# Patient Record
Sex: FEMALE | Race: WHITE | Employment: OTHER | URBAN - METROPOLITAN AREA
[De-identification: names, ages, dates, MRNs, and addresses within clinical notes are randomized per-mention and may not be internally consistent; named-entity substitution may affect disease eponyms.]

---

## 2017-01-02 ENCOUNTER — HOSPITAL ENCOUNTER (EMERGENCY)
Age: 77
Discharge: HOME | End: 2017-01-03
Attending: EMERGENCY MEDICINE
Payer: MEDICARE

## 2017-01-02 ENCOUNTER — APPOINTMENT (OUTPATIENT)
Dept: GENERAL RADIOLOGY | Age: 77
End: 2017-01-02
Attending: EMERGENCY MEDICINE
Payer: MEDICARE

## 2017-01-02 ENCOUNTER — APPOINTMENT (OUTPATIENT)
Dept: CT IMAGING | Age: 77
End: 2017-01-02
Attending: EMERGENCY MEDICINE
Payer: MEDICARE

## 2017-01-02 DIAGNOSIS — R53.1 GENERAL WEAKNESS: ICD-10-CM

## 2017-01-02 DIAGNOSIS — M79.10 MYALGIA: Primary | ICD-10-CM

## 2017-01-02 DIAGNOSIS — Z87.448: ICD-10-CM

## 2017-01-02 LAB
ALBUMIN SERPL BCP-MCNC: 3 G/DL (ref 3.5–5)
ALBUMIN/GLOB SERPL: 0.6 {RATIO} (ref 1.1–2.2)
ALP SERPL-CCNC: 68 U/L (ref 45–117)
ALT SERPL-CCNC: 20 U/L (ref 12–78)
ANION GAP BLD CALC-SCNC: 14 MMOL/L (ref 5–15)
APPEARANCE UR: CLEAR
AST SERPL W P-5'-P-CCNC: 15 U/L (ref 15–37)
BACTERIA URNS QL MICRO: NEGATIVE /HPF
BASOPHILS # BLD AUTO: 0 K/UL
BASOPHILS # BLD: 0 %
BILIRUB SERPL-MCNC: 0.6 MG/DL (ref 0.2–1)
BILIRUB UR QL: NEGATIVE
BUN SERPL-MCNC: 29 MG/DL (ref 6–20)
BUN/CREAT SERPL: 13 (ref 12–20)
CALCIUM SERPL-MCNC: 10 MG/DL (ref 8.5–10.1)
CHLORIDE SERPL-SCNC: 100 MMOL/L (ref 97–108)
CK MB CFR SERPL CALC: ABNORMAL % (ref 0–2.5)
CK MB SERPL-MCNC: <1 NG/ML (ref 5–25)
CK SERPL-CCNC: 13 U/L (ref 26–192)
CO2 SERPL-SCNC: 24 MMOL/L (ref 21–32)
COLOR UR: ABNORMAL
CREAT SERPL-MCNC: 2.15 MG/DL (ref 0.55–1.02)
CRP SERPL-MCNC: 20.1 MG/DL (ref 0–0.6)
EOSINOPHIL # BLD: 0.3 K/UL
EOSINOPHIL NFR BLD: 2 %
EPITH CASTS URNS QL MICRO: ABNORMAL /LPF
ERYTHROCYTE [DISTWIDTH] IN BLOOD BY AUTOMATED COUNT: 16.5 % (ref 11.5–14.5)
ERYTHROCYTE [SEDIMENTATION RATE] IN BLOOD: 127 MM/HR (ref 0–30)
GLOBULIN SER CALC-MCNC: 4.8 G/DL (ref 2–4)
GLUCOSE SERPL-MCNC: 106 MG/DL (ref 65–100)
GLUCOSE UR STRIP.AUTO-MCNC: NEGATIVE MG/DL
HCT VFR BLD AUTO: 29.5 % (ref 35–47)
HGB BLD-MCNC: 9.7 G/DL (ref 11.5–16)
HGB UR QL STRIP: NEGATIVE
HYALINE CASTS URNS QL MICRO: ABNORMAL /LPF (ref 0–5)
KETONES UR QL STRIP.AUTO: NEGATIVE MG/DL
LACTATE SERPL-SCNC: 1 MMOL/L (ref 0.4–2)
LEUKOCYTE ESTERASE UR QL STRIP.AUTO: NEGATIVE
LIPASE SERPL-CCNC: 92 U/L (ref 73–393)
LYMPHOCYTES # BLD AUTO: 16 %
LYMPHOCYTES # BLD: 2.2 K/UL
MAGNESIUM SERPL-MCNC: 1.9 MG/DL (ref 1.6–2.4)
MCH RBC QN AUTO: 29.6 PG (ref 26–34)
MCHC RBC AUTO-ENTMCNC: 32.9 G/DL (ref 30–36.5)
MCV RBC AUTO: 89.9 FL (ref 80–99)
METAMYELOCYTES NFR BLD MANUAL: 2 %
MONOCYTES # BLD: 1.1 K/UL
MONOCYTES NFR BLD AUTO: 8 %
NEUTS BAND NFR BLD MANUAL: 5 %
NEUTS SEG # BLD: 9.9 K/UL
NEUTS SEG NFR BLD AUTO: 67 %
NITRITE UR QL STRIP.AUTO: NEGATIVE
PH UR STRIP: 6.5 [PH] (ref 5–8)
PLATELET # BLD AUTO: 296 K/UL (ref 150–400)
POTASSIUM SERPL-SCNC: 3.2 MMOL/L (ref 3.5–5.1)
PROT SERPL-MCNC: 7.8 G/DL (ref 6.4–8.2)
PROT UR STRIP-MCNC: 30 MG/DL
RBC # BLD AUTO: 3.28 M/UL (ref 3.8–5.2)
RBC #/AREA URNS HPF: ABNORMAL /HPF (ref 0–5)
RBC MORPH BLD: ABNORMAL
SODIUM SERPL-SCNC: 138 MMOL/L (ref 136–145)
SP GR UR REFRACTOMETRY: 1.01 (ref 1–1.03)
TROPONIN I SERPL-MCNC: <0.04 NG/ML
UA: UC IF INDICATED,UAUC: ABNORMAL
UROBILINOGEN UR QL STRIP.AUTO: 1 EU/DL (ref 0.2–1)
WBC # BLD AUTO: 13.8 K/UL (ref 3.6–11)
WBC MORPH BLD: ABNORMAL
WBC URNS QL MICRO: ABNORMAL /HPF (ref 0–4)

## 2017-01-02 PROCEDURE — 83605 ASSAY OF LACTIC ACID: CPT | Performed by: EMERGENCY MEDICINE

## 2017-01-02 PROCEDURE — 81001 URINALYSIS AUTO W/SCOPE: CPT | Performed by: EMERGENCY MEDICINE

## 2017-01-02 PROCEDURE — 87086 URINE CULTURE/COLONY COUNT: CPT | Performed by: EMERGENCY MEDICINE

## 2017-01-02 PROCEDURE — 87040 BLOOD CULTURE FOR BACTERIA: CPT | Performed by: EMERGENCY MEDICINE

## 2017-01-02 PROCEDURE — 82550 ASSAY OF CK (CPK): CPT | Performed by: EMERGENCY MEDICINE

## 2017-01-02 PROCEDURE — 74011250636 HC RX REV CODE- 250/636: Performed by: EMERGENCY MEDICINE

## 2017-01-02 PROCEDURE — 86140 C-REACTIVE PROTEIN: CPT | Performed by: EMERGENCY MEDICINE

## 2017-01-02 PROCEDURE — 74176 CT ABD & PELVIS W/O CONTRAST: CPT

## 2017-01-02 PROCEDURE — 84484 ASSAY OF TROPONIN QUANT: CPT | Performed by: EMERGENCY MEDICINE

## 2017-01-02 PROCEDURE — 83690 ASSAY OF LIPASE: CPT | Performed by: EMERGENCY MEDICINE

## 2017-01-02 PROCEDURE — 80053 COMPREHEN METABOLIC PANEL: CPT | Performed by: EMERGENCY MEDICINE

## 2017-01-02 PROCEDURE — 93005 ELECTROCARDIOGRAM TRACING: CPT

## 2017-01-02 PROCEDURE — 36415 COLL VENOUS BLD VENIPUNCTURE: CPT | Performed by: EMERGENCY MEDICINE

## 2017-01-02 PROCEDURE — 96360 HYDRATION IV INFUSION INIT: CPT

## 2017-01-02 PROCEDURE — 85652 RBC SED RATE AUTOMATED: CPT | Performed by: EMERGENCY MEDICINE

## 2017-01-02 PROCEDURE — 71020 XR CHEST PA LAT: CPT

## 2017-01-02 PROCEDURE — 85025 COMPLETE CBC W/AUTO DIFF WBC: CPT | Performed by: EMERGENCY MEDICINE

## 2017-01-02 PROCEDURE — 83735 ASSAY OF MAGNESIUM: CPT | Performed by: EMERGENCY MEDICINE

## 2017-01-02 PROCEDURE — 74011636320 HC RX REV CODE- 636/320: Performed by: EMERGENCY MEDICINE

## 2017-01-02 PROCEDURE — 99285 EMERGENCY DEPT VISIT HI MDM: CPT

## 2017-01-02 RX ORDER — SODIUM CHLORIDE 0.9 % (FLUSH) 0.9 %
5-10 SYRINGE (ML) INJECTION EVERY 8 HOURS
Status: DISCONTINUED | OUTPATIENT
Start: 2017-01-02 | End: 2017-01-03 | Stop reason: HOSPADM

## 2017-01-02 RX ORDER — SODIUM CHLORIDE 0.9 % (FLUSH) 0.9 %
5-10 SYRINGE (ML) INJECTION AS NEEDED
Status: DISCONTINUED | OUTPATIENT
Start: 2017-01-02 | End: 2017-01-03 | Stop reason: HOSPADM

## 2017-01-02 RX ADMIN — DIATRIZOATE MEGLUMINE AND DIATRIZOATE SODIUM 30 ML: 600; 100 SOLUTION ORAL; RECTAL at 22:08

## 2017-01-02 RX ADMIN — SODIUM CHLORIDE 1000 ML: 900 INJECTION, SOLUTION INTRAVENOUS at 21:58

## 2017-01-03 VITALS
WEIGHT: 103.84 LBS | SYSTOLIC BLOOD PRESSURE: 137 MMHG | RESPIRATION RATE: 25 BRPM | HEART RATE: 95 BPM | OXYGEN SATURATION: 93 % | BODY MASS INDEX: 20.93 KG/M2 | HEIGHT: 59 IN | DIASTOLIC BLOOD PRESSURE: 65 MMHG | TEMPERATURE: 99.4 F

## 2017-01-03 LAB
ATRIAL RATE: 111 BPM
CALCULATED P AXIS, ECG09: 22 DEGREES
CALCULATED R AXIS, ECG10: -16 DEGREES
CALCULATED T AXIS, ECG11: 23 DEGREES
DIAGNOSIS, 93000: NORMAL
P-R INTERVAL, ECG05: 156 MS
Q-T INTERVAL, ECG07: 304 MS
QRS DURATION, ECG06: 68 MS
QTC CALCULATION (BEZET), ECG08: 413 MS
VENTRICULAR RATE, ECG03: 111 BPM

## 2017-01-03 NOTE — ED NOTES
Discharge instructions reviewed with pt and copy given along with RX by ER MD Melina Sarah. Pt ambulatory from ED accompanied by spouse in no sign of distress.

## 2017-01-03 NOTE — ED NOTES
Patient presents to ED with C/O fevers, SOB, fatigue, left side abd tenderness, and bilateral leg pain that started 1 month ago. The pt stated she gets SOB when ambulating to the mailbox. The pt stated she was seen in this ED on Saturday night fore the same symptoms. The pt denies chest pain, dizziness, N/V, diarrhea, constipation, and urinary symptoms. Patient is A&Ox3, side rails up, call bell w/in reach, and aware of plan of care. The patient is in NAD.  at the bedside.

## 2017-01-03 NOTE — DISCHARGE INSTRUCTIONS
Fatigue: Care Instructions  Your Care Instructions  Fatigue is a feeling of tiredness, exhaustion, or lack of energy. You may feel fatigue because of too much or not enough activity. It can also come from stress, lack of sleep, boredom, and poor diet. Many medical problems, such as viral infections, can cause fatigue. Emotional problems, especially depression, are often the cause of fatigue. Fatigue is most often a symptom of another problem. Treatment for fatigue depends on the cause. For example, if you have fatigue because you have a certain health problem, treating this problem also treats your fatigue. If depression or anxiety is the cause, treatment may help. Follow-up care is a key part of your treatment and safety. Be sure to make and go to all appointments, and call your doctor if you are having problems. It's also a good idea to know your test results and keep a list of the medicines you take. How can you care for yourself at home? · Get regular exercise. But don't overdo it. Go back and forth between rest and exercise. · Get plenty of rest.  · Eat a healthy diet. Do not skip meals, especially breakfast.  · Reduce your use of caffeine, tobacco, and alcohol. Caffeine is most often found in coffee, tea, cola drinks, and chocolate. · Limit medicines that can cause fatigue. This includes tranquilizers and cold and allergy medicines. When should you call for help? Watch closely for changes in your health, and be sure to contact your doctor if:  · You have new symptoms such as fever or a rash. · Your fatigue gets worse. · You have been feeling down, depressed, or hopeless. Or you may have lost interest in things that you usually enjoy. · You are not getting better as expected. Where can you learn more? Go to http://roel-lonnie.info/. Enter J938 in the search box to learn more about \"Fatigue: Care Instructions. \"  Current as of: May 27, 2016  Content Version: 11.1  © 7015-7346 [employer], [employer]. Care instructions adapted under license by [employer] (which disclaims liability or warranty for this information). If you have questions about a medical condition or this instruction, always ask your healthcare professional. [address] any warranty or liability for your use of this information. Dr. Reyna Davila feels that we should not restart your prednisone just yet and would prefer to have Dr. Mora Mcgregor help decide the next steps in your treatment. Based off labs tonight and your last ED visit it is likely that you are having a re-occurrence of your Acute Interstitial Nephritis, if things continue to escalate please return to the ED and we will likely need to admit you for further work-up, but based off current labs and studies you do not require inpatient admission at this time and can be followed up as an outpatient with Dr. Mora Mcgregor to help discuss the next steps in your care.

## 2017-01-03 NOTE — ED PROVIDER NOTES
HPI Comments: Rach Traylor is a 64 y.o. female with PMhx significant for HTN and ARF who presents ambulatory to the ED with cc of increased fatigue, fever x 2 days, abdominal pain x 2 days, rhinorrhea, SAMPSON, and bilateral lower extremity pain and weakness upon ambulation. Pt also notes decreased appetite secondary to symptoms. Pt was evaluated in the ED on 2016 for similar symptoms with noted worsened creatinine. Pt was told to discontinue her lisinopril and double her dose of amlodipine at that time. She states that she has been compliant with medication changes but notes onset of fever and worsening of symptoms after leaving the ED. She denies any changes in her SOB with position. She denies taking any medications for relief of symptoms or any other relieving or exacerbating factors. Pt specifically denies any cough, urinary frequency, hematuria, dysuria, nausea, vomiting, diarrhea, congestion, headache, ear pain, sore throat, sinus pressure, or leg swelling.     Neprhologist: Dr. Savannah Avendaño  PCP: Cookie Shipman MD     There are no other complaints, changes or physical findings at this time. The history is provided by the patient and the spouse. No  was used.         Past Medical History:   Diagnosis Date    Arthritis     Gastrointestinal disorder      GERD    GERD (gastroesophageal reflux disease)     Glaucoma     High cholesterol     Hypertension     Ill-defined condition      chronic cystitis    Kidney failure 10/14/2016       Past Surgical History:   Procedure Laterality Date    Hx tonsillectomy      Hx urological       cauterization of bladder ulcers    Hx gyn           Hx hysterectomy      Hx gyn           Family History:   Problem Relation Age of Onset    Cancer Father     Cancer Brother        Social History     Social History    Marital status:      Spouse name: N/A    Number of children: N/A    Years of education: N/A     Occupational History    Not on file. Social History Main Topics    Smoking status: Former Smoker     Packs/day: 0.50     Years: 4.00     Quit date: 7/29/1997    Smokeless tobacco: Not on file    Alcohol use Yes      Comment: seldom    Drug use: Yes     Special: Prescription    Sexual activity: Not on file     Other Topics Concern    Not on file     Social History Narrative    ** Merged History Encounter **              ALLERGIES: Review of patient's allergies indicates no known allergies. Review of Systems   Constitutional: Positive for appetite change and fever. Negative for chills and fatigue. HENT: Positive for rhinorrhea. Negative for congestion, ear pain, sinus pressure and sore throat. Eyes: Negative. Respiratory: Positive for shortness of breath. Negative for cough, choking, chest tightness and wheezing. Cardiovascular: Negative. Negative for chest pain, palpitations and leg swelling. Gastrointestinal: Positive for abdominal pain. Negative for constipation, diarrhea, nausea and vomiting. Endocrine: Negative. Genitourinary: Negative. Negative for difficulty urinating, dysuria, flank pain, hematuria and urgency. Musculoskeletal: Positive for myalgias. Skin: Negative. Neurological: Positive for weakness. Negative for dizziness, speech difficulty, light-headedness, numbness and headaches. Psychiatric/Behavioral: Negative. All other systems reviewed and are negative.       Patient Vitals for the past 12 hrs:   Temp Pulse Resp BP SpO2   01/03/17 0050 - 95 25 - 93 %   01/02/17 2320 - 97 30 - 94 %   01/02/17 2300 - 97 (!) 32 132/81 97 %   01/02/17 2245 - 96 29 134/65 95 %   01/02/17 2230 - 97 30 146/69 96 %   01/02/17 2215 - (!) 102 20 132/67 97 %   01/02/17 2200 - (!) 108 21 129/69 95 %   01/02/17 2145 - (!) 103 22 126/66 93 %   01/02/17 2134 - (!) 107 22 - 95 %   01/02/17 2133 - - - 134/71 -   01/02/17 2115 - (!) 105 16 123/62 95 %   01/02/17 2100 - (!) 101 18 117/59 93 %   01/02/17 2050 - - - 114/64 95 %   01/02/17 2046 - (!) 107 20 - 94 %   01/02/17 2030 - (!) 103 23 147/65 97 %   01/02/17 2015 - (!) 104 16 153/75 100 %   01/02/17 2013 - (!) 105 25 123/83 97 %   01/02/17 1814 99.4 °F (37.4 °C) (!) 123 18 123/79 96 %            Physical Exam   Constitutional: She is oriented to person, place, and time. She appears well-developed and well-nourished. No distress. HENT:   Head: Normocephalic and atraumatic. Mouth/Throat: Oropharynx is clear and moist. No oropharyngeal exudate. Eyes: Conjunctivae and EOM are normal. Pupils are equal, round, and reactive to light. Neck: Normal range of motion. Neck supple. No JVD present. No tracheal deviation present. Cardiovascular: Regular rhythm, normal heart sounds and intact distal pulses. No murmur heard. Tachycardic     Pulmonary/Chest: Effort normal and breath sounds normal. No stridor. No respiratory distress. She has no wheezes. She has no rales. She exhibits no tenderness. Abdominal: Soft. She exhibits no distension. There is no tenderness. There is no rebound and no guarding. Musculoskeletal: Normal range of motion. She exhibits no edema or tenderness. Neurological: She is alert and oriented to person, place, and time. No cranial nerve deficit. No gross motor or sensory deficits    Skin: Skin is warm and dry. She is not diaphoretic. Psychiatric: She has a normal mood and affect. Her behavior is normal.   Nursing note and vitals reviewed.        MDM  Number of Diagnoses or Management Options  Diagnosis management comments: DDx: colitis, diverticulitis, UTI, viral illness, dehydration       Amount and/or Complexity of Data Reviewed  Clinical lab tests: ordered and reviewed  Tests in the radiology section of CPT®: ordered and reviewed  Tests in the medicine section of CPT®: ordered and reviewed  Obtain history from someone other than the patient: yes  Review and summarize past medical records: yes  Discuss the patient with other providers: yes (Nephrology)  Independent visualization of images, tracings, or specimens: yes    Patient Progress  Patient progress: stable      Procedures     ED EKG interpretation:  Rhythm: sinus tachycardia; and regular . Rate (approx.): 111; Axis: normal; P wave: normal; QRS interval: normal ; ST/T wave: normal;  This EKG was interpreted by Rico Garcia DO,ED Provider. Progress Note:  10:55 PM  Discussed elevated SED rate with the pt. Concern for possible polymyalgia or viral myositis. However, CK is not elevated to suggest viral myositis. Will restart pt on high dose steroids with close follow up with Dr. Caleb Cristobal to discuss further treatment. CONSULT NOTE:   12:02 AM   Rico Garcia DO spoke with Isaac Sharp MD,   Specialty: Nephrology  Discussed pt's hx, disposition, and available diagnostic and imaging results. Reviewed care plans. Consultant agrees with plans as outlined. Dr. Renetta Salcedo advises that he thinks pt is having a recurrence of her AIN and would not start her on steroids at this time and what pt needs is to f/u with Dr. Caleb Cristobal in office and discuss starting steroids with him. He notes Dr. Caleb Cristobal is out of town until next week and so he will send a message to Dr. Judith Gerard nurse tomorrow to get pt a clinic appointment as soon as possible. He further recommends that if pt's sxs worsen she should return to the ED and be admitted for further eval/management  Written by Nicole Miranda.  Fu, ED Scribe, as dictated by Rico Garcia DO.      LABORATORY TESTS:  Recent Results (from the past 12 hour(s))   EKG, 12 LEAD, INITIAL    Collection Time: 01/02/17  6:15 PM   Result Value Ref Range    Ventricular Rate 111 BPM    Atrial Rate 111 BPM    P-R Interval 156 ms    QRS Duration 68 ms    Q-T Interval 304 ms    QTC Calculation (Bezet) 413 ms    Calculated P Axis 22 degrees    Calculated R Axis -16 degrees    Calculated T Axis 23 degrees    Diagnosis       Sinus tachycardia  When compared with ECG of 31-DEC-2016 17:05,  No significant change was found     CBC WITH AUTOMATED DIFF    Collection Time: 01/02/17  8:50 PM   Result Value Ref Range    WBC 13.8 (H) 3.6 - 11.0 K/uL    RBC 3.28 (L) 3.80 - 5.20 M/uL    HGB 9.7 (L) 11.5 - 16.0 g/dL    HCT 29.5 (L) 35.0 - 47.0 %    MCV 89.9 80.0 - 99.0 FL    MCH 29.6 26.0 - 34.0 PG    MCHC 32.9 30.0 - 36.5 g/dL    RDW 16.5 (H) 11.5 - 14.5 %    PLATELET 117 253 - 853 K/uL    NEUTROPHILS 67 %    BANDS 5 %    LYMPHOCYTES 16 %    MONOCYTES 8 %    EOSINOPHILS 2 %    BASOPHILS 0 %    METAMYELOCYTES 2 %    ABS. NEUTROPHILS 9.9 K/UL    ABS. LYMPHOCYTES 2.2 K/UL    ABS. MONOCYTES 1.1 K/UL    ABS. EOSINOPHILS 0.3 K/UL    ABS. BASOPHILS 0.0 K/UL    RBC COMMENTS ANISOCYTOSIS  1+        WBC COMMENTS TOXIC GRANULATION     METABOLIC PANEL, COMPREHENSIVE    Collection Time: 01/02/17  8:50 PM   Result Value Ref Range    Sodium 138 136 - 145 mmol/L    Potassium 3.2 (L) 3.5 - 5.1 mmol/L    Chloride 100 97 - 108 mmol/L    CO2 24 21 - 32 mmol/L    Anion gap 14 5 - 15 mmol/L    Glucose 106 (H) 65 - 100 mg/dL    BUN 29 (H) 6 - 20 MG/DL    Creatinine 2.15 (H) 0.55 - 1.02 MG/DL    BUN/Creatinine ratio 13 12 - 20      GFR est AA 27 (L) >60 ml/min/1.73m2    GFR est non-AA 22 (L) >60 ml/min/1.73m2    Calcium 10.0 8.5 - 10.1 MG/DL    Bilirubin, total 0.6 0.2 - 1.0 MG/DL    ALT 20 12 - 78 U/L    AST 15 15 - 37 U/L    Alk.  phosphatase 68 45 - 117 U/L    Protein, total 7.8 6.4 - 8.2 g/dL    Albumin 3.0 (L) 3.5 - 5.0 g/dL    Globulin 4.8 (H) 2.0 - 4.0 g/dL    A-G Ratio 0.6 (L) 1.1 - 2.2     CK W/ CKMB & INDEX    Collection Time: 01/02/17  8:50 PM   Result Value Ref Range    CK 13 (L) 26 - 192 U/L    CK - MB <1.0 <3.6 NG/ML    CK-MB Index CANNOT BE CALCULATED 0 - 2.5     TROPONIN I    Collection Time: 01/02/17  8:50 PM   Result Value Ref Range    Troponin-I, Qt. <0.04 <0.05 ng/mL   LIPASE    Collection Time: 01/02/17  8:50 PM   Result Value Ref Range    Lipase 92 73 - 393 U/L   MAGNESIUM    Collection Time: 01/02/17  8:50 PM   Result Value Ref Range    Magnesium 1.9 1.6 - 2.4 mg/dL   URINALYSIS W/ REFLEX CULTURE    Collection Time: 01/02/17  8:50 PM   Result Value Ref Range    Color YELLOW/STRAW      Appearance CLEAR CLEAR      Specific gravity 1.014 1.003 - 1.030      pH (UA) 6.5 5.0 - 8.0      Protein 30 (A) NEG mg/dL    Glucose NEGATIVE  NEG mg/dL    Ketone NEGATIVE  NEG mg/dL    Bilirubin NEGATIVE  NEG      Blood NEGATIVE  NEG      Urobilinogen 1.0 0.2 - 1.0 EU/dL    Nitrites NEGATIVE  NEG      Leukocyte Esterase NEGATIVE  NEG      WBC 5-10 0 - 4 /hpf    RBC 0-5 0 - 5 /hpf    Epithelial cells FEW FEW /lpf    Bacteria NEGATIVE  NEG /hpf    UA:UC IF INDICATED URINE CULTURE ORDERED (A) CNI      Hyaline Cast 0-2 0 - 5 /lpf   SED RATE (ESR)    Collection Time: 01/02/17  8:50 PM   Result Value Ref Range    Sed rate, automated 127 (H) 0 - 30 mm/hr   C REACTIVE PROTEIN, QT    Collection Time: 01/02/17  8:50 PM   Result Value Ref Range    C-Reactive protein 20.10 (H) 0.00 - 0.60 mg/dL   LACTIC ACID, PLASMA    Collection Time: 01/02/17  8:52 PM   Result Value Ref Range    Lactic acid 1.0 0.4 - 2.0 MMOL/L       IMAGING RESULTS:  CT ABD PELV WO CONT   Final Result     INDICATION: ABD LLQ, will get PO contrast      EXAM: CT Abdomen and CT Pelvis without contrast.  CT dose reduction was achieved through use of a standardized protocol tailored  for this examination and automatic exposure control for dose modulation.     FINDINGS:   There are nonobstructing left intrarenal stones. There is no  hydroureteronephrosis. The kidneys are normal in size. There is no perirenal  fluid or ascites.      There are gallstones without apparent inflammation. Liver shows no apparent  significant finding without contrast. Pancreas, adrenal glands, spleen and aorta  show no significant enlargement. No focal inflammation is seen. There is no  free air or substantial adenopathy.     The bladder is midline and the distal ureters are not dilated. There is no  apparent pelvic mass. Appendix is not seen. There are a few colonic diverticula. There is mild to moderate stool.     IMPRESSION  IMPRESSION: No Acute Disease. XR CHEST PA LAT   Final Result     INDICATION: fatigue, fever     EXAM: CXR 2 Views.     COMPARISON: 12/31/2016.     FINDINGS: Frontal and lateral views of the chest show the lungs are free of  acute disease. Heart size is normal. There is no overt pulmonary edema. There is  no evident pneumothorax, adenopathy or pleural effusion.         IMPRESSION  IMPRESSION: No acute disease. No significant interval change. MEDICATIONS GIVEN:  Medications   sodium chloride (NS) flush 5-10 mL (not administered)   sodium chloride (NS) flush 5-10 mL (not administered)   sodium chloride 0.9 % bolus infusion 1,000 mL (1,000 mL IntraVENous New Bag 1/2/17 9282)   diatrizoate meglumine-d.sodium (MD-GASTROVIEW,GASTROGRAFIN) 66-10 % contrast solution 30 mL (30 mL Oral Given 1/2/17 8240)       IMPRESSION:  1. Myalgia    2. General weakness    3. History of interstitial nephritis        PLAN:  1. Current Discharge Medication List      CONTINUE these medications which have NOT CHANGED    Details   amLODIPine (NORVASC) 10 mg tablet Take 1 Tab by mouth daily. Qty: 30 Tab, Refills: 0      famotidine (PEPCID) 20 mg tablet Take 1 Tab by mouth two (2) times a day. Indications: GASTROESOPHAGEAL REFLUX  Qty: 180 Tab, Refills: 1      latanoprost (XALATAN) 0.005 % ophthalmic solution Administer 1 Drop to both eyes daily. multivitamin (ONE A DAY) tablet Take 2 Tabs by mouth daily. cholecalciferol, vitamin D3, (VITAMIN D3) 2,000 unit tab Take 1 Tab by mouth daily. Associated Diagnoses: Osteopenia      lovastatin (MEVACOR) 40 mg tablet Take 1 Tab by mouth daily. Qty: 90 Tab, Refills: 3    Associated Diagnoses: Other and unspecified hyperlipidemia      Aspirin, Buffered 81 mg tab Take 1 Tab by mouth daily.       timolol (BETIMOL) 0.25 % ophthalmic solution Administer 2 Drops to both eyes two (2) times a day. use in affected eye(s)       vitamin c-vitamin e (CRANBERRY CONCENTRATE) cap Take 1 Cap by mouth two (2) times a day. 2.   Follow-up Information     Follow up With Details Comments [id number] Socrates Borrero MD   [address] [facility]  [address] [facility] [address] [facility]      MD Dr. Jess Sotelo is who I spoke with this evening  Fercho Shaun [address] [place] [address]  [address] [phone]          Return to ED if worse     DISCHARGE NOTE  12:57 AM  Pt has been re-evaluated. She has no new complaints, changes, or physical findings. All diagnostic results have been reviewed and discussed with the pt. Care plan has been outlined and discussed, and she understands all current sx, dx, tx, and rx. All of the pt's questions have been answered and concerns addressed. All medications have been reviewed with the pt. She was instructed to and agrees to follow up with her PCP and nephrologist, or to return to the ED should her sxs worsen prior to follow up. Thuy Severino is ready for discharge. This note is prepared by Baudilio Simons, acting as Scribe for Sabine De Leon, [facility], DO : The scribe's documentation has been prepared under my direction and personally reviewed by me in its entirety. I confirm that the note above accurately reflects all work, treatment, procedures, and medical decision making performed by me. Excerpt from pt's discharge instructions:   Dr. Jess Masterson feels that we should not restart your prednisone just yet and would prefer to have Dr. Lorie Gilford help decide the next steps in your treatment.   Based off labs tonight and your last ED visit it is likely that you are having a re-occurrence of your Acute Interstitial Nephritis, if things continue to escalate please return to the ED and we will likely need to admit you for further work-up, but based off current labs and studies you do not require inpatient admission at this time and can be followed up as an outpatient with Dr. Ace Keen to help discuss the next steps in your care.

## 2017-01-04 LAB
BACTERIA SPEC CULT: NORMAL
CC UR VC: NORMAL
SERVICE CMNT-IMP: NORMAL

## 2017-01-07 LAB
BACTERIA SPEC CULT: NORMAL
SERVICE CMNT-IMP: NORMAL

## 2017-01-08 RX ORDER — LOVASTATIN 40 MG/1
40 TABLET ORAL DAILY
Qty: 90 TAB | Refills: 1 | Status: CANCELLED | OUTPATIENT
Start: 2017-01-08

## 2017-01-09 ENCOUNTER — OFFICE VISIT (OUTPATIENT)
Dept: INTERNAL MEDICINE CLINIC | Age: 77
End: 2017-01-09

## 2017-01-09 VITALS
WEIGHT: 102.2 LBS | TEMPERATURE: 97.5 F | BODY MASS INDEX: 20.6 KG/M2 | SYSTOLIC BLOOD PRESSURE: 121 MMHG | DIASTOLIC BLOOD PRESSURE: 72 MMHG | OXYGEN SATURATION: 99 % | RESPIRATION RATE: 16 BRPM | HEIGHT: 59 IN | HEART RATE: 98 BPM

## 2017-01-09 DIAGNOSIS — M79.10 MUSCLE PAIN: Primary | ICD-10-CM

## 2017-01-09 RX ORDER — FAMOTIDINE 20 MG/1
20 TABLET, FILM COATED ORAL 2 TIMES DAILY
Qty: 180 TAB | Refills: 1 | Status: SHIPPED | OUTPATIENT
Start: 2017-01-09 | End: 2017-04-07 | Stop reason: SDUPTHER

## 2017-01-09 NOTE — PROGRESS NOTES
She is a 64 y.o. female who presents for follow up on medications. In with . Seen in ED on  with muscle aches and weakness. She reports both legs are very achy with exertion. Feels generally weak with muscle soreness. She has been on mevacor for years without difficulty. Labs in ED normal except creatinine 2.15, increased from 2.05 Dec 31. Sees Dr. Светлана Mclaughlin, nephrology. Avoids NSAIDS, increase water intake. ROS:  Constitutional: negative for fevers, chills, anorexia and weight loss  Eyes:   negative for visual disturbance,  irritation  ENT:   negative for tinnitus,sore throat,nasal congestion,ear pain, sinus pain. Respiratory:  negative for cough, chest congestion, dyspnea,wheezing  CV:   negative for chest pain, palpitations, lower extremity edema  GI:   negative for nausea, vomiting, diarrhea, abdominal pain,melena  Genitourinary: negative for frequency, dysuria, hematuria  Musculoskel: negative for arthralgias, back pain, muscle weakness, joint pain, positive for muscle pain  Neurological:  negative for headaches, dizziness, focal weakness, numbness      Past Medical History   Diagnosis Date    Arthritis     Gastrointestinal disorder      GERD    GERD (gastroesophageal reflux disease)     Glaucoma     High cholesterol     Hypertension     Ill-defined condition      chronic cystitis    Kidney failure 10/14/2016       Past Surgical History   Procedure Laterality Date    Hx tonsillectomy      Hx urological       cauterization of bladder ulcers    Hx gyn           Hx hysterectomy      Hx gyn         Family History   Problem Relation Age of Onset    Cancer Father     Cancer Brother        Social History     Social History    Marital status:      Spouse name: N/A    Number of children: N/A    Years of education: N/A     Occupational History    Not on file.      Social History Main Topics    Smoking status: Former Smoker     Packs/day: 0.50 Years: 4.00     Quit date: 7/29/1997    Smokeless tobacco: Not on file    Alcohol use Yes      Comment: seldom    Drug use: Yes     Special: Prescription    Sexual activity: Not on file     Other Topics Concern    Not on file     Social History Narrative    ** Merged History Encounter **              Current Outpatient Prescriptions:     lovastatin (MEVACOR) 40 mg tablet, Take 1 Tab by mouth daily. , Disp: 90 Tab, Rfl: 1    famotidine (PEPCID) 20 mg tablet, Take 1 Tab by mouth two (2) times a day. Indications: GASTROESOPHAGEAL REFLUX, Disp: 180 Tab, Rfl: 1    amLODIPine (NORVASC) 10 mg tablet, Take 1 Tab by mouth daily. , Disp: 30 Tab, Rfl: 0    latanoprost (XALATAN) 0.005 % ophthalmic solution, Administer 1 Drop to both eyes daily. , Disp: , Rfl:     multivitamin (ONE A DAY) tablet, Take 2 Tabs by mouth daily. , Disp: , Rfl:     cholecalciferol, vitamin D3, (VITAMIN D3) 2,000 unit tab, Take 1 Tab by mouth daily. , Disp: , Rfl:     vitamin c-vitamin e (CRANBERRY CONCENTRATE) cap, Take 1 Cap by mouth two (2) times a day., Disp: , Rfl:     Aspirin, Buffered 81 mg tab, Take 1 Tab by mouth daily. , Disp: , Rfl:     timolol (BETIMOL) 0.25 % ophthalmic solution, Administer 2 Drops to both eyes two (2) times a day. use in affected eye(s) , Disp: , Rfl:        Visit Vitals    /72 (BP 1 Location: Left arm, BP Patient Position: Sitting)    Pulse 98    Temp 97.5 °F (36.4 °C) (Oral)    Resp 16    Ht 4' 11\" (1.499 m)    Wt 102 lb 3.2 oz (46.4 kg)    SpO2 99%    BMI 20.64 kg/m2       Physical Examination:   General - Well appearing female  Pulm - clear to auscultation bilaterally  Cardio - RRR, normal S1 S2  Abd - soft, nontender, no masses, no HSM  Extrem - no edema, +2 distal pulses  Neuro- alert, cautious steady gait. Assessment/Plan:    1. Muscle pain-stop lovastatin. Call if symptoms persist. Increase water intake, increase potassium and magnesium in diet.        Follow-up Disposition:  Return in about 3 months (around 4/9/2017) for Alissa Mario.  Vaishnavi Aguiar MD

## 2017-01-09 NOTE — PROGRESS NOTES
Reviewed record in preparation for visit and have obtained necessary documentation. Identified pt with two pt identifiers(name and ). Chief Complaint   Patient presents with   [facility] Follow Up     seen in ED 16 pt states still feeling lethargic c/o of no appetite, HA, bilateral leg burning pain          Coordination of Care Questionnaire:  :     1) Have you been to an emergency room, urgent care clinic since your last visit? Yes 16 ED [facility] ED    Hospitalized since your last visit? no             2) Have you seen or consulted any other health care providers outside of [address] [place] [address] since your last visit? no  (Include any pap smears or colon screenings in this section.)      Patient is accompanied by spouse I have received verbal consent from Rach Traylor to discuss any/all medical information while they are present in the room.

## 2017-01-09 NOTE — TELEPHONE ENCOUNTER
From: Jani Haywood  To: Chely Rizo MD  Sent: 1/8/2017 10:08 PM EST  Subject: Medication Renewal Request    Original authorizing provider: MD Jani Noland would like a refill of the following medications:  lovastatin (MEVACOR) 40 mg tablet Chely Rizo MD]  famotidine (PEPCID) 20 mg tablet Chely Rizo MD]    [facility] [address]    Comment:

## 2017-01-09 NOTE — PATIENT INSTRUCTIONS
STOP THE LOVASTATIN FOR THE NEXT 2 WEEKS TO SEE IF THE MUSCLE PAIN RESOLVES. IF IT DOES, CONTINUE OFF THE MEDICATION. IF IT DOES NOT, LET ME KNOW.

## 2017-01-09 NOTE — MR AVS SNAPSHOT
Visit Information Date & Time Provider Department Dept. Phone Encounter #  
 1/9/2017 11:45 AM Mery Garner, [address] [id number] Follow-up Instructions Return in about 3 months (around 4/9/2017) for Alissa MarioSegun Viktoriya Inocencio Your Appointments 2/7/2017 10:30 AM  
ROUTINE CARE with Mery Garner MD  
[facility] [address]) Appt Note: 3 month follow up meds [address] [facility] [phone]  
[address] [facility] [address] [place] [address] Upcoming Health Maintenance Date Due  
 GLAUCOMA SCREENING Q2Y 3/18/2003 OSTEOPOROSIS SCREENING (DEXA) 3/18/2003 Pneumococcal 65+ High/Highest Risk (2 of 2 - PPSV23) 1/1/2012 MEDICARE YEARLY EXAM 9/4/2016 DTaP/Tdap/Td series (2 - Td) 9/4/2025 Allergies as of 1/9/2017  Review Complete On: 1/9/2017 By: José Tong No Known Allergies Current Immunizations  Never Reviewed Name Date Influenza Vaccine (Quad) PF 10/24/2016 10:25 AM  
 Pneumococcal Vaccine (Unspecified Type) 1/1/2007 Tdap 9/4/2015  1:01 PM  
 Zoster Vaccine, Live 1/1/2013 Not reviewed this visit Vitals BP Pulse Temp Resp Height(growth percentile) Weight(growth percentile) 121/72 (BP 1 Location: Left arm, BP Patient Position: Sitting) 98 97.5 °F (36.4 °C) (Oral) 16 4' 11\" (1.499 m) 102 lb 3.2 oz (46.4 kg) SpO2 BMI OB Status Smoking Status 99% 20.64 kg/m2 Hysterectomy Former Smoker BMI and BSA Data Body Mass Index Body Surface Area  
 20.64 kg/m 2 1.39 m 2 Preferred Pharmacy Pharmacy Name Phone [facility] [address] [place] [address] [address] [facility] [address] [place] [address] [phone] Your Updated Medication List  
  
   
This list is accurate as of: 1/9/17 12:40 PM.  Always use your most recent med list. amLODIPine 10 mg tablet Commonly known as:  Wendy Jocelyn Take 1 Tab by mouth daily. aspirin, buffered 81 mg Tab Take 1 Tab by mouth daily. CRANBERRY CONCENTRATE Cap Generic drug:  vitamin c-vitamin e Take 1 Cap by mouth two (2) times a day. famotidine 20 mg tablet Commonly known as:  PEPCID Take 1 Tab by mouth two (2) times a day. Indications: GASTROESOPHAGEAL REFLUX  
  
 latanoprost 0.005 % ophthalmic solution Commonly known as:  Verta Piety Administer 1 Drop to both eyes daily. lovastatin 40 mg tablet Commonly known as:  MEVACOR Take 1 Tab by mouth daily. multivitamin tablet Commonly known as:  ONE A DAY Take 2 Tabs by mouth daily. timolol 0.25 % ophthalmic solution Commonly known as:  Se Spaniel Administer 2 Drops to both eyes two (2) times a day. use in affected eye(s) VITAMIN D3 2,000 unit Tab Generic drug:  cholecalciferol (vitamin D3) Take 1 Tab by mouth daily. Follow-up Instructions Return in about 3 months (around 4/9/2017) for Alissa Singh .  
  
  
Patient Instructions STOP THE LOVASTATIN FOR THE NEXT 2 WEEKS TO SEE IF THE MUSCLE PAIN RESOLVES. IF IT DOES, CONTINUE OFF THE MEDICATION. IF IT DOES NOT, LET ME KNOW. Introducing [facility] & HEALTH SERVICES! Dear [facility]: Thank you for requesting a [employer] account. Our records indicate that you already have an active [employer] account. You can access your account anytime at [email or web address]. "[employer] Did you know that you can access your hospital and ER discharge instructions at any time in [employer]? You can also review all of your test results from your hospital stay or ER visit. Additional Information If you have questions, please visit the Frequently Asked Questions section of the [employer] website at [email or web address]. "[employer]/. Remember, [employer] is NOT to be used for urgent needs. For medical emergencies, dial 911. Now available from your iPhone and Android! Please provide this summary of care documentation to your next provider. Your primary care clinician is listed as Aneta Calix. If you have any questions after today's visit, please call [phone].

## 2017-01-10 ENCOUNTER — APPOINTMENT (OUTPATIENT)
Dept: GENERAL RADIOLOGY | Age: 77
End: 2017-01-10
Attending: EMERGENCY MEDICINE
Payer: MEDICARE

## 2017-01-10 ENCOUNTER — HOSPITAL ENCOUNTER (EMERGENCY)
Age: 77
Discharge: HOME | End: 2017-01-10
Attending: EMERGENCY MEDICINE
Payer: MEDICARE

## 2017-01-10 VITALS
SYSTOLIC BLOOD PRESSURE: 137 MMHG | DIASTOLIC BLOOD PRESSURE: 79 MMHG | WEIGHT: 100.75 LBS | RESPIRATION RATE: 22 BRPM | OXYGEN SATURATION: 98 % | TEMPERATURE: 97.5 F | BODY MASS INDEX: 20.31 KG/M2 | HEIGHT: 59 IN | HEART RATE: 109 BPM

## 2017-01-10 DIAGNOSIS — M79.10 MUSCLE PAIN: ICD-10-CM

## 2017-01-10 DIAGNOSIS — R53.83 FATIGUE, UNSPECIFIED TYPE: Primary | ICD-10-CM

## 2017-01-10 DIAGNOSIS — E83.52 HYPERCALCEMIA: ICD-10-CM

## 2017-01-10 LAB
ALBUMIN SERPL BCP-MCNC: 2.9 G/DL (ref 3.5–5)
ALBUMIN/GLOB SERPL: 0.6 {RATIO} (ref 1.1–2.2)
ALP SERPL-CCNC: 78 U/L (ref 45–117)
ALT SERPL-CCNC: 18 U/L (ref 12–78)
ANION GAP BLD CALC-SCNC: 13 MMOL/L (ref 5–15)
APPEARANCE UR: CLEAR
AST SERPL W P-5'-P-CCNC: 12 U/L (ref 15–37)
BACTERIA URNS QL MICRO: NEGATIVE /HPF
BASOPHILS # BLD AUTO: 0.1 K/UL (ref 0–0.1)
BASOPHILS # BLD: 1 % (ref 0–1)
BILIRUB SERPL-MCNC: 0.3 MG/DL (ref 0.2–1)
BILIRUB UR QL: NEGATIVE
BNP SERPL-MCNC: 779 PG/ML (ref 0–450)
BUN SERPL-MCNC: 25 MG/DL (ref 6–20)
BUN/CREAT SERPL: 12 (ref 12–20)
CALCIUM SERPL-MCNC: 12.6 MG/DL (ref 8.5–10.1)
CHLORIDE SERPL-SCNC: 107 MMOL/L (ref 97–108)
CK SERPL-CCNC: 14 U/L (ref 26–192)
CO2 SERPL-SCNC: 21 MMOL/L (ref 21–32)
COLOR UR: ABNORMAL
CREAT SERPL-MCNC: 2.15 MG/DL (ref 0.55–1.02)
D DIMER PPP FEU-MCNC: 0.45 MG/L FEU (ref 0–0.65)
EOSINOPHIL # BLD: 0.4 K/UL (ref 0–0.4)
EOSINOPHIL NFR BLD: 4 % (ref 0–7)
EPITH CASTS URNS QL MICRO: ABNORMAL /LPF
ERYTHROCYTE [DISTWIDTH] IN BLOOD BY AUTOMATED COUNT: 16.2 % (ref 11.5–14.5)
GLOBULIN SER CALC-MCNC: 4.9 G/DL (ref 2–4)
GLUCOSE SERPL-MCNC: 105 MG/DL (ref 65–100)
GLUCOSE UR STRIP.AUTO-MCNC: NEGATIVE MG/DL
HCT VFR BLD AUTO: 26.2 % (ref 35–47)
HGB BLD-MCNC: 8.7 G/DL (ref 11.5–16)
HGB UR QL STRIP: NEGATIVE
KETONES UR QL STRIP.AUTO: NEGATIVE MG/DL
LEUKOCYTE ESTERASE UR QL STRIP.AUTO: ABNORMAL
LYMPHOCYTES # BLD AUTO: 17 % (ref 12–49)
LYMPHOCYTES # BLD: 1.9 K/UL (ref 0.8–3.5)
MAGNESIUM SERPL-MCNC: 2.3 MG/DL (ref 1.6–2.4)
MCH RBC QN AUTO: 29 PG (ref 26–34)
MCHC RBC AUTO-ENTMCNC: 33.2 G/DL (ref 30–36.5)
MCV RBC AUTO: 87.3 FL (ref 80–99)
MONOCYTES # BLD: 0.8 K/UL (ref 0–1)
MONOCYTES NFR BLD AUTO: 7 % (ref 5–13)
NEUTS SEG # BLD: 8 K/UL (ref 1.8–8)
NEUTS SEG NFR BLD AUTO: 71 % (ref 32–75)
NITRITE UR QL STRIP.AUTO: NEGATIVE
PH UR STRIP: 6.5 [PH] (ref 5–8)
PLATELET # BLD AUTO: 395 K/UL (ref 150–400)
POTASSIUM SERPL-SCNC: 3 MMOL/L (ref 3.5–5.1)
PROT SERPL-MCNC: 7.8 G/DL (ref 6.4–8.2)
PROT UR STRIP-MCNC: NEGATIVE MG/DL
RBC # BLD AUTO: 3 M/UL (ref 3.8–5.2)
RBC #/AREA URNS HPF: ABNORMAL /HPF (ref 0–5)
RBC MORPH BLD: ABNORMAL
SODIUM SERPL-SCNC: 141 MMOL/L (ref 136–145)
SP GR UR REFRACTOMETRY: 1.01 (ref 1–1.03)
TROPONIN I SERPL-MCNC: <0.04 NG/ML
UA: UC IF INDICATED,UAUC: ABNORMAL
UROBILINOGEN UR QL STRIP.AUTO: 0.2 EU/DL (ref 0.2–1)
WBC # BLD AUTO: 11.2 K/UL (ref 3.6–11)
WBC MORPH BLD: ABNORMAL
WBC URNS QL MICRO: ABNORMAL /HPF (ref 0–4)

## 2017-01-10 PROCEDURE — 84484 ASSAY OF TROPONIN QUANT: CPT | Performed by: EMERGENCY MEDICINE

## 2017-01-10 PROCEDURE — 96360 HYDRATION IV INFUSION INIT: CPT

## 2017-01-10 PROCEDURE — 85379 FIBRIN DEGRADATION QUANT: CPT | Performed by: EMERGENCY MEDICINE

## 2017-01-10 PROCEDURE — 82550 ASSAY OF CK (CPK): CPT | Performed by: EMERGENCY MEDICINE

## 2017-01-10 PROCEDURE — 74011250636 HC RX REV CODE- 250/636: Performed by: EMERGENCY MEDICINE

## 2017-01-10 PROCEDURE — 71020 XR CHEST PA LAT: CPT

## 2017-01-10 PROCEDURE — 81001 URINALYSIS AUTO W/SCOPE: CPT | Performed by: EMERGENCY MEDICINE

## 2017-01-10 PROCEDURE — 83735 ASSAY OF MAGNESIUM: CPT | Performed by: EMERGENCY MEDICINE

## 2017-01-10 PROCEDURE — 99285 EMERGENCY DEPT VISIT HI MDM: CPT

## 2017-01-10 PROCEDURE — 87086 URINE CULTURE/COLONY COUNT: CPT | Performed by: EMERGENCY MEDICINE

## 2017-01-10 PROCEDURE — 85025 COMPLETE CBC W/AUTO DIFF WBC: CPT | Performed by: EMERGENCY MEDICINE

## 2017-01-10 PROCEDURE — 83880 ASSAY OF NATRIURETIC PEPTIDE: CPT | Performed by: EMERGENCY MEDICINE

## 2017-01-10 PROCEDURE — 93005 ELECTROCARDIOGRAM TRACING: CPT

## 2017-01-10 PROCEDURE — 74011250637 HC RX REV CODE- 250/637: Performed by: EMERGENCY MEDICINE

## 2017-01-10 PROCEDURE — 36415 COLL VENOUS BLD VENIPUNCTURE: CPT | Performed by: EMERGENCY MEDICINE

## 2017-01-10 PROCEDURE — 80053 COMPREHEN METABOLIC PANEL: CPT | Performed by: EMERGENCY MEDICINE

## 2017-01-10 RX ORDER — POTASSIUM CHLORIDE 750 MG/1
20 TABLET, FILM COATED, EXTENDED RELEASE ORAL
Status: COMPLETED | OUTPATIENT
Start: 2017-01-10 | End: 2017-01-10

## 2017-01-10 RX ADMIN — SODIUM CHLORIDE 500 ML: 900 INJECTION, SOLUTION INTRAVENOUS at 13:25

## 2017-01-10 RX ADMIN — POTASSIUM CHLORIDE 20 MEQ: 750 TABLET, FILM COATED, EXTENDED RELEASE ORAL at 16:31

## 2017-01-10 NOTE — DISCHARGE INSTRUCTIONS
- Please stop taking lovastatin as previously prescribed by primary care provider. Please increase water intake. Please follow up with Dr. Tri Bryant to discuss elevated calcium. Fatigue: Care Instructions  Your Care Instructions  Fatigue is a feeling of tiredness, exhaustion, or lack of energy. You may feel fatigue because of too much or not enough activity. It can also come from stress, lack of sleep, boredom, and poor diet. Many medical problems, such as viral infections, can cause fatigue. Emotional problems, especially depression, are often the cause of fatigue. Fatigue is most often a symptom of another problem. Treatment for fatigue depends on the cause. For example, if you have fatigue because you have a certain health problem, treating this problem also treats your fatigue. If depression or anxiety is the cause, treatment may help. Follow-up care is a key part of your treatment and safety. Be sure to make and go to all appointments, and call your doctor if you are having problems. It's also a good idea to know your test results and keep a list of the medicines you take. How can you care for yourself at home? · Get regular exercise. But don't overdo it. Go back and forth between rest and exercise. · Get plenty of rest.  · Eat a healthy diet. Do not skip meals, especially breakfast.  · Reduce your use of caffeine, tobacco, and alcohol. Caffeine is most often found in coffee, tea, cola drinks, and chocolate. · Limit medicines that can cause fatigue. This includes tranquilizers and cold and allergy medicines. When should you call for help? Watch closely for changes in your health, and be sure to contact your doctor if:  · You have new symptoms such as fever or a rash. · Your fatigue gets worse. · You have been feeling down, depressed, or hopeless. Or you may have lost interest in things that you usually enjoy. · You are not getting better as expected. Where can you learn more?   Go to http://roel-lonnie.info/. Enter K136 in the search box to learn more about \"Fatigue: Care Instructions. \"  Current as of: May 27, 2016  Content Version: 11.1  © 9533-3064 [employer]. Care instructions adapted under license by [employer] (which disclaims liability or warranty for this information). If you have questions about a medical condition or this instruction, always ask your healthcare professional. [place] [address] any warranty or liability for your use of this information. Musculoskeletal Pain: Care Instructions  Your Care Instructions  Different problems with the bones, muscles, nerves, ligaments, and tendons in the body can cause pain. One or more areas of your body may ache or burn. Or they may feel tired, stiff, or sore. The medical term for this type of pain is musculoskeletal pain. It can have many different causes. Sometimes the pain is caused by an injury such as a strain or sprain. Or you might have pain from using one part of your body in the same way over and over again. This is called overuse. In some cases, the cause of the pain is another health problem such as arthritis or fibromyalgia. The doctor will examine you and ask you questions about your health to help find the cause of your pain. Blood tests or imaging tests like an X-ray may also be helpful. But sometimes doctors can't find a cause of the pain. Treatment depends on your symptoms and the cause of the pain, if known. The doctor has checked you carefully, but problems can develop later. If you notice any problems or new symptoms, get medical treatment right away. Follow-up care is a key part of your treatment and safety. Be sure to make and go to all appointments, and call your doctor if you are having problems. It's also a good idea to know your test results and keep a list of the medicines you take. How can you care for yourself at home?   · Rest until you feel better. · Do not do anything that makes the pain worse. Return to exercise gradually if you feel better and your doctor says it's okay. · Be safe with medicines. Read and follow all instructions on the label. ¨ If the doctor gave you a prescription medicine for pain, take it as prescribed. ¨ If you are not taking a prescription pain medicine, ask your doctor if you can take an over-the-counter medicine. · Put ice or a cold pack on the area for 10 to 20 minutes at a time to ease pain. Put a thin cloth between the ice and your skin. When should you call for help? Call your doctor now or seek immediate medical care if:  · You have new pain, or your pain gets worse. · You have new symptoms such as a fever, a rash, or chills. Watch closely for changes in your health, and be sure to contact your doctor if:  · You do not get better as expected. Where can you learn more? Go to http://roel-lonnie.info/. Enter Z253 in the search box to learn more about \"Musculoskeletal Pain: Care Instructions. \"  Current as of: February 19, 2016  Content Version: 11.1  © 8758-1714 [employer]. Care instructions adapted under license by [employer] (which disclaims liability or warranty for this information). If you have questions about a medical condition or this instruction, always ask your healthcare professional. [address] any warranty or liability for your use of this information.

## 2017-01-10 NOTE — ED NOTES
Patient in room resting in bed. No distress observed. Needs assessed and addressed. Side rails up x 2 and bed in low position with call bell in reach. Will continue to monitor.

## 2017-01-10 NOTE — ED NOTES
Discharge instructions given to and reviewed with patient and her  by Dr. Shanae Lopez. Patient verbalized her understanding and agreed to drink 2-3 glasses of water upon arrival home.

## 2017-01-10 NOTE — ED PROVIDER NOTES
HPI Comments: Brigid Dixon is a 64 y.o. female with PMHx significant for HTN, kidney failure, and GERD, who presents ambulatory to [facility] ED with cc of bilateral leg pain and intermittent SOB. Patient reports aching pain and weakness in her bilateral legs and arms. She states walking aggravates her leg pain and causes her thighs to \"burn. \" Patient notes SOB for several weeks and states dyspnea is exacerbated with exertion. She reports additional fatigue and states she is constantly sleeping. She reports being admitted to the hospital for 10 days for acute renal failure on 10/14/16. Patient states she was prescribed tapered Prednisone from 10/24/16 until 16, which she states has \"thrown her out of whack. \" She reports she saw her PCP yesterday, who instructed her to discontinue Lovastatin. Patient denies a history of DVT or PE. She denies any recent travel, surgery, or hormone therapy. Patient denies hematemesis, hematochezia, cough, fever, chills, abdominal pain, vomiting, diarrhea, nausea, or hemoptysis. PCP: Aletha Madison MD      There are no other complaints, changes, or physical findings at this time. Written by CAROLYN Angulo, as dictated by Elisabeth Munoz MD.       The history is provided by the patient. No  was used.         Past Medical History:   Diagnosis Date    Arthritis     Gastrointestinal disorder      GERD    GERD (gastroesophageal reflux disease)     Glaucoma     High cholesterol     Hypertension     Ill-defined condition      chronic cystitis    Kidney failure 10/14/2016       Past Surgical History:   Procedure Laterality Date    Hx tonsillectomy      Hx urological       cauterization of bladder ulcers    Hx gyn           Hx hysterectomy      Hx gyn           Family History:   Problem Relation Age of Onset    Cancer Father     Cancer Brother        Social History     Social History    Marital status:      Spouse name: N/A    Number of children: N/A    Years of education: N/A     Occupational History    Not on file. Social History Main Topics    Smoking status: Former Smoker     Packs/day: 0.50     Years: 4.00     Quit date: 7/29/1997    Smokeless tobacco: Not on file    Alcohol use Yes      Comment: seldom    Drug use: Yes     Special: Prescription    Sexual activity: Not on file     Other Topics Concern    Not on file     Social History Narrative    ** Merged History Encounter **              ALLERGIES: Review of patient's allergies indicates no known allergies. Review of Systems   Constitutional: Positive for fatigue. Negative for chills and fever. HENT: Negative for congestion, rhinorrhea and sore throat. Eyes: Negative for pain, discharge and visual disturbance. Respiratory: Positive for shortness of breath. Negative for cough, chest tightness and wheezing. Negative for hemoptysis   Cardiovascular: Negative for chest pain, palpitations and leg swelling. Gastrointestinal: Negative for abdominal pain, blood in stool, constipation, diarrhea, nausea and vomiting. Negative for hematemesis   Genitourinary: Negative for dysuria, frequency and hematuria. Musculoskeletal: Positive for myalgias (BL legs and arms). Negative for arthralgias and back pain. Skin: Negative for rash. Neurological: Negative for dizziness, weakness, light-headedness and headaches. Psychiatric/Behavioral: Negative. All other systems reviewed and are negative.     Patient Vitals for the past 12 hrs:   Temp Pulse Resp BP SpO2   01/10/17 1630 - (!) 109 22 137/79 98 %   01/10/17 1530 - (!) 105 18 140/64 98 %   01/10/17 1400 - 97 21 130/61 98 %   01/10/17 1300 - 92 22 128/64 96 %   01/10/17 1230 - 99 23 131/67 95 %   01/10/17 1208 - 95 10 - 99 %   01/10/17 1206 - (!) 101 14 136/70 99 %   01/10/17 1057 97.5 °F (36.4 °C) (!) 117 18 110/71 98 %       Physical Exam   Constitutional: She is oriented to person, place, and time. She appears well-developed and well-nourished. No distress. HENT:   Head: Normocephalic and atraumatic. Eyes: EOM are normal. Right eye exhibits no discharge. Left eye exhibits no discharge. No scleral icterus. Neck: Normal range of motion. Neck supple. No tracheal deviation present. Cardiovascular: Normal rate, regular rhythm, normal heart sounds and intact distal pulses. Exam reveals no gallop and no friction rub. No murmur heard. Pulses:       Femoral pulses are 2+ on the right side, and 2+ on the left side. Dorsalis pedis pulses are 2+ on the right side, and 2+ on the left side. Pulmonary/Chest: Effort normal and breath sounds normal. No respiratory distress. She has no wheezes. She has no rales. Abdominal: Soft. She exhibits no distension. There is no tenderness. Genitourinary:   Genitourinary Comments: Rectal Exam: Guaiac negative. Musculoskeletal: Normal range of motion. She exhibits no edema. No calf tenderness   Lymphadenopathy:     She has no cervical adenopathy. Neurological: She is alert and oriented to person, place, and time. No focal neuro deficits   Skin: Skin is warm and dry. No rash noted. No erythema or warmth to BL calves. Psychiatric: She has a normal mood and affect. Nursing note and vitals reviewed. MDM  Number of Diagnoses or Management Options  Fatigue, unspecified type:   Hypercalcemia:   Muscle pain:   Diagnosis management comments:     Patient presents to ED with diffuse myalgias and dyspnea. She saw PCP yesterday for myalgias and was instructed to d/c lovastatin. 1. Myalgias, fatigue - Differential includes medication reaction, rhabdomyolysis, electrolyte abnormality, dehydration, ARF  2.  Dyspnea - Differential includes heart failure, pulmonary edema, pleural effusion, pneumonia, lung mass, ACS, PE  - CBC, CMP, Mg, UA  - Stephanie  - D-dimer, low suspicion for PE  - CXR  - IVF         Amount and/or Complexity of Data Reviewed  Clinical lab tests: ordered and reviewed  Tests in the radiology section of CPT®: ordered and reviewed  Tests in the medicine section of CPT®: ordered and reviewed  Review and summarize past medical records: yes  Discuss the patient with other providers: yes (PCP)  Independent visualization of images, tracings, or specimens: yes    Patient Progress  Patient progress: stable    ED Course       Procedures     EKG interpretation: (Preliminary) 1102  Rhythm: normal sinus rhythm; and regular . Rate (approx.): 98; Axis: normal; AK interval: normal; QRS interval: normal ; ST/T wave: non-specific T wave abnormality; Other findings: unchanged from previous EKG. Written by Joy Urbano, ED Scribe, as dictated by Regi Kemp MD.    Procedure Note - Rectal Exam:   2:10 PM  Performed by: Regi Kemp MD  Rectal exam performed. No stool was collected. Stool was Hemoccult tested, and found to be heme Negative. The procedure took 1-15 minutes, and pt tolerated well. Written by Joy Urbano, CAROLYN Scribe, as dictated by Regi Kemp MD.    CONSULT NOTE:   3:00 PM  Regi Kemp MD spoke with Dr. Pito Taylor,  Specialty: Hospitalist  Discussed pt's hx, disposition, and available diagnostic and imaging results. Reviewed care plans. Consultant agrees with plans as outlined. Patient does not require hospitalization for hypercalcemia. Written by Regi Kemp MD.    CONSULT NOTE:   4:23 PM  Regi Kemp MD spoke with Dr. Andrea Espinoza,   Specialty: Primary Care  Discussed pt's hx, disposition, and available diagnostic and imaging results. Reviewed care plans. Consultant agrees with plans as outlined. Encourage patient to hydrate and follow-up closely with Dr. Aristides Membreno. Written by Joy Urbano ED Scribe, as dictated by Regi Kemp MD.    PROGRESS NOTE:  4:25 PM  Calcium 12.6 - likely contributing to symptoms. Discussed with hospitalist - patient does not require admission for hypercalcemia.   Hgb 8.7 - no s/s to suggest GI bleed, guaiac negative. Potassium 3 - supplemented with 20 meq due to CKD, which is stable. Discussed results with patient and encouraged hydration and close PCP follow up for reevaluation of calcium. Discussed results, prescriptions and follow up plan with patient. Provided customary return to ED instructions. Patient expressed understanding. Whitney Hall MD      LABORATORY TESTS:  Recent Results (from the past 12 hour(s))   EKG, 12 LEAD, INITIAL    Collection Time: 01/10/17 11:02 AM   Result Value Ref Range    Ventricular Rate 98 BPM    Atrial Rate 98 BPM    P-R Interval 178 ms    QRS Duration 72 ms    Q-T Interval 340 ms    QTC Calculation (Bezet) 434 ms    Calculated P Axis 9 degrees    Calculated R Axis -11 degrees    Calculated T Axis 4 degrees    Diagnosis       Normal sinus rhythm  Nonspecific ST and T wave abnormality  When compared with ECG of 02-JAN-2017 18:15,  Nonspecific T wave abnormality, worse in Inferior leads     CBC WITH AUTOMATED DIFF    Collection Time: 01/10/17  1:15 PM   Result Value Ref Range    WBC 11.2 (H) 3.6 - 11.0 K/uL    RBC 3.00 (L) 3.80 - 5.20 M/uL    HGB 8.7 (L) 11.5 - 16.0 g/dL    HCT 26.2 (L) 35.0 - 47.0 %    MCV 87.3 80.0 - 99.0 FL    MCH 29.0 26.0 - 34.0 PG    MCHC 33.2 30.0 - 36.5 g/dL    RDW 16.2 (H) 11.5 - 14.5 %    PLATELET 615 366 - 016 K/uL    NEUTROPHILS 71 32 - 75 %    LYMPHOCYTES 17 12 - 49 %    MONOCYTES 7 5 - 13 %    EOSINOPHILS 4 0 - 7 %    BASOPHILS 1 0 - 1 %    ABS. NEUTROPHILS 8.0 1.8 - 8.0 K/UL    ABS. LYMPHOCYTES 1.9 0.8 - 3.5 K/UL    ABS. MONOCYTES 0.8 0.0 - 1.0 K/UL    ABS. EOSINOPHILS 0.4 0.0 - 0.4 K/UL    ABS.  BASOPHILS 0.1 0.0 - 0.1 K/UL    RBC COMMENTS ANISOCYTOSIS  1+        WBC COMMENTS TOXIC GRANULATION     METABOLIC PANEL, COMPREHENSIVE    Collection Time: 01/10/17  1:15 PM   Result Value Ref Range    Sodium 141 136 - 145 mmol/L    Potassium 3.0 (L) 3.5 - 5.1 mmol/L    Chloride 107 97 - 108 mmol/L    CO2 21 21 - 32 mmol/L    Anion gap 13 5 - 15 mmol/L    Glucose 105 (H) 65 - 100 mg/dL    BUN 25 (H) 6 - 20 MG/DL    Creatinine 2.15 (H) 0.55 - 1.02 MG/DL    BUN/Creatinine ratio 12 12 - 20      GFR est AA 27 (L) >60 ml/min/1.73m2    GFR est non-AA 22 (L) >60 ml/min/1.73m2    Calcium 12.6 (H) 8.5 - 10.1 MG/DL    Bilirubin, total 0.3 0.2 - 1.0 MG/DL    ALT 18 12 - 78 U/L    AST 12 (L) 15 - 37 U/L    Alk.  phosphatase 78 45 - 117 U/L    Protein, total 7.8 6.4 - 8.2 g/dL    Albumin 2.9 (L) 3.5 - 5.0 g/dL    Globulin 4.9 (H) 2.0 - 4.0 g/dL    A-G Ratio 0.6 (L) 1.1 - 2.2     TROPONIN I    Collection Time: 01/10/17  1:15 PM   Result Value Ref Range    Troponin-I, Qt. <0.04 <0.05 ng/mL   CK W/ REFLX CKMB    Collection Time: 01/10/17  1:15 PM   Result Value Ref Range    CK 14 (L) 26 - 192 U/L   MAGNESIUM    Collection Time: 01/10/17  1:15 PM   Result Value Ref Range    Magnesium 2.3 1.6 - 2.4 mg/dL   PRO-BNP    Collection Time: 01/10/17  1:15 PM   Result Value Ref Range    NT pro- (H) 0 - 450 PG/ML   URINALYSIS W/ REFLEX CULTURE    Collection Time: 01/10/17  1:25 PM   Result Value Ref Range    Color YELLOW/STRAW      Appearance CLEAR CLEAR      Specific gravity 1.010 1.003 - 1.030      pH (UA) 6.5 5.0 - 8.0      Protein NEGATIVE  NEG mg/dL    Glucose NEGATIVE  NEG mg/dL    Ketone NEGATIVE  NEG mg/dL    Bilirubin NEGATIVE  NEG      Blood NEGATIVE  NEG      Urobilinogen 0.2 0.2 - 1.0 EU/dL    Nitrites NEGATIVE  NEG      Leukocyte Esterase TRACE (A) NEG      WBC 5-10 0 - 4 /hpf    RBC 0-5 0 - 5 /hpf    Epithelial cells FEW FEW /lpf    Bacteria NEGATIVE  NEG /hpf    UA:UC IF INDICATED URINE CULTURE ORDERED (A) CNI     D DIMER    Collection Time: 01/10/17  2:12 PM   Result Value Ref Range    D-dimer 0.45 0.00 - 0.65 mg/L FEU       IMAGING RESULTS:  XR CHEST PA LAT   Final Result   Indication: dyspnea      Exam: PA and lateral views of the chest.     Direct comparison is made to prior CXR dated January 2, 2017.     Findings: Cardiomediastinal silhouette is within normal limits. Lungs are clear  bilaterally. Pleural spaces are normal. Osseous structures are intact.     IMPRESSION  IMPRESSION: No acute cardiopulmonary disease. MEDICATIONS GIVEN:  Medications   sodium chloride 0.9 % bolus infusion 500 mL (0 mL IntraVENous IV Completed 1/10/17 1425)   potassium chloride SR (KLOR-CON 10) tablet 20 mEq (20 mEq Oral Given 1/10/17 1631)       IMPRESSION:  1. Fatigue, unspecified type    2. Muscle pain    3. Hypercalcemia        PLAN:  1. Discharge Medication List as of 1/10/2017  4:24 PM      CONTINUE these medications which have NOT CHANGED    Details   famotidine (PEPCID) 20 mg tablet Take 1 Tab by mouth two (2) times a day. Indications: GASTROESOPHAGEAL REFLUX, Normal, Disp-180 Tab, R-1      lovastatin (MEVACOR) 40 mg tablet Take 1 Tab by mouth daily. , Normal, Disp-90 Tab, R-1      amLODIPine (NORVASC) 10 mg tablet Take 1 Tab by mouth daily. , Print, Disp-30 Tab, R-0      latanoprost (XALATAN) 0.005 % ophthalmic solution Administer 1 Drop to both eyes daily. , Historical Med      multivitamin (ONE A DAY) tablet Take 2 Tabs by mouth daily. , Historical Med      cholecalciferol, vitamin D3, (VITAMIN D3) 2,000 unit tab Take 1 Tab by mouth daily. , Historical Med      vitamin c-vitamin e (CRANBERRY CONCENTRATE) cap Take 1 Cap by mouth two (2) times a day., Historical Med      Aspirin, Buffered 81 mg tab Take 1 Tab by mouth daily. , Historical Med      timolol (BETIMOL) 0.25 % ophthalmic solution Administer 2 Drops to both eyes two (2) times a day. use in affected eye(s) , Historical Med           2.    Follow-up Information     Follow up With Details Comments Contact Info    Mery Garner MD  Please follow up with Dr. Juan Awan as soon as possible [address] [place] [address]  [phone]      [facility] [place] EM[facility] DEPT  As needed, If symptoms worsen Apolinar Oscar  [address] [facility]  [phone]        Return to ED if worse     Discharge Note:  4:26 PM  The patient has been re-evaluated and is ready for discharge. Reviewed available results with patient. Counseled patient on diagnosis and care plan. Patient has expressed understanding, and all questions have been answered. Patient agrees with plan and agrees to follow up as recommended, or to return to the ED if their symptoms worsen. Discharge instructions have been provided and explained to the patient, along with reasons to return to the ED. Written by Hermilo Ragland, ED Scribe, as dictated by Lottie Beasley MD.    This note is prepared by Hermilo Ragland, acting as Scribe for Lottie Beasley MD,. Lottie Beasley MD,: The scribe's documentation has been prepared under my direction and personally reviewed by me in its entirety. I confirm that the note above accurately reflects all work, treatment, procedures, and medical decision making performed by me.

## 2017-01-10 NOTE — ED NOTES
Assumed care of patient who is resting on stretcher in position of comfor with call bell in reach. Patient on cardiac monitor showing  bpm. Patient has multiple complaints including BLE burning when she walks, fatigue, memory loss since starting prednisone several months ago, elevated electrolytes, SOB. And increased sleeping. \"I just overall don't feel good. \" Patient in NAD.  at bedside. She takes prednisone for kidney failure for which she sees Dr. Daniele Rosa. Will continue to monitor.

## 2017-01-10 NOTE — ED NOTES
Dr. Rios Dinning in to update patient and  on test results. D-Dimer needs to be recollected due to short sample. Re-ordered.

## 2017-01-10 NOTE — ED NOTES
Patient in room resting in bed. No distress observed. Needs assessed and addressed. Potassium Chloride to be given PO. Side rails up x 2 and bed in low position with call bell in reach. Will continue to monitor.

## 2017-01-10 NOTE — ED NOTES
states pt has been c/o of \"feeling bad\" for some time. He also states pt was admitted recently for a high Creatinine level. Pt has continued to stay feeling bad since then.  expresses some concern about Prednisone usage and her mental status as well, to include irritability.  states pt has a hx of very high anxiety and is concerned that this may be causing her symptoms.

## 2017-01-11 ENCOUNTER — TELEPHONE (OUTPATIENT)
Dept: INTERNAL MEDICINE CLINIC | Age: 77
End: 2017-01-11

## 2017-01-11 LAB
ATRIAL RATE: 98 BPM
CALCULATED P AXIS, ECG09: 9 DEGREES
CALCULATED R AXIS, ECG10: -11 DEGREES
CALCULATED T AXIS, ECG11: 4 DEGREES
DIAGNOSIS, 93000: NORMAL
P-R INTERVAL, ECG05: 178 MS
Q-T INTERVAL, ECG07: 340 MS
QRS DURATION, ECG06: 72 MS
QTC CALCULATION (BEZET), ECG08: 434 MS
VENTRICULAR RATE, ECG03: 98 BPM

## 2017-01-11 NOTE — TELEPHONE ENCOUNTER
Patient states she needs a call back to discuss her increase in dosage on her Amlodipine. Patient states she thinks she is having side effects from medication. Patient states she has a History of Kidney problems. Please call to discuss.  Thank you

## 2017-01-12 ENCOUNTER — TELEPHONE (OUTPATIENT)
Dept: INTERNAL MEDICINE CLINIC | Age: 77
End: 2017-01-12

## 2017-01-12 DIAGNOSIS — E87.6 HYPOKALEMIA: ICD-10-CM

## 2017-01-12 DIAGNOSIS — E83.52 HYPERCALCEMIA: Primary | ICD-10-CM

## 2017-01-12 LAB
BACTERIA SPEC CULT: NORMAL
CC UR VC: NORMAL
SERVICE CMNT-IMP: NORMAL

## 2017-01-12 NOTE — TELEPHONE ENCOUNTER
1/12/17 @ 1:36pm Returning patient's call. Unable to speak with patient but left message for patient to call back.

## 2017-01-12 NOTE — TELEPHONE ENCOUNTER
Patient states she feels her symptoms on loss of memory & muscle pain is related to her amlodipine after reading up on the medication. Patient reports d/c Lovastatin. She did not increasing mag/K+ in diet \"because the levels were high in the ER\". Of note, mag was 2.3 & Potassium low at 3.0. Patient states she is drinking 5-6 glasses of water (8oz) which she states \"is not enough\". Advised will forward to Dr. Miriam York for recommendations.

## 2017-01-13 NOTE — TELEPHONE ENCOUNTER
Advised patient of recommendations per Dr. Cirilo East. I explained her symptoms are unlikely to be related to amlodipine & to continue med. Since Calcium was high in ED, we need repeat labs, if not tomorrow, then Monday.  Increase dietary potassium. Pt states she is not taking any Calcium supplements. I have reviewed the provider's instructions with the patient, answering all questions to her satisfaction.

## 2017-01-16 ENCOUNTER — LAB ONLY (OUTPATIENT)
Dept: INTERNAL MEDICINE CLINIC | Age: 77
End: 2017-01-16

## 2017-01-16 ENCOUNTER — HOSPITAL ENCOUNTER (OUTPATIENT)
Dept: LAB | Age: 77
Discharge: HOME | End: 2017-01-16
Payer: MEDICARE

## 2017-01-16 DIAGNOSIS — E83.52 HYPERCALCEMIA: ICD-10-CM

## 2017-01-16 DIAGNOSIS — E87.6 HYPOKALEMIA: ICD-10-CM

## 2017-01-16 PROCEDURE — 36415 COLL VENOUS BLD VENIPUNCTURE: CPT

## 2017-01-16 PROCEDURE — 80048 BASIC METABOLIC PNL TOTAL CA: CPT

## 2017-01-16 PROCEDURE — 83970 ASSAY OF PARATHORMONE: CPT

## 2017-01-17 ENCOUNTER — OFFICE VISIT (OUTPATIENT)
Dept: INTERNAL MEDICINE CLINIC | Age: 77
End: 2017-01-17

## 2017-01-17 ENCOUNTER — APPOINTMENT (OUTPATIENT)
Dept: CT IMAGING | Age: 77
DRG: 641 | End: 2017-01-17
Attending: EMERGENCY MEDICINE
Payer: MEDICARE

## 2017-01-17 ENCOUNTER — HOSPITAL ENCOUNTER (INPATIENT)
Age: 77
LOS: 2 days | Discharge: HOME | DRG: 641 | End: 2017-01-19
Attending: EMERGENCY MEDICINE | Admitting: INTERNAL MEDICINE
Payer: MEDICARE

## 2017-01-17 VITALS
DIASTOLIC BLOOD PRESSURE: 76 MMHG | OXYGEN SATURATION: 98 % | TEMPERATURE: 97.7 F | HEIGHT: 59 IN | SYSTOLIC BLOOD PRESSURE: 137 MMHG | RESPIRATION RATE: 19 BRPM | BODY MASS INDEX: 19.88 KG/M2 | WEIGHT: 98.6 LBS | HEART RATE: 99 BPM

## 2017-01-17 DIAGNOSIS — R41.82 ALTERED MENTAL STATUS, UNSPECIFIED ALTERED MENTAL STATUS TYPE: Primary | ICD-10-CM

## 2017-01-17 DIAGNOSIS — E83.52 HYPERCALCEMIA: Primary | ICD-10-CM

## 2017-01-17 DIAGNOSIS — E83.52 HYPERCALCEMIA: ICD-10-CM

## 2017-01-17 LAB
ALBUMIN SERPL BCP-MCNC: 3.8 G/DL (ref 3.5–5)
ALBUMIN/GLOB SERPL: 0.9 {RATIO} (ref 1.1–2.2)
ALP SERPL-CCNC: 64 U/L (ref 45–117)
ALT SERPL-CCNC: 15 U/L (ref 12–78)
ANION GAP BLD CALC-SCNC: 14 MMOL/L (ref 5–15)
APPEARANCE UR: CLEAR
AST SERPL W P-5'-P-CCNC: 15 U/L (ref 15–37)
BACTERIA URNS QL MICRO: NEGATIVE /HPF
BASOPHILS # BLD AUTO: 0.1 K/UL (ref 0–0.1)
BASOPHILS # BLD: 1 % (ref 0–1)
BILIRUB SERPL-MCNC: 0.3 MG/DL (ref 0.2–1)
BILIRUB UR QL: NEGATIVE
BUN SERPL-MCNC: 25 MG/DL (ref 6–20)
BUN SERPL-MCNC: 25 MG/DL (ref 8–27)
BUN/CREAT SERPL: 12 (ref 11–26)
BUN/CREAT SERPL: 12 (ref 12–20)
CALCIUM SERPL-MCNC: 11.7 MG/DL (ref 8.5–10.1)
CALCIUM SERPL-MCNC: 12.5 MG/DL (ref 8.7–10.3)
CHLORIDE SERPL-SCNC: 105 MMOL/L (ref 96–106)
CHLORIDE SERPL-SCNC: 108 MMOL/L (ref 97–108)
CO2 SERPL-SCNC: 18 MMOL/L (ref 18–29)
CO2 SERPL-SCNC: 18 MMOL/L (ref 21–32)
COLOR UR: ABNORMAL
CREAT SERPL-MCNC: 2.13 MG/DL (ref 0.55–1.02)
CREAT SERPL-MCNC: 2.13 MG/DL (ref 0.57–1)
EOSINOPHIL # BLD: 0.2 K/UL (ref 0–0.4)
EOSINOPHIL NFR BLD: 2 % (ref 0–7)
EPITH CASTS URNS QL MICRO: ABNORMAL /LPF
ERYTHROCYTE [DISTWIDTH] IN BLOOD BY AUTOMATED COUNT: 17.2 % (ref 11.5–14.5)
GLOBULIN SER CALC-MCNC: 4.3 G/DL (ref 2–4)
GLUCOSE SERPL-MCNC: 112 MG/DL (ref 65–99)
GLUCOSE SERPL-MCNC: 131 MG/DL (ref 65–100)
GLUCOSE UR STRIP.AUTO-MCNC: NEGATIVE MG/DL
HCT VFR BLD AUTO: 29.6 % (ref 35–47)
HGB BLD-MCNC: 9.6 G/DL (ref 11.5–16)
HGB UR QL STRIP: NEGATIVE
INTACT PTH, 004000: ABNORMAL
KETONES UR QL STRIP.AUTO: NEGATIVE MG/DL
LEUKOCYTE ESTERASE UR QL STRIP.AUTO: NEGATIVE
LYMPHOCYTES # BLD AUTO: 25 % (ref 12–49)
LYMPHOCYTES # BLD: 3.9 K/UL (ref 0.8–3.5)
MCH RBC QN AUTO: 28.6 PG (ref 26–34)
MCHC RBC AUTO-ENTMCNC: 32.4 G/DL (ref 30–36.5)
MCV RBC AUTO: 88.1 FL (ref 80–99)
MONOCYTES # BLD: 0.9 K/UL (ref 0–1)
MONOCYTES NFR BLD AUTO: 6 % (ref 5–13)
NEUTS SEG # BLD: 10.3 K/UL (ref 1.8–8)
NEUTS SEG NFR BLD AUTO: 66 % (ref 32–75)
NITRITE UR QL STRIP.AUTO: NEGATIVE
PH UR STRIP: 6 [PH] (ref 5–8)
PLATELET # BLD AUTO: 488 K/UL (ref 150–400)
POTASSIUM SERPL-SCNC: 3.5 MMOL/L (ref 3.5–5.1)
POTASSIUM SERPL-SCNC: 3.7 MMOL/L (ref 3.5–5.2)
PROT SERPL-MCNC: 8.1 G/DL (ref 6.4–8.2)
PROT UR STRIP-MCNC: 30 MG/DL
PTH-INTACT SERPL-MCNC: 12 PG/ML (ref 15–65)
RBC # BLD AUTO: 3.36 M/UL (ref 3.8–5.2)
RBC #/AREA URNS HPF: ABNORMAL /HPF (ref 0–5)
SODIUM SERPL-SCNC: 140 MMOL/L (ref 136–145)
SODIUM SERPL-SCNC: 141 MMOL/L (ref 134–144)
SP GR UR REFRACTOMETRY: 1.02 (ref 1–1.03)
UA: UC IF INDICATED,UAUC: ABNORMAL
UROBILINOGEN UR QL STRIP.AUTO: 0.2 EU/DL (ref 0.2–1)
WBC # BLD AUTO: 15.4 K/UL (ref 3.6–11)
WBC URNS QL MICRO: ABNORMAL /HPF (ref 0–4)

## 2017-01-17 PROCEDURE — 74011250636 HC RX REV CODE- 250/636: Performed by: INTERNAL MEDICINE

## 2017-01-17 PROCEDURE — 87086 URINE CULTURE/COLONY COUNT: CPT | Performed by: EMERGENCY MEDICINE

## 2017-01-17 PROCEDURE — 36415 COLL VENOUS BLD VENIPUNCTURE: CPT | Performed by: EMERGENCY MEDICINE

## 2017-01-17 PROCEDURE — 74011250636 HC RX REV CODE- 250/636: Performed by: EMERGENCY MEDICINE

## 2017-01-17 PROCEDURE — 80053 COMPREHEN METABOLIC PANEL: CPT | Performed by: EMERGENCY MEDICINE

## 2017-01-17 PROCEDURE — 65660000000 HC RM CCU STEPDOWN

## 2017-01-17 PROCEDURE — 85025 COMPLETE CBC W/AUTO DIFF WBC: CPT | Performed by: EMERGENCY MEDICINE

## 2017-01-17 PROCEDURE — 99284 EMERGENCY DEPT VISIT MOD MDM: CPT

## 2017-01-17 PROCEDURE — 81001 URINALYSIS AUTO W/SCOPE: CPT | Performed by: EMERGENCY MEDICINE

## 2017-01-17 PROCEDURE — 96360 HYDRATION IV INFUSION INIT: CPT

## 2017-01-17 PROCEDURE — 70450 CT HEAD/BRAIN W/O DYE: CPT

## 2017-01-17 RX ORDER — MORPHINE SULFATE 10 MG/ML
2 INJECTION, SOLUTION INTRAMUSCULAR; INTRAVENOUS
Status: DISCONTINUED | OUTPATIENT
Start: 2017-01-17 | End: 2017-01-19 | Stop reason: HOSPADM

## 2017-01-17 RX ORDER — SODIUM CHLORIDE 0.9 % (FLUSH) 0.9 %
5-10 SYRINGE (ML) INJECTION AS NEEDED
Status: DISCONTINUED | OUTPATIENT
Start: 2017-01-17 | End: 2017-01-19 | Stop reason: HOSPADM

## 2017-01-17 RX ORDER — ACETAMINOPHEN 325 MG/1
650 TABLET ORAL
Status: DISCONTINUED | OUTPATIENT
Start: 2017-01-17 | End: 2017-01-19 | Stop reason: HOSPADM

## 2017-01-17 RX ORDER — FAMOTIDINE 20 MG/1
20 TABLET, FILM COATED ORAL 2 TIMES DAILY
Status: DISCONTINUED | OUTPATIENT
Start: 2017-01-17 | End: 2017-01-17 | Stop reason: DRUGHIGH

## 2017-01-17 RX ORDER — SODIUM CHLORIDE 9 MG/ML
100 INJECTION, SOLUTION INTRAVENOUS CONTINUOUS
Status: DISCONTINUED | OUTPATIENT
Start: 2017-01-17 | End: 2017-01-18

## 2017-01-17 RX ORDER — SODIUM CHLORIDE 0.9 % (FLUSH) 0.9 %
5-10 SYRINGE (ML) INJECTION EVERY 8 HOURS
Status: DISCONTINUED | OUTPATIENT
Start: 2017-01-17 | End: 2017-01-19 | Stop reason: HOSPADM

## 2017-01-17 RX ORDER — LATANOPROST 50 UG/ML
1 SOLUTION/ DROPS OPHTHALMIC DAILY
Status: DISCONTINUED | OUTPATIENT
Start: 2017-01-18 | End: 2017-01-19 | Stop reason: HOSPADM

## 2017-01-17 RX ORDER — ENOXAPARIN SODIUM 100 MG/ML
40 INJECTION SUBCUTANEOUS EVERY 24 HOURS
Status: DISCONTINUED | OUTPATIENT
Start: 2017-01-17 | End: 2017-01-17 | Stop reason: DRUGHIGH

## 2017-01-17 RX ORDER — DOCUSATE SODIUM 100 MG/1
100 CAPSULE, LIQUID FILLED ORAL
Status: DISCONTINUED | OUTPATIENT
Start: 2017-01-17 | End: 2017-01-19 | Stop reason: HOSPADM

## 2017-01-17 RX ORDER — FAMOTIDINE 20 MG/1
20 TABLET, FILM COATED ORAL DAILY
Status: DISCONTINUED | OUTPATIENT
Start: 2017-01-18 | End: 2017-01-19 | Stop reason: HOSPADM

## 2017-01-17 RX ORDER — HYDROCODONE BITARTRATE AND ACETAMINOPHEN 5; 325 MG/1; MG/1
1 TABLET ORAL
Status: DISCONTINUED | OUTPATIENT
Start: 2017-01-17 | End: 2017-01-19 | Stop reason: HOSPADM

## 2017-01-17 RX ORDER — ONDANSETRON 2 MG/ML
4 INJECTION INTRAMUSCULAR; INTRAVENOUS
Status: DISCONTINUED | OUTPATIENT
Start: 2017-01-17 | End: 2017-01-19 | Stop reason: HOSPADM

## 2017-01-17 RX ADMIN — SODIUM CHLORIDE 1000 ML: 900 INJECTION, SOLUTION INTRAVENOUS at 13:44

## 2017-01-17 RX ADMIN — SODIUM CHLORIDE 100 ML/HR: 900 INJECTION, SOLUTION INTRAVENOUS at 20:50

## 2017-01-17 RX ADMIN — Medication 10 ML: at 20:49

## 2017-01-17 RX ADMIN — ENOXAPARIN SODIUM 20 MG: 60 INJECTION SUBCUTANEOUS at 20:49

## 2017-01-17 NOTE — H&P
Hospitalist Admission Note    NAME: Sandra Olson   :  1940   MRN:  [id number]     Date/Time:  2017 3:59 PM    Patient PCP: El Sprague MD  ________________________________________________________________________    My assessment of this patient's clinical condition and my plan of care is as follows. Assessment / Plan:  Hypercalcemia with associated weakness and confusion - 12.5 this morning, 11.7 after fluids, no significant EKG changes, PTH is low so not primary parathyroid, consider hypercalcemia of malignancy, patient not taking any supplemental calcium or vit d  -will start IV fluid resuscitation  -send work up for hypercalcemia including SPEP, UPEP, vitamin d levels, PTHrP  -recheck Ca tomorrow AM, if elevated consider bisphosphonate therapy  -consider endocrinology consult    CKD - patient with history of ARF in October of last year, Cr has been improving, plan for f/u with Dr. Dasia Meek next month  -will consult nephrology to follow up with patient, also given new electrolyte abnormalities    Glaucoma  -continue eye drops    Hypertension  -patient recently stopped amlodipine, not on antihypertensive meds    Code Status: full  Surrogate Decision Maker:     DVT Prophylaxis: lovenox  GI Prophylaxis: not indicated    Baseline: independent        Subjective:   CHIEF COMPLAINT: confusion, weakness    HISTORY OF PRESENT ILLNESS:     Bibi Cordova is a 64 y.o.  female with history of recent renal failure due to medication, hypertension, hyperlipidemia who presents with worsening weakness and confusion over the last 3 weeks. Patient noticed gradual fatigue and lower extremity weakness, stating it has become more difficult to walk. She also notes worsening forgetfulness and confusion. Her weakness in generalized and has some dyspnea on exertion and lightheadedness. For last few weeks she has very poor appetite and has lost 10 pounds, stating things \"taste terrible. \" She was in to see her PCP and found to have hypercalcemia, hypokalemia. We were asked to admit for work up and evaluation of the above problems. Past Medical History   Diagnosis Date    Arthritis     Gastrointestinal disorder      GERD    GERD (gastroesophageal reflux disease)     Glaucoma     High cholesterol     Hypertension     Ill-defined condition      chronic cystitis    Kidney failure 10/14/2016        Past Surgical History   Procedure Laterality Date    Hx tonsillectomy      Hx urological       cauterization of bladder ulcers    Hx gyn           Hx hysterectomy      Hx gyn         Social History   Substance Use Topics    Smoking status: Former Smoker     Packs/day: 0.50     Years: 4.00     Quit date: 1997    Smokeless tobacco: Not on file    Alcohol use Yes      Comment: seldom        Family History   Problem Relation Age of Onset    Cancer Father     Cancer Brother      No Known Allergies     Prior to Admission medications    Medication Sig Start Date End Date Taking? Authorizing Provider   famotidine (PEPCID) 20 mg tablet Take 1 Tab by mouth two (2) times a day. Indications: GASTROESOPHAGEAL REFLUX 17   Nallely Jonas MD   lovastatin (MEVACOR) 40 mg tablet Take 1 Tab by mouth daily. 17   Nallely Jonas MD   amLODIPine (NORVASC) 10 mg tablet Take 1 Tab by mouth daily. 16   Siddhartha Main, DO   latanoprost (XALATAN) 0.005 % ophthalmic solution Administer 1 Drop to both eyes daily. 2/10/16   Historical Provider   multivitamin (ONE A DAY) tablet Take 2 Tabs by mouth daily. Historical Provider   cholecalciferol, vitamin D3, (VITAMIN D3) 2,000 unit tab Take 1 Tab by mouth daily. Historical Provider   vitamin c-vitamin e (CRANBERRY CONCENTRATE) cap Take 1 Cap by mouth two (2) times a day. Historical Provider   Aspirin, Buffered 81 mg tab Take 1 Tab by mouth daily.     Historical Provider   timolol (BETIMOL) 0.25 % ophthalmic solution Administer 2 Drops to both eyes two (2) times a day. use in affected eye(s)     Phys Other, MD       REVIEW OF SYSTEMS:       Total of 12 systems reviewed as follows:         General: no fever, no chills, no sweats, + generalized weakness, + weight loss, no weight gain, + loss of appetite   Eyes: no blurred vision, no eye pain, no loss of vision, no double vision  ENT: no rhinorrhea, no pharyngitis   Respiratory: no cough, no sputum production, no SOB, + SAMPSON, no wheezing, no pleuritic pain   Cardiology: no chest pain, no palpitations, no orthopnea, no PND, no edema, no syncope   Gastrointestinal: no abdominal pain, no N/V, + diarrhea, no dysphagia, no constipation, no bleeding   Genitourinary: no frequency, no urgency, no dysuria, no hematuria, no incontinence   Muskuloskeletal : + arthralgia, no myalgia, no back pain  Hematology: no easy bruising, no nose or gum bleeding, no lymphadenopathy   Dermatological: no rash, no ulceration, no pruritis, no color change / jaundice  Endocrine: no hot flashes, no polydipsia   Neurological: no headache, + dizziness, + confusion, no focal weakness, no paresthesia, no speech difficulties, + memory loss, + gait difficulty  Psychological: no anxiety, no depression, no agitation      Objective:   VITALS:    Patient Vitals for the past 12 hrs:   Temp Pulse Resp BP SpO2   01/17/17 1630 - (!) 101 18 156/72 98 %   01/17/17 1545 - (!) 101 20 144/73 99 %   01/17/17 1200 - (!) 123 - 105/67 99 %   01/17/17 1117 97.7 °F (36.5 °C) (!) 124 18 103/68 100 %         PHYSICAL EXAM:    General:    Alert, cooperative, no distress, appears stated age. HEENT: Atraumatic, anicteric sclerae, pink conjunctivae     No oral ulcers, mucosa dry, throat clear, dentition fair  Neck:  Supple, symmetrical  Lungs:   Clear to auscultation bilaterally, no wheezing, rhonchi, or rales. Chest wall:  No tenderness, no accessory muscle use.   Heart:   Regular rhythm, no murmur, no edema  Abdomen:   Soft, non-tender, not distended, bowel sounds normal  Extremities: No cyanosis, no clubbing      Capillary refill normal,  radial pulse 2+  Skin:     Not pale, not jaundiced, no rashes   Psych:  Good insight, not depressed, not anxious or agitated. Neurologic: EOMs intact, face symmetric, no aphasia or slurred speech, symmetrical strength, sensation grossly intact, alert and oriented x 4.     _______________________________________________________________________  Care Plan discussed with:    Comments   Patient x    Family      RN     Care Manager                    Consultant:      _______________________________________________________________________  Expected  Disposition:   Home with Family x   HH/PT/OT/RN x   SNF/LTC    JO ANN    ________________________________________________________________________  TOTAL TIME:  61 Minutes    Critical Care Provided     Minutes non procedure based      Comments     Reviewed previous records   >50% of visit spent in counseling and coordination of care  Discussion with patient and/or family and questions answered       ________________________________________________________________________  Kedar Osuna MD    Procedures: see electronic medical records for all procedures/Xrays and details which were not copied into this note but were reviewed prior to creation of Plan. LAB DATA REVIEWED:    Recent Results (from the past 24 hour(s))   CBC WITH AUTOMATED DIFF    Collection Time: 01/17/17 11:35 AM   Result Value Ref Range    WBC 15.4 (H) 3.6 - 11.0 K/uL    RBC 3.36 (L) 3.80 - 5.20 M/uL    HGB 9.6 (L) 11.5 - 16.0 g/dL    HCT 29.6 (L) 35.0 - 47.0 %    MCV 88.1 80.0 - 99.0 FL    MCH 28.6 26.0 - 34.0 PG    MCHC 32.4 30.0 - 36.5 g/dL    RDW 17.2 (H) 11.5 - 14.5 %    PLATELET 097 (H) 826 - 400 K/uL    NEUTROPHILS 66 32 - 75 %    LYMPHOCYTES 25 12 - 49 %    MONOCYTES 6 5 - 13 %    EOSINOPHILS 2 0 - 7 %    BASOPHILS 1 0 - 1 %    ABS. NEUTROPHILS 10.3 (H) 1.8 - 8.0 K/UL    ABS.  LYMPHOCYTES 3.9 (H) 0.8 - 3.5 K/UL    ABS. MONOCYTES 0.9 0.0 - 1.0 K/UL    ABS. EOSINOPHILS 0.2 0.0 - 0.4 K/UL    ABS. BASOPHILS 0.1 0.0 - 0.1 K/UL   METABOLIC PANEL, COMPREHENSIVE    Collection Time: 01/17/17 11:35 AM   Result Value Ref Range    Sodium 140 136 - 145 mmol/L    Potassium 3.5 3.5 - 5.1 mmol/L    Chloride 108 97 - 108 mmol/L    CO2 18 (L) 21 - 32 mmol/L    Anion gap 14 5 - 15 mmol/L    Glucose 131 (H) 65 - 100 mg/dL    BUN 25 (H) 6 - 20 MG/DL    Creatinine 2.13 (H) 0.55 - 1.02 MG/DL    BUN/Creatinine ratio 12 12 - 20      GFR est AA 27 (L) >60 ml/min/1.73m2    GFR est non-AA 22 (L) >60 ml/min/1.73m2    Calcium 11.7 (H) 8.5 - 10.1 MG/DL    Bilirubin, total 0.3 0.2 - 1.0 MG/DL    ALT 15 12 - 78 U/L    AST 15 15 - 37 U/L    Alk.  phosphatase 64 45 - 117 U/L    Protein, total 8.1 6.4 - 8.2 g/dL    Albumin 3.8 3.5 - 5.0 g/dL    Globulin 4.3 (H) 2.0 - 4.0 g/dL    A-G Ratio 0.9 (L) 1.1 - 2.2     URINALYSIS W/ REFLEX CULTURE    Collection Time: 01/17/17  1:52 PM   Result Value Ref Range    Color YELLOW/STRAW      Appearance CLEAR CLEAR      Specific gravity 1.021 1.003 - 1.030      pH (UA) 6.0 5.0 - 8.0      Protein 30 (A) NEG mg/dL    Glucose NEGATIVE  NEG mg/dL    Ketone NEGATIVE  NEG mg/dL    Bilirubin NEGATIVE  NEG      Blood NEGATIVE  NEG      Urobilinogen 0.2 0.2 - 1.0 EU/dL    Nitrites NEGATIVE  NEG      Leukocyte Esterase NEGATIVE  NEG      WBC 5-10 0 - 4 /hpf    RBC 0-5 0 - 5 /hpf    Epithelial cells FEW FEW /lpf    Bacteria NEGATIVE  NEG /hpf    UA:UC IF INDICATED URINE CULTURE ORDERED (A) CNI

## 2017-01-17 NOTE — PROGRESS NOTES
Reviewed record in preparation for visit and have obtained necessary documentation. Identified pt with two pt identifiers(name and ). Chief Complaint   Patient presents with   [facility] Follow Up     seen in ED on 1/10/16 c/o of bilateral leg pain x 3 weeks            Coordination of Care Questionnaire:  :     1) Have you been to an emergency room, urgent care clinic since your last visit? yes ED 1/10/16  Hospitalized since your last visit? no             2) Have you seen or consulted any other health care providers outside of [address] [place] [address] since your last visit? no  (Include any pap smears or colon screenings in this section.)      Patient is accompanied by  I have received verbal consent from Rach Traylor to discuss any/all medical information while they are present in the room.

## 2017-01-17 NOTE — ED PROVIDER NOTES
HPI Comments: Nani Patel is a 64 y.o. female with a PMHx of HTN, Hypercholesterolemia, Arthritis, Glaucoma, Kidney failure, GERD, and Chronic Cystitis who presents ambulatory to the ED c/o generalized weakness with increased confusion. Pt notes her confusion is along the lines of her forgetting how to do things. Pt's  states the pt has been having increased difficulty with using the phone and the computer, has not been getting around well, and has been having increased fatigue and has been sleeping a lot. Pt reports she has been having a decreased appetite stating \"all food tastes horrible to me\". With her decrease in food and drink intake, pt notes she has been having decreased urine as well as very few BM's. Pt adds she has lost 10-12 pounds in the last 3 weeks. Pt states she is also presenting with unspecified vision changes as well as light-headedness that began this morning. Pt reports going to her PCP today for her sxs who did blood work and found that the pt has elevated calcium levels with low potassium and protein levels who then sent her to [facility] ED for further evaluation. Pt notes having a hx of kidney failure due to an allergic drug reaction in April with Dr. Idalia Monahan. Pt specifically denies HA, nausea, vomiting, CP, or SOB. PCP: Sharmaine Marie MD    There are no other complaints, changes or physical findings at this time. The history is provided by the patient and the spouse. The history is limited by the condition of the patient.         Past Medical History:   Diagnosis Date    Arthritis     Gastrointestinal disorder      GERD    GERD (gastroesophageal reflux disease)     Glaucoma     High cholesterol     Hypertension     Ill-defined condition      chronic cystitis    Kidney failure 10/14/2016       Past Surgical History:   Procedure Laterality Date    Hx tonsillectomy      Hx urological       cauterization of bladder ulcers    Hx gyn           Hx hysterectomy  Hx gyn           Family History:   Problem Relation Age of Onset    Cancer Father     Cancer Brother        Social History     Social History    Marital status:      Spouse name: N/A    Number of children: N/A    Years of education: N/A     Occupational History    Not on file. Social History Main Topics    Smoking status: Former Smoker     Packs/day: 0.50     Years: 4.00     Quit date: 7/29/1997    Smokeless tobacco: Not on file    Alcohol use Yes      Comment: seldom    Drug use: Yes     Special: Prescription    Sexual activity: Not on file     Other Topics Concern    Not on file     Social History Narrative    ** Merged History Encounter **              ALLERGIES: Review of patient's allergies indicates no known allergies. Review of Systems   Constitutional: Positive for appetite change (decreased), fatigue and unexpected weight change (decreased). Negative for fever. HENT: Negative. Eyes: Positive for visual disturbance. Respiratory: Negative for shortness of breath and wheezing. Cardiovascular: Negative for chest pain and leg swelling. Gastrointestinal: Negative for blood in stool, constipation, diarrhea, nausea and vomiting. Endocrine: Negative. Genitourinary: Positive for decreased urine volume. Negative for difficulty urinating and dysuria. Musculoskeletal: Negative. Skin: Negative for rash. Allergic/Immunologic: Negative. Neurological: Positive for weakness (generalized). Negative for numbness. Hematological: Negative. Psychiatric/Behavioral: Positive for confusion. All other systems reviewed and are negative. Vitals:    01/17/17 1117 01/17/17 1200   BP: 103/68 105/67   Pulse: (!) 124 (!) 123   Resp: 18    Temp: 97.7 °F (36.5 °C)    SpO2: 100% 99%   Weight: 45 kg (99 lb 3.3 oz)    Height: 4' 11\" (1.499 m)             Physical Exam   Constitutional: She is oriented to person, place, and time. She appears well-developed and well-nourished. HENT:   Head: Normocephalic and atraumatic. Mouth/Throat: Mucous membranes are normal.   Eyes: EOM are normal. Pupils are equal, round, and reactive to light. Neck: Normal range of motion. No JVD present. No tracheal deviation present. Cardiovascular: Regular rhythm, normal heart sounds and intact distal pulses. Tachycardia present. Exam reveals no gallop and no friction rub. No murmur heard. Pulmonary/Chest: Effort normal and breath sounds normal. No stridor. She has no wheezes. She has no rales. Abdominal: Soft. Bowel sounds are normal. She exhibits no distension and no mass. There is no tenderness. There is no guarding. Musculoskeletal: Normal range of motion. She exhibits no edema or tenderness. Neurological: She is alert and oriented to person, place, and time. No focal deficits, moving all extremities without difficulty, sensations intact, 5/5 muscle strength in BL upper and lower extremities, CN II-XII intact   Skin: Skin is warm and dry. No rash noted. Psychiatric: She has a normal mood and affect. Her behavior is normal. Judgment and thought content normal.        MDM  Number of Diagnoses or Management Options  Altered mental status, unspecified altered mental status type:   Hypercalcemia:   Diagnosis management comments: Pt sent from PCP's office for persistent hypercalcemia and worsening confusion and decreased appetite at home. DDx includes uti, malignancy, dehydration, electrolyte abnormality, aby, CVA. Pt was seen here on 1/10 and hypoalbuminemia at that time. Pt had a CXR which was normal. Will recheck labs, ua, and get head CT, then reassess.         Amount and/or Complexity of Data Reviewed  Clinical lab tests: reviewed and ordered  Tests in the radiology section of CPT®: reviewed and ordered  Obtain history from someone other than the patient: yes ()  Review and summarize past medical records: yes  Discuss the patient with other providers: yes (Hospitalist)    Patient Progress  Patient progress: stable    ED Course       Procedures    CONSULT NOTE:   3:04 PM   Johanna Lama DO  spoke with Dr. Schuyler Philip,   Specialty: Hospitalist  Discussed pt's hx, disposition, and available diagnostic and imaging results. Reviewed care plans. Consultant will evaluate pt for admission. Written by Marylene Given , ED Scribe, as dictated by Johanna Lama DO .    LABORATORY TESTS:  Recent Results (from the past 12 hour(s))   CBC WITH AUTOMATED DIFF    Collection Time: 01/17/17 11:35 AM   Result Value Ref Range    WBC 15.4 (H) 3.6 - 11.0 K/uL    RBC 3.36 (L) 3.80 - 5.20 M/uL    HGB 9.6 (L) 11.5 - 16.0 g/dL    HCT 29.6 (L) 35.0 - 47.0 %    MCV 88.1 80.0 - 99.0 FL    MCH 28.6 26.0 - 34.0 PG    MCHC 32.4 30.0 - 36.5 g/dL    RDW 17.2 (H) 11.5 - 14.5 %    PLATELET 648 (H) 252 - 400 K/uL    NEUTROPHILS 66 32 - 75 %    LYMPHOCYTES 25 12 - 49 %    MONOCYTES 6 5 - 13 %    EOSINOPHILS 2 0 - 7 %    BASOPHILS 1 0 - 1 %    ABS. NEUTROPHILS 10.3 (H) 1.8 - 8.0 K/UL    ABS. LYMPHOCYTES 3.9 (H) 0.8 - 3.5 K/UL    ABS. MONOCYTES 0.9 0.0 - 1.0 K/UL    ABS. EOSINOPHILS 0.2 0.0 - 0.4 K/UL    ABS. BASOPHILS 0.1 0.0 - 0.1 K/UL   METABOLIC PANEL, COMPREHENSIVE    Collection Time: 01/17/17 11:35 AM   Result Value Ref Range    Sodium 140 136 - 145 mmol/L    Potassium 3.5 3.5 - 5.1 mmol/L    Chloride 108 97 - 108 mmol/L    CO2 18 (L) 21 - 32 mmol/L    Anion gap 14 5 - 15 mmol/L    Glucose 131 (H) 65 - 100 mg/dL    BUN 25 (H) 6 - 20 MG/DL    Creatinine 2.13 (H) 0.55 - 1.02 MG/DL    BUN/Creatinine ratio 12 12 - 20      GFR est AA 27 (L) >60 ml/min/1.73m2    GFR est non-AA 22 (L) >60 ml/min/1.73m2    Calcium 11.7 (H) 8.5 - 10.1 MG/DL    Bilirubin, total 0.3 0.2 - 1.0 MG/DL    ALT 15 12 - 78 U/L    AST 15 15 - 37 U/L    Alk.  phosphatase 64 45 - 117 U/L    Protein, total 8.1 6.4 - 8.2 g/dL    Albumin 3.8 3.5 - 5.0 g/dL    Globulin 4.3 (H) 2.0 - 4.0 g/dL    A-G Ratio 0.9 (L) 1.1 - 2.2     URINALYSIS W/ REFLEX CULTURE Collection Time: 01/17/17  1:52 PM   Result Value Ref Range    Color YELLOW/STRAW      Appearance CLEAR CLEAR      Specific gravity 1.021 1.003 - 1.030      pH (UA) 6.0 5.0 - 8.0      Protein 30 (A) NEG mg/dL    Glucose NEGATIVE  NEG mg/dL    Ketone NEGATIVE  NEG mg/dL    Bilirubin NEGATIVE  NEG      Blood NEGATIVE  NEG      Urobilinogen 0.2 0.2 - 1.0 EU/dL    Nitrites NEGATIVE  NEG      Leukocyte Esterase NEGATIVE  NEG      WBC 5-10 0 - 4 /hpf    RBC 0-5 0 - 5 /hpf    Epithelial cells FEW FEW /lpf    Bacteria NEGATIVE  NEG /hpf    UA:UC IF INDICATED URINE CULTURE ORDERED (A) CNI         IMAGING RESULTS:  CT HEAD WO CONT   Final Result   HEAD CT WITHOUT CONTRAST: 1/17/2017 2:43 PM     INDICATION: Altered mental status. Hypercalcemia, confusion, weakness.     COMPARISON: None.     PROCEDURE: Axial images of the head were obtained without contrast. Coronal and  sagittal reformats were performed. CT dose reduction was achieved through use of  a standardized protocol tailored for this examination and automatic exposure  control for dose modulation.     FINDINGS: The ventricles and sulci are appropriate in size and configuration for  age. No loss of gray-white differentiation to suggest late acute or early  subacute infarction. No mass effect or intracranial hemorrhage.     IMPRESSION  IMPRESSION: No acute intracranial abnormality. MEDICATIONS GIVEN:  Medications   sodium chloride 0.9 % bolus infusion 1,000 mL (1,000 mL IntraVENous New Bag 1/17/17 1344)       IMPRESSION:  1. Altered mental status, unspecified altered mental status type    2. Hypercalcemia        PLAN:  1. Admit    3:04 PM  Patient is being admitted to the hospital by Dr. Tariq Castillo. The results of their tests and reasons for their admission have been discussed with them and/or available family. They convey agreement and understanding for the need to be admitted and for their admission diagnosis.   Consultation has been made with the inpatient physician specialist for hospitalization. Written by CAROLYN Mixonibe, as dictated by Sasha Chinchilla DO . This note is prepared by Gonzalo Stubbs, acting as Scribe for Sasha Chinchilla DO . Sasha Chinchilla DO : The scribe's documentation has been prepared under my direction and personally reviewed by me in its entirety. I confirm that the note above accurately reflects all work, treatment, procedures, and medical decision making performed by me.

## 2017-01-17 NOTE — IP AVS SNAPSHOT
[address] [facility] 
[phone] Patient: Cleo Ward MRN: [id number] TBV:6/80/[id number] You are allergic to the following No active allergies Recent Documentation Height Weight Breastfeeding? BMI OB Status Smoking Status 1.499 m 45 kg No 20.02 kg/m2 Hysterectomy Former Smoker Unresulted Labs Order Current Status FREE LIGHT CHAINS, KAPPA/LAMBDA, QT In process PTH-RELATED PEPTIDE In process CULTURE, STOOL Preliminary result FREE LIGHT CHAINS, KAPPA/LAMBDA, UR, QT(BENCE-MAYS) Preliminary result Emergency Contacts Name Discharge Info Relation Home Work Mobile Buck Guajardo DISCHARGE CAREGIVER [3] Spouse [3] [phone] About your hospitalization You were admitted on:  January 17, 2017 You last received care in the:  [facility] 2 CARDIOPULMONARY CARE You were discharged on:  January 19, 2017 Unit phone number:  [phone] Why you were hospitalized Your primary diagnosis was:  Not on File Your diagnoses also included:  Hypercalcemia Providers Seen During Your Hospitalizations Provider Role Specialty Primary office phone Arlyn Corbin DO Attending Provider Emergency Medicine [phone] Loco Hinton MD Attending Provider Internal Medicine [phone] Your Primary Care Physician (PCP) Primary Care Physician Office Phone Office Fax Deanna Davila [phone] Follow-up Information Follow up With Details Comments Contact Info Jamir Recio MD   [address] [place] [address],[address] [facility] 
[phone] Current Discharge Medication List  
  
CONTINUE these medications which have NOT CHANGED Dose & Instructions Dispensing Information Comments Morning Noon Evening Bedtime  
 aspirin, buffered 81 mg Tab Your next dose is: Today Dose:  1 Tab Take 1 Tab by mouth daily. Refills:  0  
     
   
   
  
   
  
 famotidine 20 mg tablet Commonly known as:  PEPCID Your next dose is: Today Dose:  20 mg Take 1 Tab by mouth two (2) times a day. Indications: GASTROESOPHAGEAL REFLUX Quantity:  180 Tab Refills:  1  
     
   
   
  
   
  
 latanoprost 0.005 % ophthalmic solution Commonly known as:  Carlos Alberto Cower Your next dose is:  Tomorrow Dose:  1 Drop Administer 1 Drop to both eyes daily. Refills:  0  
     
  
   
   
  
   
  
 timolol 0.25 % ophthalmic solution Commonly known as:  Leveda Fresh Your next dose is:  Tomorrow Dose:  2 Drop Administer 2 Drops to both eyes two (2) times a day. use in affected eye(s) Refills:  0 Discharge Instructions Patient Discharge Instructions Rigoberto Caicedo / [id number] : 1940 Admitted 2017 Discharged: 2017 11:46 AM  
 
ACUTE DIAGNOSES: 
Hypercalcemia CHRONIC MEDICAL DIAGNOSES: 
Problem List as of 2017  Date Reviewed: 2017 Codes Class Noted - Resolved Hypercalcemia ICD-10-CM: K05.18 
ICD-9-CM: 275.42  2017 - Present Interstitial nephritis ICD-10-CM: N12 
ICD-9-CM: 583.89  2016 - Present ARF (acute renal failure) (HCC) ICD-10-CM: N17.9 ICD-9-CM: 584.9  10/15/2016 - Present Chronic interstitial cystitis ICD-10-CM: N30.10 ICD-9-CM: 595.1 Chronic 10/15/2016 - Present Overview Signed 10/15/2016  6:41 AM by Alberta Osorio MD  
  Followed by Dr. Vikki Sena Diarrhea ICD-10-CM: R19.7 ICD-9-CM: 787.91 Present on Admission 10/15/2016 - Present Acute cystitis without hematuria ICD-10-CM: N30.00 ICD-9-CM: 595.0 Present on Admission 10/15/2016 - Present Family history of colon cancer ICD-10-CM: Z80.0 ICD-9-CM: V16.0  3/17/2016 - Present Anemia ICD-10-CM: D64.9 ICD-9-CM: 285.9  3/17/2016 - Present Essential hypertension ICD-10-CM: I10 
ICD-9-CM: 401.9  3/17/2016 - Present Hyperlipidemia ICD-10-CM: E78.5 ICD-9-CM: 272.4  9/4/2015 - Present Glaucoma ICD-10-CM: H40.9 ICD-9-CM: 365.9  9/4/2015 - Present Gastroesophageal reflux disease without esophagitis ICD-10-CM: K21.9 ICD-9-CM: 530.81  9/4/2015 - Present Osteopenia ICD-10-CM: M85.80 ICD-9-CM: 733.90  9/4/2015 - Present DISCHARGE MEDICATIONS:  
· It is important that you take the medication exactly as they are prescribed. · Keep your medication in the bottles provided by the pharmacist and keep a list of the medication names, dosages, and times to be taken in your wallet. · Do not take other medications without consulting your doctor. DIET:  Renal Diet ACTIVITY: Activity as tolerated ADDITIONAL INFORMATION: If you experience any of the following symptoms then please call your primary care physician or return to the emergency room if you cannot get hold of your doctor: Fever, chills, nausea, vomiting, diarrhea, change in mentation, falling, bleeding, shortness of breath. FOLLOW UP CARE: 
Dr. El Sprague MD.  Please call and set up an appointment to see them in 1 weeks. With Dr Fernando Gonzalez,nephrology as outpt. Discharge Orders None OfficialVirtualDJ Announcement We are excited to announce that we are making your provider's discharge notes available to you in OfficialVirtualDJ. You will see these notes when they are completed and signed by the physician that discharged you from your recent hospital stay. If you have any questions or concerns about any information you see in OfficialVirtualDJ, please call the Health Information Department where you were seen or reach out to your Primary Care Provider for more information about your plan of care. Introducing \[facility] [place] [facility]\"" & HEALTH SERVICES! Dear Kathia Soni: Thank you for requesting a OfficialVirtualDJ account.   Our records indicate that you already have an active TRUECar account. You can access your account anytime at [email or web address] Did you know that you can access your hospital and ER discharge instructions at any time in TRUECar? You can also review all of your test results from your hospital stay or ER visit. Additional Information If you have questions, please visit the Frequently Asked Questions section of the TRUECar website at [email or web address] Remember, TRUECar is NOT to be used for urgent needs. For medical emergencies, dial 911. Now available from your iPhone and Android! General Information Please provide this summary of care documentation to your next provider. Patient Signature:  ____________________________________________________________ Date:  ____________________________________________________________  
  
Georgette Goldberg Provider Signature:  ____________________________________________________________ Date:  ____________________________________________________________

## 2017-01-17 NOTE — MR AVS SNAPSHOT
Visit Information Date & Time Provider Department Dept. Phone Encounter #  
 1/17/2017  9:15 AM Gary Castellanos, [address] [id number] Follow-up Instructions Return for  to ED. Your Appointments 4/7/2017 10:00 AM  
ROUTINE CARE with Gary Castellanos MD  
[facility] [address]) Appt Note: 3 month follow up meds; .  
 [address] [facility] [address] [id number]  
[address] [facility] [address] [place] [address] Upcoming Health Maintenance Date Due  
 GLAUCOMA SCREENING Q2Y 3/18/2003 OSTEOPOROSIS SCREENING (DEXA) 3/18/2003 Pneumococcal 65+ High/Highest Risk (2 of 2 - PPSV23) 1/1/2012 MEDICARE YEARLY EXAM 9/4/2016 DTaP/Tdap/Td series (2 - Td) 9/4/2025 Allergies as of 1/17/2017  Review Complete On: 1/17/2017 By: Luciana Price No Known Allergies Current Immunizations  Never Reviewed Name Date Influenza Vaccine (Quad) PF 10/24/2016 10:25 AM  
 Pneumococcal Vaccine (Unspecified Type) 1/1/2007 Tdap 9/4/2015  1:01 PM  
 Zoster Vaccine, Live 1/1/2013 Not reviewed this visit You Were Diagnosed With   
  
 Codes Comments Hypercalcemia    -  Primary ICD-10-CM: D22.89 
ICD-9-CM: 275.42 Vitals BP Pulse Temp Resp Height(growth percentile) Weight(growth percentile)  
 137/76 (BP 1 Location: Left arm, BP Patient Position: Supine) 99 97.7 °F (36.5 °C) (Oral) 19 4' 11\" (1.499 m) 98 lb 9.6 oz (44.7 kg) SpO2 BMI OB Status Smoking Status 98% 19.91 kg/m2 Hysterectomy Former Smoker Vitals History BMI and BSA Data Body Mass Index Body Surface Area  
 19.91 kg/m 2 1.36 m 2 Preferred Pharmacy Pharmacy Name Phone [facility]  [address] [phone] Your Updated Medication List  
  
   
This list is accurate as of: 1/17/17 10:37 AM.  Always use your most recent med list. amLODIPine 10 mg tablet Commonly known as:  Wagnerdai Nathan Take 1 Tab by mouth daily. aspirin, buffered 81 mg Tab Take 1 Tab by mouth daily. CRANBERRY CONCENTRATE Cap Generic drug:  vitamin c-vitamin e Take 1 Cap by mouth two (2) times a day. famotidine 20 mg tablet Commonly known as:  PEPCID Take 1 Tab by mouth two (2) times a day. Indications: GASTROESOPHAGEAL REFLUX  
  
 latanoprost 0.005 % ophthalmic solution Commonly known as:  Kenith Standing Administer 1 Drop to both eyes daily. lovastatin 40 mg tablet Commonly known as:  MEVACOR Take 1 Tab by mouth daily. multivitamin tablet Commonly known as:  ONE A DAY Take 2 Tabs by mouth daily. timolol 0.25 % ophthalmic solution Commonly known as:  Felicia Flores Administer 2 Drops to both eyes two (2) times a day. use in affected eye(s) VITAMIN D3 2,000 unit Tab Generic drug:  cholecalciferol (vitamin D3) Take 1 Tab by mouth daily. We Performed the Following AMB POC EKG ROUTINE W/ 12 LEADS, INTER & REP [11602 CPT(R)] Follow-up Instructions Return for  to ED. Introducing [facility] [place] & HEALTH SERVICES! Dear Finn Rank: Thank you for requesting a Hordspot account. Our records indicate that you already have an active Hordspot account. You can access your account anytime at [email or web address] Did you know that you can access your hospital and ER discharge instructions at any time in Hordspot? You can also review all of your test results from your hospital stay or ER visit. Additional Information If you have questions, please visit the Frequently Asked Questions section of the Hordspot website at [email or web address] Remember, Hordspot is NOT to be used for urgent needs. For medical emergencies, dial 911. Now available from your iPhone and Android! Please provide this summary of care documentation to your next provider. Your primary care clinician is listed as Celestine Malone. If you have any questions after today's visit, please call [phone].

## 2017-01-17 NOTE — PROGRESS NOTES
She is a 64 y.o. female who presents with concerns of poor appetite, generalized weakness and feeling more forgetful. In with . Patient reports that she cannot remember anything. Per , she is not herself. Usually she is more energetic. She states that since she had the kidney failure, she has had a poor appetite. Not eating well, not drinking much. Weight loss noted. No nausea, no vomiting. Some diarrhea, water or soft dark yellow. No BRBPR or melena. Feeling generally weak. She has leg pain. Dizziness on arising. No falls recently. No abdominal pain. No urinary symptoms. Noted visual changes. Found to have elevated calcium in the hospital of 12.6 with albumin 2.9, corrected 13.48.  K 3.0. Was given IVF and potassium and discharged from ED. Had repeat labs yesterday calcium 12.5, PTH low. Baseline EKG with QTc 410, was 434 in ED on 1/10. Creatinine has been stable since December. To see Dr. Flex Beverly in February for follow up on renal failure in October. ROS:  Constitutional: negative for fevers, chills, positive for anorexia, weakness, weight loss  ENT:   negative for tinnitus,sore throat,nasal congestion,ear pain, sinus pain.    Respiratory:  negative for cough, chest congestion, dyspnea,wheezing  CV:   negative for chest pain, palpitations, lower extremity edema  GI:   negative for nausea, vomiting, diarrhea, abdominal pain,melena, positive for diarrhea  Genitourinary: negative for frequency, dysuria, hematuria  Musculoskel: negative for myalgias, arthralgias, back pain, muscle weakness, joint pain, positive for leg pain  Neurological:  negative for headaches, dizziness, focal weakness, numbness,positive for confusion      Past Medical History   Diagnosis Date    Arthritis     Gastrointestinal disorder      GERD    GERD (gastroesophageal reflux disease)     Glaucoma     High cholesterol     Hypertension     Ill-defined condition      chronic cystitis    Kidney failure 10/14/2016       Past Surgical History   Procedure Laterality Date    Hx tonsillectomy      Hx urological       cauterization of bladder ulcers    Hx gyn           Hx hysterectomy      Hx gyn         Family History   Problem Relation Age of Onset    Cancer Father     Cancer Brother        Social History     Social History    Marital status:      Spouse name: N/A    Number of children: N/A    Years of education: N/A     Occupational History    Not on file. Social History Main Topics    Smoking status: Former Smoker     Packs/day: 0.50     Years: 4.00     Quit date: 1997    Smokeless tobacco: Not on file    Alcohol use Yes      Comment: seldom    Drug use: Yes     Special: Prescription    Sexual activity: Not on file     Other Topics Concern    Not on file     Social History Narrative    ** Merged History Encounter **            No current facility-administered medications for this visit. Current Outpatient Prescriptions:     famotidine (PEPCID) 20 mg tablet, Take 1 Tab by mouth two (2) times a day. Indications: GASTROESOPHAGEAL REFLUX, Disp: 180 Tab, Rfl: 1    latanoprost (XALATAN) 0.005 % ophthalmic solution, Administer 1 Drop to both eyes daily. , Disp: , Rfl:     Aspirin, Buffered 81 mg tab, Take 1 Tab by mouth daily. , Disp: , Rfl:     timolol (BETIMOL) 0.25 % ophthalmic solution, Administer 2 Drops to both eyes two (2) times a day.  use in affected eye(s) , Disp: , Rfl:     Facility-Administered Medications Ordered in Other Visits:     0.9% sodium chloride infusion, 100 mL/hr, IntraVENous, CONTINUOUS, Miguel Rojo MD       Visit Vitals    /76 (BP 1 Location: Left arm, BP Patient Position: Supine)    Pulse 99    Temp 97.7 °F (36.5 °C) (Oral)    Resp 19    Ht 4' 11\" (1.499 m)    Wt 98 lb 9.6 oz (44.7 kg)    SpO2 98%    BMI 19.91 kg/m2       Physical Examination:   General - mildly ill appearing female  HEENT - PERRL, TM no erythema/opacification, OP no erythema or exudate, MMM  Neck - supple, no bruits, no TMG, no LAD  Pulm - clear to auscultation bilaterally  Cardio - RRR, normal S1 S2, no murmur  Abd - soft, nontender, no masses, no HSM  Extrem - no edema, +2 distal pulses  Neuro- alert and able to answer questions, normal gait, +2 DTR     Assessment/Plan:    1. Hypercalcemia-her corrected calcium remains high. She is off calcium and vitamin D supplements. The renal function is impaired, but stable since December. Due to her inability symptoms of weakness and anorexia, she is referred to Ed for hydration and nephrology consult regarding etiology of her hypercalcemia. EKG with mildly elevated QTc.      - AMB POC EKG ROUTINE W/ 12 LEADS, INTER & REP    Follow up pending ED evaluation.      Cookie Shipman MD

## 2017-01-17 NOTE — IP AVS SNAPSHOT
Current Discharge Medication List  
  
Take these medications at their scheduled times Dose & Instructions Dispensing Information Comments Morning Noon Evening Bedtime  
 aspirin, buffered 81 mg Tab Your next dose is: Today Dose:  1 Tab Take 1 Tab by mouth daily. Refills:  0  
     
   
   
  
   
  
 famotidine 20 mg tablet Commonly known as:  PEPCID Your next dose is: Today Dose:  20 mg Take 1 Tab by mouth two (2) times a day. Indications: GASTROESOPHAGEAL REFLUX Quantity:  180 Tab Refills:  1  
     
   
   
  
   
  
 latanoprost 0.005 % ophthalmic solution Commonly known as:  Franco Barnett Your next dose is:  Tomorrow Dose:  1 Drop Administer 1 Drop to both eyes daily. Refills:  0  
     
  
   
   
  
   
  
 timolol 0.25 % ophthalmic solution Commonly known as:  Naima Callum Your next dose is:  Tomorrow Dose:  2 Drop Administer 2 Drops to both eyes two (2) times a day. use in affected eye(s) Refills:  0

## 2017-01-18 LAB
25(OH)D3 SERPL-MCNC: 65.7 NG/ML (ref 30–100)
ALBUMIN SERPL BCP-MCNC: 3.2 G/DL (ref 3.5–5)
ANION GAP BLD CALC-SCNC: 10 MMOL/L (ref 5–15)
BUN SERPL-MCNC: 20 MG/DL (ref 6–20)
BUN/CREAT SERPL: 12 (ref 12–20)
C DIFF TOX GENS STL QL NAA+PROBE: NEGATIVE
CA-I BLD-SCNC: 1.41 MMOL/L (ref 1.13–1.32)
CALCIUM SERPL-MCNC: 9.7 MG/DL (ref 8.5–10.1)
CHLORIDE SERPL-SCNC: 115 MMOL/L (ref 97–108)
CO2 SERPL-SCNC: 16 MMOL/L (ref 21–32)
CREAT SERPL-MCNC: 1.65 MG/DL (ref 0.55–1.02)
CREAT UR-MCNC: 16.5 MG/DL
ERYTHROCYTE [DISTWIDTH] IN BLOOD BY AUTOMATED COUNT: 17.2 % (ref 11.5–14.5)
GLUCOSE SERPL-MCNC: 103 MG/DL (ref 65–100)
HCT VFR BLD AUTO: 25 % (ref 35–47)
HGB BLD-MCNC: 8.2 G/DL (ref 11.5–16)
MCH RBC QN AUTO: 28.7 PG (ref 26–34)
MCHC RBC AUTO-ENTMCNC: 32.8 G/DL (ref 30–36.5)
MCV RBC AUTO: 87.4 FL (ref 80–99)
PHOSPHATE SERPL-MCNC: 1.6 MG/DL (ref 2.6–4.7)
PHOSPHATE SERPL-MCNC: 2.4 MG/DL (ref 2.6–4.7)
PLATELET # BLD AUTO: 346 K/UL (ref 150–400)
POTASSIUM SERPL-SCNC: 3 MMOL/L (ref 3.5–5.1)
PROT UR-MCNC: 9 MG/DL (ref 0–11.9)
PROT/CREAT UR-RTO: 0.5
RBC # BLD AUTO: 2.86 M/UL (ref 3.8–5.2)
SODIUM SERPL-SCNC: 141 MMOL/L (ref 136–145)
TSH SERPL DL<=0.05 MIU/L-ACNC: 1.34 UIU/ML (ref 0.36–3.74)
TSH SERPL DL<=0.05 MIU/L-ACNC: 1.94 UIU/ML (ref 0.36–3.74)
WBC # BLD AUTO: 11.6 K/UL (ref 3.6–11)
WBC #/AREA STL HPF: NORMAL /HPF (ref 0–4)

## 2017-01-18 PROCEDURE — 84100 ASSAY OF PHOSPHORUS: CPT | Performed by: INTERNAL MEDICINE

## 2017-01-18 PROCEDURE — 74011000250 HC RX REV CODE- 250: Performed by: INTERNAL MEDICINE

## 2017-01-18 PROCEDURE — 74011250637 HC RX REV CODE- 250/637: Performed by: FAMILY MEDICINE

## 2017-01-18 PROCEDURE — 82330 ASSAY OF CALCIUM: CPT | Performed by: INTERNAL MEDICINE

## 2017-01-18 PROCEDURE — 84443 ASSAY THYROID STIM HORMONE: CPT | Performed by: FAMILY MEDICINE

## 2017-01-18 PROCEDURE — 83883 ASSAY NEPHELOMETRY NOT SPEC: CPT | Performed by: INTERNAL MEDICINE

## 2017-01-18 PROCEDURE — 82652 VIT D 1 25-DIHYDROXY: CPT | Performed by: INTERNAL MEDICINE

## 2017-01-18 PROCEDURE — 36415 COLL VENOUS BLD VENIPUNCTURE: CPT | Performed by: INTERNAL MEDICINE

## 2017-01-18 PROCEDURE — 85027 COMPLETE CBC AUTOMATED: CPT | Performed by: INTERNAL MEDICINE

## 2017-01-18 PROCEDURE — 80069 RENAL FUNCTION PANEL: CPT | Performed by: INTERNAL MEDICINE

## 2017-01-18 PROCEDURE — 74011250637 HC RX REV CODE- 250/637: Performed by: INTERNAL MEDICINE

## 2017-01-18 PROCEDURE — 84156 ASSAY OF PROTEIN URINE: CPT | Performed by: INTERNAL MEDICINE

## 2017-01-18 PROCEDURE — 87493 C DIFF AMPLIFIED PROBE: CPT | Performed by: INTERNAL MEDICINE

## 2017-01-18 PROCEDURE — 89055 LEUKOCYTE ASSESSMENT FECAL: CPT | Performed by: FAMILY MEDICINE

## 2017-01-18 PROCEDURE — 82306 VITAMIN D 25 HYDROXY: CPT | Performed by: INTERNAL MEDICINE

## 2017-01-18 PROCEDURE — 86335 IMMUNFIX E-PHORSIS/URINE/CSF: CPT | Performed by: INTERNAL MEDICINE

## 2017-01-18 PROCEDURE — 82397 CHEMILUMINESCENT ASSAY: CPT | Performed by: INTERNAL MEDICINE

## 2017-01-18 PROCEDURE — 74011250636 HC RX REV CODE- 250/636: Performed by: INTERNAL MEDICINE

## 2017-01-18 PROCEDURE — 65660000000 HC RM CCU STEPDOWN

## 2017-01-18 PROCEDURE — 84155 ASSAY OF PROTEIN SERUM: CPT | Performed by: INTERNAL MEDICINE

## 2017-01-18 PROCEDURE — 74011000250 HC RX REV CODE- 250: Performed by: FAMILY MEDICINE

## 2017-01-18 PROCEDURE — 87045 FECES CULTURE AEROBIC BACT: CPT | Performed by: FAMILY MEDICINE

## 2017-01-18 PROCEDURE — 87077 CULTURE AEROBIC IDENTIFY: CPT | Performed by: FAMILY MEDICINE

## 2017-01-18 RX ORDER — CLONIDINE HYDROCHLORIDE 0.1 MG/1
0.2 TABLET ORAL
Status: DISCONTINUED | OUTPATIENT
Start: 2017-01-18 | End: 2017-01-19 | Stop reason: HOSPADM

## 2017-01-18 RX ORDER — POTASSIUM CHLORIDE 20 MEQ/1
40 TABLET, EXTENDED RELEASE ORAL
Status: COMPLETED | OUTPATIENT
Start: 2017-01-18 | End: 2017-01-18

## 2017-01-18 RX ORDER — POTASSIUM CHLORIDE 1.5 G/1.77G
40 POWDER, FOR SOLUTION ORAL
Status: COMPLETED | OUTPATIENT
Start: 2017-01-18 | End: 2017-01-18

## 2017-01-18 RX ORDER — HYDRALAZINE HYDROCHLORIDE 20 MG/ML
10 INJECTION INTRAMUSCULAR; INTRAVENOUS
Status: DISCONTINUED | OUTPATIENT
Start: 2017-01-18 | End: 2017-01-19 | Stop reason: HOSPADM

## 2017-01-18 RX ORDER — POTASSIUM CHLORIDE 1.5 G/1.77G
40 POWDER, FOR SOLUTION ORAL
Status: DISPENSED | OUTPATIENT
Start: 2017-01-18 | End: 2017-01-19

## 2017-01-18 RX ORDER — TIMOLOL MALEATE 2.5 MG/ML
1 SOLUTION/ DROPS OPHTHALMIC 2 TIMES DAILY
Status: DISCONTINUED | OUTPATIENT
Start: 2017-01-18 | End: 2017-01-19 | Stop reason: HOSPADM

## 2017-01-18 RX ADMIN — FAMOTIDINE 20 MG: 20 TABLET ORAL at 09:42

## 2017-01-18 RX ADMIN — CLONIDINE HYDROCHLORIDE 0.2 MG: 0.1 TABLET ORAL at 22:14

## 2017-01-18 RX ADMIN — TIMOLOL MALEATE 1 DROP: 2.5 SOLUTION OPHTHALMIC at 11:05

## 2017-01-18 RX ADMIN — LATANOPROST 1 DROP: 50 SOLUTION OPHTHALMIC at 11:05

## 2017-01-18 RX ADMIN — POTASSIUM CHLORIDE 40 MEQ: 20 TABLET, EXTENDED RELEASE ORAL at 16:34

## 2017-01-18 RX ADMIN — Medication 10 ML: at 16:34

## 2017-01-18 RX ADMIN — ACETAMINOPHEN 650 MG: 325 TABLET, FILM COATED ORAL at 22:14

## 2017-01-18 RX ADMIN — POTASSIUM CHLORIDE 40 MEQ: 1.5 POWDER, FOR SOLUTION ORAL at 09:45

## 2017-01-18 RX ADMIN — TIMOLOL MALEATE 1 DROP: 2.5 SOLUTION OPHTHALMIC at 17:31

## 2017-01-18 RX ADMIN — SODIUM BICARBONATE: 84 INJECTION, SOLUTION INTRAVENOUS at 12:57

## 2017-01-18 RX ADMIN — Medication 5 ML: at 06:00

## 2017-01-18 RX ADMIN — ENOXAPARIN SODIUM 60 MG: 60 INJECTION SUBCUTANEOUS at 20:38

## 2017-01-18 RX ADMIN — Medication 10 ML: at 20:40

## 2017-01-18 NOTE — PROGRESS NOTES
0730: [facility]    0800: Pt.'s rectum is very excoriated from diarrhea. Will order Desitin now.

## 2017-01-18 NOTE — CDMP QUERY
Please clarify if this patient is being treated/managed for:    =>What is the clinical significance of:    2. Low K+ level in setting of recent hypokalemia in PCP offc? Hypokalemia POA?    =>Other Explanation of clinical findings  =>Unable to Determine (no explanation of clinical findings)    The medical record reflects the following clinical findings, treatment, and risk factors:    Risk Factors: PMH: Chr interstitial cystitis, CKD, recent ARF, hypokalemia, hypercalcemia per PCP offc. Recent ARF 2/2 meds, poor appetite, lost 10# over few wks, diarrhea. Clinical Indicators: k-3.0, co2-16, Recent EKG shows mildly elev QTc, bun.cr-25/2.13, gfr-22, pth-16 (low),     Treatment: send SPEP, UPEP, Vit D,  caesar yissel, if up consider bisphophonate tx, endo cs, renal cs, lvnx    Please clarify and document your clinical opinion in the progress notes and discharge summary. Thank you!   Kaylan Flood, [facility]. [address]

## 2017-01-18 NOTE — PROGRESS NOTES
Hospitalist Progress Note    NAME: Dk Richmond   :  1940   MRN:  [id number]     Interim Hospital Summary: 64 y.o. female whom presented on 2017 with      Assessment / Plan:  1. Recurrent Hypercalcemia(POA): now improved with hydration. On IVF. D/w Dr Mora Mcgregor. Previous testing done incl SPEP which was negative, possibly related to vitamin D.  PTH is low so not primary parathyroid -will start IV fluid resuscitation  2. Acute on CKD(POA): could be dehydration,poor po intake, hypercalcemia. Now cr improved. Nephrology consulted. 3.  Glaucoma: continue eye drops   4. Hypokalemia: replete  5. Hypertension: not on antihypertensive meds. Taken off norvasc recently. Add PRN hydralazine.   6. Diarrhea: no recent abx. Send stool for CX. Code Status: full  Surrogate Decision Maker:      DVT Prophylaxis: lovenox  GI Prophylaxis: not indicated     Baseline: independent            Subjective:     Chief Complaint / Reason for Physician Visit  No c/o abd pain/N/V. Having some loose stools since yday  Discussed with RN events overnight. Review of Systems:  Symptom Y/N Comments  Symptom Y/N Comments   Fever/Chills n   Chest Pain n    Poor Appetite y   Edema n    Cough n   Abdominal Pain n    Sputum n   Joint Pain n    SOB/SAMPSON n   Pruritis/Rash     Nausea/vomit n   Tolerating PT/OT     Diarrhea y   Tolerating Diet     Constipation n   Other         Objective:     VITALS:   Last 24hrs VS reviewed since prior progress note.  Most recent are:  Patient Vitals for the past 24 hrs:   Temp Pulse Resp BP SpO2   17 0742 98.4 °F (36.9 °C) 96 18 141/88 97 %   17 0501 95.8 °F (35.4 °C) 60 18 151/66 96 %   17 0021 98.7 °F (37.1 °C) 96 18 105/48 98 %   17 1948 98.7 °F (37.1 °C) (!) 102 16 175/87 100 %   17 1630 - (!) 101 18 156/72 98 %   17 1545 - (!) 101 20 144/73 99 %     No intake or output data in the 24 hours ending 17 1351     PHYSICAL EXAM:  General: WD, WN. Alert, cooperative, no acute distress    EENT:  EOMI. Anicteric sclerae. MMM  Resp:  CTA bilaterally, no wheezing or rales. No accessory muscle use  CV:  Regular  rhythm,  No edema  GI:  Soft, Non distended, Non tender.  +Bowel sounds  Neurologic:  Alert and oriented X 3, normal speech,   Psych:   Good insight. Not anxious nor agitated  Skin:  no rashes or ulcers. No jaundice    Reviewed most current lab test results and cultures  YES  Reviewed most current radiology test results   YES  Review and summation of old records today    NO  Reviewed patient's current orders and MAR    YES  PMH/ reviewed - no change compared to H&P  ________________________________________________________________________  Care Plan discussed with:    Comments   Patient x    Family      RN x    Care Manager                    Consultant:  baltazar Nunes Cost   ________________________________________________________________________  Total NON critical care TIME: 25  Minutes    Total CRITICAL CARE TIME Spent:   Minutes non procedure based      Comments   >50% of visit spent in counseling and coordination of care     ________________________________________________________________________  Monika Soni MD     Procedures: see electronic medical records for all procedures/Xrays and details which were not copied into this note but were reviewed prior to creation of Plan. LABS:  I reviewed today's most current labs and imaging studies.   Pertinent labs include:  Recent Labs      01/18/17   0300  01/17/17   1135   WBC  11.6*  15.4*   HGB  8.2*  9.6*   HCT  25.0*  29.6*   PLT  346  488*     Recent Labs      01/18/17   0300  01/17/17   1135  01/16/17   0919   NA  141  140  141   K  3.0*  3.5  3.7   CL  115*  108  105   CO2  16*  18*  18   GLU  103*  131*  112*   BUN  20  25*  25   CREA  1.65*  2.13*  2.13*   CA  9.7  11.7*  12.5*   PHOS  2.4*   --    --    ALB  3.2*  3.8   --    TBILI   --   0.3   -- SGOT   --   15   --    ALT   --   15   --

## 2017-01-18 NOTE — PROGRESS NOTES
1925 TRANSFER - IN REPORT:    Verbal report received from Eusebio Courser, [facility] My[facility] (name) on Rambo Gray being received from ED (unit) for routine progression of care. Report consisted of patients Situation, Background, Assessment and Recommendations(SBAR). Information from the following report(s) SBAR, Kardex, Intake/Output, MAR, Accordion and Recent Results was reviewed with the receiving nurse. Opportunity for questions and clarification was provided. Assessment completed upon patients arrival to unit and care assumed. 1935 Report given to night shift RNJammie

## 2017-01-18 NOTE — PROGRESS NOTES
Initial Nutrition Assessment:    INTERVENTIONS/RECOMMENDATIONS:   · Consider regular diet, K+ and Phos low  · Ensure BID  · Check B12 and Zinc    ASSESSMENT:   Ms. Jany Mae, 65 yo female present with recurrent hypercalcemia, CKD, HTN and diarrhea. Pt reports of ~7% wt loss in the past two weeks due to decrease PO intake caused by altered taste and smell aversion. Pt agreed to try Ensure. B12 and zinc deficiencies can cause altered taste. Will follow PO intake. Diet Order: Renal  % Eaten:  No data found. Pertinent Medications: [x]Reviewed []Other (D5 w Na bicarb, colace PRN, pepcid, KCl)   Pertinent Labs: [x]Reviewed []Other (K+ 3.0, Phos 1.6)  Food Allergies: [x]None []Other   Last BM: 1/17   [x]Active     []Hyperactive  []Hypoactive       [] Absent BS  Skin:    [x] Intact   [] Incision  [] Breakdown  [] Other:    Anthropometrics:   Height: 4' 11\" (149.9 cm) Weight: 45 kg (99 lb 1.6 oz)   IBW (%IBW):   ( ) UBW (%UBW):   (  %)   Last Weight Metrics:  Weight Loss Metrics 1/17/2017 1/17/2017 1/10/2017 1/9/2017 1/2/2017 12/31/2016 11/7/2016   Today's Wt 99 lb 1.6 oz 98 lb 9.6 oz 100 lb 12 oz 102 lb 3.2 oz 103 lb 13.4 oz 105 lb 2.6 oz 106 lb   BMI 20.02 kg/m2 19.91 kg/m2 20.35 kg/m2 20.64 kg/m2 20.97 kg/m2 21.24 kg/m2 21.41 kg/m2       BMI: Body mass index is 20.02 kg/(m^2). This BMI is indicative of:   []Underweight    [x]Normal    []Overweight    [] Obesity   [] Extreme Obesity (BMI>40)     Estimated Nutrition Needs (Based on):   1100 Kcals/day (MSJ: 850 x 1.3) , 45 g (1 g/kg) Protein  Carbohydrate:  At Least 130 g/day  Fluids: 1100 mL/day (1ml/kcal)    NUTRITION DIAGNOSES:   Problem:  Inadequate oral food/beverage intake      Etiology: related to altered taste and smell aversions     Signs/Symptoms: as evidenced by 6% weight loss in 2 weeks      NUTRITION INTERVENTIONS:  Meals/Snacks: General/healthful diet   Supplements: Commercial supplement              GOAL:   consume >50% of meals and ONS in 2-4 days    LEARNING NEEDS (Diet, Food/Nutrient-Drug Interaction):    [x] None Identified   [] Identified and Education Provided/Documented   [] Identified and Pt declined/was not appropriate     Cultureal, Episcopalian, OR Ethnic Dietary Needs:    [x] None Identified   [] Identified and Addressed     [x] Interdisciplinary Care Plan Reviewed/Documented    [x] Discharge Planning:   TBD    MONITORING /EVALUATION:      Food/Nutrient Intake Outcomes:  Total energy intake, Liquid meal replacement  Physical Signs/Symptoms Outcomes: Weight/weight change, Electrolyte and renal profile, GI    NUTRITION RISK:    [x] High              [] Moderate           []  Low  []  Minimal/Uncompromised    PT SEEN FOR:    []  MD Consult: []Calorie Count      []Diabetic Diet Education        []Diet Education     []Electrolyte Management     []General Nutrition Management and Supplements     []Management of Tube Feeding     []TPN Recommendations    [x]  RN Referral:  [x]MST score >=2     []Enteral/Parenteral Nutrition PTA     []Pregnant: Gestational DM or Multigestation     []Pressure Ulcer/Wound Care needs        []  Low BMI  []  DTR Referral       Blanca Yusuf RDN  Pager [phone]  Weekend Pager [phone]

## 2017-01-18 NOTE — PROGRESS NOTES
Cardiopulmonary Care Interdisciplinary rounds were held today to discuss patient plan of care and outcomes. The following members were present: PT, NP/Physician, Pharmacy, Nursing, Nutritionist and Case Management.       Plan of Care: Continue current treatment plan  D/c 1/20-1/21

## 2017-01-18 NOTE — ROUTINE PROCESS
TRANSFER - OUT REPORT:    Verbal report given to Erick Menendez RN(name) on Quinn Paiz  being transferred to [facility](unit) for routine progression of care       Report consisted of patients Situation, Background, Assessment and   Recommendations(SBAR). Information from the following report(s) ED Summary and MAR was reviewed with the receiving nurse. Lines:   Peripheral IV 17 Right Antecubital (Active)   Site Assessment Clean, dry, & intact 2017  2:01 PM   Phlebitis Assessment 0 2017  2:01 PM   Infiltration Assessment 0 2017  2:01 PM   Dressing Status Clean, dry, & intact 2017  2:01 PM   Hub Color/Line Status Pink 2017  2:01 PM        Opportunity for questions and clarification was provided.       Patient transported with:   [employer]

## 2017-01-18 NOTE — CONSULTS
Nephrology Consult Note     Shree Karmen     www. [facility]                    Phone - [phone]   Patient: Dontrell Parker  YOB: 1940     Date- 1/18/2017   MRN: [id number]  Stefanie Estrada MD   Age: 64 y.o. Sex: female      ADMIT DATE:1/17/2017    CONSULTATION DATE:1/18/2017                REASON FOR CONSULTATION: I have been asked to see this patient by Sumanth Cleveland for  Hypercalcemia and renal failure  ASSESSMENT:   · Hypercalcemia ? tums + vit d supplements , Low PTH suggest that she has non parathyroid etiology for her hypercalcemia  · Acute renal failure due to hypercalcemia + dehydration from diarrhea  She was started on lisinopril during last office visit but she is not taking lisinopril    · Active Problems:  ·   Hypercalcemia (1/17/2017)  ·   · hypokalemia  · Metabolic acidosis  · Diarrhea  · ckd 3 baseline cr. 1.4  · hypertension  PLAN:   · Add bicarb to ivf  · kcl 80 meq po  · Check urine pr/cr  · Avoid acei or arb  · Check tsh, vit a level,   · Follow pth rp, vit d level  · She had normal spep during last admission. · Follow bmp and calcium  · Check mg and phos level  · Use clonidine prn for high bp    [] High complexity decision making was performed  [] Patient is at high-risk of decompensation with multiple organ involvement    Subjective:   HPI: Dontrell Parker is a 64 y.o.  female. She is admitted with hypercalcemia and confusion  Her ca was 12.5 and cr. 2.13 on 1-16-17  She is getting nacl since admission. Her cr. Improved to 1.65 and calcium 9.7  She is having diarrhea for last 2 weeks. She was taking vit d and tums at home-   She had labs done as out for my office. Her cr.was 2.25 and calcium 12.4 on 1-9-17. At that time her tums was stopped  She was seen in [facility] for arf with cr. 9.8 and bun 107 on 10-14-16  She had renal bx done which showed AIN  She AIN was treated with prednisone. Her cr.  Improved to 1.4 in [facility] and dec.  She is off prednisone now. She denies taking lasix or nsaids  She was started on lisinopril for proteinuria and htn during last office visit. She is not taking any lisinopril at home        Past Medical Hx:   Past Medical History   Diagnosis Date    AIN (acute interstitial nephritis)     Arthritis     CKD (chronic kidney disease) stage 3, GFR 30-59 ml/min     Gastrointestinal disorder      GERD    GERD (gastroesophageal reflux disease)     Glaucoma     High cholesterol     Hypertension     Hypertension     Ill-defined condition      chronic cystitis    Kidney failure 10/14/2016        Past Surgical Hx:     Past Surgical History   Procedure Laterality Date    Hx tonsillectomy      Hx urological       cauterization of bladder ulcers    Hx gyn           Hx hysterectomy      Hx gyn         Medications:  Prior to Admission medications    Medication Sig Start Date End Date Taking? Authorizing Provider   famotidine (PEPCID) 20 mg tablet Take 1 Tab by mouth two (2) times a day. Indications: GASTROESOPHAGEAL REFLUX 17  Yes Edmund Melgoza MD   latanoprost (XALATAN) 0.005 % ophthalmic solution Administer 1 Drop to both eyes daily. 2/10/16  Yes Historical Provider   Aspirin, Buffered 81 mg tab Take 1 Tab by mouth daily. Yes Historical Provider   timolol (BETIMOL) 0.25 % ophthalmic solution Administer 2 Drops to both eyes two (2) times a day. use in affected eye(s)    Yes Rehan Phillips MD       No Known Allergies    Social Hx:  reports that she quit smoking about 19 years ago. She has a 2.00 pack-year smoking history. She does not have any smokeless tobacco history on file. She reports that she drinks alcohol. She reports that she uses illicit drugs, including Prescription. Family History   Problem Relation Age of Onset    Cancer Father     Cancer Brother        Review of Systems:  A 11 point review of system was performed today.  Pertinent positives and negatives are mentioned in the HPI. The reminder of the ROS is negative and noncontributory. No Known Allergies   Objective:    Vitals:    Vitals:    01/18/17 0021 01/18/17 0501 01/18/17 0742 01/18/17 1403   BP: 105/48 151/66 141/88 148/85   Pulse: 96 60 96 91   Resp: 18 18 18 18   Temp: 98.7 °F (37.1 °C) 95.8 °F (35.4 °C) 98.4 °F (36.9 °C) 98.2 °F (36.8 °C)   SpO2: 98% 96% 97% 99%   Weight:       Height:         I&O's:       Physical Exam:  General:Alert, No distress,   Eyes:No scleral icterus, No conjunctival pallor  Neck:Supple,no mass palpable,no thyromegaly  Lungs:Clears to auscultation Bilaterally, normal respiratory effort  CVS:RRR, S1 S2 normal,  No rub,  Abdomen:Soft, Non tender, No hepatosplenomegaly  Extremities: no LE edema  Skin:No rash or lesions, Warm and DRY   Psych: appropriate affect    :  No osborn  Musculoskeletal : no joint pain, no joint tenderness  NEURO: Normal Speech, Non focal    CODE STATUS:  full  Care Plan discussed with:  patient     Chart reviewed.   ECG[de-identified] Rev:no  Xray/CT/US/MRI REV:yes  Lab Data Personally Reviewed: (see below)  Recent Labs      01/18/17   0300  01/17/17   1135  01/16/17   0919   NA  141  140  141   K  3.0*  3.5  3.7   CL  115*  108  105   CO2  16*  18*  18   GLU  103*  131*  112*   BUN  20  25*  25   CREA  1.65*  2.13*  2.13*   CA  9.7  11.7*  12.5*   PHOS  2.4*   --    --      Recent Labs      01/18/17   0300  01/17/17   1135   WBC  11.6*  15.4*   HGB  8.2*  9.6*   HCT  25.0*  29.6*   PLT  346  488*     Recent Labs      01/18/17   0300  01/17/17   1135  01/16/17   0919   NA  141  140  141   K  3.0*  3.5  3.7   CL  115*  108  105   CO2  16*  18*  18   BUN  20  25*  25   CREA  1.65*  2.13*  2.13*   GLU  103*  131*  112*   CA  9.7  11.7*  12.5*   PHOS  2.4*   --    --      Lab Results   Component Value Date/Time    Color YELLOW/STRAW 01/17/2017 01:52 PM    Appearance CLEAR 01/17/2017 01:52 PM    Specific gravity 1.021 01/17/2017 01:52 PM    pH (UA) 6.0 01/17/2017 01:52 PM    Protein 30 01/17/2017 01:52 PM    Glucose NEGATIVE  01/17/2017 01:52 PM    Ketone NEGATIVE  01/17/2017 01:52 PM    Bilirubin NEGATIVE  01/17/2017 01:52 PM    Urobilinogen 0.2 01/17/2017 01:52 PM    Nitrites NEGATIVE  01/17/2017 01:52 PM    Leukocyte Esterase NEGATIVE  01/17/2017 01:52 PM    Epithelial cells FEW 01/17/2017 01:52 PM    Bacteria NEGATIVE  01/17/2017 01:52 PM    WBC 5-10 01/17/2017 01:52 PM    RBC 0-5 01/17/2017 01:52 PM     Lab Results   Component Value Date/Time    Culture result: NO SIGNIFICANT GROWTH 01/10/2017 01:25 PM    Culture result: NO GROWTH 5 DAYS 01/02/2017 08:50 PM    Culture result: MIXED UROGENITAL GARCIA ISOLATED 01/02/2017 08:50 PM     Prior to Admission Medications   Prescriptions Last Dose Informant Patient Reported? Taking? Aspirin, Buffered 81 mg tab 1/16/2017 at Unknown time  Yes Yes   Sig: Take 1 Tab by mouth daily. famotidine (PEPCID) 20 mg tablet 1/16/2017 at Unknown time  No Yes   Sig: Take 1 Tab by mouth two (2) times a day. Indications: GASTROESOPHAGEAL REFLUX   latanoprost (XALATAN) 0.005 % ophthalmic solution 1/16/2017 at Unknown time  Yes Yes   Sig: Administer 1 Drop to both eyes daily. timolol (BETIMOL) 0.25 % ophthalmic solution 1/16/2017 at Unknown time  Yes Yes   Sig: Administer 2 Drops to both eyes two (2) times a day. use in affected eye(s)       Facility-Administered Medications: None       Medications list Personally Reviewed   [x]      Yes     []               No    Thank you for allowing us to participate in the care this patient. We will follow patient with you. Signed By: MD [facility]  [facility]  [address], [address]  [place], [address]  Phone - [phone]         Fax - [phone] [facility] Office  [address] [place] [address]  Phone - [phone]        Fax - [phone]     www. [facility] [employer]

## 2017-01-18 NOTE — CDMP QUERY
Please clarify if this patient is being treated/managed for:    =>What is the clinical significance of:    3. Low CO2 levels in setting of poor po intake, wt loss? Metabolic acidosis POA?    =>Other Explanation of clinical findings  =>Unable to Determine (no explanation of clinical findings)    The medical record reflects the following clinical findings, treatment, and risk factors:    Risk Factors: PMH: Chr interstitial cystitis, CKD, recent ARF, hypokalemia, hypercalcemia per PCP offc. Recent ARF 2/2 meds, poor appetite, lost 10# over few wks, diarrhea. Clinical Indicators: k-3.0, co2-16, Recent EKG shows mildly elev QTc, bun.cr-25/2.13, gfr-22, pth-16 (low),     Treatment: send SPEP, UPEP, Vit D,  caesar yissel, if up consider bisphophonate tx, endo cs, renal cs, lvnx    Please clarify and document your clinical opinion in the progress notes and discharge summary. Thank you!   Robin Dale, [facility]. [address]

## 2017-01-18 NOTE — CDMP QUERY
Please clarify if this patient is being treated/managed for:    =>What is the clinical significance of:    1. low PTH in setting of hypercalcemia? Hypoparathyroidism POA?    =>Other Explanation of clinical findings  =>Unable to Determine (no explanation of clinical findings)    The medical record reflects the following clinical findings, treatment, and risk factors:    Risk Factors: PMH: Chr interstitial cystitis, CKD, recent ARF, hypokalemia, hypercalcemia per PCP offc. Recent ARF 2/2 meds, poor appetite, lost 10# over few wks, diarrhea. Clinical Indicators: k-3.0, co2-16, Recent EKG shows mildly elev QTc, bun.cr-25/2.13, gfr-22, pth-16 (low),     Treatment: send SPEP, UPEP, Vit D,  caesar yissel, if up consider bisphophonate tx, endo cs, renal cs, lvnx    Please clarify and document your clinical opinion in the progress notes and discharge summary. Thank you!   Rafa Mendenhall [facility] . [address]

## 2017-01-18 NOTE — CDMP QUERY
Please clarify if this patient is being treated/managed for:    =>What is the clinical significance of:    4. Elevated bun/creat in setting of h/o CKD, ARF above pt's baseline? Acute renal failure on CKD4 POA? Acute/Chronic Tubulo-interstitial nephritis POA?    =>Other Explanation of clinical findings  =>Unable to Determine (no explanation of clinical findings)    The medical record reflects the following clinical findings, treatment, and risk factors:    Risk Factors: PMH: Chr interstitial cystitis, CKD, recent ARF, hypokalemia, hypercalcemia per PCP offc. Recent ARF 2/2 meds, poor appetite, lost 10# over few wks, diarrhea. Clinical Indicators: k-3.0, co2-16, Recent EKG shows mildly elev QTc, bun.cr-25/2.13, gfr-22, pth-16 (low),     Treatment: send SPEP, UPEP, Vit D,  caesar yissel, if up consider bisphophonate tx, endo cs, renal cs, lvnx    Please clarify and document your clinical opinion in the progress notes and discharge summary. Thank you!   Chloé Christian, [facility]. [address]

## 2017-01-19 VITALS
SYSTOLIC BLOOD PRESSURE: 131 MMHG | BODY MASS INDEX: 19.98 KG/M2 | TEMPERATURE: 98.4 F | DIASTOLIC BLOOD PRESSURE: 60 MMHG | WEIGHT: 99.1 LBS | HEART RATE: 101 BPM | RESPIRATION RATE: 19 BRPM | HEIGHT: 59 IN | OXYGEN SATURATION: 97 %

## 2017-01-19 LAB
1,25(OH)2D3 SERPL-MCNC: 65.8 PG/ML (ref 19.9–79.3)
ALBUMIN MFR UR ELPH: 100 %
ALBUMIN SERPL BCP-MCNC: 2.8 G/DL (ref 3.5–5)
ALBUMIN SERPL ELPH-MCNC: 3.2 G/DL (ref 2.9–4.4)
ALBUMIN/GLOB SERPL: 1 {RATIO} (ref 0.7–1.7)
ALPHA1 GLOB MFR UR ELPH: 0 %
ALPHA1 GLOB SERPL ELPH-MCNC: 0.3 G/DL (ref 0–0.4)
ALPHA2 GLOB 24H MFR UR ELPH: 0 %
ALPHA2 GLOB SERPL ELPH-MCNC: 0.9 G/DL (ref 0.4–1)
ANION GAP BLD CALC-SCNC: 11 MMOL/L (ref 5–15)
B-GLOBULIN 24H MFR UR ELPH: 0 %
B-GLOBULIN SERPL ELPH-MCNC: 1.1 G/DL (ref 0.7–1.3)
BACTERIA SPEC CULT: NORMAL
BUN SERPL-MCNC: 15 MG/DL (ref 6–20)
BUN/CREAT SERPL: 10 (ref 12–20)
CALCIUM SERPL-MCNC: 8.9 MG/DL (ref 8.5–10.1)
CC UR VC: NORMAL
CHLORIDE SERPL-SCNC: 110 MMOL/L (ref 97–108)
CO2 SERPL-SCNC: 22 MMOL/L (ref 21–32)
CREAT SERPL-MCNC: 1.51 MG/DL (ref 0.55–1.02)
GAMMA GLOB 24H MFR UR ELPH: 0 %
GAMMA GLOB SERPL ELPH-MCNC: 0.8 G/DL (ref 0.4–1.8)
GLOBULIN SER CALC-MCNC: 3.1 G/DL (ref 2.2–3.9)
GLUCOSE SERPL-MCNC: 115 MG/DL (ref 65–100)
INTERPRETATION UR IFE-IMP: NORMAL
M PROTEIN MFR UR ELPH: NORMAL %
M PROTEIN SERPL ELPH-MCNC: NORMAL G/DL
MAGNESIUM SERPL-MCNC: 1.7 MG/DL (ref 1.6–2.4)
PHOSPHATE SERPL-MCNC: 1.2 MG/DL (ref 2.6–4.7)
PLEASE NOTE:, 133800: NORMAL
POTASSIUM SERPL-SCNC: 3.5 MMOL/L (ref 3.5–5.1)
PROT SERPL-MCNC: 6.3 G/DL (ref 6–8.5)
PROT UR-MCNC: 10 MG/DL
SERVICE CMNT-IMP: NORMAL
SODIUM SERPL-SCNC: 143 MMOL/L (ref 136–145)

## 2017-01-19 PROCEDURE — 80069 RENAL FUNCTION PANEL: CPT | Performed by: INTERNAL MEDICINE

## 2017-01-19 PROCEDURE — G8979 MOBILITY GOAL STATUS: HCPCS

## 2017-01-19 PROCEDURE — 74011250637 HC RX REV CODE- 250/637: Performed by: INTERNAL MEDICINE

## 2017-01-19 PROCEDURE — 97161 PT EVAL LOW COMPLEX 20 MIN: CPT

## 2017-01-19 PROCEDURE — 83883 ASSAY NEPHELOMETRY NOT SPEC: CPT | Performed by: INTERNAL MEDICINE

## 2017-01-19 PROCEDURE — 83735 ASSAY OF MAGNESIUM: CPT | Performed by: INTERNAL MEDICINE

## 2017-01-19 PROCEDURE — 74011250636 HC RX REV CODE- 250/636: Performed by: INTERNAL MEDICINE

## 2017-01-19 PROCEDURE — 74011000250 HC RX REV CODE- 250: Performed by: INTERNAL MEDICINE

## 2017-01-19 PROCEDURE — 36415 COLL VENOUS BLD VENIPUNCTURE: CPT | Performed by: INTERNAL MEDICINE

## 2017-01-19 PROCEDURE — G8980 MOBILITY D/C STATUS: HCPCS

## 2017-01-19 PROCEDURE — G8978 MOBILITY CURRENT STATUS: HCPCS

## 2017-01-19 PROCEDURE — 74011250637 HC RX REV CODE- 250/637: Performed by: FAMILY MEDICINE

## 2017-01-19 RX ORDER — SODIUM,POTASSIUM PHOSPHATES 280-250MG
2 POWDER IN PACKET (EA) ORAL 4 TIMES DAILY
Status: COMPLETED | OUTPATIENT
Start: 2017-01-19 | End: 2017-01-19

## 2017-01-19 RX ADMIN — POTASSIUM PHOSPHATE, MONOBASIC AND POTASSIUM PHOSPHATE, DIBASIC: 224; 236 INJECTION, SOLUTION INTRAVENOUS at 10:31

## 2017-01-19 RX ADMIN — TIMOLOL MALEATE 1 DROP: 2.5 SOLUTION OPHTHALMIC at 10:30

## 2017-01-19 RX ADMIN — SODIUM BICARBONATE: 84 INJECTION, SOLUTION INTRAVENOUS at 01:38

## 2017-01-19 RX ADMIN — TIMOLOL MALEATE 1 DROP: 2.5 SOLUTION OPHTHALMIC at 17:14

## 2017-01-19 RX ADMIN — POTASSIUM & SODIUM PHOSPHATES POWDER PACK 280-160-250 MG 2 PACKET: 280-160-250 PACK at 15:37

## 2017-01-19 RX ADMIN — LATANOPROST 1 DROP: 50 SOLUTION OPHTHALMIC at 10:30

## 2017-01-19 RX ADMIN — Medication 5 ML: at 06:00

## 2017-01-19 RX ADMIN — Medication 10 ML: at 15:39

## 2017-01-19 RX ADMIN — Medication: at 10:30

## 2017-01-19 RX ADMIN — FAMOTIDINE 20 MG: 20 TABLET ORAL at 10:30

## 2017-01-19 RX ADMIN — POTASSIUM & SODIUM PHOSPHATES POWDER PACK 280-160-250 MG 2 PACKET: 280-160-250 PACK at 10:41

## 2017-01-19 NOTE — PROGRESS NOTES
Patient discharged. Removed telemetry and PIV. No personal belongings/home meds left in ,safe or at bedside. Patient eating at this time. No prescriptions given. I have reviewed discharge instructions with the patient and spouse. The patient and spouse verbalized understanding. Patient to call primary care and nephro to schedule f/u appt. Primary care nurse aware.

## 2017-01-19 NOTE — PROGRESS NOTES
Nephrology Progress Note     Shree Peraza     www. [facility]                    Phone - [phone]   Patient: Alicia Seo  YOB: 1940     Date- 1/19/2017     CC: Follow up for arf and hypercalcemia          Subjective: Interval History:   -   Phos low  Calcium better  Cr. Back to baseline    ROS-  No more diarrhea  No c/o sob, fever. No c/o nausea or vomiting  No c/o chest pain     Assessment:   · Hypercalcemia ? tums + vit d supplements , Low PTH suggest that she has non parathyroid etiology for her hypercalcemia  · Acute renal failure due to hypercalcemia + dehydration from diarrhea -- She was started on lisinopril during last office visit but she is not taking lisinopril  · Hypophosphatemia  · hypokalemia  · Metabolic acidosis  · Diarrhea  · ckd 3 baseline cr. 1.4  · hypertension   Plan:   - po and iv phos replacement  - okay to stop ivf if taking po well  - we will follow as out pt for hypercalcemia work up.  - avoid tums and vit d supplements  Okay to d/c home today   Care Plan discussed with: patient , DR. Graham  Objective:   Vitals:    01/18/17 2028 01/18/17 2208 01/19/17 0534 01/19/17 0831   BP: 140/80 (!) 165/92 124/65 115/56   Pulse: 89 97 83 91   Resp: 18 18 18 18   Temp: 98.2 °F (36.8 °C) 98.2 °F (36.8 °C) 97.9 °F (36.6 °C) 97.8 °F (36.6 °C)   SpO2: 99% 100% 96% 97%   Weight:       Height:           Intake/Output Summary (Last 24 hours) at 01/19/17 1027  Last data filed at 01/18/17 2146   Gross per 24 hour   Intake                0 ml   Output              250 ml   Net             -250 ml     Physical Exam:   GEN: NAD  NECK- Supple, no thyromegaly  RESP: Clear b/l, no wheezing  CVS: RRR,S1,S2   NEURO: non focal, normal speech  SKIN: No Rash  ABDO: soft , non tender, No hepatosplenomegaly  EXT: No Edema       [] High complexity decision making was performed  [] Patient is at high-risk of decompensation with multiple organ involvement  - Chart reviewed. - Pertinent Notes Reviewed. - Medications list  Reviewed. Data Review :  Recent Labs      01/19/17   0136  01/18/17   1245  01/18/17   0300  01/17/17   1135   NA  143   --   141  140   K  3.5   --   3.0*  3.5   CL  110*   --   115*  108   CO2  22   --   16*  18*   GLU  115*   --   103*  131*   BUN  15   --   20  25*   CREA  1.51*   --   1.65*  2.13*   CA  8.9   --   9.7  11.7*   MG  1.7   --    --    --    PHOS  1.2*  1.6*  2.4*   --    ALB  2.8*   --   3.2*  3.8   SGOT   --    --    --   15   ALT   --    --    --   15     Recent Labs      01/18/17   0300  01/17/17   1135   WBC  11.6*  15.4*   HGB  8.2*  9.6*   HCT  25.0*  29.6*   PLT  346  488*     Lab Results   Component Value Date/Time    Culture result: CHECKING FOR POSSIBLE PATHOGEN 01/18/2017 10:30 AM    Culture result: NO SIGNIFICANT GROWTH 01/17/2017 01:52 PM    Culture result: NO SIGNIFICANT GROWTH 01/10/2017 01:25 PM    Culture result: NO GROWTH 5 DAYS 01/02/2017 08:50 PM    Culture result: MIXED UROGENITAL GARCIA ISOLATED 01/02/2017 08:50 PM     No results found for: MD [facility]  [facility] [address], [address]  [place], [address] [place]  Phone - [phone]         Fax - [phone] [facility] Office  [address] [place] [address]  Phone - [phone]        Fax - [phone]     www. [facility]

## 2017-01-19 NOTE — PROGRESS NOTES
Problem: Mobility Impaired (Adult and Pediatric)  Goal: *Acute Goals and Plan of Care (Insert Text)  PHYSICAL THERAPY EVALUATION/DISCHARGE  Patient: Alfredo Smith (42 y.o. female)  Date: 2017  Primary Diagnosis: Hypercalcemia        Precautions:   Fall  ASSESSMENT :  Pt is cleared for PT by RN. Pt rec'd up ad jessica in room, just finishing in the bathroom. Based on the objective data described below, the patient presents with independence/mod I with functional mobility and good balance, scoring 27/28 on Tinetti. Pt is able to  items off the floor and stand on one foot. From a mobility perspective, pt is safe to ambulate ad jessica in hallway and to return home with her . No f/u services needed. Will complete PT order. Skilled physical therapy is not indicated at this time. PLAN :  Discharge Recommendations: None  Further Equipment Recommendations for Discharge: None       SUBJECTIVE:   Patient stated I feel so much better. My muscles don't hurt anymore.       OBJECTIVE DATA SUMMARY:   HISTORY:    Past Medical History   Diagnosis Date    AIN (acute interstitial nephritis)      Arthritis      CKD (chronic kidney disease) stage 3, GFR 30-59 ml/min      Gastrointestinal disorder         GERD    GERD (gastroesophageal reflux disease)      Glaucoma      High cholesterol      Hypertension      Hypertension      Ill-defined condition         chronic cystitis    Kidney failure 10/14/2016     Past Surgical History   Procedure Laterality Date    Hx tonsillectomy        Hx urological           cauterization of bladder ulcers    Hx gyn               Hx hysterectomy        Hx gyn         Prior Level of Function/Home Situation: Pt lives with her  in a 2 story home with 3 JABARI. They stay on the 1st floor. Pt was completely independent with mobility prior to admission.    Personal factors and/or comorbidities impacting plan of care:      Home Situation  Home Environment: Private residence  # Steps to Enter: 3  Rails to Enter: Yes  Hand Rails : Bilateral  One/Two Story Residence: Two story, live on 1st floor  # of Interior Steps: 10  Height of Each Step (in): 0.5 inches  Interior Rails: Both  Lift Chair Available: No  Living Alone: No  Support Systems: Spouse/Significant Other/Partner  Patient Expects to be Discharged to[de-identified] Private residence  Current DME Used/Available at Home: None     EXAMINATION/PRESENTATION/DECISION MAKING:   Strength:    Strength:  Within functional limits                     Range Of Motion:  AROM: Within functional limits           PROM: Within functional limits           Coordination:  Coordination: Within functional limits     Functional Mobility:  Bed Mobility:              Transfers:  Sit to Stand: Independent  Stand to Sit: Independent                       Balance:   Sitting: Intact  Standing: Intact  Ambulation/Gait Training:  Distance (ft): 300 Feet (ft)     Ambulation - Level of Assistance: Independent                          Stairs:  Number of Stairs Trained: 3  Stairs - Level of Assistance: Modified independent                                  Functional Measure:  Tinetti test:      Sitting Balance: 1  Arises: 2  Attempts to Rise: 2  Immediate Standing Balance: 2  Standing Balance: 2  Nudged: 1  Eyes Closed: 1  Turn 360 Degrees - Continuous/Discontinuous: 1  Turn 360 Degrees - Steady/Unsteady: 1  Sitting Down: 2  Balance Score: 15  Indication of Gait: 1  R Step Length/Height: 1  L Step Length/Height: 1  R Foot Clearance: 1  L Foot Clearance: 1  Step Symmetry: 1  Step Continuity: 1  Path: 2  Trunk: 2  Walking Time: 1  Gait Score: 12  Total Score: 27         Tinetti Test and G-code impairment scale:  Percentage of Impairment CH     0%    CI     1-19% CJ     20-39% CK     40-59% CL     60-79% CM     80-99% CN      100%   Tinetti  Score 0-28 28 23-27 17-22 12-16 6-11 1-5 0          Tinetti Tool Score Risk of Falls  <19 = High Fall Risk  19-24 = Moderate Fall Risk  25-28 = Low Fall Risk  Tinetti ME. Performance-Oriented Assessment of Mobility Problems in Elderly Patients. [facility] 66; [id number]. (Scoring Description: PT Bulletin Feb. 10, 1993)     Older adults: Jaren Gudino et al, 2009; n = 1000 [facility] elderly evaluated with ABC, YULIYA, ADL, and IADL)  · Mean YULIYA score for males aged 69-68 years = 26.21(3.40)  · Mean YULIYA score for females age 69-68 years = 25.16(4.30)  · Mean YULIYA score for males over 80 years = 23.29(6.02)  · Mean YULIYA score for females over 80 years = 17.20(8.32)         G codes: In compliance with CMSs Claims Based Outcome Reporting, the following G-code set was chosen for this patient based on their primary functional limitation being treated: The outcome measure chosen to determine the severity of the functional limitation was the Tinetti with a score of 27/28 which was correlated with the impairment scale. · Mobility - Walking and Moving Around:               - CURRENT STATUS:    CI - 1%-19% impaired, limited or restricted               - GOAL STATUS:           CI - 1%-19% impaired, limited or restricted               - D/C STATUS:                       CI - 1%-19% impaired, limited or restricted         Physical Therapy Evaluation Charge Determination   History Examination Presentation Decision-Making   HIGH Complexity :3+ comorbidities / personal factors will impact the outcome/ POC  MEDIUM Complexity : 3 Standardized tests and measures addressing body structure, function, activity limitation and / or participation in recreation  LOW Complexity : Stable, uncomplicated  Other outcome measures Tinetti  LOW       Based on the above components, the patient evaluation is determined to be of the following complexity level: LOW      Pain:  Pain Scale 1: Numeric (0 - 10)  Pain Intensity 1: 0     Activity Tolerance:   Good for activities today. Please refer to the flowsheet for vital signs taken during this treatment.   After treatment:   [X]   Patient left in no apparent distress sitting up in chair  [ ]   Patient left in no apparent distress in bed  [X]   Call bell left within reach  [X]   Nursing notified  [X]   Caregiver present  [ ]   Bed alarm activated      COMMUNICATION/EDUCATION:   Communication/Collaboration:  [X]   Fall prevention education was provided and the patient/caregiver indicated understanding. [X]   Patient/family have participated as able and agree with findings and recommendations. [ ]   Patient is unable to participate in plan of care at this time.   Findings and recommendations were discussed with: Registered Nurse     Thank you for this referral.  Frances Perry, PT   Time Calculation: 21 mins

## 2017-01-19 NOTE — DISCHARGE SUMMARY
Hospitalist Discharge Summary     Patient ID:  Nani Patel  [id number]  89 y.o.  1940    PCP on record: Sharmaine Marie MD    Admit date: 1/17/2017  Discharge date and time: 1/19/2017      DISCHARGE DIAGNOSIS:   1. Recurrent Hypercalcemia(POA): now improved with hydration. 2. Acute on CKD(POA): could be dehydration,poor po intake, hypercalcemia   3. Glaucoma   4. Hypokalemia: not POA. 5. Hypertension   6. Diarrhea              CONSULTATIONS:  IP CONSULT TO NEPHROLOGY    Excerpted HPI from H&P of Dr Ashley Sa:   Amada Moritz is a 64 y.o.  female with history of recent renal failure due to medication, hypertension, hyperlipidemia who presents with worsening weakness and confusion over the last 3 weeks. Patient noticed gradual fatigue and lower extremity weakness, stating it has become more difficult to walk. She also notes worsening forgetfulness and confusion. Her weakness in generalized and has some dyspnea on exertion and lightheadedness. For last few weeks she has very poor appetite and has lost 10 pounds, stating things \"taste terrible. \" She was in to see her PCP and found to have hypercalcemia, hypokalemia.       ______________________________________________________________________  DISCHARGE SUMMARY/HOSPITAL COURSE:  for full details see H&P, daily progress notes, labs, consult notes. 1. Recurrent Hypercalcemia(POA): now improved with hydration. On IVF. D/w Dr Idalia Monahan. Previous testing done incl SPEP which was negative, possibly related to vitamin D. PTH is low so not primary parathyroid -improved with IVF. Stop Vitamin D and tums  2. Acute on CKD(POA): could be dehydration,poor po intake, hypercalcemia. Now cr improved. Nephrology consulted. Follow up with Dr Betty Manley as oupt  3. Glaucoma: continue eye drops   4. Hypokalemia: not POA. repleted  5. Hypertension: not on antihypertensive meds. Taken off norvasc recently. Add PRN hydralazine.   6. Diarrhea: no recent abx. stool for CX negative. 7. PT/OT-no recs     Dispo: will be discharged today to f/u with Dr Savannah Avendaño as outpt.     Code Status: full  Surrogate Decision Maker:       DVT Prophylaxis: lovenox  GI Prophylaxis: not indicated      Baseline: independent          _______________________________________________________________________  Patient seen and examined by me on discharge day. Pertinent Findings:  Gen:    Not in distress  Chest: Clear lungs  CVS:   Regular rhythm. No edema  Abd:  Soft, not distended, not tender  Neuro:  Alert,oriented x3  _______________________________________________________________________  DISCHARGE MEDICATIONS:   Current Discharge Medication List      CONTINUE these medications which have NOT CHANGED    Details   famotidine (PEPCID) 20 mg tablet Take 1 Tab by mouth two (2) times a day. Indications: GASTROESOPHAGEAL REFLUX  Qty: 180 Tab, Refills: 1      latanoprost (XALATAN) 0.005 % ophthalmic solution Administer 1 Drop to both eyes daily. Aspirin, Buffered 81 mg tab Take 1 Tab by mouth daily. timolol (BETIMOL) 0.25 % ophthalmic solution Administer 2 Drops to both eyes two (2) times a day. use in affected eye(s)              My Recommended Diet, Activity, Wound Care, and follow-up labs are listed in the patient's Discharge Insturctions which I have personally completed and reviewed.     _______________________________________________________________________  DISPOSITION:    Home with Family: x   Home with HH/PT/OT/RN:    SNF/LTC:    JO ANN:    OTHER:        Condition at Discharge:  Stable  _______________________________________________________________________  Follow up with:   PCP : Cooike Shipman MD  Follow-up Information     None              Total time in minutes spent coordinating this discharge (includes going over instructions, follow-up, prescriptions, and preparing report for sign off to her PCP) :35 minutes    Signed:  Ely Nguyen MD

## 2017-01-19 NOTE — DISCHARGE INSTRUCTIONS
Patient Discharge Instructions    [place] [employer] / [id number] : 1940    Admitted 2017 Discharged: 2017 11:46 AM     ACUTE DIAGNOSES:  Hypercalcemia    CHRONIC MEDICAL DIAGNOSES:  Problem List as of 2017  Date Reviewed: 2017          Codes Class Noted - Resolved    Hypercalcemia ICD-10-CM: E83.52  ICD-9-CM: 275.42  2017 - Present        Interstitial nephritis ICD-10-CM: N12  ICD-9-CM: 583.89  2016 - Present        [facility] (acute renal failure) ([facility] ICD-10-CM: N17.9  ICD-9-CM: 584.9  10/15/2016 - Present        Chronic interstitial cystitis ICD-10-CM: N30.10  ICD-9-CM: 595.1 Chronic 10/15/2016 - Present    Overview Signed 10/15/2016  6:41 AM by Ary Pan MD     Followed by Dr. Geovanna Suresh               Diarrhea ICD-10-CM: R19.7  ICD-9-CM: 787.91 Present on Admission 10/15/2016 - Present        Acute cystitis without hematuria ICD-10-CM: N30.00  ICD-9-CM: 595.0 Present on Admission 10/15/2016 - Present        Family history of colon cancer ICD-10-CM: Z80.0  ICD-9-CM: V16.0  3/17/2016 - Present        Anemia ICD-10-CM: D64.9  ICD-9-CM: 285.9  3/17/2016 - Present        Essential hypertension ICD-10-CM: I10  ICD-9-CM: 401.9  3/17/2016 - Present        Hyperlipidemia ICD-10-CM: E78.5  ICD-9-CM: 272.4  2015 - Present        Glaucoma ICD-10-CM: H40.9  ICD-9-CM: 365.9  2015 - Present        Gastroesophageal reflux disease without esophagitis ICD-10-CM: K21.9  ICD-9-CM: 530.81  2015 - Present        Osteopenia ICD-10-CM: M85.80  ICD-9-CM: 733.90  2015 - Present              DISCHARGE MEDICATIONS:   · It is important that you take the medication exactly as they are prescribed. · Keep your medication in the bottles provided by the pharmacist and keep a list of the medication names, dosages, and times to be taken in your wallet. · Do not take other medications without consulting your doctor.        DIET:  Renal Diet    ACTIVITY: Activity as tolerated    ADDITIONAL INFORMATION: If you experience any of the following symptoms then please call your primary care physician or return to the emergency room if you cannot get hold of your doctor: Fever, chills, nausea, vomiting, diarrhea, change in mentation, falling, bleeding, shortness of breath. FOLLOW UP CARE:  Dr. Dandre Killian MD.  Please call and set up an appointment to see them in 1 weeks. With Dr Ainsley Gonzalez,nephrology as outpt.

## 2017-01-20 ENCOUNTER — NURSE NAVIGATOR (OUTPATIENT)
Dept: INTERNAL MEDICINE CLINIC | Age: 77
End: 2017-01-20

## 2017-01-20 ENCOUNTER — PATIENT OUTREACH (OUTPATIENT)
Dept: INTERNAL MEDICINE CLINIC | Age: 77
End: 2017-01-20

## 2017-01-20 LAB
BACTERIA SPEC CULT: NORMAL
C JEJUNI+C COLI AG STL QL: NEGATIVE
COLLECT DURATION TIME UR: ABNORMAL HR
E COLI SXT1+2 STL IA: NEGATIVE
KAPPA LC FREE SER-MCNC: 45.41 MG/L (ref 3.3–19.4)
KAPPA LC FREE/LAMBDA FREE SER: 1.73 {RATIO} (ref 0.26–1.65)
KAPPA LC UR-MCNC: 42.7 MG/L (ref 1.35–24.19)
KAPPA LC/LAMBDA UR: 7.64 {RATIO} (ref 2.04–10.37)
LAMBDA LC FREE SERPL-MCNC: 26.31 MG/L (ref 5.71–26.3)
LAMBDA LC UR-MCNC: 5.59 MG/L (ref 0.24–6.66)
SERVICE CMNT-IMP: NORMAL
SPECIMEN VOL ?TM UR: ABNORMAL ML

## 2017-01-20 NOTE — PROGRESS NOTES
[facility]NT[facility] Post Hospitalization       This note will not be viewable in MyChart. Referral from [facility]. Patient admitted to [facility] on 1/17/17 and discharged on 1/19/17 with diagnosis of Recurrent Hypercalcemia, Acute on CKD. Significant Lab/Diagnostic Findings:  Lab Results  Component Value Date/Time   WBC 11.6 01/18/2017 03:00 AM   HGB 8.2 01/18/2017 03:00 AM   HCT 25.0 01/18/2017 03:00 AM   PLATELET 798 78/26/7958 03:00 AM   MCV 87.4 01/18/2017 03:00 AM       Lab Results  Component Value Date/Time   GFR est AA 40 01/19/2017 01:36 AM   GFR est non-AA 33 01/19/2017 01:36 AM   Creatinine (POC) 1.0 08/14/2009 08:21 AM   Creatinine 1.51 01/19/2017 01:36 AM   BUN 15 01/19/2017 01:36 AM   Sodium 143 01/19/2017 01:36 AM   Potassium 3.5 01/19/2017 01:36 AM   Chloride 110 01/19/2017 01:36 AM   CO2 22 01/19/2017 01:36 AM      Lab Results  Component Value Date/Time   TSH 1.34 01/18/2017 12:45 PM      Lab Results   Component Value Date/Time    Sodium 143 01/19/2017 01:36 AM    Potassium 3.5 01/19/2017 01:36 AM    Chloride 110 01/19/2017 01:36 AM    CO2 22 01/19/2017 01:36 AM    Anion gap 11 01/19/2017 01:36 AM    Glucose 115 01/19/2017 01:36 AM    BUN 15 01/19/2017 01:36 AM    Creatinine 1.51 01/19/2017 01:36 AM    BUN/Creatinine ratio 10 01/19/2017 01:36 AM    GFR est AA 40 01/19/2017 01:36 AM    GFR est non-AA 33 01/19/2017 01:36 AM    Calcium 8.9 01/19/2017 01:36 AM    Bilirubin, total 0.3 01/17/2017 11:35 AM    ALT 15 01/17/2017 11:35 AM    AST 15 01/17/2017 11:35 AM    Alk.  phosphatase 64 01/17/2017 11:35 AM    Protein, total 6.3 01/18/2017 03:00 AM    Albumin 2.8 01/19/2017 01:36 AM    Globulin 4.3 01/17/2017 11:35 AM    A-G Ratio 0.9 01/17/2017 11:35 AM          Component      Latest Ref Rng & Units 1/19/2017 1/18/2017 1/18/2017 1/18/2017           1:36 AM  5:29 PM  5:29 PM  1:00 PM   Protein, total      6.0 - 8.5 g/dL       Albumin      2.9 - 4.4 g/dL       Alpha-1-globulin      0.0 - 0.4 g/dL Alpha-2-globulin      0.4 - 1.0 g/dL       Beta globulin      0.7 - 1.3 g/dL       Gamma globulin      0.4 - 1.8 g/dL       M-spike      Not Observed g/dL       Globulin, total      2.2 - 3.9 g/dL       A/G ratio      0.7 - 1.7         Protein, urine random      0.0 - 11.9 mg/dL   9 10.0   Albumin, urine      %    100.0   Alpha-1-Globulin, urine      %    0.0   Alpha-2-Globulin, urine      %    0.0   Beta Globulin, urine      %    0.0   Gamma Globulin, urine      %    0.0   M-Boris, %      Not Observed %    Not Observed   Please note          Comment   Total volume      mL  RANDOM     Period of collection      hr  RANDOM     Free Kappa Lt Chains, urine      1.35 - 24.19 mg/L  42.70 (H)     Free Lambda Lt Chains, urine      0.24 - 6.66 mg/L  5.59     Kappa/Lambda Ratio, urine      2.04 - 10.37    7.64     Creatinine, urine      mg/dL   16.50    Protein/Creat.  urine Ratio         0.5    BRIELLE Interpretation urine             Calcitriol (Vit D 1, 25 di-OH)      19.9 - 79.3 pg/mL       Vitamin D 25-Hydroxy      30 - 100 ng/mL       Calcium, ionized      1.13 - 1.32 mmol/L       Phosphorus      2.6 - 4.7 MG/DL       Magnesium      1.6 - 2.4 mg/dL 1.7        Component      Latest Ref Rng & Units 1/18/2017 1/18/2017 1/18/2017 1/18/2017           1:00 PM 12:45 PM  3:00 AM  3:00 AM   Protein, total      6.0 - 8.5 g/dL   6.3    Albumin      2.9 - 4.4 g/dL   3.2    Alpha-1-globulin      0.0 - 0.4 g/dL   0.3    Alpha-2-globulin      0.4 - 1.0 g/dL   0.9    Beta globulin      0.7 - 1.3 g/dL   1.1    Gamma globulin      0.4 - 1.8 g/dL   0.8    M-spike      Not Observed g/dL   Not Observed    Globulin, total      2.2 - 3.9 g/dL   3.1    A/G ratio      0.7 - 1.7     1.0    Protein, urine random      0.0 - 11.9 mg/dL       Albumin, urine      %       Alpha-1-Globulin, urine      %       Alpha-2-Globulin, urine      %       Beta Globulin, urine      %       Gamma Globulin, urine      %       M-Boris, %      Not Observed % Please note             Total volume      mL       Period of collection      hr       Free Kappa Lt Chains, urine      1.35 - 24.19 mg/L       Free Lambda Lt Chains, urine      0.24 - 6.66 mg/L       Kappa/Lambda Ratio, urine      2.04 - 10.37         Creatinine, urine      mg/dL       Protein/Creat. urine Ratio             BRIELLE Interpretation urine       Comment      Calcitriol (Vit D 1, 25 di-OH)      19.9 - 79.3 pg/mL    65.8   Vitamin D 25-Hydroxy      30 - 100 ng/mL       Calcium, ionized      1.13 - 1.32 mmol/L       Phosphorus      2.6 - 4.7 MG/DL  1.6 (L)     Magnesium      1.6 - 2.4 mg/dL         Component      Latest Ref Rng & Units 1/18/2017 1/18/2017           3:00 AM  3:00 AM   Protein, total      6.0 - 8.5 g/dL     Albumin      2.9 - 4.4 g/dL     Alpha-1-globulin      0.0 - 0.4 g/dL     Alpha-2-globulin      0.4 - 1.0 g/dL     Beta globulin      0.7 - 1.3 g/dL     Gamma globulin      0.4 - 1.8 g/dL     M-spike      Not Observed g/dL     Globulin, total      2.2 - 3.9 g/dL     A/G ratio      0.7 - 1.7       Protein, urine random      0.0 - 11.9 mg/dL     Albumin, urine      %     Alpha-1-Globulin, urine      %     Alpha-2-Globulin, urine      %     Beta Globulin, urine      %     Gamma Globulin, urine      %     M-Boris, %      Not Observed %     Please note           Total volume      mL     Period of collection      hr     Free Kappa Lt Chains, urine      1.35 - 24.19 mg/L     Free Lambda Lt Chains, urine      0.24 - 6.66 mg/L     Kappa/Lambda Ratio, urine      2.04 - 10.37       Creatinine, urine      mg/dL     Protein/Creat.  urine Ratio           BRIELLE Interpretation urine           Calcitriol (Vit D 1, 25 di-OH)      19.9 - 79.3 pg/mL     Vitamin D 25-Hydroxy      30 - 100 ng/mL 65.7    Calcium, ionized      1.13 - 1.32 mmol/L  1.41 (HH)   Phosphorus      2.6 - 4.7 MG/DL     Magnesium      1.6 - 2.4 mg/dL         Urine Culture:  Component      Latest Ref Rng & Units 1/17/2017           1:52 PM Special Requests:       NO SPECIAL REQUESTS   [place] Count       <10,000 . . .   Culture result:       NO SIGNIFICANT GROWTH         Stool:  Component      Latest Ref Rng & Units 1/18/2017          10:30 AM   Special Requests:       NO SPECIAL REQUESTS   Campylobacter antigen       NEGATIVE   Shiga toxin-producing E. coli Ag       NEGATIVE   Culture result:       NO ROUTINE ENTERIC PATHOGENS ISOLATED INCLUDING SALMONELLA, SHIGELLA, YERSINIA, VIBRIO OR SHIGA TOXIN PRODUCING E. COLI   White blood cells, stool      0 - 4 /HPF    C. difficile (DNA)      NEG        Component      Latest Ref Rng & Units 1/18/2017 1/18/2017          10:30 AM 10:30 AM   Special Requests:           Campylobacter antigen           Shiga toxin-producing E. coli Ag           Culture result:           White blood cells, stool      0 - 4 /HPF  0 TO 4   C. difficile (DNA)      NEG   NEGATIVE            RRAT score:   Low Risk            9       Total Score        3 Relationship with PCP    2 Patient Living Status    4 More than 1 Admission in calendar year        Criteria that do not apply:    Patient Length of Stay > 5    Patient Insurance is Medicare, Medicaid or Self Pay    Charlson Comorbidity Score                  Advance Medical Directive on file in EMR? No.  Patient was not evaluated by care management during hospitalization. Per notes, Discharge Disposition from [address] [place] [address] Hw: Home. Recommended Post-Hospital Discharge follow-up (see below as listed on Hospital Discharge AVS/Instructions). Follow-up Information     Follow up With Details Comments Contact Info     Carina Almodovar MD     [address] [place] [address],[address]  [address] [place] [address] [address]ky                                Most recent HIPAA form in the patient's chart (dated 11/7/16) was reviewed; authorized individual(s) listed on HIPAA form include Bonifacio Bean (), Rosalio Rodriguez (Son).           NN follow-up phone call  NN contacted the patient today to complete NN post-hospital discharge assessment. Two patient identifiers verified. NN introduced self to the patient, including NN role and purpose of NN follow-up phone call; patient verbalizes understanding. NN inquired about the patient's current condition and how the patient is feeling; patient states, \"I really feel one-hundred percent better than the day that I was admitted. \"     Patient denies fever, chills, abdominal pain, diarrhea since hospital discharge. Patient denies increased weakness, fatigue since hospital discharge; states, \"It's so much better, I don't count it. \"  Patient reports that her appetite has improved since hospital discharge; denies nausea, vomiting. See details of Functional Assessment below as reported by the Patient. Living Situation/Support System: Lives with her  in a two-level home, however reports that they primarily reside on the first level (master bedroom on first level). Patient reports a good support system that consists of friends, family/neighbors. ADLs: Independent with ADLs. Mobility: Independent with Ambulation. Transportation: Self; reports that she is able to drive at baseline and prior to hospital admission, however her  will be providing her with transportation to post-hospital follow-up appointments. Patient denies any concerns regarding transportation. Medication Management: Independently manages her medications. Financial Status: Denies any financial concerns regarding her medications. Barriers to care? no       Allergies Reviewed with patient and Medication Reconciliation completed and updated. Patient verbalizes understanding of self management of medications and reports compliance with prescribed medication regimen. Med Rec during this call  Current Outpatient Prescriptions   Medication Sig    famotidine (PEPCID) 20 mg tablet Take 1 Tab by mouth two (2) times a day.  Indications: GASTROESOPHAGEAL REFLUX    latanoprost (XALATAN) 0.005 % ophthalmic solution Administer 1 Drop to both eyes daily.  Aspirin, Buffered 81 mg tab Take 1 Tab by mouth daily.  timolol (BETIMOL) 0.25 % ophthalmic solution Administer 2 Drops to both eyes two (2) times a day. use in affected eye(s)      No current facility-administered medications for this visit. There are no discontinued medications. New medications at discharge include: N/A  Medication(s) changed at hospital discharge include: N/A  Medication(s) discontinued at hospital discharge include: N/A  Patient able to fill/pickup new medications without difficulty: N/A  Any Questions/Concerns regarding new Medication(s) and/or Medication Change(s): Denies any questions/concerns. Patient verbalizes understanding of discharge instructions that were provided to her upon discharge from [address] [place] [address]. Red Flags reviewed with patient and patient understands when to contact Dr. Dixon Proper office versus use of EMS. Patient has been scheduled for a CECILY appointment with Dr. Tsering Sanchez; patient verbalizes understanding of appointment information. Opportunity for patient to ask NN questions was provided. NN contact information provided to patient and patient advised to contact NN as needed. Red Flags:  Fever, chills, dizziness, confusion, chest pain, shortness of breath, abdominal pain/discomfort, nausea, vomiting, diarrhea, increased weakness/fatigue, loss of appetite, new/worsened symptoms.       PLAN      -Patient to attend Transitions Of Care Appointment with Dr. Tsering Sanchez on 1/23/17.  -Patient to attend follow-up appointment(s) with Surgeon(if applicable) and/or Speciality Provider(s): Dr. Ayush Montano (Nephrology) - to be scheduled by the patient.  -Patient to contact this NN and/or PCP office with any questions/concerns.  -Notified of availability of PCP on-call physician after-hours and on the weekends for non-emergent/non-life threatening medical questions/concerns; patient verbalizes understanding. Patient's currently scheduled future appointments are listed below. Future Appointments      Provider Alissa Kuhn   1/23/2017 10:30 AM Jamir Recio MD [facility]   4/7/2017 10:00 AM Jamir Recio MD [address]                 Patient expressed no questions, concerns or needs for this NN at this time. Patient verbalized understanding of all information discussed. NN contact information provided and patient advised to contact NN as needed. NN will route this encounter to Dr. Atlas Saint for notification/review.

## 2017-01-20 NOTE — PROGRESS NOTES
NN attempted to reach the patient today in order to advise that DANA PERRY appointment with PCP can be cancelled, per Dr. Iona Marinelli, and she is advised to follow-up with Dr. Clayton Raza as scheduled and PCP in April. NN was unable to reach the patient today. LVM requesting a return phone call; NN direct contact information provided.

## 2017-01-20 NOTE — PROGRESS NOTES
Message  Received:  Today       MD Elle Gil, RN       Cc: Keyur Luna                     Does not need an appointment with me on 1/23.  She is following with the nephrologist for the problem and I just saw here.  Is scheduled for April with me, which is fine.  Please cancel and advise patient to see Dr. Lorie Gilford as scheduled.              Previous Messages

## 2017-01-20 NOTE — PROGRESS NOTES
This note will not be viewable in [address] [facility] Post Hospitalization         - Patient attended DANA PERRY appointment with Dr. Marybel Mendez on 11/7/16 and was advised to follow-up in 3 months. To the best of this NN's knowledge, this patient had no additional ED visits or Hospital Admissions during 30 day CECILY period following admission to [facility] 10/14/16-10/24/16. CECILY period has ended. Post-Hospitalization Episode Resolved.

## 2017-01-23 ENCOUNTER — OFFICE VISIT (OUTPATIENT)
Dept: INTERNAL MEDICINE CLINIC | Age: 77
End: 2017-01-23

## 2017-01-23 VITALS
TEMPERATURE: 97.7 F | WEIGHT: 102.6 LBS | BODY MASS INDEX: 20.68 KG/M2 | HEART RATE: 91 BPM | OXYGEN SATURATION: 98 % | DIASTOLIC BLOOD PRESSURE: 109 MMHG | RESPIRATION RATE: 16 BRPM | SYSTOLIC BLOOD PRESSURE: 181 MMHG | HEIGHT: 59 IN

## 2017-01-23 DIAGNOSIS — E83.52 HYPERCALCEMIA: ICD-10-CM

## 2017-01-23 DIAGNOSIS — I10 ESSENTIAL HYPERTENSION: Primary | ICD-10-CM

## 2017-01-23 LAB — PTH RELATED PROT SERPL-SCNC: <1.1 PMOL/L

## 2017-01-23 RX ORDER — DILTIAZEM HYDROCHLORIDE 180 MG/1
180 CAPSULE, COATED, EXTENDED RELEASE ORAL DAILY
Qty: 30 CAP | Refills: 5 | Status: SHIPPED | OUTPATIENT
Start: 2017-01-23 | End: 2017-04-07 | Stop reason: SDUPTHER

## 2017-01-23 NOTE — PROGRESS NOTES
She is a 64 y.o. female who presents for follow up after hospitalization for hypercalcemia from 1/17-1/19. Received IVF. Eating and drinking well now. No more confusion. No more muscle pain. Change in taste of food has stopped. BM are soft, not loose. Normal urination. Had low potassium, she has increased dietary potassium intake. At discharge, Creatinine 1.5. Had ARF in October. Was seen by nephrology in hospital.    Was on amlodipine due to side effect of memory changes and changes in odor of food. Stopped that prior to hospitalization. BP was fine, since being hospitalized BP trending higher. At home, 160-194/73  Avoiding ACE inhibitors, ARB and diuretics. No NSAIDS. Takes tylenol prn. Will get some leg pain. Followup with nephrology on 2/16. ROS:  Constitutional: negative for fevers, chills, anorexia and weight loss  Eyes:   negative for visual disturbance,  irritation  ENT:   negative for tinnitus,sore throat,nasal congestion,ear pain, sinus pain.    Respiratory:  negative for cough, chest congestion, dyspnea,wheezing  CV:   negative for chest pain, palpitations, lower extremity edema  GI:   negative for nausea, vomiting, diarrhea, abdominal pain,melena  Genitourinary: negative for frequency, dysuria, hematuria  Musculoskel: negative for myalgias, arthralgias, back pain, muscle weakness, joint pain  Neurological:  negative for headaches, dizziness, focal weakness, numbness      Past Medical History   Diagnosis Date    AIN (acute interstitial nephritis)     Arthritis     CKD (chronic kidney disease) stage 3, GFR 30-59 ml/min     Gastrointestinal disorder      GERD    GERD (gastroesophageal reflux disease)     Glaucoma     High cholesterol     Hypertension     Hypertension     Ill-defined condition      chronic cystitis    Kidney failure 10/14/2016       Past Surgical History   Procedure Laterality Date    Hx tonsillectomy      Hx urological       cauterization of bladder ulcers    Hx gyn           Hx hysterectomy      Hx gyn         Family History   Problem Relation Age of Onset    Cancer Father     Cancer Brother        Social History     Social History    Marital status:      Spouse name: N/A    Number of children: N/A    Years of education: N/A     Occupational History    Not on file. Social History Main Topics    Smoking status: Former Smoker     Packs/day: 0.50     Years: 4.00     Quit date: 1997    Smokeless tobacco: Not on file    Alcohol use Yes      Comment: seldom    Drug use: Yes     Special: Prescription    Sexual activity: Not on file     Other Topics Concern    Not on file     Social History Narrative    ** Merged History Encounter **              Current Outpatient Prescriptions:     dilTIAZem CD (CARDIZEM CD) 180 mg ER capsule, Take 1 Cap by mouth daily. , Disp: 30 Cap, Rfl: 5    famotidine (PEPCID) 20 mg tablet, Take 1 Tab by mouth two (2) times a day. Indications: GASTROESOPHAGEAL REFLUX, Disp: 180 Tab, Rfl: 1    latanoprost (XALATAN) 0.005 % ophthalmic solution, Administer 1 Drop to both eyes daily. , Disp: , Rfl:     Aspirin, Buffered 81 mg tab, Take 1 Tab by mouth daily. , Disp: , Rfl:     timolol (BETIMOL) 0.25 % ophthalmic solution, Administer 2 Drops to both eyes two (2) times a day.  use in affected eye(s) , Disp: , Rfl:        Visit Vitals    BP (!) 181/109 (BP 1 Location: Left arm, BP Patient Position: Sitting)    Pulse 91    Temp 97.7 °F (36.5 °C) (Oral)    Resp 16    Ht 4' 11\" (1.499 m)    Wt 102 lb 9.6 oz (46.5 kg)    SpO2 98%    BMI 20.72 kg/m2       Physical Examination:   General - Well appearing female  HEENT - PERRL, TM no erythema/opacification, normal nasal turbinates, OP no erythema or exudate, MMM  Neck - supple, no bruits, no TMG, no LAD  Pulm - clear to auscultation bilaterally  Cardio - RRR, normal S1 S2, no murmur  Abd - soft, nontender, no masses, no HSM  Extrem - no edema, +2 distal pulses     Assessment/Plan:    1. Essential hypertension-off amlodipine due to side effects. Add diltiazem for HTN. Recommend following a lower sodium diet and regular cardiovascular exercise. Blood pressure goal is less than 130/85 on average. Advised compliance with blood pressure medication and regular follow up.    - dilTIAZem CD (CARDIZEM CD) 180 mg ER capsule; Take 1 Cap by mouth daily. Dispense: 30 Cap; Refill: 5    2. Hypercalcemia- improved after hydration. Has renal follow up. Follow-up Disposition:  Return for as scheduled. followup on medications.  Mirna Benoit MD

## 2017-01-23 NOTE — PROGRESS NOTES
Reviewed record in preparation for visit and have obtained necessary documentation. Identified pt with two pt identifiers(name and ). Chief Complaint   Patient presents with   [facility] Follow Up     admitted to Hospital on 17 for hypercalcemia pt states \" I feel so much better\"         Coordination of Care Questionnaire:  :     1) Have you been to an emergency room, urgent care clinic since your last visit? yes 17 ED [facility] ED   Hospitalized since your last visit?  yes         ED [facility] 17    2) Have you seen or consulted any other health care providers outside of [facility] since your last visit? no  (Include any pap smears or colon screenings in this section.)

## 2017-01-23 NOTE — MR AVS SNAPSHOT
Visit Information Date & Time Provider Department Dept. Phone Encounter #  
 1/23/2017 10:30 AM Heide Montes De Oca, [address] [id number] Follow-up Instructions Return for as scheduled. followup on medications. .  
  
Your Appointments 4/7/2017 10:00 AM  
ROUTINE CARE with MD [facility]) Appt Note: 3 month follow up meds; .  
 [facility] [address] [phone]  
[address] [facility] [address] [place] [address] Upcoming Health Maintenance Date Due  
 GLAUCOMA SCREENING Q2Y 3/18/2003 OSTEOPOROSIS SCREENING (DEXA) 3/18/2003 Pneumococcal 65+ High/Highest Risk (2 of 2 - PPSV23) 1/1/2012 MEDICARE YEARLY EXAM 9/4/2016 DTaP/Tdap/Td series (2 - Td) 9/4/2025 Allergies as of 1/23/2017  Review Complete On: 1/23/2017 By: Heide Montes De Oca MD  
  
 Severity Noted Reaction Type Reactions Amlodipine  01/23/2017   Side Effect Other (comments) Memory loss, change in odor of food. Lisinopril  01/23/2017   Intolerance Other (comments) Renal dysfunction Current Immunizations  Never Reviewed Name Date Influenza Vaccine (Quad) PF 10/24/2016 10:25 AM  
 Pneumococcal Vaccine (Unspecified Type) 1/1/2007 Tdap 9/4/2015  1:01 PM  
 Zoster Vaccine, Live 1/1/2013 Not reviewed this visit You Were Diagnosed With   
  
 Codes Comments Essential hypertension    -  Primary ICD-10-CM: I10 
ICD-9-CM: 401.9 Vitals BP Pulse Temp Resp Height(growth percentile) Weight(growth percentile) (!) 181/109 (BP 1 Location: Left arm, BP Patient Position: Sitting) 91 97.7 °F (36.5 °C) (Oral) 16 4' 11\" (1.499 m) 102 lb 9.6 oz (46.5 kg) SpO2 BMI OB Status Smoking Status 98% 20.72 kg/m2 Hysterectomy Former Smoker Vitals History BMI and BSA Data Body Mass Index Body Surface Area  20.72 kg/m 2 1.39 m 2  
  
 Preferred Pharmacy Pharmacy Name Phone [id number] [phone] Your Updated Medication List  
  
   
This list is accurate as of: 1/23/17 11:18 AM.  Always use your most recent med list.  
  
  
  
  
 aspirin, buffered 81 mg Tab Take 1 Tab by mouth daily. dilTIAZem  mg ER capsule Commonly known as:  CARDIZEM CD Take 1 Cap by mouth daily. famotidine 20 mg tablet Commonly known as:  PEPCID Take 1 Tab by mouth two (2) times a day. Indications: GASTROESOPHAGEAL REFLUX  
  
 latanoprost 0.005 % ophthalmic solution Commonly known as:  Ying Rinaldi Administer 1 Drop to both eyes daily. timolol 0.25 % ophthalmic solution Commonly known as:  Luisana Lopez Administer 2 Drops to both eyes two (2) times a day. use in affected eye(s) Prescriptions Sent to Pharmacy Refills  
 dilTIAZem CD (CARDIZEM CD) 180 mg ER capsule 5 Sig: Take 1 Cap by mouth daily. Class: Normal  
 Pharmacy: [id number] [facility] #: [phone] Route: Oral  
  
Follow-up Instructions Return for as scheduled. followup on medications. .  
  
  
Patient Instructions Low Sodium Diet (2,000 Milligram): Care Instructions Your Care Instructions Too much sodium causes your body to hold on to extra water. This can raise your blood pressure and force your heart and kidneys to work harder. In very serious cases, this could cause you to be put in the hospital. It might even be life-threatening. By limiting sodium, you will feel better and lower your risk of serious problems. The most common source of sodium is salt. People get most of the salt in their diet from canned, prepared, and packaged foods. Fast food and restaurant meals also are very high in sodium. Your doctor will probably limit your sodium to less than 2,000 milligrams (mg) a day.  This limit counts all the sodium in prepared and packaged foods and any salt you add to your food. Follow-up care is a key part of your treatment and safety. Be sure to make and go to all appointments, and call your doctor if you are having problems. It's also a good idea to know your test results and keep a list of the medicines you take. How can you care for yourself at home? Read food labels · Read labels on cans and food packages. The labels tell you how much sodium is in each serving. Make sure that you look at the serving size. If you eat more than the serving size, you have eaten more sodium. · Food labels also tell you the Percent Daily Value for sodium. Choose products with low Percent Daily Values for sodium. · Be aware that sodium can come in forms other than salt, including monosodium glutamate (MSG), sodium citrate, and sodium bicarbonate (baking soda). MSG is often added to Asian food. When you eat out, you can sometimes ask for food without MSG or added salt. Buy low-sodium foods · Buy foods that are labeled \"unsalted\" (no salt added), \"sodium-free\" (less than 5 mg of sodium per serving), or \"low-sodium\" (less than 140 mg of sodium per serving). Foods labeled \"reduced-sodium\" and \"light sodium\" may still have too much sodium. Be sure to read the label to see how much sodium you are getting. · Buy fresh vegetables, or frozen vegetables without added sauces. Buy low-sodium versions of canned vegetables, soups, and other canned goods. Prepare low-sodium meals · Cut back on the amount of salt you use in cooking. This will help you adjust to the taste. Do not add salt after cooking. One teaspoon of salt has about 2,300 mg of sodium. · Take the salt shaker off the table. · Flavor your food with garlic, lemon juice, onion, vinegar, herbs, and spices. Do not use soy sauce, lite soy sauce, steak sauce, onion salt, garlic salt, celery salt, mustard, or ketchup on your food. · Use low-sodium salad dressings, sauces, and ketchup. Or make your own salad dressings and sauces without adding salt. · Use less salt (or none) when recipes call for it. You can often use half the salt a recipe calls for without losing flavor. Other foods such as rice, pasta, and grains do not need added salt. · Rinse canned vegetables, and cook them in fresh water. This removes somebut not allof the salt. · Avoid water that is naturally high in sodium or that has been treated with water softeners, which add sodium. Call your local water company to find out the sodium content of your water supply. If you buy bottled water, read the label and choose a sodium-free brand. Avoid high-sodium foods · Avoid eating: ¨ Smoked, cured, salted, and canned meat, fish, and poultry. ¨ Ham, gil, hot dogs, and luncheon meats. ¨ Regular, hard, and processed cheese and regular peanut butter. ¨ Crackers with salted tops, and other salted snack foods such as pretzels, chips, and salted popcorn. ¨ Frozen prepared meals, unless labeled low-sodium. ¨ Canned and dried soups, broths, and bouillon, unless labeled sodium-free or low-sodium. ¨ Canned vegetables, unless labeled sodium-free or low-sodium. ¨ [place] fries, pizza, tacos, and other fast foods. ¨ Pickles, olives, ketchup, and other condiments, especially soy sauce, unless labeled sodium-free or low-sodium. Where can you learn more? Go to http://roel-lonnie.info/. Enter D952 in the search box to learn more about \"Low Sodium Diet (2,000 Milligram): Care Instructions. \" Current as of: July 26, 2016 Content Version: 11.1 © 4363-5399 [employer]. Care instructions adapted under license by [employer] (which disclaims liability or warranty for this information).  If you have questions about a medical condition or this instruction, always ask your healthcare professional. [employer] disclaims any warranty or liability for your use of this information. Blood pressure goal is always under 150 upper number Introducing [facility]! Dear Kristin Luther: Thank you for requesting a [employer] account. Our records indicate that you already have an active [employer] account. You can access your account anytime at [email or web address] Did you know that you can access your hospital and ER discharge instructions at any time in [employer]? You can also review all of your test results from your hospital stay or ER visit. Additional Information If you have questions, please visit the Frequently Asked Questions section of the [employer] website at [email or web address] Remember, [employer] is NOT to be used for urgent needs. For medical emergencies, dial 911. Now available from your iPhone and Android! Please provide this summary of care documentation to your next provider. Your primary care clinician is listed as Donis Fsih. If you have any questions after today's visit, please call [phone].

## 2017-01-23 NOTE — PATIENT INSTRUCTIONS

## 2017-02-06 ENCOUNTER — TELEPHONE (OUTPATIENT)
Dept: INTERNAL MEDICINE CLINIC | Age: 77
End: 2017-02-06

## 2017-02-06 NOTE — TELEPHONE ENCOUNTER
Pt states she needs a letter stating that she can return to work and work 2 days a week.  (No details given)

## 2017-02-06 NOTE — LETTER
2/7/2017 2:34 PM 
 
Ms. Zayra Gomez [address] [facility], [address] [address] To Whom It May Concern: Zayra Gomez is currently under the care of [facility]. 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 If there are questions or concerns please have the patient contact our office.  
 
 
 
Sincerely, 
 
 
Krish Ayala MD

## 2017-02-07 ENCOUNTER — TELEPHONE (OUTPATIENT)
Dept: INTERNAL MEDICINE CLINIC | Age: 77
End: 2017-02-07

## 2017-02-07 NOTE — TELEPHONE ENCOUNTER
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  Patient stated she was in the hospital due to her kidneys shutting down. 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 Patient given an opportunity to ask questions, repeated information, and verbalized understanding.

## 2017-02-23 ENCOUNTER — TELEPHONE (OUTPATIENT)
Dept: INTERNAL MEDICINE CLINIC | Age: 77
End: 2017-02-23

## 2017-04-07 ENCOUNTER — OFFICE VISIT (OUTPATIENT)
Dept: INTERNAL MEDICINE CLINIC | Age: 77
End: 2017-04-07

## 2017-04-07 ENCOUNTER — HOSPITAL ENCOUNTER (OUTPATIENT)
Dept: LAB | Age: 77
Discharge: HOME | End: 2017-04-07
Payer: MEDICARE

## 2017-04-07 VITALS
TEMPERATURE: 97.9 F | RESPIRATION RATE: 18 BRPM | HEIGHT: 59 IN | WEIGHT: 98.6 LBS | HEART RATE: 78 BPM | DIASTOLIC BLOOD PRESSURE: 77 MMHG | BODY MASS INDEX: 19.88 KG/M2 | SYSTOLIC BLOOD PRESSURE: 178 MMHG | OXYGEN SATURATION: 98 %

## 2017-04-07 DIAGNOSIS — M54.5 LOW BACK PAIN, UNSPECIFIED BACK PAIN LATERALITY, UNSPECIFIED CHRONICITY, WITH SCIATICA PRESENCE UNSPECIFIED: ICD-10-CM

## 2017-04-07 DIAGNOSIS — D64.9 ANEMIA, UNSPECIFIED TYPE: ICD-10-CM

## 2017-04-07 DIAGNOSIS — N28.9 RENAL INSUFFICIENCY: ICD-10-CM

## 2017-04-07 DIAGNOSIS — K21.9 GASTROESOPHAGEAL REFLUX DISEASE WITHOUT ESOPHAGITIS: ICD-10-CM

## 2017-04-07 DIAGNOSIS — E78.00 PURE HYPERCHOLESTEROLEMIA: ICD-10-CM

## 2017-04-07 DIAGNOSIS — I10 ESSENTIAL HYPERTENSION: Primary | ICD-10-CM

## 2017-04-07 PROCEDURE — 80053 COMPREHEN METABOLIC PANEL: CPT

## 2017-04-07 PROCEDURE — 80061 LIPID PANEL: CPT

## 2017-04-07 PROCEDURE — 36415 COLL VENOUS BLD VENIPUNCTURE: CPT

## 2017-04-07 PROCEDURE — 85027 COMPLETE CBC AUTOMATED: CPT

## 2017-04-07 RX ORDER — TIMOLOL MALEATE 5 MG/ML
1 SOLUTION/ DROPS OPHTHALMIC DAILY
COMMUNITY
Start: 2017-03-24 | End: 2022-04-14

## 2017-04-07 RX ORDER — DILTIAZEM HYDROCHLORIDE 180 MG/1
180 CAPSULE, COATED, EXTENDED RELEASE ORAL DAILY
Qty: 90 CAP | Refills: 3 | Status: SHIPPED | COMMUNITY
Start: 2017-04-07 | End: 2017-11-13 | Stop reason: ALTCHOICE

## 2017-04-07 RX ORDER — FAMOTIDINE 20 MG/1
20 TABLET, FILM COATED ORAL 2 TIMES DAILY
Qty: 180 TAB | Refills: 3 | Status: SHIPPED | COMMUNITY
Start: 2017-04-07 | End: 2018-04-10 | Stop reason: SDUPTHER

## 2017-04-07 NOTE — PATIENT INSTRUCTIONS

## 2017-04-07 NOTE — PROGRESS NOTES
Reviewed record in preparation for visit and have obtained necessary documentation. Identified pt with two pt identifiers(name and ).     Chief Complaint   Patient presents with    Hypertension     3 month f/u     Mole     pt states has a mole on Rt arm denies itching or pain     LOW BACK PAIN     c/o of lower back pain radiating to left hip x 1 week            Coordination of Care Questionnaire:  :     1) Have you been to an emergency room, urgent care clinic since your last visit? no   Hospitalized since your last visit? no             2) Have you seen or consulted any other health care providers outside of [address] [place] [address] since your last visit? no  (Include any pap smears or colon screenings in this section.)

## 2017-04-07 NOTE — PROGRESS NOTES
Iker Buck is a 76 y.o. female who presents for follow up. Under stress, cat  this week. BP elevated today. 134-160/68-76. Taking cardizem. Is active volunteering at hospital. Active in the yard. No sx with exertion. Trying to restrict salt. Sees Dr. Diego Weston, nephrology, last in February. Renal insufficiency. Taking only tylenol for pain, no NSAIDS. Drinking water. Lower back pain this week. No neuro sx in legs. Taking tylenol and used heat. Past Medical History:   Diagnosis Date    AIN (acute interstitial nephritis)     Arthritis     CKD (chronic kidney disease) stage 3, GFR 30-59 ml/min     Gastrointestinal disorder     GERD    GERD (gastroesophageal reflux disease)     Glaucoma     High cholesterol     Hypertension     Hypertension     Ill-defined condition     chronic cystitis    Kidney failure 10/14/2016       Family History   Problem Relation Age of Onset    Cancer Father     Cancer Brother        Social History     Social History    Marital status:      Spouse name: N/A    Number of children: N/A    Years of education: N/A     Occupational History    Not on file. Social History Main Topics    Smoking status: Former Smoker     Packs/day: 0.50     Years: 4.00     Quit date: 1997    Smokeless tobacco: Not on file    Alcohol use Yes      Comment: seldom    Drug use: Yes     Special: Prescription    Sexual activity: Not on file     Other Topics Concern    Not on file     Social History Narrative    ** Merged History Encounter **            Current Outpatient Prescriptions on File Prior to Visit   Medication Sig Dispense Refill    latanoprost (XALATAN) 0.005 % ophthalmic solution Administer 1 Drop to both eyes daily.  Aspirin, Buffered 81 mg tab Take 1 Tab by mouth daily.  timolol (BETIMOL) 0.25 % ophthalmic solution Administer 2 Drops to both eyes two (2) times a day.  use in affected eye(s)        No current facility-administered medications on file prior to visit. Review of Systems  Pertinent items are noted in HPI. Objective:     Visit Vitals    /77 (BP 1 Location: Left arm, BP Patient Position: Sitting)    Pulse 78    Temp 97.9 °F (36.6 °C) (Oral)    Resp 18    Ht 4' 11\" (1.499 m)    Wt 98 lb 9.6 oz (44.7 kg)    SpO2 98%    BMI 19.91 kg/m2     Gen: well appearing female  HEENT:   PERRL,normal conjunctiva. External ear and canals normal, TMs no opacification or erythema,  OP no erythema, no exudates, MMM  Neck:  Supple. Thyroid normal size, nontender, without nodules. No masses or LAD  Resp:  No wheezing, no rhonchi, no rales. CV:  RRR, normal S1S2, no murmur. GI: soft, nontender, without masses. No hepatosplenomegaly. Extrem:  +2 pulses, no edema, warm distally      Assessment/Plan:     1. Essential hypertension- Recommend following a lower sodium diet and regular cardiovascular exercise. Blood pressure goal is less than 130/85 on average. Advised compliance with blood pressure medication and regular follow up.    - dilTIAZem CD (CARDIZEM CD) 180 mg ER capsule; Take 1 Cap by mouth daily. Dispense: 90 Cap; Refill: 3  - METABOLIC PANEL, COMPREHENSIVE    2. Renal insufficiency-avoid NSAIDS. Increase water intake. 3. Anemia, unspecified type    - CBC W/O DIFF    4. Gastroesophageal reflux disease without esophagitis    - famotidine (PEPCID) 20 mg tablet; Take 1 Tab by mouth two (2) times a day. Indications: gastroesophageal reflux disease  Dispense: 180 Tab; Refill: 3    5. Pure hypercholesterolemia    - LIPID PANEL    6. Low back pain, unspecified back pain laterality, unspecified chronicity, with sciatica presence unspecified- Given lower back exercises. Recommend heat alternating with ice. Recommend tylenol as tolerated. Follow-up Disposition:  Return for follow up pending labs and at 6 months.   Kaelyn Stockton MD

## 2017-04-07 NOTE — MR AVS SNAPSHOT
Visit Information Date & Time Provider Department Dept. Phone Encounter #  
 4/7/2017 10:00 AM Halina Puentes, [address] [place] [address],[address] [phone] [id number] Follow-up Instructions Return for follow up pending labs and at 6 months. Sonia Harrison Upcoming Health Maintenance Date Due  
 GLAUCOMA SCREENING Q2Y 3/18/2003 OSTEOPOROSIS SCREENING (DEXA) 3/18/2003 Pneumococcal 65+ High/Highest Risk (2 of 2 - PPSV23) 1/1/2012 MEDICARE YEARLY EXAM 9/4/2016 DTaP/Tdap/Td series (2 - Td) 9/4/2025 Allergies as of 4/7/2017  Review Complete On: 4/7/2017 By: Shashank Rivera Severity Noted Reaction Type Reactions Amlodipine  01/23/2017   Side Effect Other (comments) Memory loss, change in odor of food. Lisinopril  01/23/2017   Intolerance Other (comments) Renal dysfunction Current Immunizations  Never Reviewed Name Date Influenza Vaccine (Quad) PF 10/24/2016 10:25 AM  
 Pneumococcal Vaccine (Unspecified Type) 1/1/2007 Tdap 9/4/2015  1:01 PM  
 Zoster Vaccine, Live 1/1/2013 Not reviewed this visit You Were Diagnosed With   
  
 Codes Comments Essential hypertension    -  Primary ICD-10-CM: I10 
ICD-9-CM: 401.9 Renal insufficiency     ICD-10-CM: N28.9 ICD-9-CM: 593.9 Anemia, unspecified type     ICD-10-CM: D64.9 ICD-9-CM: 285.9 Gastroesophageal reflux disease without esophagitis     ICD-10-CM: K21.9 ICD-9-CM: 530.81 Pure hypercholesterolemia     ICD-10-CM: E78.00 ICD-9-CM: 272.0 Low back pain, unspecified back pain laterality, unspecified chronicity, with sciatica presence unspecified     ICD-10-CM: M54.5 ICD-9-CM: 724.2 Vitals BP Pulse Temp Resp Height(growth percentile) Weight(growth percentile) 178/77 (BP 1 Location: Left arm, BP Patient Position: Sitting) 78 97.9 °F (36.6 °C) (Oral) 18 4' 11\" (1.499 m) 98 lb 9.6 oz (44.7 kg) SpO2 BMI OB Status Smoking Status 98% 19.91 kg/m2 Hysterectomy Former Smoker BMI and BSA Data Body Mass Index Body Surface Area  
 19.91 kg/m 2 1.36 m 2 Preferred Pharmacy Pharmacy Name Phone Kelly Baig [address] [place] [address] 3637 [facility] [address] [phone] Your Updated Medication List  
  
   
This list is accurate as of: 4/7/17 10:48 AM.  Always use your most recent med list.  
  
  
  
  
 aspirin, buffered 81 mg Tab Take 1 Tab by mouth daily. BENADRYL PO Take  by mouth. dilTIAZem  mg ER capsule Commonly known as:  CARDIZEM CD Take 1 Cap by mouth daily. famotidine 20 mg tablet Commonly known as:  PEPCID Take 1 Tab by mouth two (2) times a day. Indications: gastroesophageal reflux disease  
  
 latanoprost 0.005 % ophthalmic solution Commonly known as:  Jadine Bjornstad Administer 1 Drop to both eyes daily. STOOL SOFTENER PO Take  by mouth. Indications: PRN  
  
 timolol 0.25 % ophthalmic solution Commonly known as:  Allie Force Administer 2 Drops to both eyes two (2) times a day. use in affected eye(s)  
  
 timolol 0.5 % ophthalmic solution Commonly known as:  TIMOPTIC  
  
 TYLENOL PO Take  by mouth. Prescriptions Sent to Mail Order Refills  
 dilTIAZem CD (CARDIZEM CD) 180 mg ER capsule 3 Sig: Take 1 Cap by mouth daily. Class: Mail Order Pharmacy: [address] [place] [address] Ph #: [phone] Route: Oral  
 famotidine (PEPCID) 20 mg tablet 3 Sig: Take 1 Tab by mouth two (2) times a day. Indications: gastroesophageal reflux disease Class: Mail Order Pharmacy: [address] [place] [address] Ph #: [phone] Route: Oral  
  
We Performed the Following CBC W/O DIFF [68986 CPT(R)] LIPID PANEL [30135 CPT(R)] METABOLIC PANEL, COMPREHENSIVE [00622 CPT(R)] Follow-up Instructions Return for follow up pending labs and at 6 months. .  
  
  
Patient Instructions Low Back Pain: Exercises Your Care Instructions Here are some examples of typical rehabilitation exercises for your condition. Start each exercise slowly. Ease off the exercise if you start to have pain. Your doctor or physical therapist will tell you when you can start these exercises and which ones will work best for you. How to do the exercises Press-up 1. Lie on your stomach, supporting your body with your forearms. 2. Press your elbows down into the floor to raise your upper back. As you do this, relax your stomach muscles and allow your back to arch without using your back muscles. As your press up, do not let your hips or pelvis come off the floor. 3. Hold for 15 to 30 seconds, then relax. 4. Repeat 2 to 4 times. Alternate arm and leg (bird dog) exercise Note: Do this exercise slowly. Try to keep your body straight at all times, and do not let one hip drop lower than the other. 1. Start on the floor, on your hands and knees. 2. Tighten your belly muscles. 3. Raise one leg off the floor, and hold it straight out behind you. Be careful not to let your hip drop down, because that will twist your trunk. 4. Hold for about 6 seconds, then lower your leg and switch to the other leg. 5. Repeat 8 to 12 times on each leg. 6. Over time, work up to holding for 10 to 30 seconds each time. 7. If you feel stable and secure with your leg raised, try raising the opposite arm straight out in front of you at the same time. Knee-to-chest exercise 1. Lie on your back with your knees bent and your feet flat on the floor. 2. Bring one knee to your chest, keeping the other foot flat on the floor (or keeping the other leg straight, whichever feels better on your lower back). 3. Keep your lower back pressed to the floor. Hold for at least 15 to 30 seconds. 4. Relax, and lower the knee to the starting position. 5. Repeat with the other leg. Repeat 2 to 4 times with each leg. 6. To get more stretch, put your other leg flat on the floor while pulling your knee to your chest. 
Curl-ups 1. Lie on the floor on your back with your knees bent at a 90-degree angle. Your feet should be flat on the floor, about 12 inches from your buttocks. 2. Cross your arms over your chest. If this bothers your neck, try putting your hands behind your neck (not your head), with your elbows spread apart. 3. Slowly tighten your belly muscles and raise your shoulder blades off the floor. 4. Keep your head in line with your body, and do not press your chin to your chest. 
5. Hold this position for 1 or 2 seconds, then slowly lower yourself back down to the floor. 6. Repeat 8 to 12 times. Pelvic tilt exercise 1. Lie on your back with your knees bent. 2. \"Brace\" your stomach. This means to tighten your muscles by pulling in and imagining your belly button moving toward your spine. You should feel like your back is pressing to the floor and your hips and pelvis are rocking back. 3. Hold for about 6 seconds while you breathe smoothly. 4. Repeat 8 to 12 times. Heel dig bridging 1. Lie on your back with both knees bent and your ankles bent so that only your heels are digging into the floor. Your knees should be bent about 90 degrees. 2. Then push your heels into the floor, squeeze your buttocks, and lift your hips off the floor until your shoulders, hips, and knees are all in a straight line. 3. Hold for about 6 seconds as you continue to breathe normally, and then slowly lower your hips back down to the floor and rest for up to 10 seconds. 4. Do 8 to 12 repetitions. Hamstring stretch in doorway 1. Lie on your back in a doorway, with one leg through the open door. 2. Slide your leg up the wall to straighten your knee. You should feel a gentle stretch down the back of your leg. 3. Hold the stretch for at least 15 to 30 seconds. Do not arch your back, point your toes, or bend either knee. Keep one heel touching the floor and the other heel touching the wall. 4. Repeat with your other leg. 5. Do 2 to 4 times for each leg. Hip flexor stretch 1. Kneel on the floor with one knee bent and one leg behind you. Place your forward knee over your foot. Keep your other knee touching the floor. 2. Slowly push your hips forward until you feel a stretch in the upper thigh of your rear leg. 3. Hold the stretch for at least 15 to 30 seconds. Repeat with your other leg. 4. Do 2 to 4 times on each side. Wall sit 1. Stand with your back 10 to 12 inches away from a wall. 2. Lean into the wall until your back is flat against it. 3. Slowly slide down until your knees are slightly bent, pressing your lower back into the wall. 4. Hold for about 6 seconds, then slide back up the wall. 5. Repeat 8 to 12 times. Follow-up care is a key part of your treatment and safety. Be sure to make and go to all appointments, and call your doctor if you are having problems. It's also a good idea to know your test results and keep a list of the medicines you take. Where can you learn more? Go to http://roel-lonnie.info/. Enter E679 in the search box to learn more about \"Low Back Pain: Exercises. \" Current as of: May 23, 2016 Content Version: 11.2 © 6723-2779 [employer]. Care instructions adapted under license by [employer] (which disclaims liability or warranty for this information). If you have questions about a medical condition or this instruction, always ask your healthcare professional. [place] [address] any warranty or liability for your use of this information. Introducing [facility] & HEALTH SERVICES! Dear Aleyda Calixto: Thank you for requesting a [employer] account.   Our records indicate that you have previously registered for a [employer] account but its currently inactive. Please call our [employer] support line at [phone]. Additional Information If you have questions, please visit the Frequently Asked Questions section of the [employer] website at [email or web address] Remember, [employer] is NOT to be used for urgent needs. For medical emergencies, dial 911. Now available from your iPhone and Android! Please provide this summary of care documentation to your next provider. Your primary care clinician is listed as Fabiana Quintero. If you have any questions after today's visit, please call [phone].

## 2017-04-08 LAB
ALBUMIN SERPL-MCNC: 4.3 G/DL (ref 3.5–4.8)
ALBUMIN/GLOB SERPL: 1.7 {RATIO} (ref 1.2–2.2)
ALP SERPL-CCNC: 74 IU/L (ref 39–117)
ALT SERPL-CCNC: 6 IU/L (ref 0–32)
AST SERPL-CCNC: 9 IU/L (ref 0–40)
BILIRUB SERPL-MCNC: <0.2 MG/DL (ref 0–1.2)
BUN SERPL-MCNC: 22 MG/DL (ref 8–27)
BUN/CREAT SERPL: 20 (ref 12–28)
CALCIUM SERPL-MCNC: 9.7 MG/DL (ref 8.7–10.3)
CHLORIDE SERPL-SCNC: 106 MMOL/L (ref 96–106)
CHOLEST SERPL-MCNC: 181 MG/DL (ref 100–199)
CO2 SERPL-SCNC: 18 MMOL/L (ref 18–29)
CREAT SERPL-MCNC: 1.08 MG/DL (ref 0.57–1)
ERYTHROCYTE [DISTWIDTH] IN BLOOD BY AUTOMATED COUNT: 13.8 % (ref 12.3–15.4)
GLOBULIN SER CALC-MCNC: 2.6 G/DL (ref 1.5–4.5)
GLUCOSE SERPL-MCNC: 100 MG/DL (ref 65–99)
HCT VFR BLD AUTO: 35.6 % (ref 34–46.6)
HDLC SERPL-MCNC: 45 MG/DL
HGB BLD-MCNC: 11.5 G/DL (ref 11.1–15.9)
LDLC SERPL CALC-MCNC: 111 MG/DL (ref 0–99)
MCH RBC QN AUTO: 28.4 PG (ref 26.6–33)
MCHC RBC AUTO-ENTMCNC: 32.3 G/DL (ref 31.5–35.7)
MCV RBC AUTO: 88 FL (ref 79–97)
PLATELET # BLD AUTO: 339 X10E3/UL (ref 150–379)
POTASSIUM SERPL-SCNC: 4.2 MMOL/L (ref 3.5–5.2)
PROT SERPL-MCNC: 6.9 G/DL (ref 6–8.5)
RBC # BLD AUTO: 4.05 X10E6/UL (ref 3.77–5.28)
SODIUM SERPL-SCNC: 143 MMOL/L (ref 134–144)
TRIGL SERPL-MCNC: 124 MG/DL (ref 0–149)
VLDLC SERPL CALC-MCNC: 25 MG/DL (ref 5–40)
WBC # BLD AUTO: 8 X10E3/UL (ref 3.4–10.8)

## 2017-04-11 NOTE — PROGRESS NOTES
Notify patient kidney test is improving, not anemic anymore. Cholesterol controlled. Follow up in 6 months.

## 2017-04-12 NOTE — PROGRESS NOTES
Spoke with patient , let patient know that patients  lab results were  kidney test is improving, not anemic anymore. Cholesterol controlled. Follow up in 6 months. . Asked patient if any questions or concerns at the time. Pt stated no.

## 2017-06-01 ENCOUNTER — TELEPHONE (OUTPATIENT)
Dept: INTERNAL MEDICINE CLINIC | Age: 77
End: 2017-06-01

## 2017-06-01 NOTE — TELEPHONE ENCOUNTER
----- Message from Silas Grossman sent at 5/31/2017  4:41 PM EDT -----  Regarding: Dr. Elliott Hall  Pt would like to know if she can take two tylenol 650 mg pills each in the morning and night for pain in legs and back. Best contact number is [phone] or [phone]. Copy/pasted from [place].

## 2017-06-05 NOTE — TELEPHONE ENCOUNTER
Patient states she's was out of town when you had returned her call & patient is calling back to discuss medication. Please call.  Thank you

## 2017-06-06 ENCOUNTER — HOSPITAL ENCOUNTER (OUTPATIENT)
Dept: MAMMOGRAPHY | Age: 77
Discharge: HOME | End: 2017-06-06
Attending: FAMILY MEDICINE
Payer: MEDICARE

## 2017-06-06 DIAGNOSIS — Z12.31 VISIT FOR SCREENING MAMMOGRAM: ICD-10-CM

## 2017-06-06 PROCEDURE — 77067 SCR MAMMO BI INCL CAD: CPT

## 2017-06-06 NOTE — TELEPHONE ENCOUNTER
Spoke with patient and advised ok to do 2 Tylenol in AM and 2 in PM for short term. Not something she can take every day for extended length of time since it can cause stomach ulcers. Patient verbalized understanding and states she's doing better right now.

## 2017-06-09 ENCOUNTER — HOSPITAL ENCOUNTER (OUTPATIENT)
Dept: LAB | Age: 77
Discharge: HOME | End: 2017-06-09
Payer: MEDICARE

## 2017-06-09 ENCOUNTER — OFFICE VISIT (OUTPATIENT)
Dept: INTERNAL MEDICINE CLINIC | Age: 77
End: 2017-06-09

## 2017-06-09 VITALS
HEART RATE: 70 BPM | WEIGHT: 97.8 LBS | SYSTOLIC BLOOD PRESSURE: 154 MMHG | DIASTOLIC BLOOD PRESSURE: 82 MMHG | RESPIRATION RATE: 16 BRPM | BODY MASS INDEX: 19.72 KG/M2 | OXYGEN SATURATION: 99 % | TEMPERATURE: 98 F | HEIGHT: 59 IN

## 2017-06-09 DIAGNOSIS — N95.2 VAGINAL ATROPHY: ICD-10-CM

## 2017-06-09 DIAGNOSIS — I10 ESSENTIAL HYPERTENSION: ICD-10-CM

## 2017-06-09 DIAGNOSIS — R30.0 DYSURIA: Primary | ICD-10-CM

## 2017-06-09 DIAGNOSIS — R82.90 ABNORMAL URINALYSIS: ICD-10-CM

## 2017-06-09 DIAGNOSIS — R50.9 FEVER, UNSPECIFIED FEVER CAUSE: ICD-10-CM

## 2017-06-09 LAB
BILIRUB UR QL STRIP: NEGATIVE
GLUCOSE UR-MCNC: NEGATIVE MG/DL
KETONES P FAST UR STRIP-MCNC: NEGATIVE MG/DL
PH UR STRIP: 6 [PH] (ref 4.6–8)
PROT UR QL STRIP: NEGATIVE MG/DL
SP GR UR STRIP: 1.02 (ref 1–1.03)
UA UROBILINOGEN AMB POC: NORMAL (ref 0.2–1)
URINALYSIS CLARITY POC: CLEAR
URINALYSIS COLOR POC: YELLOW
URINE BLOOD POC: NORMAL
URINE LEUKOCYTES POC: NEGATIVE
URINE NITRITES POC: NEGATIVE

## 2017-06-09 PROCEDURE — 85025 COMPLETE CBC W/AUTO DIFF WBC: CPT

## 2017-06-09 PROCEDURE — 36415 COLL VENOUS BLD VENIPUNCTURE: CPT

## 2017-06-09 PROCEDURE — 80053 COMPREHEN METABOLIC PANEL: CPT

## 2017-06-09 NOTE — PROGRESS NOTES
Subjective:      Patient : This patient is a 76 y.o. female that presents today with possible UTI. She reports dysuria. Denies urgency, frequency, blood in urine. Getting up twice at night to urinate. No nausea. Some ongoing lower abdominal pain intermittently, problems with constipation, chronic. Had chills yesterday, felt hot, temp 101. 3. No URI. The patient has history of UTI's. Saw Dr. Kali Branch, urology. Given estrace for vaginal dryness, not using it. Objective:     Visit Vitals    /82 (BP 1 Location: Right arm, BP Patient Position: Sitting)    Pulse 70    Temp 98 °F (36.7 °C) (Oral)    Resp 16    Ht 4' 11\" (1.499 m)    Wt 97 lb 12.8 oz (44.4 kg)    LMP  (LMP Unknown)    SpO2 99%    BMI 19.75 kg/m2       General: well appearing female  Pulmonary:  Clear to auscultation  CV: RRR, normal S1S2  Abdomen: soft, suprapubic tenderness, no rebound, no CVAT, no masses      Past Medical History:   Diagnosis Date    AIN (acute interstitial nephritis)     Arthritis     CKD (chronic kidney disease) stage 3, GFR 30-59 ml/min     Gastrointestinal disorder     GERD    GERD (gastroesophageal reflux disease)     Glaucoma     High cholesterol     Hypertension     Hypertension     Ill-defined condition     chronic cystitis    Kidney failure 10/14/2016     Current Outpatient Prescriptions   Medication Sig Dispense Refill    dilTIAZem CD (CARDIZEM CD) 180 mg ER capsule Take 1 Cap by mouth daily. 90 Cap 3    timolol (TIMOPTIC) 0.5 % ophthalmic solution       DOCUSATE CALCIUM (STOOL SOFTENER PO) Take  by mouth. Indications: PRN      DIPHENHYDRAMINE HCL (BENADRYL PO) Take  by mouth.  ACETAMINOPHEN (TYLENOL PO) Take  by mouth.  famotidine (PEPCID) 20 mg tablet Take 1 Tab by mouth two (2) times a day. Indications: gastroesophageal reflux disease 180 Tab 3    latanoprost (XALATAN) 0.005 % ophthalmic solution Administer 1 Drop to both eyes daily.       Aspirin, Buffered 81 mg tab Take 1 Tab by mouth daily.  timolol (BETIMOL) 0.25 % ophthalmic solution Administer 2 Drops to both eyes two (2) times a day. use in affected eye(s)        Allergies   Allergen Reactions    Amlodipine Other (comments)     Memory loss, change in odor of food.  Lisinopril Other (comments)     Renal dysfunction           Assessment/Plan:        1. Abnormal urinalysis    - AMB POC URINALYSIS DIP STICK AUTO W/O MICRO    2. Fever, unspecified fever cause    - CBC WITH AUTOMATED DIFF  - METABOLIC PANEL, COMPREHENSIVE    3. Dysuria-increase water intake, treat vaginal atrophy. Avoid bladder irritants. 4. Vaginal atrophy-resume use of estrace. 5. Essential hypertension- Recommend following a lower sodium diet and regular cardiovascular exercise. Blood pressure goal is less than 130/85 on average. Advised compliance with blood pressure medication and regular follow up. - CBC WITH AUTOMATED DIFF  - METABOLIC PANEL, COMPREHENSIVE    Follow-up Disposition:  Return if symptoms worsen or fail to improve.     Joshua Reyna MD

## 2017-06-09 NOTE — PATIENT INSTRUCTIONS
INCREASE WATER INTAKE. TAKE TYLENOL FOR PAIN OR FEVER. 1-2 TWICE A DAY AS NEEDED. RESUME THE ESTRACE VAGINAL CREAM 2-3 DAYS A WEEK.

## 2017-06-09 NOTE — PROGRESS NOTES
Tito Cantu is a 76 y.o. female    Chief Complaint   Patient presents with    Urinary Pain     Burning with urination, back pain X 1 month     1. Have you been to the ER, urgent care clinic since your last visit? Hospitalized since your last visit? No    2. Have you seen or consulted any other health care providers outside of the [address] [place] [address] since your last visit? Include any pap smears or colon screening.  No

## 2017-06-09 NOTE — MR AVS SNAPSHOT
Visit Information Date & Time Provider Department Dept. Phone Encounter #  
 6/9/2017 11:30 AM Lawyer Faith, [address] [place] [address],[address] [phone] [id number] Follow-up Instructions Return if symptoms worsen or fail to improve. Your Appointments 10/6/2017 10:00 AM  
ROUTINE CARE with Lawyer Marcell MD  
[facility] [address] [facility]) Appt Note: 6 month follow up  
 [address] [facility] [address] [phone]  
[address] [facility] [address] [place] [address] Upcoming Health Maintenance Date Due  
 GLAUCOMA SCREENING Q2Y 3/18/2003 OSTEOPOROSIS SCREENING (DEXA) 3/18/2003 Pneumococcal 65+ High/Highest Risk (2 of 2 - PPSV23) 1/1/2012 MEDICARE YEARLY EXAM 9/4/2016 INFLUENZA AGE 9 TO ADULT 8/1/2017 DTaP/Tdap/Td series (2 - Td) 9/4/2025 Allergies as of 6/9/2017  Review Complete On: 6/9/2017 By: Lawyer Marcell MD  
  
 Severity Noted Reaction Type Reactions Amlodipine  01/23/2017   Side Effect Other (comments) Memory loss, change in odor of food. Lisinopril  01/23/2017   Intolerance Other (comments) Renal dysfunction Current Immunizations  Never Reviewed Name Date Influenza Vaccine (Quad) PF 10/24/2016 10:25 AM  
 Pneumococcal Vaccine (Unspecified Type) 1/1/2007 Tdap 9/4/2015  1:01 PM  
 Zoster Vaccine, Live 1/1/2013 Not reviewed this visit You Were Diagnosed With   
  
 Codes Comments Dysuria    -  Primary ICD-10-CM: R30.0 ICD-9-CM: 786. 1 Abnormal urinalysis     ICD-10-CM: R82.90 ICD-9-CM: 791.9 Fever, unspecified fever cause     ICD-10-CM: R50.9 ICD-9-CM: 780.60 Vaginal atrophy     ICD-10-CM: N95.2 ICD-9-CM: 627.3 Essential hypertension     ICD-10-CM: I10 
ICD-9-CM: 401.9 Vitals BP Pulse Temp Resp Height(growth percentile) Weight(growth percentile)  154/82 (BP 1 Location: Right arm, BP Patient Position: Sitting) 70 98 °F (36.7 °C) (Oral) 16 4' 11\" (1.499 m) 97 lb 12.8 oz (44.4 kg) LMP SpO2 BMI OB Status Smoking Status (LMP Unknown) 99% 19.75 kg/m2 Hysterectomy Former Smoker Vitals History BMI and BSA Data Body Mass Index Body Surface Area 19.75 kg/m 2 1.36 m 2 Preferred Pharmacy Pharmacy Name Phone [facility] [address] [phone] Your Updated Medication List  
  
   
This list is accurate as of: 6/9/17 12:10 PM.  Always use your most recent med list.  
  
  
  
  
 aspirin, buffered 81 mg Tab Take 1 Tab by mouth daily. BENADRYL PO Take  by mouth. dilTIAZem  mg ER capsule Commonly known as:  CARDIZEM CD Take 1 Cap by mouth daily. famotidine 20 mg tablet Commonly known as:  PEPCID Take 1 Tab by mouth two (2) times a day. Indications: gastroesophageal reflux disease  
  
 latanoprost 0.005 % ophthalmic solution Commonly known as:  Albesa Lesch Administer 1 Drop to both eyes daily. STOOL SOFTENER PO Take  by mouth. Indications: PRN  
  
 timolol 0.25 % ophthalmic solution Commonly known as:  Cynthea Moons Administer 2 Drops to both eyes two (2) times a day. use in affected eye(s)  
  
 timolol 0.5 % ophthalmic solution Commonly known as:  TIMOPTIC  
  
 TYLENOL PO Take  by mouth. We Performed the Following AMB POC URINALYSIS DIP STICK AUTO W/O MICRO [81490 CPT(R)] CBC WITH AUTOMATED DIFF [39995 CPT(R)] METABOLIC PANEL, COMPREHENSIVE [08867 CPT(R)] Follow-up Instructions Return if symptoms worsen or fail to improve. Patient Instructions INCREASE WATER INTAKE. TAKE TYLENOL FOR PAIN OR FEVER. 1-2 TWICE A DAY AS NEEDED. RESUME THE ESTRACE VAGINAL CREAM 2-3 DAYS A WEEK. Introducing [facility] & HEALTH SERVICES! Dear Charbel Many: Thank you for requesting a [employer] account.   Our records indicate that you already have an active General Blood account. You can access your account anytime at [email or web address] Did you know that you can access your hospital and ER discharge instructions at any time in General Blood? You can also review all of your test results from your hospital stay or ER visit. Additional Information If you have questions, please visit the Frequently Asked Questions section of the General Blood website at [email or web address] Remember, General Blood is NOT to be used for urgent needs. For medical emergencies, dial 911. Now available from your iPhone and Android! Please provide this summary of care documentation to your next provider. Your primary care clinician is listed as Syeda Gaming. If you have any questions after today's visit, please call [phone].

## 2017-06-10 LAB
ALBUMIN SERPL-MCNC: 4.5 G/DL (ref 3.5–4.8)
ALBUMIN/GLOB SERPL: 1.5 {RATIO} (ref 1.2–2.2)
ALP SERPL-CCNC: 88 IU/L (ref 39–117)
ALT SERPL-CCNC: 7 IU/L (ref 0–32)
AST SERPL-CCNC: 12 IU/L (ref 0–40)
BASOPHILS # BLD AUTO: 0 X10E3/UL (ref 0–0.2)
BASOPHILS NFR BLD AUTO: 0 %
BILIRUB SERPL-MCNC: 0.3 MG/DL (ref 0–1.2)
BUN SERPL-MCNC: 22 MG/DL (ref 8–27)
BUN/CREAT SERPL: 19 (ref 12–28)
CALCIUM SERPL-MCNC: 10 MG/DL (ref 8.7–10.3)
CHLORIDE SERPL-SCNC: 107 MMOL/L (ref 96–106)
CO2 SERPL-SCNC: 17 MMOL/L (ref 18–29)
CREAT SERPL-MCNC: 1.14 MG/DL (ref 0.57–1)
EOSINOPHIL # BLD AUTO: 0.3 X10E3/UL (ref 0–0.4)
EOSINOPHIL NFR BLD AUTO: 3 %
ERYTHROCYTE [DISTWIDTH] IN BLOOD BY AUTOMATED COUNT: 15.3 % (ref 12.3–15.4)
GLOBULIN SER CALC-MCNC: 3.1 G/DL (ref 1.5–4.5)
GLUCOSE SERPL-MCNC: 94 MG/DL (ref 65–99)
HCT VFR BLD AUTO: 33.3 % (ref 34–46.6)
HGB BLD-MCNC: 10.5 G/DL (ref 11.1–15.9)
IMM GRANULOCYTES # BLD: 0 X10E3/UL (ref 0–0.1)
IMM GRANULOCYTES NFR BLD: 0 %
LYMPHOCYTES # BLD AUTO: 2.4 X10E3/UL (ref 0.7–3.1)
LYMPHOCYTES NFR BLD AUTO: 26 %
MCH RBC QN AUTO: 26.9 PG (ref 26.6–33)
MCHC RBC AUTO-ENTMCNC: 31.5 G/DL (ref 31.5–35.7)
MCV RBC AUTO: 85 FL (ref 79–97)
MONOCYTES # BLD AUTO: 0.9 X10E3/UL (ref 0.1–0.9)
MONOCYTES NFR BLD AUTO: 10 %
NEUTROPHILS # BLD AUTO: 5.5 X10E3/UL (ref 1.4–7)
NEUTROPHILS NFR BLD AUTO: 61 %
PLATELET # BLD AUTO: 401 X10E3/UL (ref 150–379)
POTASSIUM SERPL-SCNC: 4.7 MMOL/L (ref 3.5–5.2)
PROT SERPL-MCNC: 7.6 G/DL (ref 6–8.5)
RBC # BLD AUTO: 3.9 X10E6/UL (ref 3.77–5.28)
SODIUM SERPL-SCNC: 144 MMOL/L (ref 134–144)
WBC # BLD AUTO: 9.2 X10E3/UL (ref 3.4–10.8)

## 2017-06-12 ENCOUNTER — TELEPHONE (OUTPATIENT)
Dept: INTERNAL MEDICINE CLINIC | Age: 77
End: 2017-06-12

## 2017-06-12 NOTE — TELEPHONE ENCOUNTER
Notify patient labs showed mild kidney impairment and mild anemia. Stable compared to prior labs. Has she had any more fevers? Nothing in labs to explain fever.

## 2017-06-12 NOTE — TELEPHONE ENCOUNTER
----- Message from Hedy Garza sent at 6/12/2017  3:25 PM EDT -----  Regarding: Dr. Jacqueline Amador   Pt is returning a call from the practice. Best contact number [phone].        Copied/pasted from [facility]

## 2017-06-13 NOTE — TELEPHONE ENCOUNTER
----- Message from Wan Fernando sent at 6/13/2017  9:40 AM EDT -----  Regarding: Dr. Jaswant Tracy  Pt is returning a phone call to nurse regard to blood work results in lab. Best contact number to reach pt is [phone].       Message copy/pasted from [facility]

## 2017-06-13 NOTE — TELEPHONE ENCOUNTER
Spoke with patient and notified of labs per Dr. Lucien Wall. She states her temp has been around . Xander Ro is the only day she has not had a fever per patient.

## 2017-06-14 NOTE — TELEPHONE ENCOUNTER
Spoke with patient and advised per Dr. Nay Jara to increase her water intake and call with any symptoms. She verbalized understanding. Also, patient states her neck is still bothering her. She now has a \"knot\" on the back of her neck about middle way between base of skull and shoulders. States only painful when turning her head. Advised possibly muscular or cervical spine out of alignment. Apply ice and may take nsaids for pain and swelling. Patient verbalized understanding and will return call if no improvement or worsening.

## 2017-06-18 NOTE — PROGRESS NOTES
Notify patient kidney function is stable. Mild anemia, stable. Labs sent to Dr. Daysi Hernandez. Follow up in 6 months.

## 2017-06-19 NOTE — PROGRESS NOTES
Call to pt, ID verified x2. Result reviewed. Pt verbalized understanding with no further questions or concerns.

## 2017-07-06 ENCOUNTER — OFFICE VISIT (OUTPATIENT)
Dept: INTERNAL MEDICINE CLINIC | Age: 77
End: 2017-07-06

## 2017-07-06 ENCOUNTER — HOSPITAL ENCOUNTER (OUTPATIENT)
Dept: GENERAL RADIOLOGY | Age: 77
Discharge: HOME | End: 2017-07-06
Payer: MEDICARE

## 2017-07-06 VITALS
BODY MASS INDEX: 19.31 KG/M2 | SYSTOLIC BLOOD PRESSURE: 159 MMHG | HEART RATE: 83 BPM | RESPIRATION RATE: 18 BRPM | WEIGHT: 95.8 LBS | TEMPERATURE: 97.7 F | OXYGEN SATURATION: 99 % | DIASTOLIC BLOOD PRESSURE: 73 MMHG | HEIGHT: 59 IN

## 2017-07-06 DIAGNOSIS — G89.29 CHRONIC BILATERAL LOW BACK PAIN WITHOUT SCIATICA: ICD-10-CM

## 2017-07-06 DIAGNOSIS — M54.2 NECK PAIN: Primary | ICD-10-CM

## 2017-07-06 DIAGNOSIS — M54.50 CHRONIC BILATERAL LOW BACK PAIN WITHOUT SCIATICA: ICD-10-CM

## 2017-07-06 DIAGNOSIS — M54.2 NECK PAIN: ICD-10-CM

## 2017-07-06 PROCEDURE — 72110 X-RAY EXAM L-2 SPINE 4/>VWS: CPT

## 2017-07-06 PROCEDURE — 72050 X-RAY EXAM NECK SPINE 4/5VWS: CPT

## 2017-07-06 RX ORDER — CARISOPRODOL 350 MG/1
350 TABLET ORAL
Qty: 30 TAB | Refills: 2 | Status: SHIPPED | OUTPATIENT
Start: 2017-07-06 | End: 2017-07-25

## 2017-07-06 NOTE — PATIENT INSTRUCTIONS
Neck: Exercises  Your Care Instructions  Here are some examples of typical rehabilitation exercises for your condition. Start each exercise slowly. Ease off the exercise if you start to have pain. Your doctor or physical therapist will tell you when you can start these exercises and which ones will work best for you. How to do the exercises  Note: Stretching should make you feel a gentle stretch, but no pain. Stop any strengthening exercise that makes pain worse. Neck stretch    1. This stretch works best if you keep your shoulder down as you lean away from it. To help you remember to do this, start by relaxing your shoulders and lightly holding on to your thighs or your chair. 2. Tilt your head toward your shoulder and hold for 15 to 30 seconds. Let the weight of your head stretch your muscles. 3. If you would like a little added stretch, use your hand to gently and steadily pull your head toward your shoulder. For example, keeping your right shoulder down, lean your head to the left. 4. Repeat 2 to 4 times toward each shoulder. Diagonal neck stretch    1. Turn your head slightly toward the direction you will be stretching, and tilt your head diagonally toward your chest and hold for 15 to 30 seconds. 2. If you would like a little added stretch, use your hand to gently and steadily pull your head forward on the diagonal.  3. Repeat 2 to 4 times toward each side. Dorsal glide stretch    1. Sit or stand tall and look straight ahead. 2. Slowly tuck your chin as you glide your head backward over your body  3. Hold for a count of 6, and then relax for up to 10 seconds. 4. Repeat 8 to 12 times. Note: The dorsal glide stretches the back of the neck. If you feel pain, do not glide so far back. Some people find this exercise easier to do while lying on their backs with an ice pack on the neck. Chest and shoulder stretch    1.  Sit or stand tall and glide your head backward as in the dorsal glide stretch. 2. Raise both arms so that your hands are next to your ears. 3. Take a deep breath, and as you breathe out, lower your elbows down and behind your back. You will feel your shoulder blades slide down and together, and at the same time you will feel a stretch across your chest and the front of your shoulders. 4. Hold for about 6 seconds, and then relax for up to 10 seconds. 5. Repeat 8 to 12 times. Strengthening: Hands on head    1. Move your head backward, forward, and side to side against gentle pressure from your hands, holding each position for about 6 seconds. 2. Repeat 8 to 12 times. Follow-up care is a key part of your treatment and safety. Be sure to make and go to all appointments, and call your doctor if you are having problems. It's also a good idea to know your test results and keep a list of the medicines you take. Where can you learn more? Go to http://roel-lonnie.info/. Enter P975 in the search box to learn more about \"Neck: Exercises. \"  Current as of: March 21, 2017  Content Version: 11.3  © 6314-6439 24h00. Care instructions adapted under license by [employer] (which disclaims liability or warranty for this information). If you have questions about a medical condition or this instruction, always ask your healthcare professional. [address] any warranty or liability for your use of this information.   Take tylenol Twice a day ( up to 4 pills daily)  Trial of muscle relaxant - try first at bedtime - do not drive or operate machinery when taking muscle relaxant

## 2017-07-06 NOTE — MR AVS SNAPSHOT
Visit Information Date & Time Provider Department Dept. Phone Encounter #  
 7/6/2017 10:45 AM Nayeli Rodriguez, [address] [phone] [id number] Follow-up Instructions Return if symptoms worsen or fail to improve. Your Appointments 10/6/2017 10:00 AM  
ROUTINE CARE with Mansi Moon MD  
[facility] [address] [facility]) Appt Note: 6 month follow up  
 [address] [id number]  
[address] [facility] [address] Upcoming Health Maintenance Date Due  
 GLAUCOMA SCREENING Q2Y 3/18/2003 OSTEOPOROSIS SCREENING (DEXA) 3/18/2003 Pneumococcal 65+ High/Highest Risk (2 of 2 - PPSV23) 1/1/2012 MEDICARE YEARLY EXAM 9/4/2016 INFLUENZA AGE 9 TO ADULT 8/1/2017 DTaP/Tdap/Td series (2 - Td) 9/4/2025 Allergies as of 7/6/2017  Review Complete On: 7/6/2017 By: Mac Sharpe Severity Noted Reaction Type Reactions Amlodipine  01/23/2017   Side Effect Other (comments) Memory loss, change in odor of food. Ibuprofen  07/06/2017    Unknown (comments) Lisinopril  01/23/2017   Intolerance Other (comments) Renal dysfunction Current Immunizations  Never Reviewed Name Date Influenza Vaccine (Quad) PF 10/24/2016 10:25 AM  
 Pneumococcal Vaccine (Unspecified Type) 1/1/2007 Tdap 9/4/2015  1:01 PM  
 Zoster Vaccine, Live 1/1/2013 Not reviewed this visit You Were Diagnosed With   
  
 Codes Comments Neck pain    -  Primary ICD-10-CM: M54.2 ICD-9-CM: 723.1 Chronic bilateral low back pain without sciatica     ICD-10-CM: M54.5, G89.29 ICD-9-CM: 724.2, 338.29 Vitals BP Pulse Temp Resp Height(growth percentile) Weight(growth percentile) 159/73 (BP 1 Location: Right arm, BP Patient Position: Sitting) 83 97.7 °F (36.5 °C) (Oral) 18 4' 11\" (1.499 m) 95 lb 12.8 oz (43.5 kg) LMP SpO2 BMI OB Status Smoking Status (LMP Unknown) 99% 19.35 kg/m2 Hysterectomy Former Smoker BMI and BSA Data Body Mass Index Body Surface Area  
 19.35 kg/m 2 1.35 m 2 Preferred Pharmacy Pharmacy Name Phone [facility] #[address] [phone] Your Updated Medication List  
  
   
This list is accurate as of: 7/6/17 11:29 AM.  Always use your most recent med list.  
  
  
  
  
 aspirin, buffered 81 mg Tab Take 1 Tab by mouth daily. BENADRYL PO Take  by mouth.  
  
 carisoprodol 350 mg tablet Commonly known as:  SOMA Take 1 Tab by mouth two (2) times daily as needed for Muscle Spasm(s). Max Daily Amount: 700 mg.  
  
 dilTIAZem  mg ER capsule Commonly known as:  CARDIZEM CD Take 1 Cap by mouth daily. famotidine 20 mg tablet Commonly known as:  PEPCID Take 1 Tab by mouth two (2) times a day. Indications: gastroesophageal reflux disease  
  
 latanoprost 0.005 % ophthalmic solution Commonly known as:  Murphy Labrum Administer 1 Drop to both eyes daily. STOOL SOFTENER PO Take  by mouth. Indications: PRN  
  
 timolol 0.25 % ophthalmic solution Commonly known as:  Nanetta Else Administer 2 Drops to both eyes two (2) times a day. use in affected eye(s)  
  
 timolol 0.5 % ophthalmic solution Commonly known as:  TIMOPTIC  
  
 TYLENOL PO Take  by mouth. Prescriptions Printed Refills  
 carisoprodol (SOMA) 350 mg tablet 2 Sig: Take 1 Tab by mouth two (2) times daily as needed for Muscle Spasm(s). Max Daily Amount: 700 mg. Class: Print Route: Oral  
  
We Performed the Following REFERRAL TO PHYSICAL THERAPY [IGW88 Custom] Follow-up Instructions Return if symptoms worsen or fail to improve. To-Do List   
 07/06/2017 Imaging:  XR SPINE CERV 4 OR 5 V   
  
 07/06/2017 Imaging:  XR SPINE LUMB MIN 4 V Referral Information Referral ID Referred By Referred To  
  
 [id number] Artem Salgado 1, [address] [facility] [address], [address] Phone: [phone] Fax: [phone] Visits Status Start Date End Date 1 New Request 7/6/17 7/6/18 If your referral has a status of pending review or denied, additional information will be sent to support the outcome of this decision. Patient Instructions Neck: Exercises Your Care Instructions Here are some examples of typical rehabilitation exercises for your condition. Start each exercise slowly. Ease off the exercise if you start to have pain. Your doctor or physical therapist will tell you when you can start these exercises and which ones will work best for you. How to do the exercises Note: Stretching should make you feel a gentle stretch, but no pain. Stop any strengthening exercise that makes pain worse. Neck stretch 1. This stretch works best if you keep your shoulder down as you lean away from it. To help you remember to do this, start by relaxing your shoulders and lightly holding on to your thighs or your chair. 2. Tilt your head toward your shoulder and hold for 15 to 30 seconds. Let the weight of your head stretch your muscles. 3. If you would like a little added stretch, use your hand to gently and steadily pull your head toward your shoulder. For example, keeping your right shoulder down, lean your head to the left. 4. Repeat 2 to 4 times toward each shoulder. Diagonal neck stretch 1. Turn your head slightly toward the direction you will be stretching, and tilt your head diagonally toward your chest and hold for 15 to 30 seconds. 2. If you would like a little added stretch, use your hand to gently and steadily pull your head forward on the diagonal. 
3. Repeat 2 to 4 times toward each side. Dorsal glide stretch 1. Sit or stand tall and look straight ahead. 2. Slowly tuck your chin as you glide your head backward over your body 3. Hold for a count of 6, and then relax for up to 10 seconds. 4. Repeat 8 to 12 times. Note: The dorsal glide stretches the back of the neck. If you feel pain, do not glide so far back. Some people find this exercise easier to do while lying on their backs with an ice pack on the neck. Chest and shoulder stretch 1. Sit or stand tall and glide your head backward as in the dorsal glide stretch. 2. Raise both arms so that your hands are next to your ears. 3. Take a deep breath, and as you breathe out, lower your elbows down and behind your back. You will feel your shoulder blades slide down and together, and at the same time you will feel a stretch across your chest and the front of your shoulders. 4. Hold for about 6 seconds, and then relax for up to 10 seconds. 5. Repeat 8 to 12 times. Strengthening: Hands on head 1. Move your head backward, forward, and side to side against gentle pressure from your hands, holding each position for about 6 seconds. 2. Repeat 8 to 12 times. Follow-up care is a key part of your treatment and safety. Be sure to make and go to all appointments, and call your doctor if you are having problems. It's also a good idea to know your test results and keep a list of the medicines you take. Where can you learn more? Go to http://roel-lonnie.info/. Enter P975 in the search box to learn more about \"Neck: Exercises. \" Current as of: March 21, 2017 Content Version: 11.3 © 5697-3983 [employer]. Care instructions adapted under license by [employer] (which disclaims liability or warranty for this information). If you have questions about a medical condition or this instruction, always ask your healthcare professional. [address] any warranty or liability for your use of this information. Take tylenol Twice a day ( up to 4 pills daily) Trial of muscle relaxant - try first at bedtime - do not drive or operate machinery when taking muscle relaxant Introducing [facility]! Dear Walter Alexander: Thank you for requesting a [employer] account. Our records indicate that you already have an active [employer] account. You can access your account anytime at [email or web address] Did you know that you can access your hospital and ER discharge instructions at any time in [employer]? You can also review all of your test results from your hospital stay or ER visit. Additional Information If you have questions, please visit the Frequently Asked Questions section of the [employer] website at [email or web address] Remember, [employer] is NOT to be used for urgent needs. For medical emergencies, dial 911. Now available from your iPhone and Android! Please provide this summary of care documentation to your next provider. Your primary care clinician is listed as Metlakatla Barrier. If you have any questions after today's visit, please call [phone].

## 2017-07-06 NOTE — PROGRESS NOTES
Chief Complaint   Patient presents with    Neck Pain     couple months     Back Pain     couple months     1. Have you been to the ER, urgent care clinic since your last visit? Hospitalized since your last visit? No    2. Have you seen or consulted any other health care providers outside of the [facility] since your last visit? Include any pap smears or colon screening.  No

## 2017-07-06 NOTE — PROGRESS NOTES
Please notify patient she has severe multilevel degenerative disc disease ( arthritis) in her neck and back which is likely reason for pain.  Patient should schedule PT therapy as discussed during visit

## 2017-07-06 NOTE — PROGRESS NOTES
CC: Neck Pain (couple months ) and Back Pain (couple months)      HPI:    She is a pleasant 76 y.o. female who presents for evaluation of neck and back pain. Has a history of chronic low back pain. Has noted increased pain in neck points to trapezius bilaterally that extends to below the ears. Pain is sharp and at times debilitating feels like spasms. Has history of chronic back pain which needs to be well controlled with NSAIDS which patient cannot take   due to renal impairment   Taking tylenol and it takes a while to get releif but helps   Taking tylenol extra strength twice a day  Back ROS: denies weakness in legs, no bowel or bladder incontinence, no lack of sensation, no weight loss and no fever. No hx of cancer. Denies arm weakness also     ROS:  10 systems reviewed and negative other than HPI    Past Medical History:   Diagnosis Date    AIN (acute interstitial nephritis)     Arthritis     CKD (chronic kidney disease) stage 3, GFR 30-59 ml/min     Gastrointestinal disorder     GERD    GERD (gastroesophageal reflux disease)     Glaucoma     High cholesterol     Hypertension     Hypertension     Ill-defined condition     chronic cystitis    Kidney failure 10/14/2016       Current Outpatient Prescriptions on File Prior to Visit   Medication Sig Dispense Refill    dilTIAZem CD (CARDIZEM CD) 180 mg ER capsule Take 1 Cap by mouth daily. 90 Cap 3    timolol (TIMOPTIC) 0.5 % ophthalmic solution       DOCUSATE CALCIUM (STOOL SOFTENER PO) Take  by mouth. Indications: PRN      DIPHENHYDRAMINE HCL (BENADRYL PO) Take  by mouth.  ACETAMINOPHEN (TYLENOL PO) Take  by mouth.  famotidine (PEPCID) 20 mg tablet Take 1 Tab by mouth two (2) times a day. Indications: gastroesophageal reflux disease 180 Tab 3    latanoprost (XALATAN) 0.005 % ophthalmic solution Administer 1 Drop to both eyes daily.  Aspirin, Buffered 81 mg tab Take 1 Tab by mouth daily.       timolol (BETIMOL) 0.25 % ophthalmic solution Administer 2 Drops to both eyes two (2) times a day. use in affected eye(s)        No current facility-administered medications on file prior to visit. Past Surgical History:   Procedure Laterality Date    HX GYN          HX GYN      HX HYSTERECTOMY      HX TONSILLECTOMY      HX UROLOGICAL      cauterization of bladder ulcers       Family History   Problem Relation Age of Onset    Cancer Father     Cancer Brother      Reviewed and no changes     Social History     Social History    Marital status:      Spouse name: N/A    Number of children: N/A    Years of education: N/A     Occupational History    Not on file.      Social History Main Topics    Smoking status: Former Smoker     Packs/day: 0.50     Years: 4.00     Quit date: 1997    Smokeless tobacco: Former User    Alcohol use Yes      Comment: seldom    Drug use: Yes     Special: Prescription    Sexual activity: Not on file     Other Topics Concern    Not on file     Social History Narrative    ** Merged History Encounter **                 Visit Vitals    /73 (BP 1 Location: Right arm, BP Patient Position: Sitting)    Pulse 83    Temp 97.7 °F (36.5 °C) (Oral)    Resp 18    Ht 4' 11\" (1.499 m)    Wt 95 lb 12.8 oz (43.5 kg)    LMP  (LMP Unknown)    SpO2 99%    BMI 19.35 kg/m2       Physical Examination:   General - Well appearing female  HEENT - PERRL, TM no erythema/opacification, normal nasal turbinates, oropharynx no erythema or exudate, MMM  Neck - supple, no bruits, no TMG, no LAD  Pulm - clear to auscultation bilaterally  Cardio - RRR, normal S1 S2, no murmur gallops or rubs  Abd - soft, nontender, no masses, no HSM  Extrem - no edema, +2 distal pulses  Psych - normal affect, appropriate mood  MSK: neck has FROM, tenderness to palpation on trapezius muscles bilaterally    Back exam- paraspinous muscle pain on palpation, no vertebral pain, able to move on exam table without difficulty, straight leg raise is negative. Back neuro:  5/5 strength in legs, normal sensation,  normal reflexes in knee and achilles, normal gait. Lab Results   Component Value Date/Time    WBC 9.2 06/09/2017 12:12 PM    HGB 10.5 06/09/2017 12:12 PM    HCT 33.3 06/09/2017 12:12 PM    PLATELET 844 84/28/8510 12:12 PM    MCV 85 06/09/2017 12:12 PM     Lab Results   Component Value Date/Time    Sodium 144 06/09/2017 12:12 PM    Potassium 4.7 06/09/2017 12:12 PM    Chloride 107 06/09/2017 12:12 PM    CO2 17 06/09/2017 12:12 PM    Anion gap 11 01/19/2017 01:36 AM    Glucose 94 06/09/2017 12:12 PM    BUN 22 06/09/2017 12:12 PM    Creatinine 1.14 06/09/2017 12:12 PM    BUN/Creatinine ratio 19 06/09/2017 12:12 PM    GFR est AA 53 06/09/2017 12:12 PM    GFR est non-AA 46 06/09/2017 12:12 PM    Calcium 10.0 06/09/2017 12:12 PM     Lab Results   Component Value Date/Time    Cholesterol, total 181 04/07/2017 10:52 AM    HDL Cholesterol 45 04/07/2017 10:52 AM    LDL, calculated 111 04/07/2017 10:52 AM    VLDL, calculated 25 04/07/2017 10:52 AM    Triglyceride 124 04/07/2017 10:52 AM    CHOL/HDL Ratio 4.8 06/04/2009 10:00 AM     Lab Results   Component Value Date/Time    TSH 1.34 01/18/2017 12:45 PM     No results found for: PSA, PSA2, PSAR1, Lex Ditch, PSAR3, [id number], [id number], PSALT  No results found for: HBA1C, HGBE8, ZTV5CSYV, RIV1MXAH, PAZ5LYDF  Lab Results   Component Value Date/Time    Vitamin D 25-Hydroxy 65.7 01/18/2017 03:00 AM       Lab Results   Component Value Date/Time    ALT (SGPT) 7 06/09/2017 12:12 PM    AST (SGOT) 12 06/09/2017 12:12 PM    Alk. phosphatase 88 06/09/2017 12:12 PM    Bilirubin, total 0.3 06/09/2017 12:12 PM           Assessment/Plan:    1. Neck pain  -Neck strain/neck spasms  - REFERRAL TO PHYSICAL THERAPY  - carisoprodol (SOMA) 350 mg tablet; Take 1 Tab by mouth two (2) times daily as needed for Muscle Spasm(s). Max Daily Amount: 700 mg. Dispense: 30 Tab;  Refill: 2  - XR SPINE CERV 4 OR 5 V; Future    2. Chronic bilateral low back pain without sciatica  -Advised to take Tylenol twice a day/to not exceed 2 g daily  - REFERRAL TO PHYSICAL THERAPY Clarine Carmen  - carisoprodol (SOMA) 350 mg tablet; Take 1 Tab by mouth two (2) times daily as needed for Muscle Spasm(s). Max Daily Amount: 700 mg. Dispense: 30 Tab; Refill: 2  - XR SPINE LUMB MIN 4 V; Future  -Advised to not drive or operate machinery while taking muscle relaxant      Follow-up Disposition:  Return if symptoms worsen or fail to improve.     Lily Deras MD

## 2017-07-07 ENCOUNTER — PATIENT OUTREACH (OUTPATIENT)
Dept: INTERNAL MEDICINE CLINIC | Age: 77
End: 2017-07-07

## 2017-07-07 NOTE — PROGRESS NOTES
NNTOCIP Post Hospitalization       - Attended DANA PERRY appointment with Brina Sutherland MD on 1/23/17. To the best of this NN's knowledge, this patient had no additional ED visits or Hospital Admissions during 30 day CECILY period following admission to [facility] 1/17/17-1/19/17. CECILY period has ended. Transitions of Care Episode Resolved. Goals Addressed             Most Recent       Post Hospitalization     COMPLETED: Identification of barriers to adherence to a plan of care such as inability to afford medications, lack of insurance, lack of transportation, etc.   On track (7/7/2017)             No barriers to care reported or identified at this time. 7/7/17  CECILY Episode resolved.  COMPLETED: Prevent complications post hospitalization. On track (7/7/2017)             To the best of this NN's knowledge, this patient had no additional ED visits or Hospital Admissions during 30 day CECILY period following admission to [facility] 1/17/17-1/19/17. This note will not be viewable in [address]

## 2017-07-07 NOTE — PROGRESS NOTES
Spoke with patient , let patient know that patients  X-ray  results were she has severe multilevel degenerative disc disease ( arthritis) in her neck and back which is likely reason for pain. Patient should schedule PT therapy as discussed during visit  . Asked patient if any questions or concerns at the time. Pt stated no.

## 2017-07-17 ENCOUNTER — CLINICAL SUPPORT (OUTPATIENT)
Dept: INTERNAL MEDICINE CLINIC | Age: 77
End: 2017-07-17

## 2017-07-17 DIAGNOSIS — R10.9 FLANK PAIN: Primary | ICD-10-CM

## 2017-07-17 LAB
BILIRUB UR QL STRIP: NEGATIVE
GLUCOSE UR-MCNC: NEGATIVE MG/DL
KETONES P FAST UR STRIP-MCNC: NEGATIVE MG/DL
PH UR STRIP: 6 [PH] (ref 4.6–8)
PROT UR QL STRIP: ABNORMAL MG/DL
SP GR UR STRIP: 1.02 (ref 1–1.03)
UA UROBILINOGEN AMB POC: ABNORMAL (ref 0.2–1)
URINALYSIS CLARITY POC: CLEAR
URINALYSIS COLOR POC: YELLOW
URINE BLOOD POC: ABNORMAL
URINE LEUKOCYTES POC: NEGATIVE
URINE NITRITES POC: NEGATIVE

## 2017-07-25 ENCOUNTER — HOSPITAL ENCOUNTER (EMERGENCY)
Age: 77
Discharge: HOME | End: 2017-07-25
Attending: EMERGENCY MEDICINE
Payer: MEDICARE

## 2017-07-25 VITALS
HEART RATE: 80 BPM | TEMPERATURE: 97.9 F | DIASTOLIC BLOOD PRESSURE: 52 MMHG | OXYGEN SATURATION: 99 % | WEIGHT: 97.44 LBS | SYSTOLIC BLOOD PRESSURE: 143 MMHG | RESPIRATION RATE: 16 BRPM | BODY MASS INDEX: 19.64 KG/M2 | HEIGHT: 59 IN

## 2017-07-25 DIAGNOSIS — M10.9 ACUTE GOUT OF RIGHT WRIST, UNSPECIFIED CAUSE: Primary | ICD-10-CM

## 2017-07-25 DIAGNOSIS — M25.531 RIGHT WRIST PAIN: ICD-10-CM

## 2017-07-25 LAB
ANION GAP BLD CALC-SCNC: 7 MMOL/L (ref 5–15)
BASOPHILS # BLD AUTO: 0 K/UL (ref 0–0.1)
BASOPHILS # BLD: 0 % (ref 0–1)
BUN SERPL-MCNC: 20 MG/DL (ref 6–20)
BUN/CREAT SERPL: 18 (ref 12–20)
CALCIUM SERPL-MCNC: 8.7 MG/DL (ref 8.5–10.1)
CHLORIDE SERPL-SCNC: 111 MMOL/L (ref 97–108)
CO2 SERPL-SCNC: 22 MMOL/L (ref 21–32)
CREAT SERPL-MCNC: 1.1 MG/DL (ref 0.55–1.02)
CRP SERPL-MCNC: 2.97 MG/DL (ref 0–0.6)
EOSINOPHIL # BLD: 0.2 K/UL (ref 0–0.4)
EOSINOPHIL NFR BLD: 2 % (ref 0–7)
ERYTHROCYTE [DISTWIDTH] IN BLOOD BY AUTOMATED COUNT: 15.6 % (ref 11.5–14.5)
ERYTHROCYTE [SEDIMENTATION RATE] IN BLOOD: 71 MM/HR (ref 0–30)
GLUCOSE SERPL-MCNC: 106 MG/DL (ref 65–100)
HCT VFR BLD AUTO: 29.9 % (ref 35–47)
HGB BLD-MCNC: 9.5 G/DL (ref 11.5–16)
LYMPHOCYTES # BLD AUTO: 25 % (ref 12–49)
LYMPHOCYTES # BLD: 2.1 K/UL (ref 0.8–3.5)
MCH RBC QN AUTO: 27.5 PG (ref 26–34)
MCHC RBC AUTO-ENTMCNC: 31.8 G/DL (ref 30–36.5)
MCV RBC AUTO: 86.4 FL (ref 80–99)
MONOCYTES # BLD: 0.7 K/UL (ref 0–1)
MONOCYTES NFR BLD AUTO: 8 % (ref 5–13)
NEUTS SEG # BLD: 5.3 K/UL (ref 1.8–8)
NEUTS SEG NFR BLD AUTO: 65 % (ref 32–75)
PLATELET # BLD AUTO: 338 K/UL (ref 150–400)
POTASSIUM SERPL-SCNC: 3.5 MMOL/L (ref 3.5–5.1)
RBC # BLD AUTO: 3.46 M/UL (ref 3.8–5.2)
SODIUM SERPL-SCNC: 140 MMOL/L (ref 136–145)
WBC # BLD AUTO: 8.3 K/UL (ref 3.6–11)

## 2017-07-25 PROCEDURE — 85025 COMPLETE CBC W/AUTO DIFF WBC: CPT | Performed by: EMERGENCY MEDICINE

## 2017-07-25 PROCEDURE — 96375 TX/PRO/DX INJ NEW DRUG ADDON: CPT

## 2017-07-25 PROCEDURE — 75810000054 HC ARTHOCENTISIS JOINT

## 2017-07-25 PROCEDURE — 99284 EMERGENCY DEPT VISIT MOD MDM: CPT

## 2017-07-25 PROCEDURE — 86140 C-REACTIVE PROTEIN: CPT | Performed by: EMERGENCY MEDICINE

## 2017-07-25 PROCEDURE — 96374 THER/PROPH/DIAG INJ IV PUSH: CPT

## 2017-07-25 PROCEDURE — 85652 RBC SED RATE AUTOMATED: CPT | Performed by: EMERGENCY MEDICINE

## 2017-07-25 PROCEDURE — 74011250636 HC RX REV CODE- 250/636: Performed by: EMERGENCY MEDICINE

## 2017-07-25 PROCEDURE — 80048 BASIC METABOLIC PNL TOTAL CA: CPT | Performed by: EMERGENCY MEDICINE

## 2017-07-25 PROCEDURE — 36415 COLL VENOUS BLD VENIPUNCTURE: CPT | Performed by: EMERGENCY MEDICINE

## 2017-07-25 RX ORDER — ONDANSETRON 2 MG/ML
4 INJECTION INTRAMUSCULAR; INTRAVENOUS
Status: COMPLETED | OUTPATIENT
Start: 2017-07-25 | End: 2017-07-25

## 2017-07-25 RX ORDER — LIDOCAINE HYDROCHLORIDE 10 MG/ML
10 INJECTION, SOLUTION EPIDURAL; INFILTRATION; INTRACAUDAL; PERINEURAL ONCE
Status: DISCONTINUED | OUTPATIENT
Start: 2017-07-25 | End: 2017-07-25 | Stop reason: HOSPADM

## 2017-07-25 RX ORDER — CEPHALEXIN 500 MG/1
500 CAPSULE ORAL 4 TIMES DAILY
Qty: 28 CAP | Refills: 0 | Status: SHIPPED | OUTPATIENT
Start: 2017-07-25 | End: 2017-07-25

## 2017-07-25 RX ORDER — METHYLPREDNISOLONE 4 MG/1
TABLET ORAL
Qty: 1 DOSE PACK | Refills: 0 | Status: SHIPPED | OUTPATIENT
Start: 2017-07-25 | End: 2017-07-26 | Stop reason: SDUPTHER

## 2017-07-25 RX ORDER — DOXYCYCLINE HYCLATE 100 MG
100 TABLET ORAL 2 TIMES DAILY
Qty: 20 TAB | Refills: 0 | Status: SHIPPED | OUTPATIENT
Start: 2017-07-25 | End: 2017-08-04

## 2017-07-25 RX ORDER — MORPHINE SULFATE 2 MG/ML
2 INJECTION, SOLUTION INTRAMUSCULAR; INTRAVENOUS ONCE
Status: COMPLETED | OUTPATIENT
Start: 2017-07-25 | End: 2017-07-25

## 2017-07-25 RX ADMIN — MORPHINE SULFATE 2 MG: 2 INJECTION, SOLUTION INTRAMUSCULAR; INTRAVENOUS at 19:10

## 2017-07-25 RX ADMIN — ONDANSETRON 4 MG: 2 INJECTION INTRAMUSCULAR; INTRAVENOUS at 19:09

## 2017-07-25 NOTE — ED NOTES
Assumed care of pt from Uvaldo Holcomb RN. Pt resting comfortably with side rails up and call bell in reach. Pt aware of plan to have arthrocentesis performed.

## 2017-07-25 NOTE — ED PROVIDER NOTES
HPI Comments: Nasreen Qureshi, 76 y.o. female, with PMHx of HTN, HLD, osteoarthritis, GERD, CKD presents ambulatory to ED [facility] ED with cc of right wrist pain that radiates from her thumb to her wrist since yesterday. Pt states that she was seen at [facility] and that her xrays were unremarkable, however notes that they were concerned for a septic joint infection. Pt states that she initially believed the wrist pain was due to her osteoarthritis, however she reports some redness and increased pain upon flexion of the wrist since this morning, which is new for her. She reports taking Lisinopril and Omeprazole, as well as Tylenol for her pain. She denies fever, numbness, tingling, hx of gout, or recent surgeries. She denies elbow pain. Consent obtained for arthrocentesis. PCP: Pamela Rust MD    Social history significant for: - Tobacco, + EtOH, + Illicit Drug Use    There are no other complaints, changes, or physical findings at this time. The history is provided by the patient. No  was used. Past Medical History:   Diagnosis Date    AIN (acute interstitial nephritis)     Arthritis     CKD (chronic kidney disease) stage 3, GFR 30-59 ml/min     Gastrointestinal disorder     GERD    GERD (gastroesophageal reflux disease)     Glaucoma     High cholesterol     Hypertension     Hypertension     Ill-defined condition     chronic cystitis    Kidney failure 10/14/2016       Past Surgical History:   Procedure Laterality Date    HX GYN          HX GYN      HX HYSTERECTOMY      HX TONSILLECTOMY      HX UROLOGICAL      cauterization of bladder ulcers         Family History:   Problem Relation Age of Onset    Cancer Father     Cancer Brother        Social History     Social History    Marital status:      Spouse name: N/A    Number of children: N/A    Years of education: N/A     Occupational History    Not on file.      Social History Main Topics  Smoking status: Former Smoker     Packs/day: 0.50     Years: 4.00     Quit date: 7/29/1997    Smokeless tobacco: Former User    Alcohol use Yes      Comment: seldom    Drug use: Yes     Special: Prescription    Sexual activity: Not on file     Other Topics Concern    Not on file     Social History Narrative    ** Merged History Encounter **              ALLERGIES: Aleve [naproxen sodium]; Amlodipine; Ibuprofen; and Lisinopril    Review of Systems   Constitutional: Negative for chills, fatigue and fever. HENT: Negative for congestion and sore throat. Eyes: Negative for visual disturbance. Respiratory: Negative for cough and shortness of breath. Cardiovascular: Negative for chest pain and leg swelling. Gastrointestinal: Negative for abdominal distention, abdominal pain, blood in stool, diarrhea and nausea. Endocrine: Negative for polyuria. Genitourinary: Negative for difficulty urinating, dysuria, flank pain, vaginal bleeding and vaginal discharge. Musculoskeletal: Positive for arthralgias (right wrist). Negative for myalgias and neck pain. Skin: Positive for color change (redness to right wrist). Negative for rash. Allergic/Immunologic: Negative for immunocompromised state. Neurological: Negative for weakness, numbness and headaches. Psychiatric/Behavioral: Negative for dysphoric mood. Patient Vitals for the past 12 hrs:   Temp Pulse Resp BP SpO2   07/25/17 1909 - - - 145/62 99 %   07/25/17 1857 - - - - 100 %   07/25/17 1815 - - - 151/59 99 %   07/25/17 1724 97.9 °F (36.6 °C) 80 16 - 98 %       Physical Exam   Constitutional: She is oriented to person, place, and time. She appears well-developed and well-nourished. HENT:   Head: Normocephalic and atraumatic. Moist mucous membranes   Eyes: Conjunctivae are normal. Pupils are equal, round, and reactive to light. Right eye exhibits no discharge. Left eye exhibits no discharge. Neck: Normal range of motion. Neck supple.  No tracheal deviation present. Cardiovascular: Normal rate, regular rhythm, normal heart sounds and intact distal pulses. No murmur heard. Pulmonary/Chest: Effort normal and breath sounds normal. No respiratory distress. She has no wheezes. She has no rales. Abdominal: Soft. Bowel sounds are normal. There is no tenderness. There is no rebound and no guarding. Musculoskeletal: Normal range of motion. She exhibits no edema, tenderness or deformity. Neurological: She is alert and oriented to person, place, and time. Skin: Skin is warm and dry. No rash noted. No erythema.   + Swelling and erythema to medial aspect of joint (right wrist)   Psychiatric: She has a normal mood and affect. Her behavior is normal.   Nursing note and vitals reviewed. MDM  Number of Diagnoses or Management Options  Acute gout of right wrist, unspecified cause:   Left wrist pain:   Diagnosis management comments: DDx: gout, pseudo-gout, septic joint. Arthrocentesis attempted, but could not obtained fluid. Discussed with ortho, recommend ortho follow up. Likely gout or pseudogout. Patient alerted to results, she understands diagnostic problem of unable to obtain fluids. Amount and/or Complexity of Data Reviewed  Clinical lab tests: ordered and reviewed  Review and summarize past medical records: yes  Discuss the patient with other providers: yes (Orthopedics)  Independent visualization of images, tracings, or specimens: yes    Patient Progress  Patient progress: stable    ED Course       Procedures    Procedure Note - Arthrocentesis:   8:00 PM  Performed by Laury Oden DO  Immediately prior to the procedure, the patient was reevaluated and found suitable for the planned procedure and any planned medications. Immediately prior to the procedure a time out was called to verify the correct patient, procedure, equipment, staff, and marking as appropriate.   Indication for procedure: Diagnostic  The site prepped with Teresa. Sterile field established. Anesthesia was obtained with 2 mLs of Lidocaine 2% without epinephrine. Radiocarpal joint was entered, using a 23 gauge needle, and 0 cc's of fluid was withdrawn. Estimated blood loss: < 1 mL  The procedure took 1-15 minutes, and pt tolerated well. Written by Rebecca Woody ED Scribe, as dictated by Laury Oden DO. Consult Note:  8:01 PM  Laury Oden DO spoke with Dr. Jodi Sena,  Specialty: Orthopedics  Discussed pt's hx, disposition, and available diagnostic and imaging results. Reviewed care plans. Consultant agrees with plans as outlined. He agrees to discharge pt with Abx and have her follow up with him tomorrow in office. LABORATORY TESTS:  Recent Results (from the past 12 hour(s))   CBC WITH AUTOMATED DIFF    Collection Time: 07/25/17  6:06 PM   Result Value Ref Range    WBC 8.3 3.6 - 11.0 K/uL    RBC 3.46 (L) 3.80 - 5.20 M/uL    HGB 9.5 (L) 11.5 - 16.0 g/dL    HCT 29.9 (L) 35.0 - 47.0 %    MCV 86.4 80.0 - 99.0 FL    MCH 27.5 26.0 - 34.0 PG    MCHC 31.8 30.0 - 36.5 g/dL    RDW 15.6 (H) 11.5 - 14.5 %    PLATELET 266 125 - 738 K/uL    NEUTROPHILS 65 32 - 75 %    LYMPHOCYTES 25 12 - 49 %    MONOCYTES 8 5 - 13 %    EOSINOPHILS 2 0 - 7 %    BASOPHILS 0 0 - 1 %    ABS. NEUTROPHILS 5.3 1.8 - 8.0 K/UL    ABS. LYMPHOCYTES 2.1 0.8 - 3.5 K/UL    ABS. MONOCYTES 0.7 0.0 - 1.0 K/UL    ABS. EOSINOPHILS 0.2 0.0 - 0.4 K/UL    ABS.  BASOPHILS 0.0 0.0 - 0.1 K/UL   SED RATE (ESR)    Collection Time: 07/25/17  6:06 PM   Result Value Ref Range    Sed rate, automated 71 (H) 0 - 30 mm/hr   METABOLIC PANEL, BASIC    Collection Time: 07/25/17  6:06 PM   Result Value Ref Range    Sodium 140 136 - 145 mmol/L    Potassium 3.5 3.5 - 5.1 mmol/L    Chloride 111 (H) 97 - 108 mmol/L    CO2 22 21 - 32 mmol/L    Anion gap 7 5 - 15 mmol/L    Glucose 106 (H) 65 - 100 mg/dL    BUN 20 6 - 20 MG/DL    Creatinine 1.10 (H) 0.55 - 1.02 MG/DL    BUN/Creatinine ratio 18 12 - 20      GFR est AA 58 (L) >60 ml/min/1.73m2    GFR est non-AA 48 (L) >60 ml/min/1.73m2    Calcium 8.7 8.5 - 10.1 MG/DL       MEDICATIONS GIVEN:  Medications   lidocaine (PF) (XYLOCAINE) 10 mg/mL (1 %) injection 10 mL (not administered)   morphine injection 2 mg (2 mg IntraVENous Given 7/25/17 1910)   ondansetron (ZOFRAN) injection 4 mg (4 mg IntraVENous Given 7/25/17 1909)       IMPRESSION:  1. Left wrist pain    2. Acute gout of right wrist, unspecified cause        PLAN:  1. Current Discharge Medication List      START taking these medications    Details   methylPREDNISolone (MEDROL, NICOLA,) 4 mg tablet As directed  Qty: 1 Dose Pack, Refills: 0      cephALEXin (KEFLEX) 500 mg capsule Take 1 Cap by mouth four (4) times daily for 7 days. Qty: 28 Cap, Refills: 0           2. Follow-up Information     Follow up With Details Comments [address] [place] [address] In 1 day  Debbie You 115 200  [address] 21           Return to ED if worse     Discharge Note:  8:09 PM  The pt is ready for discharge. The pt's signs, symptoms, diagnosis, and discharge instructions have been discussed and pt has conveyed their understanding. The pt is to follow up as recommended or return to ER should their symptoms worsen. Plan has been discussed and pt is in agreement. This note is prepared by Alexa Buchanan, acting as a Scribe for [employer], DO. [employer], DO: The scribe's documentation has been prepared under my direction and personally reviewed by me in its entirety. I confirm that the notes above accurately reflects all work, treatment, procedures, and medical decision making performed by me.

## 2017-07-26 ENCOUNTER — TELEPHONE (OUTPATIENT)
Dept: INTERNAL MEDICINE CLINIC | Age: 77
End: 2017-07-26

## 2017-07-26 ENCOUNTER — OFFICE VISIT (OUTPATIENT)
Dept: INTERNAL MEDICINE CLINIC | Age: 77
End: 2017-07-26

## 2017-07-26 VITALS
OXYGEN SATURATION: 98 % | TEMPERATURE: 97.7 F | DIASTOLIC BLOOD PRESSURE: 62 MMHG | HEIGHT: 59 IN | WEIGHT: 97.8 LBS | BODY MASS INDEX: 19.72 KG/M2 | SYSTOLIC BLOOD PRESSURE: 131 MMHG | HEART RATE: 65 BPM

## 2017-07-26 DIAGNOSIS — M65.9 SYNOVITIS OF WRIST: Primary | ICD-10-CM

## 2017-07-26 RX ORDER — TRIAMCINOLONE ACETONIDE 40 MG/ML
60 INJECTION, SUSPENSION INTRA-ARTICULAR; INTRAMUSCULAR ONCE
Qty: 1 ML | Refills: 0
Start: 2017-07-26 | End: 2017-07-26

## 2017-07-26 RX ORDER — METHYLPREDNISOLONE 4 MG/1
4 TABLET ORAL
Qty: 1 DOSE PACK | Refills: 0 | Status: SHIPPED | OUTPATIENT
Start: 2017-07-26 | End: 2017-08-14 | Stop reason: ALTCHOICE

## 2017-07-26 NOTE — MR AVS SNAPSHOT
Visit Information Date & Time Provider Department Dept. Phone Encounter #  
 7/26/2017  3:45 PM Duyen Thompson, [address] [place] [address],[address] [phone] [id number] Follow-up Instructions Return if symptoms worsen or fail to improve. Your Appointments 10/6/2017 10:00 AM  
ROUTINE CARE with Duyen Thompson MD  
[facility]) Appt Note: 6 month follow up  
 [facility] [address] [id number]  
[address] [facility] [address] [place] [address] Upcoming Health Maintenance Date Due  
 GLAUCOMA SCREENING Q2Y 3/18/2003 OSTEOPOROSIS SCREENING (DEXA) 3/18/2003 Pneumococcal 65+ High/Highest Risk (2 of 2 - PPSV23) 1/1/2012 MEDICARE YEARLY EXAM 9/4/2016 INFLUENZA AGE 9 TO ADULT 8/1/2017 DTaP/Tdap/Td series (2 - Td) 9/4/2025 Allergies as of 7/26/2017  Review Complete On: 7/26/2017 By: Duyen Thompson MD  
  
 Severity Noted Reaction Type Reactions Aleve [Naproxen Sodium]  07/25/2017    Other (comments) Renal issues Amlodipine  01/23/2017   Side Effect Other (comments) Memory loss, change in odor of food. Ibuprofen  07/06/2017    Unknown (comments) Lisinopril  01/23/2017   Intolerance Other (comments) Renal dysfunction Omeprazole  07/26/2017    Other (comments) Cause Kidney failure Current Immunizations  Never Reviewed Name Date Influenza Vaccine (Quad) PF 10/24/2016 10:25 AM  
 Pneumococcal Vaccine (Unspecified Type) 1/1/2007 Tdap 9/4/2015  1:01 PM  
 Zoster Vaccine, Live 1/1/2013 Not reviewed this visit You Were Diagnosed With   
  
 Codes Comments Synovitis of wrist    -  Primary ICD-10-CM: M65.9 ICD-9-CM: 727.05 Vitals  BP Pulse Temp Height(growth percentile) Weight(growth percentile) LMP  
 131/62 (BP 1 Location: Left arm, BP Patient Position: Sitting) 65 97.7 °F (36.5 °C) (Oral) 4' 11\" (1.499 m) 97 lb 12.8 oz (44.4 kg) (LMP Unknown) SpO2 BMI OB Status Smoking Status 98% 19.75 kg/m2 Hysterectomy Former Smoker BMI and BSA Data Body Mass Index Body Surface Area 19.75 kg/m 2 1.36 m 2 Preferred Pharmacy Pharmacy Name Phone [facility] #[address] [phone] Your Updated Medication List  
  
   
This list is accurate as of: 7/26/17  4:35 PM.  Always use your most recent med list.  
  
  
  
  
 aspirin, buffered 81 mg Tab Take 1 Tab by mouth daily. dilTIAZem  mg ER capsule Commonly known as:  CARDIZEM CD Take 1 Cap by mouth daily. doxycycline 100 mg tablet Commonly known as:  VIBRA-TABS Take 1 Tab by mouth two (2) times a day for 10 days. famotidine 20 mg tablet Commonly known as:  PEPCID Take 1 Tab by mouth two (2) times a day. Indications: gastroesophageal reflux disease  
  
 latanoprost 0.005 % ophthalmic solution Commonly known as:  Hemet Romero Administer 1 Drop to both eyes daily. methylPREDNISolone 4 mg tablet Commonly known as:  MEDROL (NICOLA) Take 1 Tab by mouth Specific Days and Specific Times. As directed STOOL SOFTENER PO Take  by mouth. Indications: PRN  
  
 timolol 0.5 % ophthalmic solution Commonly known as:  TIMOPTIC  
  
 triamcinolone acetonide 40 mg/mL injection Commonly known as:  KENALOG  
1.5 mL by IntraMUSCular route once for 1 dose. TYLENOL PO Take  by mouth. Prescriptions Sent to Pharmacy Refills  
 methylPREDNISolone (MEDROL, NICOLA,) 4 mg tablet 0 Sig: Take 1 Tab by mouth Specific Days and Specific Times. As directed Class: Normal  
 Pharmacy: [facility] #[id number] Ph #: [phone] Route: Oral  
  
We Performed the Following TRIAMCINOLONE ACETONIDE INJ [ [facility] [place] Follow-up Instructions Return if symptoms worsen or fail to improve. Patient Instructions ELEVATE HAND, USE ICE PACKS, USE TYLENOL 1-2 TWICE A DAY AS NEEDED. Introducing [facility]! Dear Kentrell Solis: Thank you for requesting a [employer] account. Our records indicate that you already have an active [employer] account. You can access your account anytime at [email or web address] Did you know that you can access your hospital and ER discharge instructions at any time in [employer]? You can also review all of your test results from your hospital stay or ER visit. Additional Information If you have questions, please visit the Frequently Asked Questions section of the [employer] website at [email or web address] Remember, [employer] is NOT to be used for urgent needs. For medical emergencies, dial 911. Now available from your iPhone and Android! Please provide this summary of care documentation to your next provider. Your primary care clinician is listed as John Underwood. If you have any questions after today's visit, please call [phone].

## 2017-07-26 NOTE — PROGRESS NOTES
Sis Valentin is a 76 y.o. female who presents with right wrist swelling, redness and pain. Onset 7/22. Taking tylenol, without minimal relief. 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 Seen in ED yesterday. Was given keflex for possible cellulitis, not taken. Given medrol, not taken yet. Mother and oldest sister with RA. Past Medical History:   Diagnosis Date    AIN (acute interstitial nephritis)     Arthritis     CKD (chronic kidney disease) stage 3, GFR 30-59 ml/min     Gastrointestinal disorder     GERD    GERD (gastroesophageal reflux disease)     Glaucoma     High cholesterol     Hypertension     Hypertension     Ill-defined condition     chronic cystitis    Kidney failure 10/14/2016       Family History   Problem Relation Age of Onset    Cancer Father     Cancer Brother        Social History     Social History    Marital status:      Spouse name: N/A    Number of children: N/A    Years of education: N/A     Occupational History    Not on file. Social History Main Topics    Smoking status: Former Smoker     Packs/day: 0.50     Years: 4.00     Quit date: 7/29/1997    Smokeless tobacco: Former User    Alcohol use Yes      Comment: seldom    Drug use: Yes     Special: Prescription, OTC    Sexual activity: No     Other Topics Concern    Not on file     Social History Narrative    ** Merged History Encounter **            Current Outpatient Prescriptions on File Prior to Visit   Medication Sig Dispense Refill    dilTIAZem CD (CARDIZEM CD) 180 mg ER capsule Take 1 Cap by mouth daily. 90 Cap 3    timolol (TIMOPTIC) 0.5 % ophthalmic solution       DOCUSATE CALCIUM (STOOL SOFTENER PO) Take  by mouth. Indications: PRN      ACETAMINOPHEN (TYLENOL PO) Take  by mouth.  famotidine (PEPCID) 20 mg tablet Take 1 Tab by mouth two (2) times a day.  Indications: gastroesophageal reflux disease 180 Tab 3    latanoprost (XALATAN) 0.005 % ophthalmic solution Administer 1 Drop to both eyes daily.  Aspirin, Buffered 81 mg tab Take 1 Tab by mouth daily.  doxycycline (VIBRA-TABS) 100 mg tablet Take 1 Tab by mouth two (2) times a day for 10 days. 20 Tab 0     No current facility-administered medications on file prior to visit. Review of Systems  Pertinent items are noted in HPI. Objective:     Visit Vitals    /62 (BP 1 Location: Left arm, BP Patient Position: Sitting)    Pulse 65    Temp 97.7 °F (36.5 °C) (Oral)    Ht 4' 11\" (1.499 m)    Wt 97 lb 12.8 oz (44.4 kg)    LMP  (LMP Unknown)    SpO2 98%    BMI 19.75 kg/m2     Gen: well appearing female  HEENT:   PERRL,normal conjunctiva. External ear and canals normal, TMs no opacification or erythema,  OP no erythema, no exudates, MMM  Neck:  Supple. Thyroid normal size, nontender, without nodules. No masses or LAD  Resp:  No wheezing, no rhonchi, no rales. CV:  RRR, normal S1S2, no murmur. GI: soft, nontender, without masses. No hepatosplenomegaly. Extrem:  +2 pulses, no edema, warm distally, swelling of right wrist,  OA deformities of DIP and PIP joints of hands. Assessment/Plan:       ICD-10-CM ICD-9-CM    1. Synovitis of wrist M65.9 727.05 TRIAMCINOLONE ACETONIDE INJ      triamcinolone acetonide (KENALOG) 40 mg/mL injection      methylPREDNISolone (MEDROL, NICOLA,) 4 mg tablet       Follow-up Disposition:  Return if symptoms worsen or fail to improve.     Jake Bailey MD

## 2017-07-26 NOTE — DISCHARGE INSTRUCTIONS
Joint Pain: Care Instructions  Your Care Instructions  Many people have small aches and pains from overuse or injury to muscles and joints. Joint injuries often happen during sports or recreation, work tasks, or projects around the home. An overuse injury can happen when you put too much stress on a joint or when you do an activity that stresses the joint over and over, such as using the computer or rowing a boat. You can take action at home to help your muscles and joints get better. You should feel better in 1 to 2 weeks, but it can take 3 months or more to heal completely. Follow-up care is a key part of your treatment and safety. Be sure to make and go to all appointments, and call your doctor if you are having problems. It's also a good idea to know your test results and keep a list of the medicines you take. How can you care for yourself at home? · Do not put weight on the injured joint for at least a day or two. · For the first day or two after an injury, do not take hot showers or baths, and do not use hot packs. The heat could make swelling worse. · Put ice or a cold pack on the sore joint for 10 to 20 minutes at a time. Try to do this every 1 to 2 hours for the next 3 days (when you are awake) or until the swelling goes down. Put a thin cloth between the ice and your skin. · Wrap the injury in an elastic bandage. Do not wrap it too tightly because this can cause more swelling. · Prop up the sore joint on a pillow when you ice it or anytime you sit or lie down during the next 3 days. Try to keep it above the level of your heart. This will help reduce swelling. · Take an over-the-counter pain medicine, such as acetaminophen (Tylenol), ibuprofen (Advil, Motrin), or naproxen (Aleve). Read and follow all instructions on the label. · After 1 or 2 days of rest, begin moving the joint gently.  While the joint is still healing, you can begin to exercise using activities that do not strain or hurt the painful joint. When should you call for help? Call your doctor now or seek immediate medical care if:  · You have signs of infection, such as:  ¨ Increased pain, swelling, warmth, and redness. ¨ Red streaks leading from the joint. ¨ A fever. Watch closely for changes in your health, and be sure to contact your doctor if:  · Your movement or symptoms are not getting better after 1 to 2 weeks of home treatment. Where can you learn more? Go to http://roel-lonnie.info/. Enter P205 in the search box to learn more about \"Joint Pain: Care Instructions. \"  Current as of: March 21, 2017  Content Version: 11.3  © 4277-4388 [employer]. Care instructions adapted under license by [employer] (which disclaims liability or warranty for this information). If you have questions about a medical condition or this instruction, always ask your healthcare professional. [address] any warranty or liability for your use of this information.

## 2017-07-26 NOTE — PROGRESS NOTES
Spoke with pt regarding results. Pt has an appointment with PCP today at 3:45pm. She states that her PCP is in the [employer] system so he/she will be able to log into the portal to see the result. Pt attempted to schedule appointment with orthopedist, but they told her to see her PCP first. Pt feels improved. Pt expresses understanding. Provided customary ED return precautions.

## 2017-07-26 NOTE — PROGRESS NOTES
Patient is here today for follow up and acute care of right wrist . Patient was   Seen in ER on 7/25/17. Patient complaining of right fingers hurt on the 7/21/17  which lead to her right wrist hurting , red in color and hot to the touch on 7/25/17.

## 2017-07-26 NOTE — ED NOTES
Discharge instructions given to patient by Sj Jimenez DO. Patient verbalized understanding of discharge instructions. Pt discharged without difficulty. Pt discharged in stable condition via ambulation, accompanied by .

## 2017-07-26 NOTE — PROGRESS NOTES
Attempted to call pt and left HIPPA compliant  requesting a return call. Per her chart review, pt was diagnosed with gout. She has attempted to reach out to her orthopedist and PCP since discharge. Will inform her of elevated inflammatory marker.    Marcel Cuevas PA-C

## 2017-07-26 NOTE — TELEPHONE ENCOUNTER
Patient states she needs a call back in reference to getting an appt today for an ER 7/25/17 Follow Up that patient reports she was diagnosed with Gout & advised to see an orthopedic doctor. Patient states she called [facility] & they advised to see PCP first. Please call to discuss.  Thank you

## 2017-08-14 ENCOUNTER — OFFICE VISIT (OUTPATIENT)
Dept: INTERNAL MEDICINE CLINIC | Age: 77
End: 2017-08-14

## 2017-08-14 ENCOUNTER — TELEPHONE (OUTPATIENT)
Dept: INTERNAL MEDICINE CLINIC | Age: 77
End: 2017-08-14

## 2017-08-14 VITALS
SYSTOLIC BLOOD PRESSURE: 155 MMHG | WEIGHT: 97.6 LBS | OXYGEN SATURATION: 98 % | HEIGHT: 59 IN | RESPIRATION RATE: 18 BRPM | BODY MASS INDEX: 19.68 KG/M2 | DIASTOLIC BLOOD PRESSURE: 82 MMHG | TEMPERATURE: 97.9 F | HEART RATE: 79 BPM

## 2017-08-14 DIAGNOSIS — R22.2 MASS IN CHEST: Primary | ICD-10-CM

## 2017-08-14 DIAGNOSIS — R22.2 CLAVICULAR AREA FULLNESS: Primary | ICD-10-CM

## 2017-08-14 DIAGNOSIS — R07.89 CHEST WALL PAIN: ICD-10-CM

## 2017-08-14 NOTE — TELEPHONE ENCOUNTER
Spoke with patient. States she has a knot under left clavicle \"about the size of an egg. \"  Tender to touch and hard. Patient states she first noticed yesterday. Denies any injury or fall. Appointment scheduled today at 1:30pm with Dr. Sofie Harp.

## 2017-08-14 NOTE — PROGRESS NOTES
Chief Complaint   Patient presents with    Cyst     under R clavicle     Abuse Screening Questionnaire 8/14/2017   Do you ever feel afraid of your partner? N   Are you in a relationship with someone who physically or mentally threatens you? N   Is it safe for you to go home?  Shashank Thakur

## 2017-08-14 NOTE — PATIENT INSTRUCTIONS
Use ice to the area for 10 minutes at a time. Tylenol 1-2 twice a day as needed. Avoid pressure to the area of pain.

## 2017-08-14 NOTE — PROGRESS NOTES
Thea Negron is a 76 y.o. female who presents with concern of a cyst under her right clavicle. Onset a few days ago. It is tender. Having some pain with left arm movement that radiates to the anterior chest wall. No known injury    Treated for HTN. Reports BP better at home. Past Medical History:   Diagnosis Date    AIN (acute interstitial nephritis)     Arthritis     CKD (chronic kidney disease) stage 3, GFR 30-59 ml/min     Gastrointestinal disorder     GERD    GERD (gastroesophageal reflux disease)     Glaucoma     High cholesterol     Hypertension     Hypertension     Ill-defined condition     chronic cystitis    Kidney failure 10/14/2016       Family History   Problem Relation Age of Onset    Cancer Father     Cancer Brother        Social History     Social History    Marital status:      Spouse name: N/A    Number of children: N/A    Years of education: N/A     Occupational History    Not on file. Social History Main Topics    Smoking status: Former Smoker     Packs/day: 0.50     Years: 4.00     Quit date: 7/29/1997    Smokeless tobacco: Former User    Alcohol use Yes      Comment: seldom    Drug use: Yes     Special: Prescription, OTC    Sexual activity: No     Other Topics Concern    Not on file     Social History Narrative    ** Merged History Encounter **            Current Outpatient Prescriptions on File Prior to Visit   Medication Sig Dispense Refill    dilTIAZem CD (CARDIZEM CD) 180 mg ER capsule Take 1 Cap by mouth daily. 90 Cap 3    timolol (TIMOPTIC) 0.5 % ophthalmic solution       DOCUSATE CALCIUM (STOOL SOFTENER PO) Take  by mouth. Indications: PRN      ACETAMINOPHEN (TYLENOL PO) Take  by mouth.  famotidine (PEPCID) 20 mg tablet Take 1 Tab by mouth two (2) times a day. Indications: gastroesophageal reflux disease 180 Tab 3    latanoprost (XALATAN) 0.005 % ophthalmic solution Administer 1 Drop to both eyes daily.       Aspirin, Buffered 81 mg tab Take 1 Tab by mouth daily. No current facility-administered medications on file prior to visit. Review of Systems  Pertinent items are noted in HPI. Objective:     Visit Vitals    /82 (BP 1 Location: Left arm, BP Patient Position: Sitting)    Pulse 79    Temp 97.9 °F (36.6 °C) (Oral)    Resp 18    Ht 4' 11\" (1.499 m)    Wt 97 lb 9.6 oz (44.3 kg)    LMP  (LMP Unknown)    SpO2 98%    BMI 19.71 kg/m2     Gen: well appearing female  HEENT:   PERRL,OP no erythema, no exudates, MMM  Neck:  No masses or LAD  Resp:  No wheezing, no rhonchi, no rales. Chest: left sternoclavicular joint with tenderness, soft tissue swelling noted, no bruising. CV:  RRR, normal S1S2       Assessment/Plan:       ICD-10-CM ICD-9-CM    1. Clavicular area fullness R22.2 784.2 XR CHEST PA LAT   2. Chest wall pain R07.89 786.52 XR CHEST PA LAT   Use ice to the area for 10 minutes at a time. Tylenol 1-2 twice a day as needed. Avoid pressure to the area of pain. Follow-up Disposition:  Return for follow up pending xray.     Nancy Talbot MD

## 2017-08-14 NOTE — MR AVS SNAPSHOT
Visit Information Date & Time Provider Department Dept. Phone Encounter #  
 8/14/2017  1:30 PM Veronica Anguiano, [address] [place] [address],[address] [phone] [id number] Follow-up Instructions Return for follow up pending xray. Your Appointments 10/6/2017 10:00 AM  
ROUTINE CARE with MD JENIFER [facility]) Appt Note: 6 month follow up  
 [facility] [phone]  
[address] [facility] [address] [place] [address] Upcoming Health Maintenance Date Due  
 GLAUCOMA SCREENING Q2Y 3/18/2003 OSTEOPOROSIS SCREENING (DEXA) 3/18/2003 Pneumococcal 65+ High/Highest Risk (2 of 2 - PPSV23) 1/1/2012 MEDICARE YEARLY EXAM 9/4/2016 INFLUENZA AGE 9 TO ADULT 8/1/2017 DTaP/Tdap/Td series (2 - Td) 9/4/2025 Allergies as of 8/14/2017  Review Complete On: 8/14/2017 By: Akbar Brown LPN Severity Noted Reaction Type Reactions Aleve [Naproxen Sodium]  07/25/2017    Other (comments) Renal issues Amlodipine  01/23/2017   Side Effect Other (comments) Memory loss, change in odor of food. Ibuprofen  07/06/2017    Unknown (comments) Lisinopril  01/23/2017   Intolerance Other (comments) Renal dysfunction Omeprazole  07/26/2017    Other (comments) Cause Kidney failure Current Immunizations  Never Reviewed Name Date Influenza Vaccine (Quad) PF 10/24/2016 10:25 AM  
 Pneumococcal Vaccine (Unspecified Type) 1/1/2007 Tdap 9/4/2015  1:01 PM  
 Zoster Vaccine, Live 1/1/2013 Not reviewed this visit You Were Diagnosed With   
  
 Codes Comments Clavicular area fullness    -  Primary ICD-10-CM: R22.2 ICD-9-CM: 311. 2 Chest wall pain     ICD-10-CM: R07.89 ICD-9-CM: 786.52 Vitals BP Pulse Temp Resp Height(growth percentile) Weight(growth percentile) 155/82 (BP 1 Location: Left arm, BP Patient Position: Sitting) 79 97.9 °F (36.6 °C) (Oral) 18 4' 11\" (1.499 m) 97 lb 9.6 oz (44.3 kg) LMP SpO2 BMI OB Status Smoking Status (LMP Unknown) 98% 19.71 kg/m2 Hysterectomy Former Smoker Vitals History BMI and BSA Data Body Mass Index Body Surface Area  
 19.71 kg/m 2 1.36 m 2 Preferred Pharmacy Pharmacy Name Phone [id number] [phone] Your Updated Medication List  
  
   
This list is accurate as of: 8/14/17  1:52 PM.  Always use your most recent med list.  
  
  
  
  
 aspirin, buffered 81 mg Tab Take 1 Tab by mouth daily. dilTIAZem  mg ER capsule Commonly known as:  CARDIZEM CD Take 1 Cap by mouth daily. famotidine 20 mg tablet Commonly known as:  PEPCID Take 1 Tab by mouth two (2) times a day. Indications: gastroesophageal reflux disease  
  
 latanoprost 0.005 % ophthalmic solution Commonly known as:  Bonna Billow Administer 1 Drop to both eyes daily. STOOL SOFTENER PO Take  by mouth. Indications: PRN  
  
 timolol 0.5 % ophthalmic solution Commonly known as:  TIMOPTIC  
  
 TYLENOL PO Take  by mouth. Follow-up Instructions Return for follow up pending xray. To-Do List   
 08/14/2017 Imaging:  XR CHEST PA LAT Patient Instructions Use ice to the area for 10 minutes at a time. Tylenol 1-2 twice a day as needed. Avoid pressure to the area of pain. Introducing [facility]! Dear Miya Sanchez: Thank you for requesting a [facility] account. Our records indicate that you already have an active [facility] account. You can access your account anytime at [email or web address] Did you know that you can access your hospital and ER discharge instructions at any time in [facility]? You can also review all of your test results from your hospital stay or ER visit. Additional Information If you have questions, please visit the Frequently Asked Questions section of the [employer] website at [email or web address] Remember, [employer] is NOT to be used for urgent needs. For medical emergencies, dial 911. Now available from your iPhone and Android! Please provide this summary of care documentation to your next provider. Your primary care clinician is listed as Anita Quezada. If you have any questions after today's visit, please call [phone].

## 2017-08-14 NOTE — TELEPHONE ENCOUNTER
Pt has a know on chest that is sore to the touch and would like to go over her chest back & neck xray. She didn't understand the neck xray result on my chart. Please call today as pt needs to know what to do?

## 2017-08-15 ENCOUNTER — TELEPHONE (OUTPATIENT)
Dept: INTERNAL MEDICINE CLINIC | Age: 77
End: 2017-08-15

## 2017-08-15 NOTE — TELEPHONE ENCOUNTER
Advise patient that chest xray is normal.  Recommend using tylenol and ice to the area of pain at the left clavicle. Avoid pressure to the area of pain. If it is not improving by early next week, we will get an ultrasound.

## 2017-08-15 NOTE — TELEPHONE ENCOUNTER
Received call from pt. Pt informed per Dr. Kenyon Callow chest xray is normal: rec ice along w/ tylenol at area of pain (L clavicle); avoid pressure on area of pain. Pt advised to call back early next week if sx persist, then a US will be ordered. Pt verbalized understanding of information discussed w/ no further questions at this time.

## 2017-08-28 ENCOUNTER — TELEPHONE (OUTPATIENT)
Dept: INTERNAL MEDICINE CLINIC | Age: 77
End: 2017-08-28

## 2017-08-28 DIAGNOSIS — R22.2 CLAVICULAR AREA FULLNESS: Primary | ICD-10-CM

## 2017-08-28 NOTE — TELEPHONE ENCOUNTER
Patient states she needs a call back in reference to lump on the back/side of her neck that she was seen for on 8/14/17 & is still having pain & issues. Patient also needs to be seen for leg cramps. Please call to discuss.  Thank you

## 2017-08-30 NOTE — TELEPHONE ENCOUNTER
Spoke with patient. Per Dr. August Campos, The tenderness was at the right sternoclavicular joint, normal chest xray without any mass or bony lesion seen.  I suspect she has some arthritis in that joint. Since it is still bothersome, refer to ENT for further evaluation. Dr. Nancy Teixeira in system.   (Routing comment)     Patient advised and given referral information. Referral, OV notes, and imaging faxed to Dr. Sofia lAanis. Patient will call to schedule her own appointment.

## 2017-10-06 ENCOUNTER — HOSPITAL ENCOUNTER (OUTPATIENT)
Dept: LAB | Age: 77
Discharge: HOME | End: 2017-10-06
Payer: MEDICARE

## 2017-10-06 ENCOUNTER — OFFICE VISIT (OUTPATIENT)
Dept: INTERNAL MEDICINE CLINIC | Age: 77
End: 2017-10-06

## 2017-10-06 VITALS
OXYGEN SATURATION: 99 % | HEIGHT: 59 IN | RESPIRATION RATE: 14 BRPM | SYSTOLIC BLOOD PRESSURE: 139 MMHG | WEIGHT: 98.8 LBS | DIASTOLIC BLOOD PRESSURE: 62 MMHG | BODY MASS INDEX: 19.92 KG/M2 | TEMPERATURE: 97.6 F | HEART RATE: 70 BPM

## 2017-10-06 DIAGNOSIS — E55.9 VITAMIN D DEFICIENCY: ICD-10-CM

## 2017-10-06 DIAGNOSIS — Z00.00 MEDICARE ANNUAL WELLNESS VISIT, SUBSEQUENT: ICD-10-CM

## 2017-10-06 DIAGNOSIS — I10 ESSENTIAL HYPERTENSION: Primary | ICD-10-CM

## 2017-10-06 DIAGNOSIS — N28.9 RENAL INSUFFICIENCY: ICD-10-CM

## 2017-10-06 DIAGNOSIS — M54.50 BILATERAL LOW BACK PAIN WITHOUT SCIATICA, UNSPECIFIED CHRONICITY: ICD-10-CM

## 2017-10-06 DIAGNOSIS — M54.9 UPPER BACK PAIN ON LEFT SIDE: ICD-10-CM

## 2017-10-06 DIAGNOSIS — D64.9 ANEMIA, UNSPECIFIED TYPE: ICD-10-CM

## 2017-10-06 PROCEDURE — 82306 VITAMIN D 25 HYDROXY: CPT

## 2017-10-06 PROCEDURE — 81001 URINALYSIS AUTO W/SCOPE: CPT

## 2017-10-06 PROCEDURE — 85027 COMPLETE CBC AUTOMATED: CPT

## 2017-10-06 PROCEDURE — 80053 COMPREHEN METABOLIC PANEL: CPT

## 2017-10-06 PROCEDURE — 36415 COLL VENOUS BLD VENIPUNCTURE: CPT

## 2017-10-06 NOTE — PROGRESS NOTES
Problem Focused Progress Note    Name: Willis Blanco Date: 10/6/2017  Ethnicity: NON-  Race: WHITE OR   MRN: [id number]  Age: 76 y.o.  : 1940  Sex: Female       HPI:   Nabil Dacosta is a 76y.o. year old female who presents today for follow up on medications. BP controlled. BP at home usually 130-140's. No dizziness or blurred vision. Occasional frontal headaches. Eye exam 3 months ago. Has glaucoma, seen every 6 months. 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 No sx with exertion. Sees Dr. Bella Regalado, nephrology, last in February. appt next week. Renal insufficiency. creatinine 1.1 in 2017. Taking only tylenol for pain, no NSAIDS. Drinking water.       Lower back pain this week. Some upper back pain on left. Side sleeper. Neck pain is better with changing pillow. No neuro sx in legs. Taking tylenol and used heat. Seen in ED on  with acute gout, took steroids and resolved. Taking pepcid for GERD,  controlled. Chief Complaint   Patient presents with    Hypertension     6 month follow up    Annual Wellness Visit     Pt fasting   . Review of Systems   A comprehensive review of systems was negative except for that written in the HPI. Physical Examination     Visit Vitals    /62 (BP 1 Location: Left arm, BP Patient Position: Sitting)    Pulse 70    Temp 97.6 °F (36.4 °C) (Oral)    Resp 14    Ht 4' 11\" (1.499 m)    Wt 98 lb 12.8 oz (44.8 kg)    SpO2 99%    BMI 19.96 kg/m2     Gen: well appearing female  HEENT:   PERRL,normal conjunctiva. External ear and canals normal, TMs no opacification or erythema,  OP no erythema, no exudates, MMM  Neck:  Supple. Thyroid normal size, nontender, without nodules. No masses or LAD  Resp:  No wheezing, no rhonchi, no rales. CV:  RRR, normal S1S2, no murmur. GI: soft, nontender, without masses. No hepatosplenomegaly. Extrem:  +2 pulses, no edema, warm distally    Assessment/Plan     1. Medicare annual wellness visit, subsequent- REVIEWED MEDICARE WELLNESS    2. Essential hypertension- Recommend following a lower sodium diet and regular cardiovascular exercise. Blood pressure goal is less than 130/85 on average. Advised compliance with blood pressure medication and regular follow up. - METABOLIC PANEL, COMPREHENSIVE  - CBC W/O DIFF  - URINALYSIS W/MICROSCOPIC    3. Anemia, unspecified type      4. Renal insufficiency    - METABOLIC PANEL, COMPREHENSIVE  - URINALYSIS W/MICROSCOPIC    5. Upper back pain on left side- use heat, given stretches. Tylenol prn    6.  Bilateral low back pain without sciatica, unspecified chronicity- given stretches, tylenol prn.     7. Vitamin D deficiency    - VITAMIN D, 25 HYDROXY    Aletha Madison MD  10/6/2017  10:04 AM

## 2017-10-06 NOTE — PROGRESS NOTES
This is a Subsequent Medicare Annual Wellness Exam (AWV) (Performed 12 months after IPPE or effective date of Medicare Part B enrollment, Once in a lifetime)    I have reviewed the patient's medical history in detail and updated the computerized patient record. History     Past Medical History:   Diagnosis Date    AIN (acute interstitial nephritis)     Arthritis     CKD (chronic kidney disease) stage 3, GFR 30-59 ml/min     Gastrointestinal disorder     GERD    GERD (gastroesophageal reflux disease)     Glaucoma     High cholesterol     Hypertension     Hypertension     Ill-defined condition     chronic cystitis    Kidney failure 10/14/2016      Past Surgical History:   Procedure Laterality Date    HX GYN          HX GYN      HX HYSTERECTOMY      HX TONSILLECTOMY      HX UROLOGICAL      cauterization of bladder ulcers     Current Outpatient Prescriptions   Medication Sig Dispense Refill    dilTIAZem CD (CARDIZEM CD) 180 mg ER capsule Take 1 Cap by mouth daily. 90 Cap 3    timolol (TIMOPTIC) 0.5 % ophthalmic solution       ACETAMINOPHEN (TYLENOL PO) Take  by mouth.  famotidine (PEPCID) 20 mg tablet Take 1 Tab by mouth two (2) times a day. Indications: gastroesophageal reflux disease 180 Tab 3    latanoprost (XALATAN) 0.005 % ophthalmic solution Administer 1 Drop to both eyes daily.  Aspirin, Buffered 81 mg tab Take 1 Tab by mouth daily.  DOCUSATE CALCIUM (STOOL SOFTENER PO) Take  by mouth. Indications: PRN       Allergies   Allergen Reactions    Aleve [Naproxen Sodium] Other (comments)     Renal issues    Amlodipine Other (comments)     Memory loss, change in odor of food.      Ibuprofen Unknown (comments)    Lisinopril Other (comments)     Renal dysfunction    Omeprazole Other (comments)     Cause Kidney failure     Family History   Problem Relation Age of Onset    Cancer Father     Cancer Brother      Social History   Substance Use Topics    Smoking status: Former Smoker     Packs/day: 0.50     Years: 4.00     Quit date: 7/29/1997    Smokeless tobacco: Former User    Alcohol use Yes      Comment: seldom     Patient Active Problem List   Diagnosis Code    Hyperlipidemia E78.5    Glaucoma H40.9    Gastroesophageal reflux disease without esophagitis K21.9    Osteopenia M85.80    Family history of colon cancer Z80.0    Anemia D64.9    Essential hypertension I10    ARF (acute renal failure) ([facility] [address] N17.9    Chronic interstitial cystitis N30.10    Diarrhea R19.7    Acute cystitis without hematuria N30.00    Interstitial nephritis N12    Hypercalcemia E83.52       Depression Risk Factor Screening:     PHQ over the last two weeks 10/6/2017   PHQ Not Done -   Little interest or pleasure in doing things Not at all   Feeling down, depressed or hopeless Not at all   Total Score PHQ 2 0     Alcohol Risk Factor Screening: You do not drink alcohol or very rarely. Functional Ability and Level of Safety:   Hearing Loss  Hearing is good. Activities of Daily Living  The home contains: no safety equipment. Patient does total self care    Fall Risk  Fall Risk Assessment, last 12 mths 10/6/2017   Able to walk? Yes   Fall in past 12 months?  No   Fall with injury? -   Number of falls in past 12 months -   Fall Risk Score -       Abuse Screen  Patient is not abused    Cognitive Screening   Evaluation of Cognitive Function:  Has your family/caregiver stated any concerns about your memory: no  Normal    Patient Care Team   Patient Care Team:  Yunior Fonseca MD as PCP - General (Internal Medicine)  Bessy Roberts MD as Consulting Provider (Cardiology)  Melisa Torres MD as Consulting Provider (Urology)  Raul Bedoya MD as Consulting Provider (Ophthalmology)  Kristin Olson MD as Consulting Provider (Nephrology)    Assessment/Plan   Education and counseling provided:  Are appropriate based on today's review and evaluation    Diagnoses and all orders for this visit:    1.  Medicare annual wellness visit, subsequent      Health Maintenance Due   Topic Date Due    GLAUCOMA SCREENING Q2Y  03/18/2003    OSTEOPOROSIS SCREENING (DEXA)  03/18/2003    Pneumococcal 65+ High/Highest Risk (2 of 2 - PPSV23) 01/01/2012    MEDICARE YEARLY EXAM  09/04/2016    INFLUENZA AGE 9 TO ADULT  08/01/2017

## 2017-10-06 NOTE — MR AVS SNAPSHOT
Visit Information Date & Time Provider Department Dept. Phone Encounter #  
 10/6/2017 10:00 AM Nallely Jonas, [address] [phone] [id number] Follow-up Instructions Return for follow up pending labs and 6 months. Sebastianmike Soto Upcoming Health Maintenance Date Due  
 GLAUCOMA SCREENING Q2Y 3/18/2003 OSTEOPOROSIS SCREENING (DEXA) 3/18/2003 Pneumococcal 65+ High/Highest Risk (2 of 2 - PPSV23) 1/1/2012 MEDICARE YEARLY EXAM 9/4/2016 INFLUENZA AGE 9 TO ADULT 8/1/2017 DTaP/Tdap/Td series (2 - Td) 9/4/2025 Allergies as of 10/6/2017  Review Complete On: 10/6/2017 By: Katherine Anglin LPN Severity Noted Reaction Type Reactions Aleve [Naproxen Sodium]  07/25/2017    Other (comments) Renal issues Amlodipine  01/23/2017   Side Effect Other (comments) Memory loss, change in odor of food. Ibuprofen  07/06/2017    Unknown (comments) Lisinopril  01/23/2017   Intolerance Other (comments) Renal dysfunction Omeprazole  07/26/2017    Other (comments) Cause Kidney failure Current Immunizations  Never Reviewed Name Date Influenza Vaccine (Quad) PF 10/24/2016 10:25 AM  
 Pneumococcal Vaccine (Unspecified Type) 1/1/2007 Tdap 9/4/2015  1:01 PM  
 Zoster Vaccine, Live 1/1/2013 Not reviewed this visit You Were Diagnosed With   
  
 Codes Comments Essential hypertension    -  Primary ICD-10-CM: I10 
ICD-9-CM: 401.9 Medicare annual wellness visit, subsequent     ICD-10-CM: Z00.00 ICD-9-CM: V70.0 Anemia, unspecified type     ICD-10-CM: D64.9 ICD-9-CM: 285.9 Renal insufficiency     ICD-10-CM: N28.9 ICD-9-CM: 593.9 Upper back pain on left side     ICD-10-CM: M54.9 ICD-9-CM: 724.5 Bilateral low back pain without sciatica, unspecified chronicity     ICD-10-CM: M54.5 ICD-9-CM: 724.2 Vitamin D deficiency     ICD-10-CM: E55.9 ICD-9-CM: 268.9 Vitals BP Pulse Temp Resp Height(growth percentile) Weight(growth percentile) 139/62 (BP 1 Location: Left arm, BP Patient Position: Sitting) 70 97.6 °F (36.4 °C) (Oral) 14 4' 11\" (1.499 m) 98 lb 12.8 oz (44.8 kg) LMP SpO2 BMI OB Status Smoking Status (LMP Unknown) 99% 19.96 kg/m2 Hysterectomy Former Smoker BMI and BSA Data Body Mass Index Body Surface Area  
 19.96 kg/m 2 1.37 m 2 Preferred Pharmacy Pharmacy Name Phone [id number] [phone] Your Updated Medication List  
  
   
This list is accurate as of: 10/6/17 10:31 AM.  Always use your most recent med list.  
  
  
  
  
 aspirin, buffered 81 mg Tab Take 1 Tab by mouth daily. dilTIAZem  mg ER capsule Commonly known as:  CARDIZEM CD Take 1 Cap by mouth daily. famotidine 20 mg tablet Commonly known as:  PEPCID Take 1 Tab by mouth two (2) times a day. Indications: gastroesophageal reflux disease  
  
 latanoprost 0.005 % ophthalmic solution Commonly known as:  Ehsan Machado Administer 1 Drop to both eyes daily. STOOL SOFTENER PO Take  by mouth. Indications: PRN  
  
 timolol 0.5 % ophthalmic solution Commonly known as:  TIMOPTIC  
  
 TYLENOL PO Take  by mouth. We Performed the Following CBC W/O DIFF [33315 CPT(R)] METABOLIC PANEL, COMPREHENSIVE [71273 CPT(R)] URINALYSIS W/MICROSCOPIC [03132 CPT(R)] VITAMIN D, 25 HYDROXY [id number] CPT(R)] Follow-up Instructions Return for follow up pending labs and 6 months. .  
  
  
Patient Instructions Medicare Wellness Visit, Female The best way to live healthy is to have a healthy lifestyle by eating a well-balanced diet, exercising regularly, limiting alcohol and stopping smoking. Regular physical exams and screening tests are another way to keep healthy.  Preventive exams provided by your health care provider can find health problems before they become diseases or illnesses. Preventive services including immunizations, screening tests, monitoring and exams can help you take care of your own health. All people over age 72 should have a pneumovax  and and a prevnar shot to prevent pneumonia. These are once in a lifetime unless you and your provider decide differently. All people over 65 should have a yearly flu shot and a tetanus vaccine every 10 years. A bone mass density to screen for osteoporosis or thinning of the bones should be done every 2 years after 65. Screening for diabetes mellitus with a blood sugar test should be done every year. Glaucoma is a disease of the eye due to increased ocular pressure that can lead to blindness and it should be done every year by an eye professional. 
 
Cardiovascular screening tests that check for elevated lipids (fatty part of blood) which can lead to heart disease and strokes should be done every 5 years. Colorectal screening that evaluates for blood or polyps in your colon should be done yearly as a stool test or every five years as a flexible sigmoidoscope or every 10 years as a colonoscopy up to age 76. Breast cancer screening with a mammogram is recommended biennially  for women age 54-69. Screening for cervical cancer with a pap smear and pelvic exam is recommended for women after age 72 years every 2 years up to age 79 or when the provider and patient decide to stop. If there is a history of cervical abnormalities or other increased risk for cancer then the test is recommended yearly. Hepatitis C screening is also recommended for anyone born between [birth year] through [address]. A shingles vaccine is also recommended once in a lifetime after age 61. Your Medicare Wellness Exam is recommended annually. Here is a list of your current Health Maintenance items with a due date: 
Health Maintenance Due Topic Date Due  Glaucoma Screening   03/18/2003  Bone Density Screening  03/18/2003  Pneumococcal Vaccine (2 of 2 - PPSV23) 01/01/2012 [address] [place] [address] Annual Well Visit  09/04/2016  Flu Vaccine  08/01/2017 Healthy Upper Back: Exercises Your Care Instructions Here are some examples of exercises for your upper back. Start each exercise slowly. Ease off the exercise if you start to have pain. Your doctor or physical therapist will tell you when you can start these exercises and which ones will work best for you. How to do the exercises Lower neck and upper back stretch 1. Stretch your arms out in front of your body. Clasp one hand on top of your other hand. 2. Gently reach out so that you feel your shoulder blades stretching away from each other. 3. Gently bend your head forward. 4. Hold for 15 to 30 seconds. 5. Repeat 2 to 4 times. Midback stretch Note: If you have knee pain, do not do this exercise. 1. Kneel on the floor, and sit back on your ankles. 2. Lean forward, place your hands on the floor, and stretch your arms out in front of you. Rest your head between your arms. 3. Gently push your chest toward the floor, reaching as far in front of you as possible. 4. Hold for 15 to 30 seconds. 5. Repeat 2 to 4 times. Shoulder rolls 1. Sit comfortably with your feet shoulder-width apart. You can also do this exercise while standing. 2. Roll your shoulders up, then back, and then down in a smooth, circular motion. 3. Repeat 2 to 4 times. Wall push-up 1. Stand against a wall with your feet about 12 to 24 inches back from the wall. If you feel any pain when you do this exercise, stand closer to the wall. 2. Place your hands on the wall slightly wider apart than your shoulders, and lean forward. 3. Gently lean your body toward the wall. Then push back to your starting position. Keep the motion smooth and controlled. 4. Repeat 8 to 12 times. Resisted shoulder blade squeeze Note: For this exercise, you will need elastic exercise material, such as surgical tubing or Thera-Band. 1. Sit or stand, holding the band in both hands in front of you. Keep your elbows close to your sides, bent at a 90-degree angle. Your palms should face up. 2. Squeeze your shoulder blades together, and move your arms to the outside, stretching the band. Be sure to keep your elbows at your sides while you do this. 3. Relax. 4. Repeat 8 to 12 times. Resisted rows Note: For this exercise, you will need elastic exercise material, such as surgical tubing or Thera-Band. 1. Put the band around a solid object, such as a bedpost, at about waist level. Hold one end of the band in each hand. 2. With your elbows at your sides and bent to 90 degrees, pull the band back to move your shoulder blades toward each other. Return to the starting position. 3. Repeat 8 to 12 times. Follow-up care is a key part of your treatment and safety. Be sure to make and go to all appointments, and call your doctor if you are having problems. It's also a good idea to know your test results and keep a list of the medicines you take. Where can you learn more? Go to http://roel-lonnie.info/. Enter F837 in the search box to learn more about \"Healthy Upper Back: Exercises. \" Current as of: March 21, 2017 Content Version: 11.3 © 9207-8040 cityguru. Care instructions adapted under license by [employer] (which disclaims liability or warranty for this information). If you have questions about a medical condition or this instruction, always ask your healthcare professional. [place] [address] any warranty or liability for your use of this information. Low Back Pain: Exercises Your Care Instructions Here are some examples of typical rehabilitation exercises for your condition. Start each exercise slowly. Ease off the exercise if you start to have pain. Your doctor or physical therapist will tell you when you can start these exercises and which ones will work best for you. How to do the exercises Press-up 6. Lie on your stomach, supporting your body with your forearms. 7. Press your elbows down into the floor to raise your upper back. As you do this, relax your stomach muscles and allow your back to arch without using your back muscles. As your press up, do not let your hips or pelvis come off the floor. 8. Hold for 15 to 30 seconds, then relax. 9. Repeat 2 to 4 times. Alternate arm and leg (bird dog) exercise Note: Do this exercise slowly. Try to keep your body straight at all times, and do not let one hip drop lower than the other. 6. Start on the floor, on your hands and knees. 7. Tighten your belly muscles. 8. Raise one leg off the floor, and hold it straight out behind you. Be careful not to let your hip drop down, because that will twist your trunk. 9. Hold for about 6 seconds, then lower your leg and switch to the other leg. 10. Repeat 8 to 12 times on each leg. 11. Over time, work up to holding for 10 to 30 seconds each time. 12. If you feel stable and secure with your leg raised, try raising the opposite arm straight out in front of you at the same time. Knee-to-chest exercise 4. Lie on your back with your knees bent and your feet flat on the floor. 5. Bring one knee to your chest, keeping the other foot flat on the floor (or keeping the other leg straight, whichever feels better on your lower back). 6. Keep your lower back pressed to the floor. Hold for at least 15 to 30 seconds. 7. Relax, and lower the knee to the starting position. 8. Repeat with the other leg. Repeat 2 to 4 times with each leg. 9. To get more stretch, put your other leg flat on the floor while pulling your knee to your chest. 
Curl-ups 5.  Lie on the floor on your back with your knees bent at a 90-degree angle. Your feet should be flat on the floor, about 12 inches from your buttocks. 6. Cross your arms over your chest. If this bothers your neck, try putting your hands behind your neck (not your head), with your elbows spread apart. 7. Slowly tighten your belly muscles and raise your shoulder blades off the floor. 8. Keep your head in line with your body, and do not press your chin to your chest. 
9. Hold this position for 1 or 2 seconds, then slowly lower yourself back down to the floor. 10. Repeat 8 to 12 times. Pelvic tilt exercise 5. Lie on your back with your knees bent. 6. \"Brace\" your stomach. This means to tighten your muscles by pulling in and imagining your belly button moving toward your spine. You should feel like your back is pressing to the floor and your hips and pelvis are rocking back. 7. Hold for about 6 seconds while you breathe smoothly. 8. Repeat 8 to 12 times. Heel dig bridging 4. Lie on your back with both knees bent and your ankles bent so that only your heels are digging into the floor. Your knees should be bent about 90 degrees. 5. Then push your heels into the floor, squeeze your buttocks, and lift your hips off the floor until your shoulders, hips, and knees are all in a straight line. 6. Hold for about 6 seconds as you continue to breathe normally, and then slowly lower your hips back down to the floor and rest for up to 10 seconds. 7. Do 8 to 12 repetitions. Hamstring stretch in doorway 1. Lie on your back in a doorway, with one leg through the open door. 2. Slide your leg up the wall to straighten your knee. You should feel a gentle stretch down the back of your leg. 3. Hold the stretch for at least 15 to 30 seconds. Do not arch your back, point your toes, or bend either knee. Keep one heel touching the floor and the other heel touching the wall. 4. Repeat with your other leg. 5. Do 2 to 4 times for each leg. Hip flexor stretch 1. Kneel on the floor with one knee bent and one leg behind you. Place your forward knee over your foot. Keep your other knee touching the floor. 2. Slowly push your hips forward until you feel a stretch in the upper thigh of your rear leg. 3. Hold the stretch for at least 15 to 30 seconds. Repeat with your other leg. 4. Do 2 to 4 times on each side. Wall sit 1. Stand with your back 10 to 12 inches away from a wall. 2. Lean into the wall until your back is flat against it. 3. Slowly slide down until your knees are slightly bent, pressing your lower back into the wall. 4. Hold for about 6 seconds, then slide back up the wall. 5. Repeat 8 to 12 times. Follow-up care is a key part of your treatment and safety. Be sure to make and go to all appointments, and call your doctor if you are having problems. It's also a good idea to know your test results and keep a list of the medicines you take. Where can you learn more? Go to http://roel-lonnie.info/. Enter D559 in the search box to learn more about \"Low Back Pain: Exercises. \" Current as of: March 21, 2017 Content Version: 11.3 © 0615-8856 [employer], [employer]. Care instructions adapted under license by [employer]Talk (which disclaims liability or warranty for this information). If you have questions about a medical condition or this instruction, always ask your healthcare professional. [place] [address] any warranty or liability for your use of this information. Introducing [facility]! Dear Radha Douglas: Thank you for requesting a Miappi account. Our records indicate that you already have an active Miappi account. You can access your account anytime at [email or web address] by G[employer]. [employer]/Refinder by [employer] Did you know that you can access your hospital and ER discharge instructions at any time in Miappi? You can also review all of your test results from your hospital stay or ER visit. Additional Information If you have questions, please visit the Frequently Asked Questions section of the [employer]att website at [email or web address] Remember, [employer] is NOT to be used for urgent needs. For medical emergencies, dial 911. Now available from your iPhone and Android! Please provide this summary of care documentation to your next provider. Your primary care clinician is listed as Aneta Calix. If you have any questions after today's visit, please call [phone].

## 2017-10-06 NOTE — PATIENT INSTRUCTIONS
Medicare Wellness Visit, Female    The best way to live healthy is to have a healthy lifestyle by eating a well-balanced diet, exercising regularly, limiting alcohol and stopping smoking. Regular physical exams and screening tests are another way to keep healthy. Preventive exams provided by your health care provider can find health problems before they become diseases or illnesses. Preventive services including immunizations, screening tests, monitoring and exams can help you take care of your own health. All people over age 72 should have a pneumovax  and and a prevnar shot to prevent pneumonia. These are once in a lifetime unless you and your provider decide differently. All people over 65 should have a yearly flu shot and a tetanus vaccine every 10 years. A bone mass density to screen for osteoporosis or thinning of the bones should be done every 2 years after 65. Screening for diabetes mellitus with a blood sugar test should be done every year. Glaucoma is a disease of the eye due to increased ocular pressure that can lead to blindness and it should be done every year by an eye professional.    Cardiovascular screening tests that check for elevated lipids (fatty part of blood) which can lead to heart disease and strokes should be done every 5 years. Colorectal screening that evaluates for blood or polyps in your colon should be done yearly as a stool test or every five years as a flexible sigmoidoscope or every 10 years as a colonoscopy up to age 76. Breast cancer screening with a mammogram is recommended biennially  for women age 54-69. Screening for cervical cancer with a pap smear and pelvic exam is recommended for women after age 72 years every 2 years up to age 79 or when the provider and patient decide to stop. If there is a history of cervical abnormalities or other increased risk for cancer then the test is recommended yearly.     Hepatitis C screening is also recommended for anyone born between [birth year] through [address]. A shingles vaccine is also recommended once in a lifetime after age 61. Your Medicare Wellness Exam is recommended annually. Here is a list of your current Health Maintenance items with a due date:  Health Maintenance Due   Topic Date Due    Glaucoma Screening   03/18/2003    Bone Density Screening  03/18/2003    Pneumococcal Vaccine (2 of 2 - PPSV23) 01/01/2012    Annual Well Visit  09/04/2016    Flu Vaccine  08/01/2017            Healthy Upper Back: Exercises  Your Care Instructions  Here are some examples of exercises for your upper back. Start each exercise slowly. Ease off the exercise if you start to have pain. Your doctor or physical therapist will tell you when you can start these exercises and which ones will work best for you. How to do the exercises  Lower neck and upper back stretch    1. Stretch your arms out in front of your body. Clasp one hand on top of your other hand. 2. Gently reach out so that you feel your shoulder blades stretching away from each other. 3. Gently bend your head forward. 4. Hold for 15 to 30 seconds. 5. Repeat 2 to 4 times. Midback stretch    Note: If you have knee pain, do not do this exercise. 1. Kneel on the floor, and sit back on your ankles. 2. Lean forward, place your hands on the floor, and stretch your arms out in front of you. Rest your head between your arms. 3. Gently push your chest toward the floor, reaching as far in front of you as possible. 4. Hold for 15 to 30 seconds. 5. Repeat 2 to 4 times. Shoulder rolls    1. Sit comfortably with your feet shoulder-width apart. You can also do this exercise while standing. 2. Roll your shoulders up, then back, and then down in a smooth, circular motion. 3. Repeat 2 to 4 times. Wall push-up    1. Stand against a wall with your feet about 12 to 24 inches back from the wall. If you feel any pain when you do this exercise, stand closer to the wall.   2. Place your hands on the wall slightly wider apart than your shoulders, and lean forward. 3. Gently lean your body toward the wall. Then push back to your starting position. Keep the motion smooth and controlled. 4. Repeat 8 to 12 times. Resisted shoulder blade squeeze    Note: For this exercise, you will need elastic exercise material, such as surgical tubing or Thera-Band. 1. Sit or stand, holding the band in both hands in front of you. Keep your elbows close to your sides, bent at a 90-degree angle. Your palms should face up. 2. Squeeze your shoulder blades together, and move your arms to the outside, stretching the band. Be sure to keep your elbows at your sides while you do this. 3. Relax. 4. Repeat 8 to 12 times. Resisted rows    Note: For this exercise, you will need elastic exercise material, such as surgical tubing or Thera-Band. 1. Put the band around a solid object, such as a bedpost, at about waist level. Hold one end of the band in each hand. 2. With your elbows at your sides and bent to 90 degrees, pull the band back to move your shoulder blades toward each other. Return to the starting position. 3. Repeat 8 to 12 times. Follow-up care is a key part of your treatment and safety. Be sure to make and go to all appointments, and call your doctor if you are having problems. It's also a good idea to know your test results and keep a list of the medicines you take. Where can you learn more? Go to http://roel-lonnie.info/. Enter Z007 in the search box to learn more about \"Healthy Upper Back: Exercises. \"  Current as of: March 21, 2017  Content Version: 11.3  © 9569-8529 [employer]. Care instructions adapted under license by [employer] (which disclaims liability or warranty for this information).  If you have questions about a medical condition or this instruction, always ask your healthcare professional. Kishan Ying disclaims any warranty or liability for your use of this information. Low Back Pain: Exercises  Your Care Instructions  Here are some examples of typical rehabilitation exercises for your condition. Start each exercise slowly. Ease off the exercise if you start to have pain. Your doctor or physical therapist will tell you when you can start these exercises and which ones will work best for you. How to do the exercises  Press-up    6. Lie on your stomach, supporting your body with your forearms. 7. Press your elbows down into the floor to raise your upper back. As you do this, relax your stomach muscles and allow your back to arch without using your back muscles. As your press up, do not let your hips or pelvis come off the floor. 8. Hold for 15 to 30 seconds, then relax. 9. Repeat 2 to 4 times. Alternate arm and leg (bird dog) exercise    Note: Do this exercise slowly. Try to keep your body straight at all times, and do not let one hip drop lower than the other. 6. Start on the floor, on your hands and knees. 7. Tighten your belly muscles. 8. Raise one leg off the floor, and hold it straight out behind you. Be careful not to let your hip drop down, because that will twist your trunk. 9. Hold for about 6 seconds, then lower your leg and switch to the other leg. 10. Repeat 8 to 12 times on each leg. 11. Over time, work up to holding for 10 to 30 seconds each time. 12. If you feel stable and secure with your leg raised, try raising the opposite arm straight out in front of you at the same time. Knee-to-chest exercise    4. Lie on your back with your knees bent and your feet flat on the floor. 5. Bring one knee to your chest, keeping the other foot flat on the floor (or keeping the other leg straight, whichever feels better on your lower back). 6. Keep your lower back pressed to the floor. Hold for at least 15 to 30 seconds. 7. Relax, and lower the knee to the starting position. 8. Repeat with the other leg.  Repeat 2 to 4 times with each leg. 9. To get more stretch, put your other leg flat on the floor while pulling your knee to your chest.  Curl-ups    5. Lie on the floor on your back with your knees bent at a 90-degree angle. Your feet should be flat on the floor, about 12 inches from your buttocks. 6. Cross your arms over your chest. If this bothers your neck, try putting your hands behind your neck (not your head), with your elbows spread apart. 7. Slowly tighten your belly muscles and raise your shoulder blades off the floor. 8. Keep your head in line with your body, and do not press your chin to your chest.  9. Hold this position for 1 or 2 seconds, then slowly lower yourself back down to the floor. 10. Repeat 8 to 12 times. Pelvic tilt exercise    5. Lie on your back with your knees bent. 6. \"Brace\" your stomach. This means to tighten your muscles by pulling in and imagining your belly button moving toward your spine. You should feel like your back is pressing to the floor and your hips and pelvis are rocking back. 7. Hold for about 6 seconds while you breathe smoothly. 8. Repeat 8 to 12 times. Heel dig bridging    4. Lie on your back with both knees bent and your ankles bent so that only your heels are digging into the floor. Your knees should be bent about 90 degrees. 5. Then push your heels into the floor, squeeze your buttocks, and lift your hips off the floor until your shoulders, hips, and knees are all in a straight line. 6. Hold for about 6 seconds as you continue to breathe normally, and then slowly lower your hips back down to the floor and rest for up to 10 seconds. 7. Do 8 to 12 repetitions. Hamstring stretch in doorway    1. Lie on your back in a doorway, with one leg through the open door. 2. Slide your leg up the wall to straighten your knee. You should feel a gentle stretch down the back of your leg. 3. Hold the stretch for at least 15 to 30 seconds.  Do not arch your back, point your toes, or bend either knee. Keep one heel touching the floor and the other heel touching the wall. 4. Repeat with your other leg. 5. Do 2 to 4 times for each leg. Hip flexor stretch    1. Kneel on the floor with one knee bent and one leg behind you. Place your forward knee over your foot. Keep your other knee touching the floor. 2. Slowly push your hips forward until you feel a stretch in the upper thigh of your rear leg. 3. Hold the stretch for at least 15 to 30 seconds. Repeat with your other leg. 4. Do 2 to 4 times on each side. Wall sit    1. Stand with your back 10 to 12 inches away from a wall. 2. Lean into the wall until your back is flat against it. 3. Slowly slide down until your knees are slightly bent, pressing your lower back into the wall. 4. Hold for about 6 seconds, then slide back up the wall. 5. Repeat 8 to 12 times. Follow-up care is a key part of your treatment and safety. Be sure to make and go to all appointments, and call your doctor if you are having problems. It's also a good idea to know your test results and keep a list of the medicines you take. Where can you learn more? Go to http://roel-lonnie.info/. Enter E362 in the search box to learn more about \"Low Back Pain: Exercises. \"  Current as of: March 21, 2017  Content Version: 11.3  © 3424-4255 [employer], [email or web address]. Care instructions adapted under license by [employer] (which disclaims liability or warranty for this information). If you have questions about a medical condition or this instruction, always ask your healthcare professional. [place] [address] any warranty or liability for your use of this information.

## 2017-10-07 LAB
25(OH)D3+25(OH)D2 SERPL-MCNC: 35.4 NG/ML (ref 30–100)
ALBUMIN SERPL-MCNC: 4.1 G/DL (ref 3.5–4.8)
ALBUMIN/GLOB SERPL: 1.4 {RATIO} (ref 1.2–2.2)
ALP SERPL-CCNC: 91 IU/L (ref 39–117)
ALT SERPL-CCNC: 7 IU/L (ref 0–32)
APPEARANCE UR: CLEAR
AST SERPL-CCNC: 12 IU/L (ref 0–40)
BACTERIA #/AREA URNS HPF: ABNORMAL /[HPF]
BILIRUB SERPL-MCNC: <0.2 MG/DL (ref 0–1.2)
BILIRUB UR QL STRIP: NEGATIVE
BUN SERPL-MCNC: 23 MG/DL (ref 8–27)
BUN/CREAT SERPL: 23 (ref 12–28)
CALCIUM SERPL-MCNC: 9.7 MG/DL (ref 8.7–10.3)
CASTS URNS QL MICRO: ABNORMAL /LPF
CHLORIDE SERPL-SCNC: 104 MMOL/L (ref 96–106)
CO2 SERPL-SCNC: 21 MMOL/L (ref 18–29)
COLOR UR: YELLOW
CREAT SERPL-MCNC: 0.99 MG/DL (ref 0.57–1)
EPI CELLS #/AREA URNS HPF: ABNORMAL /HPF
ERYTHROCYTE [DISTWIDTH] IN BLOOD BY AUTOMATED COUNT: 17.1 % (ref 12.3–15.4)
GLOBULIN SER CALC-MCNC: 3 G/DL (ref 1.5–4.5)
GLUCOSE SERPL-MCNC: 95 MG/DL (ref 65–99)
GLUCOSE UR QL: NEGATIVE
HCT VFR BLD AUTO: 33.2 % (ref 34–46.6)
HGB BLD-MCNC: 10.6 G/DL (ref 11.1–15.9)
HGB UR QL STRIP: ABNORMAL
KETONES UR QL STRIP: NEGATIVE
LEUKOCYTE ESTERASE UR QL STRIP: ABNORMAL
MCH RBC QN AUTO: 27.2 PG (ref 26.6–33)
MCHC RBC AUTO-ENTMCNC: 31.9 G/DL (ref 31.5–35.7)
MCV RBC AUTO: 85 FL (ref 79–97)
MICRO URNS: ABNORMAL
MUCOUS THREADS URNS QL MICRO: PRESENT
NITRITE UR QL STRIP: NEGATIVE
PH UR STRIP: 6.5 [PH] (ref 5–7.5)
PLATELET # BLD AUTO: 413 X10E3/UL (ref 150–379)
POTASSIUM SERPL-SCNC: 4.8 MMOL/L (ref 3.5–5.2)
PROT SERPL-MCNC: 7.1 G/DL (ref 6–8.5)
PROT UR QL STRIP: ABNORMAL
RBC # BLD AUTO: 3.89 X10E6/UL (ref 3.77–5.28)
RBC #/AREA URNS HPF: ABNORMAL /HPF
SODIUM SERPL-SCNC: 142 MMOL/L (ref 134–144)
SP GR UR: 1.02 (ref 1–1.03)
UROBILINOGEN UR STRIP-MCNC: 0.2 MG/DL (ref 0.2–1)
WBC # BLD AUTO: 10.2 X10E3/UL (ref 3.4–10.8)
WBC #/AREA URNS HPF: >30 /HPF

## 2017-10-08 NOTE — PROGRESS NOTES
Notify patient. Kidney function is normal. She is anemic but this is unchanged, stable. Other labs fine. Follow up in 6 months.

## 2017-10-09 NOTE — PROGRESS NOTES
Called, spoke to pt. Two pt identifiers confirmed. Pt informed per Dr. Bonnie Arroyo function is normal. She is anemic but this is unchanged, stable. Other labs fine. Follow up in 6 months. Pt verbalized understanding of information discussed w/ no further questions at this time.

## 2017-10-09 NOTE — PROGRESS NOTES
Verified two patient identifiers, name and . Robbie Chaparro provided with lab results.  No questions at this time

## 2017-11-13 ENCOUNTER — PATIENT MESSAGE (OUTPATIENT)
Dept: INTERNAL MEDICINE CLINIC | Age: 77
End: 2017-11-13

## 2017-11-13 RX ORDER — AMLODIPINE BESYLATE 5 MG/1
5 TABLET ORAL DAILY
Qty: 30 TAB | Refills: 3 | Status: SHIPPED | OUTPATIENT
Start: 2017-11-13 | End: 2017-11-28 | Stop reason: ALTCHOICE

## 2017-11-13 NOTE — TELEPHONE ENCOUNTER
----- Message from Ashley Kennedy LPN sent at 37/18/3474 11:41 AM EST -----  Regarding: FW: Prescription Question  Contact: [phone]      ----- Message -----     From: Jessica Quinones     Sent: 11/13/2017   8:30 AM       To: [facility] Nurse Pool  Subject: Prescription Question                            Dr. Gray Lowers:    I am presently taking Diltiazen (180 mg) for blood pressure. You told me this probably would firm up my stools. For a long time now, they are too firm. I have to take Miralax at least two or three times a week to be able to pass. They get hard balls. Before this I was taking Amlodipine and thought this was causing confusion. This was during the time that my calcium was very high and was hospitalized to get it down. I have read the side effects of Amlodipine and do not see this. Do  you think it would be a problem with anything else to change back to Amlodipine? Is this safe to take with my kidney problems. I think this is what Dr. Morena Giang had previously prescribed.

## 2017-11-29 ENCOUNTER — OFFICE VISIT (OUTPATIENT)
Dept: INTERNAL MEDICINE CLINIC | Age: 77
End: 2017-11-29

## 2017-11-29 ENCOUNTER — HOSPITAL ENCOUNTER (OUTPATIENT)
Dept: LAB | Age: 77
Discharge: HOME | End: 2017-11-29
Payer: MEDICARE

## 2017-11-29 VITALS
TEMPERATURE: 97.3 F | WEIGHT: 98 LBS | BODY MASS INDEX: 19.76 KG/M2 | SYSTOLIC BLOOD PRESSURE: 162 MMHG | HEART RATE: 70 BPM | HEIGHT: 59 IN | DIASTOLIC BLOOD PRESSURE: 72 MMHG | OXYGEN SATURATION: 98 %

## 2017-11-29 DIAGNOSIS — M10.9 POLYARTICULAR GOUT: ICD-10-CM

## 2017-11-29 DIAGNOSIS — M79.641 RIGHT HAND PAIN: Primary | ICD-10-CM

## 2017-11-29 PROCEDURE — 87086 URINE CULTURE/COLONY COUNT: CPT

## 2017-11-29 PROCEDURE — 84550 ASSAY OF BLOOD/URIC ACID: CPT

## 2017-11-29 PROCEDURE — 36415 COLL VENOUS BLD VENIPUNCTURE: CPT

## 2017-11-29 RX ORDER — PREDNISONE 10 MG/1
10 TABLET ORAL
Qty: 30 TAB | Refills: 0 | Status: SHIPPED | OUTPATIENT
Start: 2017-11-29 | End: 2017-12-13

## 2017-11-29 RX ORDER — TRIAMCINOLONE ACETONIDE 40 MG/ML
60 INJECTION, SUSPENSION INTRA-ARTICULAR; INTRAMUSCULAR ONCE
Qty: 1 ML | Refills: 0
Start: 2017-11-29 | End: 2017-11-29

## 2017-11-29 RX ORDER — POLYETHYLENE GLYCOL 3350 17 G/17G
17 POWDER, FOR SOLUTION ORAL AS NEEDED
COMMUNITY

## 2017-11-29 NOTE — PROGRESS NOTES
HISTORY OF PRESENT ILLNESS  Sandra Olson is a 76 y.o. female. HPI   C/p pain and swelling of right wrist and hand at MCP joints x 4-5 days  Most of the pain is at base of right thumb and radial wrist  No injury or trauma  Was treated for gout a few mos ago with similar but less severe pain in right hand and wrist  Responded well to kenalog inj and medrol jared  She reports this episode is worse with pain going up the forearm  Had normal hand/wrist [facility]r at [facility] pt  Sed rate and CRP were elevated in ED., joint aspiration was unsucessful  Had acute renal failure this year but has resolved  Patient Active Problem List    Diagnosis Date Noted    Hypercalcemia 01/17/2017    Interstitial nephritis 11/07/2016    ARF (acute renal failure) ([facility] [address] 10/15/2016    Chronic interstitial cystitis 10/15/2016     Class: Chronic    Diarrhea 10/15/2016     Class: Present on Admission    Acute cystitis without hematuria 10/15/2016     Class: Present on Admission    Family history of colon cancer 03/17/2016    Anemia 03/17/2016    Essential hypertension 03/17/2016    Hyperlipidemia 09/04/2015    Glaucoma 09/04/2015    Gastroesophageal reflux disease without esophagitis 09/04/2015    Osteopenia 09/04/2015     Current Outpatient Prescriptions   Medication Sig Dispense Refill    polyethylene glycol (MIRALAX) 17 gram/dose powder Take 17 g by mouth daily.  predniSONE (DELTASONE) 10 mg tablet Take 1 Tab by mouth daily (with breakfast). 4 every day x3d, 3qd x3d, 2 every day x3d then 1 every day x 3d 30 Tab 0    dilTIAZem CD (CARDIZEM CD) 180 mg ER capsule Take 1 Cap by mouth daily. In place of amlodipine 30 Cap 3    timolol (TIMOPTIC) 0.5 % ophthalmic solution       ACETAMINOPHEN (TYLENOL PO) Take  by mouth.  famotidine (PEPCID) 20 mg tablet Take 1 Tab by mouth two (2) times a day.  Indications: gastroesophageal reflux disease 180 Tab 3    latanoprost (XALATAN) 0.005 % ophthalmic solution Administer 1 Drop to both eyes daily.  Aspirin, Buffered 81 mg tab Take 1 Tab by mouth daily. Allergies   Allergen Reactions    Aleve [Naproxen Sodium] Other (comments)     Renal issues    Ibuprofen Unknown (comments)    Lisinopril Other (comments)     Renal dysfunction    Omeprazole Other (comments)     Cause Kidney failure      Lab Results  Component Value Date/Time   GFR est non-AA 54 10/06/2017 10:37 AM   GFRNA, POC 55 08/14/2009 08:21 AM   GFR est AA 63 10/06/2017 10:37 AM   GFRAA, POC >60 08/14/2009 08:21 AM   Creatinine 0.99 10/06/2017 10:37 AM   Creatinine (POC) 1.0 08/14/2009 08:21 AM   BUN 23 10/06/2017 10:37 AM   Sodium 142 10/06/2017 10:37 AM   Potassium 4.8 10/06/2017 10:37 AM   Chloride 104 10/06/2017 10:37 AM   CO2 21 10/06/2017 10:37 AM   Magnesium 1.7 01/19/2017 01:36 AM   Phosphorus 1.2 01/19/2017 01:36 AM   PTH, Intact 12 01/16/2017 09:19 AM        ROS    Physical Exam   Constitutional: She appears well-developed and well-nourished. Appears stated age   Cardiovascular: Normal rate, regular rhythm and normal heart sounds. Exam reveals no gallop and no friction rub. No murmur heard. Pulmonary/Chest: Effort normal and breath sounds normal. No respiratory distress. She has no wheezes. Abdominal: Soft. Bowel sounds are normal.   Musculoskeletal: She exhibits no edema. slightly red, swollen right wrist and MCP joints of right hand   Neurological: She is alert. Psychiatric: She has a normal mood and affect. Nursing note and vitals reviewed. ASSESSMENT and PLAN  Diagnoses and all orders for this visit:    1. Right hand pain  -     RHEUMATOID FACTOR, QL--not b/l or symmetric so doubt RA but pt reports FH of RA    2. Polyarticular gout  -     URIC ACID   Kenalog 60 mg im    pred taper over 12 days    Other orders  -     predniSONE (DELTASONE) 10 mg tablet; Take 1 Tab by mouth daily (with breakfast).  4 every day x3d, 3qd x3d, 2 every day x3d then 1 every day x 3d    See PCP in 3 weeks  Follow-up Disposition:  Return in about 3 weeks (around 12/20/2017) for f/u gout.

## 2017-11-29 NOTE — MR AVS SNAPSHOT
Visit Information Date & Time Provider Department Dept. Phone Encounter #  
 11/29/2017  9:30 AM Naina Friedman, [address] [place] [address],[address] [phone] [id number] Follow-up Instructions Return in about 3 weeks (around 12/20/2017) for f/u gout. Your Appointments 4/10/2018 10:00 AM  
ROUTINE CARE with Eduardo Fajardo MD  
[facility] [address]) Appt Note: 6 mon [facility] [address] [phone]  
[address] [facility] [address] [place] [address] Upcoming Health Maintenance Date Due  
 GLAUCOMA SCREENING Q2Y 3/18/2003 OSTEOPOROSIS SCREENING (DEXA) 3/18/2003 Pneumococcal 65+ High/Highest Risk (2 of 2 - PPSV23) 1/1/2012 Influenza Age 5 to Adult 8/1/2017 MEDICARE YEARLY EXAM 10/7/2018 DTaP/Tdap/Td series (2 - Td) 9/4/2025 Allergies as of 11/29/2017  Review Complete On: 11/29/2017 By: Naina Friedman MD  
  
 Severity Noted Reaction Type Reactions Aleve [Naproxen Sodium]  07/25/2017    Other (comments) Renal issues Ibuprofen  07/06/2017    Unknown (comments) Lisinopril  01/23/2017   Intolerance Other (comments) Renal dysfunction Omeprazole  07/26/2017    Other (comments) Cause Kidney failure Current Immunizations  Never Reviewed Name Date Influenza Vaccine (Quad) PF 10/24/2016 10:25 AM  
 Pneumococcal Vaccine (Unspecified Type) 1/1/2007 Tdap 9/4/2015  1:01 PM  
 Zoster Vaccine, Live 1/1/2013 Not reviewed this visit You Were Diagnosed With   
  
 Codes Comments Right hand pain    -  Primary ICD-10-CM: X46.054 ICD-9-CM: 729.5 Polyarticular gout     ICD-10-CM: M10.00 ICD-9-CM: 274.00 Vitals BP Pulse Temp Height(growth percentile) Weight(growth percentile) LMP  
 162/72 (BP 1 Location: Left arm, BP Patient Position: Sitting) 70 97.3 °F (36.3 °C) (Oral) 4' 11\" (1.499 m) 98 lb (44.5 kg) (LMP Unknown) SpO2 BMI OB Status Smoking Status 98% 19.79 kg/m2 Hysterectomy Former Smoker BMI and BSA Data Body Mass Index Body Surface Area 19.79 kg/m 2 1.36 m 2 Preferred Pharmacy Pharmacy Name Phone [facility] [address] [phone] Your Updated Medication List  
  
   
This list is accurate as of: 11/29/17 10:00 AM.  Always use your most recent med list.  
  
  
  
  
 aspirin, buffered 81 mg Tab Take 1 Tab by mouth daily. dilTIAZem  mg ER capsule Commonly known as:  CARDIZEM CD Take 1 Cap by mouth daily. In place of amlodipine  
  
 famotidine 20 mg tablet Commonly known as:  PEPCID Take 1 Tab by mouth two (2) times a day. Indications: gastroesophageal reflux disease  
  
 latanoprost 0.005 % ophthalmic solution Commonly known as:  Cholo Slimmer Administer 1 Drop to both eyes daily. MIRALAX 17 gram/dose powder Generic drug:  polyethylene glycol Take 17 g by mouth daily. predniSONE 10 mg tablet Commonly known as:  Sloane Brink Take 1 Tab by mouth daily (with breakfast). 4 every day x3d, 3qd x3d, 2 every day x3d then 1 every day x 3d  
  
 timolol 0.5 % ophthalmic solution Commonly known as:  TIMOPTIC  
  
 TYLENOL PO Take  by mouth. Prescriptions Sent to Pharmacy Refills  
 predniSONE (DELTASONE) 10 mg tablet 0 Sig: Take 1 Tab by mouth daily (with breakfast). 4 every day x3d, 3qd x3d, 2 every day x3d then 1 every day x 3d Class: Normal  
 Pharmacy: [facility] [id number] Ph #: [phone] Route: Oral  
  
We Performed the Following RHEUMATOID FACTOR, QL [id number] CPT(R)] URIC ACID [id number] CPT(R)] Follow-up Instructions Return in about 3 weeks (around 12/20/2017) for f/u gout. Introducing [facility] SERVICES! Dear Prince Camilo: Thank you for requesting a [email or web address] account.   Our records indicate that you already have an active [employer] account. You can access your account anytime at [email or web address] Did you know that you can access your hospital and ER discharge instructions at any time in [employer]? You can also review all of your test results from your hospital stay or ER visit. Additional Information If you have questions, please visit the Frequently Asked Questions section of the [employer] website at [email or web address] Remember, [employer] is NOT to be used for urgent needs. For medical emergencies, dial 911. Now available from your iPhone and Android! Please provide this summary of care documentation to your next provider. Your primary care clinician is listed as Giovanni Mcfarland. If you have any questions after today's visit, please call [phone].

## 2017-11-29 NOTE — PROGRESS NOTES
Patient is here today for acute visit complaining of  pain in right hand . Right hand is swollen, red in color,  and warm to the touch. Onset  X 1 week.

## 2017-11-30 LAB
RHEUMATOID FACT SERPL-ACNC: <10 IU/ML (ref 0–13.9)
URATE SERPL-MCNC: 6.3 MG/DL (ref 2.5–7.1)

## 2017-11-30 NOTE — PROGRESS NOTES
Tell pt her rheumatoid factor blood test is negative--does not appear to have RA.   Normal level of uric acid but still suspect has gout attack  Ask if pain and swelling of hand is improving on prednisone

## 2017-11-30 NOTE — PROGRESS NOTES
[phone] there was no [facility]. Left a voice mail message requesting a return call for lab results and recommendations.

## 2017-12-04 NOTE — PROGRESS NOTES
Spoke with patient after 2 patient identifiers being note and advised per Dr. Robison Pi pt her rheumatoid factor blood test is negative--does not appear to have RA. Normal level of uric acid but still suspect has gout attack Ask if pain and swelling of hand is improving on prednisone . Patient expressed understanding and has no further questions at this time.

## 2017-12-13 ENCOUNTER — OFFICE VISIT (OUTPATIENT)
Dept: INTERNAL MEDICINE CLINIC | Age: 77
End: 2017-12-13

## 2017-12-13 VITALS
SYSTOLIC BLOOD PRESSURE: 144 MMHG | OXYGEN SATURATION: 99 % | RESPIRATION RATE: 18 BRPM | TEMPERATURE: 97.7 F | DIASTOLIC BLOOD PRESSURE: 77 MMHG | BODY MASS INDEX: 19.84 KG/M2 | WEIGHT: 98.4 LBS | HEART RATE: 71 BPM | HEIGHT: 59 IN

## 2017-12-13 DIAGNOSIS — M79.642 BILATERAL HAND PAIN: Primary | ICD-10-CM

## 2017-12-13 DIAGNOSIS — M79.641 PAIN IN BOTH HANDS: Primary | ICD-10-CM

## 2017-12-13 DIAGNOSIS — M79.641 BILATERAL HAND PAIN: Primary | ICD-10-CM

## 2017-12-13 DIAGNOSIS — M79.642 PAIN IN BOTH HANDS: Primary | ICD-10-CM

## 2017-12-13 NOTE — MR AVS SNAPSHOT
Visit Information Date & Time Provider Department Dept. Phone Encounter #  
 12/13/2017 11:45 AM Donn Ortiz, [address]  [id number] Follow-up Instructions Return if symptoms worsen or fail to improve, for follow up as scheduled in April. .  
  
Your Appointments 4/10/2018 10:00 AM  
ROUTINE CARE with Donn Ortiz MD  
[facility] [address] [facility]) Appt Note: 6 mon [address] [facility] [address] [phone]  
[address] [facility] [address] [place] [address] Upcoming Health Maintenance Date Due  
 GLAUCOMA SCREENING Q2Y 3/18/2003 OSTEOPOROSIS SCREENING (DEXA) 3/18/2003 Pneumococcal 65+ High/Highest Risk (2 of 2 - PPSV23) 1/1/2012 Influenza Age 5 to Adult 8/1/2017 MEDICARE YEARLY EXAM 10/7/2018 DTaP/Tdap/Td series (2 - Td) 9/4/2025 Allergies as of 12/13/2017  Review Complete On: 12/13/2017 By: Albert Jimenez Severity Noted Reaction Type Reactions Aleve [Naproxen Sodium]  07/25/2017    Other (comments) Renal issues Ibuprofen  07/06/2017    Unknown (comments) Lisinopril  01/23/2017   Intolerance Other (comments) Renal dysfunction Omeprazole  07/26/2017    Other (comments) Cause Kidney failure Current Immunizations  Reviewed on 12/13/2017 Name Date Influenza Vaccine 10/10/2017, 10/24/2016 Influenza Vaccine (Quad) PF 10/24/2016 10:25 AM  
 Pneumococcal Conjugate (PCV-13) 9/16/2015 Pneumococcal Polysaccharide (PPSV-23) 1/1/2007 Tdap 9/4/2015  1:01 PM  
 Zoster Vaccine, Live 1/1/2013 Reviewed by Donn Ortiz MD on 12/13/2017 at 12:22 PM  
 Reviewed by Donn Ortiz MD on 12/13/2017 at 12:24 PM  
 Reviewed by Donn Ortiz MD on 12/13/2017 at 12:25 PM  
 Reviewed by Donn Ortiz MD on 12/13/2017 at 12:25 PM  
You Were Diagnosed With   
  
 Codes Comments Pain in both hands    -  Primary ICD-10-CM: M79.641, S78.679 ICD-9-CM: 729.5 Vitals BP Pulse Temp Resp Height(growth percentile) Weight(growth percentile) 144/77 71 97.7 °F (36.5 °C) (Oral) 18 4' 11\" (1.499 m) 98 lb 6.4 oz (44.6 kg) LMP SpO2 BMI OB Status Smoking Status (LMP Unknown) 99% 19.87 kg/m2 Hysterectomy Former Smoker Vitals History BMI and BSA Data Body Mass Index Body Surface Area  
 19.87 kg/m 2 1.36 m 2 Preferred Pharmacy Pharmacy Name Phone [id number] [phone] Your Updated Medication List  
  
   
This list is accurate as of: 12/13/17 12:33 PM.  Always use your most recent med list.  
  
  
  
  
 aspirin, buffered 81 mg Tab Take 1 Tab by mouth daily. dilTIAZem  mg ER capsule Commonly known as:  CARDIZEM CD Take 1 Cap by mouth daily. In place of amlodipine  
  
 famotidine 20 mg tablet Commonly known as:  PEPCID Take 1 Tab by mouth two (2) times a day. Indications: gastroesophageal reflux disease  
  
 latanoprost 0.005 % ophthalmic solution Commonly known as:  Lary Romero Administer 1 Drop to both eyes daily. MIRALAX 17 gram/dose powder Generic drug:  polyethylene glycol Take 17 g by mouth daily. timolol 0.5 % ophthalmic solution Commonly known as:  TIMOPTIC  
  
 TYLENOL PO Take  by mouth. Follow-up Instructions Return if symptoms worsen or fail to improve, for follow up as scheduled in April. [facility] To-Do List   
 12/13/2017 Imaging:  XR HAND LT AP/LAT   
  
 12/13/2017 Imaging:  XR HAND RT AP/LAT Patient Instructions Purine-Restricted Diet: Care Instructions Your Care Instructions Purines are substances that are found in some foods. Your body turns purines into uric acid. High levels of uric acid can cause gout, which is a form of arthritis that causes pain and inflammation in joints. You may be able to help control the amount of uric acid in your body by limiting high-purine foods in your diet. Follow-up care is a key part of your treatment and safety. Be sure to make and go to all appointments, and call your doctor if you are having problems. It's also a good idea to know your test results and keep a list of the medicines you take. How can you care for yourself at home? · Plan your meals and snacks around foods that are low in purines and are safe for you to eat. These foods include: ¨ Green vegetables and tomatoes. ¨ Fruits. ¨ Whole-grain breads, rice, and cereals. ¨ Eggs, peanut butter, and nuts. ¨ Low-fat milk, cheese, and other milk products. ¨ Popcorn. ¨ Gelatin desserts, chocolate, cocoa, and cakes and sweets, in small amounts. · You can eat certain foods that are medium-high in purines, but eat them only once in a while. These foods include: ¨ Legumes, such as dried beans and dried peas. You can have 1 cup cooked legumes each day. ¨ Asparagus, cauliflower, spinach, mushrooms, and green peas. ¨ Fish and seafood (other than very high-purine seafood). ¨ Oatmeal, wheat bran, and wheat germ. · Limit very high-purine foods, including: ¨ Organ meats, such as liver, kidneys, sweetbreads, and brains. ¨ Meats, including gil, beef, pork, and lamb. ¨ Game meats and any other meats in large amounts. ¨ Anchovies, sardines, herring, mackerel, and scallops. ¨ Gravy. ¨ Beer. Where can you learn more? Go to http://roel-lonnie.info/. Enter F448 in the search box to learn more about \"Purine-Restricted Diet: Care Instructions. \" Current as of: May 12, 2017 Content Version: 11.4 © 6009-8808 Helpr. Care instructions adapted under license by [employer] (which disclaims liability or warranty for this information).  If you have questions about a medical condition or this instruction, always ask your healthcare professional. [employer] disclaims any warranty or liability for your use of this information. Introducing [facility]! Dear Maury Mccall: Thank you for requesting a [email or web address] account. Our records indicate that you already have an active [email or web address] account. You can access your account anytime at [email or web address] Did you know that you can access your hospital and ER discharge instructions at any time in [email or web address]? You can also review all of your test results from your hospital stay or ER visit. Additional Information If you have questions, please visit the Frequently Asked Questions section of the [email or web address] website at [email or web address] Remember, [email or web address] is NOT to be used for urgent needs. For medical emergencies, dial 911. Now available from your iPhone and Android! Please provide this summary of care documentation to your next provider. Your primary care clinician is listed as Ashley Al. If you have any questions after today's visit, please call [phone].

## 2017-12-13 NOTE — PATIENT INSTRUCTIONS
Purine-Restricted Diet: Care Instructions  Your Care Instructions    Purines are substances that are found in some foods. Your body turns purines into uric acid. High levels of uric acid can cause gout, which is a form of arthritis that causes pain and inflammation in joints. You may be able to help control the amount of uric acid in your body by limiting high-purine foods in your diet. Follow-up care is a key part of your treatment and safety. Be sure to make and go to all appointments, and call your doctor if you are having problems. It's also a good idea to know your test results and keep a list of the medicines you take. How can you care for yourself at home? · Plan your meals and snacks around foods that are low in purines and are safe for you to eat. These foods include:  ¨ Green vegetables and tomatoes. ¨ Fruits. ¨ Whole-grain breads, rice, and cereals. ¨ Eggs, peanut butter, and nuts. ¨ Low-fat milk, cheese, and other milk products. ¨ Popcorn. ¨ Gelatin desserts, chocolate, cocoa, and cakes and sweets, in small amounts. · You can eat certain foods that are medium-high in purines, but eat them only once in a while. These foods include:  ¨ Legumes, such as dried beans and dried peas. You can have 1 cup cooked legumes each day. ¨ Asparagus, cauliflower, spinach, mushrooms, and green peas. ¨ Fish and seafood (other than very high-purine seafood). ¨ Oatmeal, wheat bran, and wheat germ. · Limit very high-purine foods, including:  ¨ Organ meats, such as liver, kidneys, sweetbreads, and brains. ¨ Meats, including gil, beef, pork, and lamb. ¨ Game meats and any other meats in large amounts. ¨ Anchovies, sardines, herring, mackerel, and scallops. ¨ Gravy. ¨ Beer. Where can you learn more? Go to http://roel-lonnie.info/. Enter F448 in the search box to learn more about \"Purine-Restricted Diet: Care Instructions. \"  Current as of:  May 12, 2017  Content Version: 11.4  © 3070-9585 [employer]wise, Incorporated. Care instructions adapted under license by [employer]n. (which disclaims liability or warranty for this information). If you have questions about a medical condition or this instruction, always ask your healthcare professional. [place] [address] any warranty or liability for your use of this information.

## 2017-12-13 NOTE — PROGRESS NOTES
Nani Patel is a 76 y.o. female who presents for follow up on right hand and wrist pain on 11/29 by Dr. Christian Sims. Treated for gout. Prednisone 40mg taper over 12 days. The swelling has resolved. She still has sharp pain in wrist intermittently. Rheumatoid factor. She is taking tylenol prn with some relief. Avoids NSAIDS. Past Medical History:   Diagnosis Date    AIN (acute interstitial nephritis)     Arthritis     CKD (chronic kidney disease) stage 3, GFR 30-59 ml/min     Gastrointestinal disorder     GERD    GERD (gastroesophageal reflux disease)     Glaucoma     High cholesterol     Hypertension     Hypertension     Ill-defined condition     chronic cystitis    Kidney failure 10/14/2016       Family History   Problem Relation Age of Onset    Cancer Father     Cancer Brother        Social History     Social History    Marital status:      Spouse name: N/A    Number of children: N/A    Years of education: N/A     Occupational History    Not on file. Social History Main Topics    Smoking status: Former Smoker     Packs/day: 0.50     Years: 4.00     Quit date: 7/29/1997    Smokeless tobacco: Former User    Alcohol use Yes      Comment: seldom    Drug use: Yes     Special: Prescription, OTC    Sexual activity: No     Other Topics Concern    Not on file     Social History Narrative    ** Merged History Encounter **            Current Outpatient Prescriptions on File Prior to Visit   Medication Sig Dispense Refill    polyethylene glycol (MIRALAX) 17 gram/dose powder Take 17 g by mouth daily.  dilTIAZem CD (CARDIZEM CD) 180 mg ER capsule Take 1 Cap by mouth daily. In place of amlodipine 30 Cap 3    timolol (TIMOPTIC) 0.5 % ophthalmic solution       ACETAMINOPHEN (TYLENOL PO) Take  by mouth.  famotidine (PEPCID) 20 mg tablet Take 1 Tab by mouth two (2) times a day.  Indications: gastroesophageal reflux disease 180 Tab 3    latanoprost (XALATAN) 0.005 % ophthalmic solution Administer 1 Drop to both eyes daily.  Aspirin, Buffered 81 mg tab Take 1 Tab by mouth daily. No current facility-administered medications on file prior to visit. Review of Systems  Pertinent items are noted in HPI. Objective:     Visit Vitals    /77    Pulse 71    Temp 97.7 °F (36.5 °C) (Oral)    Resp 18    Ht 4' 11\" (1.499 m)    Wt 98 lb 6.4 oz (44.6 kg)    LMP  (LMP Unknown)    SpO2 99%    BMI 19.87 kg/m2     Gen: well appearing female  Resp:  No wheezing, no rhonchi, no rales. CV:  RRR, normal S1S2, no murmur. Extrem:  +2 pulses, no edema, warm distally, no joint synovitis. OA changes in DIP, PIP      Assessment/Plan:       ICD-10-CM ICD-9-CM    1. Pain in both hands M79.641 729.5 XR HAND LT AP/LAT    M79.642  XR HAND RT AP/LAT   use tylenol for pain, given hand exercises. Follow-up Disposition:  Return if symptoms worsen or fail to improve, for follow up as scheduled in April.   Carlos A Rodriguez MD

## 2017-12-13 NOTE — PROGRESS NOTES
Chief Complaint   Patient presents with    Joint Pain     2 week follow up     1. Have you been to the ER, urgent care clinic since your last visit? Hospitalized since your last visit? No    2. Have you seen or consulted any other health care providers outside of the [address] [place] [address] since your last visit? Include any pap smears or colon screening.  No

## 2018-01-05 ENCOUNTER — TELEPHONE (OUTPATIENT)
Dept: INTERNAL MEDICINE CLINIC | Age: 78
End: 2018-01-05

## 2018-01-05 DIAGNOSIS — M15.4 EROSIVE OSTEOARTHRITIS OF BOTH HANDS: Primary | ICD-10-CM

## 2018-01-05 DIAGNOSIS — Z87.39 H/O CALCIUM PYROPHOSPHATE DEPOSITION DISEASE (CPPD): ICD-10-CM

## 2018-01-05 RX ORDER — METHYLPREDNISOLONE 4 MG/1
4 TABLET ORAL
Qty: 1 DOSE PACK | Refills: 0 | Status: SHIPPED | OUTPATIENT
Start: 2018-01-05 | End: 2018-01-05 | Stop reason: CLARIF

## 2018-01-05 RX ORDER — METHYLPREDNISOLONE 4 MG/1
4 TABLET ORAL
Qty: 1 DOSE PACK | Refills: 0 | Status: SHIPPED | OUTPATIENT
Start: 2018-01-05 | End: 2018-04-10

## 2018-01-10 ENCOUNTER — PATIENT MESSAGE (OUTPATIENT)
Dept: INTERNAL MEDICINE CLINIC | Age: 78
End: 2018-01-10

## 2018-01-12 RX ORDER — DILTIAZEM HYDROCHLORIDE 240 MG/1
240 CAPSULE, COATED, EXTENDED RELEASE ORAL DAILY
Qty: 30 CAP | Refills: 3 | Status: SHIPPED | OUTPATIENT
Start: 2018-01-12 | End: 2018-01-26 | Stop reason: ALTCHOICE

## 2018-01-12 NOTE — TELEPHONE ENCOUNTER
Regarding: Non-Urgent Medical Question  Contact: [phone]  ----- Message from Kelsey Chairez LPN sent at 2/69/9534  9:02 AM EST -----       ----- Message from Roshan Torres to Boone Garrett MD sent at 1/10/2018  6:10 PM -----   Dr. Vivian Song:  Here are my blood pressure readings beginning in January. It appears that they are higher than they have been. Is there something I should do?   1/1  141/79  P 109  1/2   169/85  P 101  1/3    163/78  P  98  1/4   150/83  P  107  1/6   134/73  P  92  1/7   155/78  P  78  1/8  158/79  P  92  1/9  159/79  P  92  1/10  160/87  P  98    Thanks    Radha Montes

## 2018-01-12 NOTE — TELEPHONE ENCOUNTER
From: Caitlin   To: Afshin Mitchell MD  Sent: 1/10/2018 6:10 PM EST  Subject: Non-Urgent Medical Question    Dr. Adrian Gave: Here are my blood pressure readings beginning in January. It appears that they are higher than they have been. Is there something I should do?   1/1 141/79 P 109  1/2 169/85 P 101  1/3 163/78 P 98  1/4 150/83 P 107  1/6 134/73 P 92  1/7 155/78 P 78  1/8 158/79 P 92  1/9 159/79 P 92  1/10 160/87 P 98    Thanks    Nory Jacome

## 2018-01-26 ENCOUNTER — TELEPHONE (OUTPATIENT)
Dept: INTERNAL MEDICINE CLINIC | Age: 78
End: 2018-01-26

## 2018-01-26 DIAGNOSIS — I10 ESSENTIAL HYPERTENSION: Primary | ICD-10-CM

## 2018-01-26 NOTE — TELEPHONE ENCOUNTER
#[phone] pt states she needs to discuss her htn with the new medication as it seems to be going up at night. Please call to advise.

## 2018-01-26 NOTE — TELEPHONE ENCOUNTER
Advise patient that we can change the diltiazem since she has not maintained consistent control. Stop diltiazem 240mg. Start toprol XL 25mg once a day. Monitor blood pressure readings over the next 2 weeks. If agrees, I will send in.

## 2018-01-26 NOTE — TELEPHONE ENCOUNTER
Called patient. Two patient identifiers verified. Patient states systolic BP is in 378'R in mornings. Evenings BP is elevated in 160's/ 80's. /87, P 92 last night   Slight headache above eyes, some nausea. No dizziness or visual changes. She is currently taking diltiazem  mg ER once daily.

## 2018-01-29 RX ORDER — METOPROLOL SUCCINATE 25 MG/1
25 TABLET, EXTENDED RELEASE ORAL DAILY
Qty: 30 TAB | Refills: 3 | Status: SHIPPED | OUTPATIENT
Start: 2018-01-29 | End: 2018-02-16 | Stop reason: DRUGHIGH

## 2018-01-29 NOTE — TELEPHONE ENCOUNTER
Called patient. Two patient identifiers verified. Advised patient of Dr. Coker Fess recommendation to switch to Toprol XL 25 mg once daily. Patient agreeable to change. Sent prescription to pharmacy per written order from Dr. Alice Grigsby. Patient will monitor BP readings for next 2 weeks and call with any questions/ concerns.

## 2018-02-14 ENCOUNTER — TELEPHONE (OUTPATIENT)
Dept: INTERNAL MEDICINE CLINIC | Age: 78
End: 2018-02-14

## 2018-02-14 NOTE — TELEPHONE ENCOUNTER
Received patient's BP readings, 132-175/74-86, average 155/80. Heart rate 75-95. On toprol XL 25mg once a day. Advise patient to use 2 of the 25mg tablets once in am. We can send in the 50mg once a day. I will wait to send in until she is aware.

## 2018-02-16 RX ORDER — METOPROLOL SUCCINATE 50 MG/1
50 TABLET, EXTENDED RELEASE ORAL DAILY
Qty: 30 TAB | Refills: 2 | Status: SHIPPED | OUTPATIENT
Start: 2018-02-16 | End: 2018-04-10 | Stop reason: SDUPTHER

## 2018-02-27 ENCOUNTER — TELEPHONE (OUTPATIENT)
Dept: INTERNAL MEDICINE CLINIC | Age: 78
End: 2018-02-27

## 2018-02-27 ENCOUNTER — HOSPITAL ENCOUNTER (OUTPATIENT)
Dept: LAB | Age: 78
Discharge: HOME | End: 2018-02-27
Payer: MEDICARE

## 2018-02-27 ENCOUNTER — CLINICAL SUPPORT (OUTPATIENT)
Dept: INTERNAL MEDICINE CLINIC | Age: 78
End: 2018-02-27

## 2018-02-27 VITALS — TEMPERATURE: 97.6 F

## 2018-02-27 DIAGNOSIS — R82.90 ABNORMAL URINALYSIS: Primary | ICD-10-CM

## 2018-02-27 LAB
BILIRUB UR QL STRIP: NEGATIVE
GLUCOSE UR-MCNC: NEGATIVE MG/DL
KETONES P FAST UR STRIP-MCNC: NEGATIVE MG/DL
PH UR STRIP: 6.5 [PH] (ref 4.6–8)
PROT UR QL STRIP: NORMAL
SP GR UR STRIP: 1.02 (ref 1–1.03)
UA UROBILINOGEN AMB POC: NORMAL (ref 0.2–1)
URINALYSIS CLARITY POC: CLEAR
URINALYSIS COLOR POC: YELLOW
URINE BLOOD POC: NORMAL
URINE LEUKOCYTES POC: NORMAL
URINE NITRITES POC: NEGATIVE

## 2018-02-27 PROCEDURE — 87086 URINE CULTURE/COLONY COUNT: CPT

## 2018-02-27 RX ORDER — NITROFURANTOIN 25; 75 MG/1; MG/1
100 CAPSULE ORAL 2 TIMES DAILY
Qty: 14 CAP | Refills: 0 | Status: SHIPPED | OUTPATIENT
Start: 2018-02-27 | End: 2018-04-10

## 2018-02-27 NOTE — TELEPHONE ENCOUNTER
Patient walked into the office to address concerns of subnormal temps & dysuria. Pt checked her temp this AM due to feeling nauseous & having urinary pain x3 days. Temp readings were 95-96. Checked temp in office triage & is wnl -97. 6. Reassured patient this is likely her thermometer. Reports urinary pain, frequency & low back pain x3 days. Is unsure if this is UTI or \"cyst\" she commonly gets. Denies hematuria. Per Dr. Cm Wilkins , [facility], a UA was run in office (see results below). UCX will also be ran to confirm if infection is present. Pt advised note will be sent to PCP & we will call back pending results.      Results for orders placed or performed in visit on 02/27/18   AMB POC URINALYSIS DIP STICK MANUAL W/O MICRO   Result Value Ref Range    Color (UA POC) Yellow     Clarity (UA POC) Clear     Glucose (UA POC) Negative Negative    Bilirubin (UA POC) Negative Negative    Ketones (UA POC) Negative Negative    Specific gravity (UA POC) 1.020 1.001 - 1.035    Blood (UA POC) 1+ Negative    pH (UA POC) 6.5 4.6 - 8.0    Protein (UA POC) Trace Negative    Urobilinogen (UA POC) 0.2 mg/dL 0.2 - 1    Nitrites (UA POC) Negative Negative    Leukocyte esterase (UA POC) Trace Negative

## 2018-02-27 NOTE — PROGRESS NOTES
Patient walked into the office to address concerns of subnormal temps & dysuria. Pt checked her temp this AM due to feeling nauseous & having urinary pain x3 days. Temp readings were 95-96. Checked temp in office triage & is wnl -97. 6. Reassured patient this is likely her thermometer. Reports urinary pain, frequency & low back pain x3 days. Is unsure if this is UTI or \"cyst\" she commonly gets. Denies hematuria. Per Dr. Adore Mcgraw , [facility], a UA was run in office (see results below). UCX will also be ran to confirm if infection is present. Pt advised note will be sent to PCP & we will call back pending results.      Results for orders placed or performed in visit on 02/27/18   AMB POC URINALYSIS DIP STICK MANUAL W/O MICRO   Result Value Ref Range    Color (UA POC) Yellow     Clarity (UA POC) Clear     Glucose (UA POC) Negative Negative    Bilirubin (UA POC) Negative Negative    Ketones (UA POC) Negative Negative    Specific gravity (UA POC) 1.020 1.001 - 1.035    Blood (UA POC) 1+ Negative    pH (UA POC) 6.5 4.6 - 8.0    Protein (UA POC) Trace Negative    Urobilinogen (UA POC) 0.2 mg/dL 0.2 - 1    Nitrites (UA POC) Negative Negative    Leukocyte esterase (UA POC) Trace Negative

## 2018-02-27 NOTE — TELEPHONE ENCOUNTER
Returned call to patient. Patient was sitting in our waiting room when I called. Patient being assisted by  staff.

## 2018-02-27 NOTE — TELEPHONE ENCOUNTER
Pt is requesting a call back regarding recent temperature.  Best contact  [phone]         Message received & copied from [facility]

## 2018-03-01 LAB — BACTERIA UR CULT: NO GROWTH

## 2018-03-05 ENCOUNTER — TELEPHONE (OUTPATIENT)
Dept: INTERNAL MEDICINE CLINIC | Age: 78
End: 2018-03-05

## 2018-03-05 NOTE — TELEPHONE ENCOUNTER
----- Message from Myrna Espinoza sent at 3/5/2018 12:33 PM EST -----  Regarding: /Telephone  Pt is requesting a callback in reference to Antibiotic Rx sent to the   Pharmacy.   Best [phone]      Message copied/pasted from [facility]

## 2018-03-05 NOTE — TELEPHONE ENCOUNTER
Called patient. Two patient identifiers verified. Advised patient of negative urine culture. Rx for macrobid was sent on 2/27/18 which was day patient left sample. Advised no need to take antibiotic. Patient verbalized understanding and states no further questions at this time.

## 2018-03-27 ENCOUNTER — HOSPITAL ENCOUNTER (OUTPATIENT)
Dept: LAB | Age: 78
Discharge: HOME | End: 2018-03-27
Payer: MEDICARE

## 2018-03-27 ENCOUNTER — APPOINTMENT (OUTPATIENT)
Dept: INTERNAL MEDICINE CLINIC | Age: 78
End: 2018-03-27

## 2018-03-27 DIAGNOSIS — I10 ESSENTIAL HYPERTENSION: ICD-10-CM

## 2018-03-27 DIAGNOSIS — R53.83 FATIGUE, UNSPECIFIED TYPE: ICD-10-CM

## 2018-03-27 DIAGNOSIS — E78.00 PURE HYPERCHOLESTEROLEMIA: ICD-10-CM

## 2018-03-27 PROCEDURE — 84443 ASSAY THYROID STIM HORMONE: CPT

## 2018-03-27 PROCEDURE — 80061 LIPID PANEL: CPT

## 2018-03-27 PROCEDURE — 80053 COMPREHEN METABOLIC PANEL: CPT

## 2018-03-27 PROCEDURE — 36415 COLL VENOUS BLD VENIPUNCTURE: CPT

## 2018-03-27 PROCEDURE — 81001 URINALYSIS AUTO W/SCOPE: CPT

## 2018-03-27 PROCEDURE — 85027 COMPLETE CBC AUTOMATED: CPT

## 2018-03-28 LAB
ALBUMIN SERPL-MCNC: 3.9 G/DL (ref 3.5–4.7)
ALBUMIN/GLOB SERPL: 1.6 {RATIO} (ref 1.2–2.2)
ALP SERPL-CCNC: 71 IU/L (ref 39–117)
ALT SERPL-CCNC: 7 IU/L (ref 0–32)
APPEARANCE UR: CLEAR
AST SERPL-CCNC: 12 IU/L (ref 0–40)
BACTERIA #/AREA URNS HPF: ABNORMAL /[HPF]
BILIRUB SERPL-MCNC: 0.4 MG/DL (ref 0–1.2)
BILIRUB UR QL STRIP: NEGATIVE
BUN SERPL-MCNC: 17 MG/DL (ref 8–27)
BUN/CREAT SERPL: 18 (ref 12–28)
CALCIUM SERPL-MCNC: 8.9 MG/DL (ref 8.7–10.3)
CASTS URNS MICRO: ABNORMAL
CASTS URNS QL MICRO: PRESENT /LPF
CHLORIDE SERPL-SCNC: 105 MMOL/L (ref 96–106)
CHOLEST SERPL-MCNC: 206 MG/DL (ref 100–199)
CO2 SERPL-SCNC: 19 MMOL/L (ref 18–29)
COLOR UR: YELLOW
CREAT SERPL-MCNC: 0.95 MG/DL (ref 0.57–1)
CRYSTALS URNS MICRO: ABNORMAL
EPI CELLS #/AREA URNS HPF: ABNORMAL /HPF
ERYTHROCYTE [DISTWIDTH] IN BLOOD BY AUTOMATED COUNT: 16.1 % (ref 12.3–15.4)
GFR SERPLBLD CREATININE-BSD FMLA CKD-EPI: 57 ML/MIN/1.73
GFR SERPLBLD CREATININE-BSD FMLA CKD-EPI: 65 ML/MIN/1.73
GLOBULIN SER CALC-MCNC: 2.5 G/DL (ref 1.5–4.5)
GLUCOSE SERPL-MCNC: 101 MG/DL (ref 65–99)
GLUCOSE UR QL: NEGATIVE
HCT VFR BLD AUTO: 37.9 % (ref 34–46.6)
HDLC SERPL-MCNC: 43 MG/DL
HGB BLD-MCNC: 12.3 G/DL (ref 11.1–15.9)
HGB UR QL STRIP: NEGATIVE
KETONES UR QL STRIP: NEGATIVE
LDLC SERPL CALC-MCNC: 132 MG/DL (ref 0–99)
LEUKOCYTE ESTERASE UR QL STRIP: NEGATIVE
MCH RBC QN AUTO: 28.3 PG (ref 26.6–33)
MCHC RBC AUTO-ENTMCNC: 32.5 G/DL (ref 31.5–35.7)
MCV RBC AUTO: 87 FL (ref 79–97)
MICRO URNS: ABNORMAL
MUCOUS THREADS URNS QL MICRO: PRESENT
NITRITE UR QL STRIP: NEGATIVE
PH UR STRIP: 5.5 [PH] (ref 5–7.5)
PLATELET # BLD AUTO: 314 X10E3/UL (ref 150–379)
POTASSIUM SERPL-SCNC: 4.1 MMOL/L (ref 3.5–5.2)
PROT SERPL-MCNC: 6.4 G/DL (ref 6–8.5)
PROT UR QL STRIP: ABNORMAL
RBC # BLD AUTO: 4.34 X10E6/UL (ref 3.77–5.28)
RBC #/AREA URNS HPF: ABNORMAL /HPF
SODIUM SERPL-SCNC: 141 MMOL/L (ref 134–144)
SP GR UR: 1.02 (ref 1–1.03)
TRIGL SERPL-MCNC: 155 MG/DL (ref 0–149)
TSH SERPL DL<=0.005 MIU/L-ACNC: 0.97 UIU/ML (ref 0.45–4.5)
UNIDENT CRYS URNS QL MICRO: PRESENT
UROBILINOGEN UR STRIP-MCNC: 0.2 MG/DL (ref 0.2–1)
VLDLC SERPL CALC-MCNC: 31 MG/DL (ref 5–40)
WBC # BLD AUTO: 13.2 X10E3/UL (ref 3.4–10.8)
WBC #/AREA URNS HPF: ABNORMAL /HPF

## 2018-04-10 ENCOUNTER — OFFICE VISIT (OUTPATIENT)
Dept: INTERNAL MEDICINE CLINIC | Age: 78
End: 2018-04-10

## 2018-04-10 VITALS
BODY MASS INDEX: 19.88 KG/M2 | DIASTOLIC BLOOD PRESSURE: 70 MMHG | WEIGHT: 98.6 LBS | HEIGHT: 59 IN | SYSTOLIC BLOOD PRESSURE: 142 MMHG | OXYGEN SATURATION: 97 % | RESPIRATION RATE: 16 BRPM | TEMPERATURE: 97.7 F | HEART RATE: 66 BPM

## 2018-04-10 DIAGNOSIS — R53.83 FATIGUE, UNSPECIFIED TYPE: Primary | ICD-10-CM

## 2018-04-10 DIAGNOSIS — R35.0 URINARY FREQUENCY: ICD-10-CM

## 2018-04-10 DIAGNOSIS — M85.80 OSTEOPENIA, UNSPECIFIED LOCATION: ICD-10-CM

## 2018-04-10 DIAGNOSIS — Z78.0 POSTMENOPAUSAL: ICD-10-CM

## 2018-04-10 DIAGNOSIS — K21.9 GASTROESOPHAGEAL REFLUX DISEASE WITHOUT ESOPHAGITIS: ICD-10-CM

## 2018-04-10 DIAGNOSIS — I10 ESSENTIAL HYPERTENSION: ICD-10-CM

## 2018-04-10 RX ORDER — DILTIAZEM HYDROCHLORIDE 240 MG/1
CAPSULE, EXTENDED RELEASE ORAL
COMMUNITY
Start: 2018-01-13 | End: 2018-04-10

## 2018-04-10 RX ORDER — FAMOTIDINE 20 MG/1
20 TABLET, FILM COATED ORAL 2 TIMES DAILY
Qty: 180 TAB | Refills: 3 | Status: SHIPPED | OUTPATIENT
Start: 2018-04-10 | End: 2018-10-10 | Stop reason: SDUPTHER

## 2018-04-10 RX ORDER — PREDNISONE 10 MG/1
TABLET ORAL
COMMUNITY
Start: 2018-03-27 | End: 2018-04-10

## 2018-04-10 RX ORDER — METOPROLOL SUCCINATE 50 MG/1
50 TABLET, EXTENDED RELEASE ORAL DAILY
Qty: 90 TAB | Refills: 3 | Status: SHIPPED | OUTPATIENT
Start: 2018-04-10 | End: 2018-10-10 | Stop reason: SDUPTHER

## 2018-04-10 NOTE — PROGRESS NOTES
Jean Marie Dee is a [de-identified] y.o. female who presents for followup. Reports feeling fatigued. Is worried about kidney function since she had ARF from NSAIDS in the past. Reports sleep is interrupted by urination, at least every 2-3 hours. Sees urologist, Dr. George Noguera. 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 Taking benadryl 25mg if cannot sleep. Reviewed labs. Urine with calcium oxalate crystals. Is drinking a lot of water. Does not take calcium supplements. Saw Dr. Tina Caba, rheumatology, 3/27. Pseudogout in right wrist.  Given steroid injection in wrist, much better. BP controlled on toprol XL 50mg once a day. BP lowest 120's. Highest 150's. HR 66.       Past Medical History:   Diagnosis Date    AIN (acute interstitial nephritis)     Arthritis     CKD (chronic kidney disease) stage 3, GFR 30-59 ml/min     Gastrointestinal disorder     GERD    GERD (gastroesophageal reflux disease)     Glaucoma     High cholesterol     Hypertension     Hypertension     Ill-defined condition     chronic cystitis    Kidney failure 10/14/2016       Family History   Problem Relation Age of Onset    Cancer Father     Cancer Brother        Social History     Social History    Marital status:      Spouse name: N/A    Number of children: N/A    Years of education: N/A     Occupational History    Not on file. Social History Main Topics    Smoking status: Former Smoker     Packs/day: 0.50     Years: 4.00     Quit date: 7/29/1997    Smokeless tobacco: Former User    Alcohol use Yes      Comment: seldom    Drug use: Yes     Special: Prescription, OTC    Sexual activity: No     Other Topics Concern    Not on file     Social History Narrative    ** Merged History Encounter **            Current Outpatient Prescriptions on File Prior to Visit   Medication Sig Dispense Refill    polyethylene glycol (MIRALAX) 17 gram/dose powder Take 17 g by mouth daily.       timolol (TIMOPTIC) 0.5 % ophthalmic solution       ACETAMINOPHEN (TYLENOL PO) Take  by mouth.  latanoprost (XALATAN) 0.005 % ophthalmic solution Administer 1 Drop to both eyes daily.  Aspirin, Buffered 81 mg tab Take 1 Tab by mouth daily. No current facility-administered medications on file prior to visit. Review of Systems  Pertinent items are noted in HPI. Objective:     Visit Vitals    /70    Pulse 66    Temp 97.7 °F (36.5 °C) (Oral)    Resp 16    Ht 4' 11\" (1.499 m)    Wt 98 lb 9.6 oz (44.7 kg)    LMP  (LMP Unknown)    SpO2 97%    BMI 19.91 kg/m2     Gen: well appearing female  HEENT:   PERRL,normal conjunctiva. External ear and canals normal, TMs no opacification or erythema,  OP no erythema, no exudates, MMM  Neck:  Supple. Thyroid normal size, nontender, without nodules. No masses or LAD  Resp:  No wheezing, no rhonchi, no rales. CV:  RRR, normal S1S2, no murmur. GI: soft, nontender, without masses. No hepatosplenomegaly. Extrem:  +2 pulses, no edema, warm distally      Assessment/Plan:       ICD-10-CM ICD-9-CM    1. Fatigue, unspecified type R53.83 780.79    2. Gastroesophageal reflux disease without esophagitis K21.9 530.81 famotidine (PEPCID) 20 mg tablet   3. Essential hypertension I10 401.9 metoprolol succinate (TOPROL-XL) 50 mg XL tablet   4. Postmenopausal Z78.0 V49.81 DEXA BONE DENSITY STUDY AXIAL   5. Osteopenia, unspecified location M85.80 733.90 DEXA BONE DENSITY STUDY AXIAL   6. Urinary frequency R35.0 788.41        Follow-up Disposition:  Return in about 6 months (around 10/10/2018) for follow up on HTN.     Gabriel Henderson MD

## 2018-04-10 NOTE — PROGRESS NOTES
Reviewed record in preparation for visit and have obtained necessary documentation. Identified pt with two pt identifiers(name and ). Chief Complaint   Patient presents with    Medication Evaluation     Pt non fasting       Health Maintenance Due   Topic Date Due    Glaucoma Screening   2003    Bone Mineral Density   2003       Ms. Danisha Live has a reminder for a \"due or due soon\" health maintenance. I have asked that she discuss this further with her primary care provider for follow-up on this health maintenance. Coordination of Care Questionnaire:  :     1) Have you been to an emergency room, urgent care clinic since your last visit? no   Hospitalized since your last visit? no             2) Have you seen or consulted any other health care providers outside of [address] [place] [address] since your last visit? no  (Include any pap smears or colon screenings in this section.)    3) In the event something were to happen to you and you were unable to speak on your behalf, do you have an Advance Directive/ Living Will in place stating your wishes? NO    Do you have an Advance Directive on file? no    4) Are you interested in receiving information on Advance Directives? NO    Patient is accompanied by self I have received verbal consent from Jessica Adam to discuss any/all medical information while they are present in the room.

## 2018-04-10 NOTE — MR AVS SNAPSHOT
[address] 
[phone] Patient: Ruby Wolfe MRN: [id number] [id number] Visit Information Date & Time Provider Department Dept. Phone Encounter #  
 4/10/2018 10:00 AM Sanjay Khalil, [address] [facility] [address] [phone] [id number] Follow-up Instructions Return in about 6 months (around 10/10/2018) for follow up on HTN. Upcoming Health Maintenance Date Due  
 GLAUCOMA SCREENING Q2Y 3/18/2003 Bone Densitometry (Dexa) Screening 3/18/2003 MEDICARE YEARLY EXAM 10/7/2018 DTaP/Tdap/Td series (2 - Td) 9/4/2025 Allergies as of 4/10/2018  Review Complete On: 4/10/2018 By: Sanjay Khalil MD  
  
 Severity Noted Reaction Type Reactions Aleve [Naproxen Sodium]  07/25/2017    Other (comments) Renal issues Ibuprofen  07/06/2017    Unknown (comments) Lisinopril  01/23/2017   Intolerance Other (comments) Renal dysfunction Omeprazole  07/26/2017    Other (comments) Cause Kidney failure Current Immunizations  Reviewed on 12/13/2017 Name Date Influenza Vaccine 10/10/2017, 10/24/2016 Influenza Vaccine (Quad) PF 10/24/2016 10:25 AM  
 Pneumococcal Conjugate (PCV-13) 9/16/2015 Pneumococcal Polysaccharide (PPSV-23) 1/1/2007 Tdap 9/4/2015  1:01 PM  
 Zoster Vaccine, Live 1/1/2013 Not reviewed this visit You Were Diagnosed With   
  
 Codes Comments Fatigue, unspecified type    -  Primary ICD-10-CM: R53.83 ICD-9-CM: 780.79 Gastroesophageal reflux disease without esophagitis     ICD-10-CM: K21.9 ICD-9-CM: 530.81 Essential hypertension     ICD-10-CM: I10 
ICD-9-CM: 401.9 Postmenopausal     ICD-10-CM: Z78.0 ICD-9-CM: V49.81 Osteopenia, unspecified location     ICD-10-CM: M85.80 ICD-9-CM: 733.90 Urinary frequency     ICD-10-CM: R35.0 ICD-9-CM: 788.41 Vitals BP Pulse Temp Resp Height(growth percentile) Weight(growth percentile) 142/70 66 97.7 °F (36.5 °C) (Oral) 16 4' 11\" (1.499 m) 98 lb 9.6 oz (44.7 kg) LMP SpO2 BMI OB Status Smoking Status (LMP Unknown) 97% 19.91 kg/m2 Hysterectomy Former Smoker Vitals History BMI and BSA Data Body Mass Index Body Surface Area  
 19.91 kg/m 2 1.36 m 2 Preferred Pharmacy Pharmacy Name Phone [facility] [address] [place], [place] [address] [facility] [address] [phone] Your Updated Medication List  
  
   
This list is accurate as of 4/10/18 11:37 AM.  Always use your most recent med list.  
  
  
  
  
 aspirin, buffered 81 mg Tab Take 1 Tab by mouth daily. famotidine 20 mg tablet Commonly known as:  PEPCID Take 1 Tab by mouth two (2) times a day. Indications: gastroesophageal reflux disease  
  
 latanoprost 0.005 % ophthalmic solution Commonly known as:  Connorssteve Layton Administer 1 Drop to both eyes daily. metoprolol succinate 50 mg XL tablet Commonly known as:  TOPROL-XL Take 1 Tab by mouth daily. MIRALAX 17 gram/dose powder Generic drug:  polyethylene glycol Take 17 g by mouth daily. timolol 0.5 % ophthalmic solution Commonly known as:  TIMOPTIC  
  
 TYLENOL PO Take  by mouth. Prescriptions Sent to Pharmacy Refills  
 famotidine (PEPCID) 20 mg tablet 3 Sig: Take 1 Tab by mouth two (2) times a day. Indications: gastroesophageal reflux disease Class: Normal  
 Pharmacy: [address] [place] [address] Ph #: [phone] Route: Oral  
 metoprolol succinate (TOPROL-XL) 50 mg XL tablet 3 Sig: Take 1 Tab by mouth daily. Class: Normal  
 Pharmacy: [address] [place] [address] Ph #: [phone] Route: Oral  
  
Follow-up Instructions Return in about 6 months (around 10/10/2018) for follow up on HTN. To-Do List   
 04/10/2018 Imaging:  DEXA BONE DENSITY STUDY AXIAL Patient Instructions TRY THE BENADRYL 25MG AT BEDTIME Introducing [facility]! Dear Cecilia Ragland: Thank you for requesting a [employer] account. Our records indicate that you already have an active [employer] account. You can access your account anytime at [email or web address]. [email or web address] Did you know that you can access your hospital and ER discharge instructions at any time in [employer]? You can also review all of your test results from your hospital stay or ER visit. Additional Information If you have questions, please visit the Frequently Asked Questions section of the [employer] website at [email or web address] Remember, [employer] is NOT to be used for urgent needs. For medical emergencies, dial 911. Now available from your iPhone and Android! Please provide this summary of care documentation to your next provider. Your primary care clinician is listed as Luis Miguel Weir. If you have any questions after today's visit, please call [phone].

## 2018-06-11 ENCOUNTER — HOSPITAL ENCOUNTER (OUTPATIENT)
Dept: MAMMOGRAPHY | Age: 78
Discharge: HOME | End: 2018-06-11
Attending: FAMILY MEDICINE
Payer: MEDICARE

## 2018-06-11 DIAGNOSIS — Z12.31 VISIT FOR SCREENING MAMMOGRAM: ICD-10-CM

## 2018-06-11 PROCEDURE — 77067 SCR MAMMO BI INCL CAD: CPT

## 2018-08-16 ENCOUNTER — HOSPITAL ENCOUNTER (OUTPATIENT)
Dept: GENERAL RADIOLOGY | Age: 78
Discharge: HOME | End: 2018-08-16
Payer: MEDICARE

## 2018-08-16 DIAGNOSIS — M85.60 CYST OF BONE: ICD-10-CM

## 2018-08-16 PROCEDURE — 73110 X-RAY EXAM OF WRIST: CPT

## 2018-09-26 ENCOUNTER — TELEPHONE (OUTPATIENT)
Dept: INTERNAL MEDICINE CLINIC | Age: 78
End: 2018-09-26

## 2018-09-26 NOTE — TELEPHONE ENCOUNTER
MD Velma Gil LPN        Caller: Unspecified (Today,  9:41 AM)                     Last labs normal in March. It is unlikely the calcium is going up.  Recommend staying active, use tylenol as needed. Keep 10/10 appt. Let us know if worsens. [employer] message sent to patient with the above message from Dr. Annamarie Whalen.

## 2018-09-26 NOTE — TELEPHONE ENCOUNTER
----- Message from Radha Seay sent at 9/25/2018  6:20 PM EDT -----  Regarding: Non-Urgent Medical Question  Contact: [phone]  Dr. Avendaño January:  I have been having pains in my legs for the past couple of weeks or more. I have been thinking maybe just arthritis, but it seems more like muscle pain. The pains are in the backs of my legs and sometimes the front. Sometimes it feels like the pain I had when my calcium was so high. I have my routine care appointment with you on Oct. 10th. Do you think I need to see you earlier or just wait until the appointment. The Tylenol helps a tiny bit. Thanks for listening to me and  your advice.   Marely Ro

## 2018-10-10 ENCOUNTER — OFFICE VISIT (OUTPATIENT)
Dept: INTERNAL MEDICINE CLINIC | Age: 78
End: 2018-10-10

## 2018-10-10 VITALS
HEART RATE: 71 BPM | DIASTOLIC BLOOD PRESSURE: 73 MMHG | TEMPERATURE: 97.6 F | BODY MASS INDEX: 20.96 KG/M2 | OXYGEN SATURATION: 99 % | SYSTOLIC BLOOD PRESSURE: 151 MMHG | WEIGHT: 104 LBS | RESPIRATION RATE: 16 BRPM | HEIGHT: 59 IN

## 2018-10-10 DIAGNOSIS — M11.20 PSEUDOGOUT: ICD-10-CM

## 2018-10-10 DIAGNOSIS — Z23 ENCOUNTER FOR IMMUNIZATION: ICD-10-CM

## 2018-10-10 DIAGNOSIS — I10 ESSENTIAL HYPERTENSION: Primary | ICD-10-CM

## 2018-10-10 DIAGNOSIS — K21.9 GASTROESOPHAGEAL REFLUX DISEASE WITHOUT ESOPHAGITIS: ICD-10-CM

## 2018-10-10 DIAGNOSIS — Z00.00 MEDICARE ANNUAL WELLNESS VISIT, SUBSEQUENT: ICD-10-CM

## 2018-10-10 RX ORDER — COLCHICINE 0.6 MG/1
TABLET, FILM COATED ORAL
COMMUNITY
Start: 2018-09-05 | End: 2018-10-10

## 2018-10-10 RX ORDER — FAMOTIDINE 20 MG/1
20 TABLET, FILM COATED ORAL 2 TIMES DAILY
Qty: 180 TAB | Refills: 3 | Status: SHIPPED | OUTPATIENT
Start: 2018-10-10 | End: 2019-10-11 | Stop reason: SDUPTHER

## 2018-10-10 RX ORDER — METOPROLOL SUCCINATE 50 MG/1
50 TABLET, EXTENDED RELEASE ORAL DAILY
Qty: 90 TAB | Refills: 3 | Status: SHIPPED | OUTPATIENT
Start: 2018-10-10 | End: 2019-10-11 | Stop reason: SDUPTHER

## 2018-10-10 NOTE — PROGRESS NOTES
This is the Subsequent Medicare Annual Wellness Exam, performed 12 months or more after the Initial AWV or the last Subsequent AWV    I have reviewed the patient's medical history in detail and updated the computerized patient record. History     Past Medical History:   Diagnosis Date    AIN (acute interstitial nephritis)     Arthritis     CKD (chronic kidney disease) stage 3, GFR 30-59 ml/min ([facility])     Gastrointestinal disorder     GERD    GERD (gastroesophageal reflux disease)     Glaucoma     High cholesterol     Hypertension     Hypertension     Ill-defined condition     chronic cystitis    Kidney failure 10/14/2016      Past Surgical History:   Procedure Laterality Date    HX GYN          HX GYN      HX HYSTERECTOMY      HX TONSILLECTOMY      HX UROLOGICAL      cauterization of bladder ulcers     Current Outpatient Prescriptions   Medication Sig Dispense Refill    famotidine (PEPCID) 20 mg tablet Take 1 Tab by mouth two (2) times a day. Indications: gastroesophageal reflux disease 180 Tab 3    metoprolol succinate (TOPROL-XL) 50 mg XL tablet Take 1 Tab by mouth daily. 90 Tab 3    polyethylene glycol (MIRALAX) 17 gram/dose powder Take 17 g by mouth daily.  timolol (TIMOPTIC) 0.5 % ophthalmic solution       ACETAMINOPHEN (TYLENOL PO) Take  by mouth.  latanoprost (XALATAN) 0.005 % ophthalmic solution Administer 1 Drop to both eyes daily.  Aspirin, Buffered 81 mg tab Take 1 Tab by mouth daily.       COLCRYS 0.6 mg tablet        Allergies   Allergen Reactions    Aleve [Naproxen Sodium] Other (comments)     Renal issues    Ibuprofen Unknown (comments)    Lisinopril Other (comments)     Renal dysfunction    Omeprazole Other (comments)     Cause Kidney failure     Family History   Problem Relation Age of Onset    Cancer Father     Cancer Brother      Social History   Substance Use Topics    Smoking status: Former Smoker     Packs/day: 0.50     Years: 4.00 Quit date: 7/29/1997    Smokeless tobacco: Former User    Alcohol use Yes      Comment: seldom     Patient Active Problem List   Diagnosis Code    Hyperlipidemia E78.5    Glaucoma H40.9    Gastroesophageal reflux disease without esophagitis K21.9    Osteopenia M85.80    Family history of colon cancer Z80.0    Anemia D64.9    Essential hypertension I10    ARF (acute renal failure) ([facility] [address] N17.9    Chronic interstitial cystitis N30.10    Diarrhea R19.7    Acute cystitis without hematuria N30.00    Interstitial nephritis N12    Hypercalcemia E83.52    Erosive osteoarthritis of both hands M15.4       Depression Risk Factor Screening:     PHQ over the last two weeks 10/10/2018   PHQ Not Done -   Little interest or pleasure in doing things Not at all   Feeling down, depressed, irritable, or hopeless Not at all   Total Score PHQ 2 0     Alcohol Risk Factor Screening: You do not drink alcohol or very rarely. Functional Ability and Level of Safety:   Hearing Loss  Hearing is good. Activities of Daily Living  The home contains: no safety equipment. Patient does total self care    Fall Risk  Fall Risk Assessment, last 12 mths 10/10/2018   Able to walk? Yes   Fall in past 12 months?  No   Fall with injury? -   Number of falls in past 12 months -   Fall Risk Score -       Abuse Screen  Patient is not abused    Cognitive Screening   Evaluation of Cognitive Function:  Has your family/caregiver stated any concerns about your memory: no  Normal    Patient Care Team   Patient Care Team:  Shakeel Quinonez MD as PCP - General (Internal Medicine)  Oswald Lin MD as Consulting Provider (Cardiology)  Radha Meyer MD as Consulting Provider (Urology)  Justino Padron MD as Consulting Provider (Ophthalmology)  Lor Kumar MD as Consulting Provider (Nephrology)    Assessment/Plan   Education and counseling provided:  Are appropriate based on today's review and evaluation    Diagnoses and all orders for this visit:    1. Essential hypertension  -     metoprolol succinate (TOPROL-XL) 50 mg XL tablet; Take 1 Tab by mouth daily. 2. Gastroesophageal reflux disease without esophagitis  -     famotidine (PEPCID) 20 mg tablet; Take 1 Tab by mouth two (2) times a day. Indications: gastroesophageal reflux disease    3. Encounter for immunization  -     Influenza Vaccine Inactivated (IIV)(FLUAD), Subunit, Adjuvanted, IM, (21146)  -     Administration fee () for Medicare insured patients    4.  Medicare annual wellness visit, subsequent        Health Maintenance Due   Topic Date Due    Shingrix Vaccine Age 49> (1 of 2) 03/18/1988    Bone Densitometry (Dexa) Screening  03/18/2003    Influenza Age 5 to Adult  08/01/2018    MEDICARE YEARLY EXAM  10/07/2018

## 2018-10-10 NOTE — PROGRESS NOTES
Reviewed record in preparation for visit and have obtained necessary documentation. Identified pt with two pt identifiers(name and ). Chief Complaint   Patient presents with    Hypertension     6 month follow up    Medication Evaluation     pt fasting    Leg Pain     states bilateral leg 4/10 sharp pain    Immunization/Injection     wants flu shot senior       Health Maintenance Due   Topic Date Due    Shingles Vaccine (1 of 2) 1988    Bone Mineral Density   2003    Flu Vaccine  2018    Annual Well Visit  10/07/2018       Ms. Abran Velez has a reminder for a \"due or due soon\" health maintenance. I have asked that she discuss this further with her primary care provider for follow-up on this health maintenance. Coordination of Care Questionnaire:  :     1) Have you been to an emergency room, urgent care clinic since your last visit? no   Hospitalized since your last visit? no             2) Have you seen or consulted any other health care providers outside of [address] [place] [address] since your last visit? no  (Include any pap smears or colon screenings in this section.)    3) In the event something were to happen to you and you were unable to speak on your behalf, do you have an Advance Directive/ Living Will in place stating your wishes? NO    Do you have an Advance Directive on file? no    4) Are you interested in receiving information on Advance Directives? NO    Patient is accompanied by self I have received verbal consent from Marie Jo to discuss any/all medical information while they are present in the room.

## 2018-10-10 NOTE — PATIENT INSTRUCTIONS
Medicare Wellness Visit, Female     The best way to live healthy is to have a lifestyle where you eat a well-balanced diet, exercise regularly, limit alcohol use, and quit all forms of tobacco/nicotine, if applicable. Regular preventive services are another way to keep healthy. Preventive services (vaccines, screening tests, monitoring & exams) can help personalize your care plan, which helps you manage your own care. Screening tests can find health problems at the earliest stages, when they are easiest to treat. [facility] follows the current, evidence-based guidelines published by the [facility] Lester Cornelio ([facility]) when recommending preventive services for our patients. Because we follow these guidelines, sometimes recommendations change over time as research supports it. (For example, mammograms used to be recommended annually. Even though Medicare will still pay for an annual mammogram, the newer guidelines recommend a mammogram every two years for women of average risk.)  Of course, you and your doctor may decide to screen more often for some diseases, based on your risk and your health status. Preventive services for you include:  - Medicare offers their members a free annual wellness visit, which is time for you and your primary care provider to discuss and plan for your preventive service needs. Take advantage of this benefit every year!  -All adults over the age of 72 should receive the recommended pneumonia vaccines. Current USPSTF guidelines recommend a series of two vaccines for the best pneumonia protection.   -All adults should have a flu vaccine yearly and a tetanus vaccine every 10 years. All adults age 61 and older should receive a shingles vaccine once in their lifetime.    -A bone mass density test is recommended when a woman turns 65 to screen for osteoporosis. This test is only recommended one time, as a screening.  Some providers will use this same test as a disease monitoring tool if you already have osteoporosis. -All adults age 38-68 who are overweight should have a diabetes screening test once every three years.   -Other screening tests and preventive services for persons with diabetes include: an eye exam to screen for diabetic retinopathy, a kidney function test, a foot exam, and stricter control over your cholesterol.   -Cardiovascular screening for adults with routine risk involves an electrocardiogram (ECG) at intervals determined by your doctor.   -Colorectal cancer screenings should be done for adults age 54-65 with no increased risk factors for colorectal cancer. There are a number of acceptable methods of screening for this type of cancer. Each test has its own benefits and drawbacks. Discuss with your doctor what is most appropriate for you during your annual wellness visit. The different tests include: colonoscopy (considered the best screening method), a fecal occult blood test, a fecal DNA test, and sigmoidoscopy. -Breast cancer screenings are recommended every other year for women of normal risk, age 54-69.  -Cervical cancer screenings for women over age 72 are only recommended with certain risk factors.   -All adults born between [facility] should be screened once for Hepatitis C.      Here is a list of your current Health Maintenance items (your personalized list of preventive services) with a due date:  Health Maintenance Due   Topic Date Due    Shingles Vaccine (1 of 2) 03/18/1988

## 2018-10-10 NOTE — MR AVS SNAPSHOT
[address] [facility] [address] [facility] 83. 
[phone] Patient: Cody Patel MRN: [id number] FAO:[id number] Visit Information Date & Time Provider Department Dept. Phone Encounter #  
 10/10/2018 10:00 AM Shakeel Quinonez, [address] [place] [address],[address] [phone] [id number] Follow-up Instructions Return in about 6 months (around 4/10/2019) for follow up on medications. Upcoming Health Maintenance Date Due Shingrix Vaccine Age 50> (1 of 2) 3/18/1988 Influenza Age 5 to Adult 8/1/2018 Bone Densitometry (Dexa) Screening 12/1/2018* MEDICARE YEARLY EXAM 10/11/2019 GLAUCOMA SCREENING Q2Y 11/21/2019 DTaP/Tdap/Td series (2 - Td) 9/4/2025 *Topic was postponed. The date shown is not the original due date. Allergies as of 10/10/2018  Review Complete On: 10/10/2018 By: Shakeel Quinonez MD  
  
 Severity Noted Reaction Type Reactions Aleve [Naproxen Sodium]  07/25/2017    Other (comments) Renal issues Ibuprofen  07/06/2017    Unknown (comments) Lisinopril  01/23/2017   Intolerance Other (comments) Renal dysfunction Omeprazole  07/26/2017    Other (comments) Cause Kidney failure Current Immunizations  Reviewed on 10/10/2018 Name Date Influenza Vaccine 10/10/2017, 10/24/2016 Influenza Vaccine (Quad) PF 10/24/2016 10:25 AM  
 Influenza Vaccine (Tri) Adjuvanted 10/10/2018 Pneumococcal Conjugate (PCV-13) 9/16/2015 Pneumococcal Polysaccharide (PPSV-23) 1/1/2007 Tdap 9/4/2015  1:01 PM  
 Zoster Vaccine, Live 1/1/2013 Reviewed by Shakeel Quinonez MD on 10/10/2018 at 10:38 AM  
You Were Diagnosed With   
  
 Codes Comments Essential hypertension    -  Primary ICD-10-CM: I10 
ICD-9-CM: 401.9 Gastroesophageal reflux disease without esophagitis     ICD-10-CM: K21.9 ICD-9-CM: 530.81 Encounter for immunization     ICD-10-CM: P95 ICD-9-CM: V03.89   
 Medicare annual wellness visit, subsequent     ICD-10-CM: Z00.00 ICD-9-CM: V70.0 Pseudogout     ICD-10-CM: M11.20 ICD-9-CM: 275.49, 712.20 Vitals BP Pulse Temp Resp Height(growth percentile) Weight(growth percentile) 151/73 (BP 1 Location: Left arm, BP Patient Position: Sitting) 71 97.6 °F (36.4 °C) (Oral) 16 4' 11\" (1.499 m) 104 lb (47.2 kg) LMP SpO2 BMI OB Status Smoking Status (LMP Unknown) 99% 21.01 kg/m2 Hysterectomy Former Smoker BMI and BSA Data Body Mass Index Body Surface Area 21.01 kg/m 2 1.4 m 2 Preferred Pharmacy Pharmacy Name Phone Kelly Baig [address] [place] [address] [address] [phone] Your Updated Medication List  
  
   
This list is accurate as of 10/10/18 10:44 AM.  Always use your most recent med list.  
  
  
  
  
 aspirin, buffered 81 mg Tab Take 1 Tab by mouth daily. famotidine 20 mg tablet Commonly known as:  PEPCID Take 1 Tab by mouth two (2) times a day. Indications: gastroesophageal reflux disease  
  
 latanoprost 0.005 % ophthalmic solution Commonly known as:  Maryann Rash Administer 1 Drop to both eyes daily. metoprolol succinate 50 mg XL tablet Commonly known as:  TOPROL-XL Take 1 Tab by mouth daily. MIRALAX 17 gram/dose powder Generic drug:  polyethylene glycol Take 17 g by mouth daily. timolol 0.5 % ophthalmic solution Commonly known as:  TIMOPTIC  
  
 TYLENOL PO Take  by mouth.  
  
 varicella-zoster recombinant (PF) 50 mcg/0.5 mL Susr injection Commonly known as:  SHINGRIX  
0.5 mL by IntraMUSCular route once for 1 dose. Prescriptions Printed Refills  
 varicella-zoster recombinant, PF, (SHINGRIX) 50 mcg/0.5 mL susr injection 1 Si.5 mL by IntraMUSCular route once for 1 dose. Class: Print Route: IntraMUSCular Prescriptions Sent to Pharmacy  Refills  
 famotidine (PEPCID) 20 mg tablet 3  
 Sig: Take 1 Tab by mouth two (2) times a day. Indications: gastroesophageal reflux disease Class: Normal  
 Pharmacy: [address] [place] [address], [address] [place] [address] Ph #: [phone] Route: Oral  
 metoprolol succinate (TOPROL-XL) 50 mg XL tablet 3 Sig: Take 1 Tab by mouth daily. Class: Normal  
 Pharmacy: [address] [place] [address], [address] [place] [address] Ph #: [phone] Route: Oral  
  
We Performed the Following ADMIN INFLUENZA VIRUS VAC [ [facility]] INFLUENZA VACCINE INACTIVATED (IIV), SUBUNIT, ADJUVANTED, IM [id number] CPT(R)] Follow-up Instructions Return in about 6 months (around 4/10/2019) for follow up on medications. Patient Instructions Medicare Wellness Visit, Female The best way to live healthy is to have a lifestyle where you eat a well-balanced diet, exercise regularly, limit alcohol use, and quit all forms of tobacco/nicotine, if applicable. Regular preventive services are another way to keep healthy. Preventive services (vaccines, screening tests, monitoring & exams) can help personalize your care plan, which helps you manage your own care. Screening tests can find health problems at the earliest stages, when they are easiest to treat. [facility] follows the current, evidence-based guidelines published by the [place] (USPSTF) when recommending preventive services for our patients. Because we follow these guidelines, sometimes recommendations change over time as research supports it. (For example, mammograms used to be recommended annually. Even though Medicare will still pay for an annual mammogram, the newer guidelines recommend a mammogram every two years for women of average risk.) Of course, you and your doctor may decide to screen more often for some diseases, based on your risk and your health status. Preventive services for you include: - Medicare offers their members a free annual wellness visit, which is time for you and your primary care provider to discuss and plan for your preventive service needs. Take advantage of this benefit every year! 
-All adults over the age of 72 should receive the recommended pneumonia vaccines. Current USPSTF guidelines recommend a series of two vaccines for the best pneumonia protection.  
-All adults should have a flu vaccine yearly and a tetanus vaccine every 10 years. All adults age 61 and older should receive a shingles vaccine once in their lifetime.   
-A bone mass density test is recommended when a woman turns 65 to screen for osteoporosis. This test is only recommended one time, as a screening. Some providers will use this same test as a disease monitoring tool if you already have osteoporosis. -All adults age 38-68 who are overweight should have a diabetes screening test once every three years.  
-Other screening tests and preventive services for persons with diabetes include: an eye exam to screen for diabetic retinopathy, a kidney function test, a foot exam, and stricter control over your cholesterol.  
-Cardiovascular screening for adults with routine risk involves an electrocardiogram (ECG) at intervals determined by your doctor.  
-Colorectal cancer screenings should be done for adults age 54-65 with no increased risk factors for colorectal cancer. There are a number of acceptable methods of screening for this type of cancer. Each test has its own benefits and drawbacks. Discuss with your doctor what is most appropriate for you during your annual wellness visit. The different tests include: colonoscopy (considered the best screening method), a fecal occult blood test, a fecal DNA test, and sigmoidoscopy. -Breast cancer screenings are recommended every other year for women of normal risk, age 54-69. 
-Cervical cancer screenings for women over age 72 are only recommended with certain risk factors. -All adults born between [facility] should be screened once for Hepatitis C. Here is a list of your current Health Maintenance items (your personalized list of preventive services) with a due date: 
Health Maintenance Due Topic Date Due  Shingles Vaccine (1 of 2) 03/18/1988  Flu Vaccine  08/01/2018 [facility]! Dear Fernando Horvath: Thank you for requesting a [employer] account. Our records indicate that you already have an active [employer] account. You can access your account anytime at [email or web address] Did you know that you can access your hospital and ER discharge instructions at any time in [employer]? You can also review all of your test results from your hospital stay or ER visit. Additional Information If you have questions, please visit the Frequently Asked Questions section of the [employer] website at [email or web address] Remember, [employer] is NOT to be used for urgent needs. For medical emergencies, dial 911. Now available from your iPhone and Android! Please provide this summary of care documentation to your next provider. Your primary care clinician is listed as Tang Araujo. If you have any questions after today's visit, please call [phone].

## 2018-11-01 ENCOUNTER — HOSPITAL ENCOUNTER (OUTPATIENT)
Dept: LAB | Age: 78
Discharge: HOME | End: 2018-11-01
Payer: MEDICARE

## 2018-11-01 ENCOUNTER — APPOINTMENT (OUTPATIENT)
Dept: INTERNAL MEDICINE CLINIC | Age: 78
End: 2018-11-01

## 2018-11-01 DIAGNOSIS — M79.10 MUSCLE PAIN: ICD-10-CM

## 2018-11-01 DIAGNOSIS — E55.9 VITAMIN D DEFICIENCY: ICD-10-CM

## 2018-11-01 PROCEDURE — 82306 VITAMIN D 25 HYDROXY: CPT

## 2018-11-01 PROCEDURE — 85027 COMPLETE CBC AUTOMATED: CPT

## 2018-11-01 PROCEDURE — 83735 ASSAY OF MAGNESIUM: CPT

## 2018-11-01 PROCEDURE — 80053 COMPREHEN METABOLIC PANEL: CPT

## 2018-11-01 PROCEDURE — 84443 ASSAY THYROID STIM HORMONE: CPT

## 2018-11-02 LAB
25(OH)D3+25(OH)D2 SERPL-MCNC: 23.8 NG/ML (ref 30–100)
ALBUMIN SERPL-MCNC: 4.2 G/DL (ref 3.5–4.7)
ALBUMIN/GLOB SERPL: 1.6 {RATIO} (ref 1.2–2.2)
ALP SERPL-CCNC: 64 IU/L (ref 39–117)
ALT SERPL-CCNC: 6 IU/L (ref 0–32)
AST SERPL-CCNC: 11 IU/L (ref 0–40)
BILIRUB SERPL-MCNC: 0.3 MG/DL (ref 0–1.2)
BUN SERPL-MCNC: 26 MG/DL (ref 8–27)
BUN/CREAT SERPL: 22 (ref 12–28)
CALCIUM SERPL-MCNC: 9.2 MG/DL (ref 8.7–10.3)
CHLORIDE SERPL-SCNC: 107 MMOL/L (ref 96–106)
CO2 SERPL-SCNC: 16 MMOL/L (ref 20–29)
CREAT SERPL-MCNC: 1.16 MG/DL (ref 0.57–1)
ERYTHROCYTE [DISTWIDTH] IN BLOOD BY AUTOMATED COUNT: 15.8 % (ref 12.3–15.4)
GLOBULIN SER CALC-MCNC: 2.7 G/DL (ref 1.5–4.5)
GLUCOSE SERPL-MCNC: 90 MG/DL (ref 65–99)
HCT VFR BLD AUTO: 34.5 % (ref 34–46.6)
HGB BLD-MCNC: 11.1 G/DL (ref 11.1–15.9)
MAGNESIUM SERPL-MCNC: 2 MG/DL (ref 1.6–2.3)
MCH RBC QN AUTO: 29.5 PG (ref 26.6–33)
MCHC RBC AUTO-ENTMCNC: 32.2 G/DL (ref 31.5–35.7)
MCV RBC AUTO: 92 FL (ref 79–97)
PLATELET # BLD AUTO: 363 X10E3/UL (ref 150–379)
POTASSIUM SERPL-SCNC: 5 MMOL/L (ref 3.5–5.2)
PROT SERPL-MCNC: 6.9 G/DL (ref 6–8.5)
RBC # BLD AUTO: 3.76 X10E6/UL (ref 3.77–5.28)
SODIUM SERPL-SCNC: 141 MMOL/L (ref 134–144)
TSH SERPL DL<=0.005 MIU/L-ACNC: 2.71 UIU/ML (ref 0.45–4.5)
WBC # BLD AUTO: 8.7 X10E3/UL (ref 3.4–10.8)

## 2018-11-08 ENCOUNTER — TELEPHONE (OUTPATIENT)
Dept: INTERNAL MEDICINE CLINIC | Age: 78
End: 2018-11-08

## 2018-11-08 NOTE — TELEPHONE ENCOUNTER
MD Liv Tejada LPN   Caller: Unspecified (Today, 11:03 AM)             Notify patient labs show kidney test is very slightly out of range, recommend drinking 4-6 glasses of water a day and avoid NSAIDS, which she should already be doing.  Not concerning. Vitamin D3 2,000 iu daily.  Thyroid is normal. She is mildly anemic, not new. Spoke with patient. Two pt identifiers confirmed. Patient notified of the above message from Dr. Luis Antonio Kapadia. Pt verbalized understanding of information discussed w/ no further questions at this time.

## 2018-11-08 NOTE — TELEPHONE ENCOUNTER
#[phone] pt states after looking at her lab results do you have anything to discuss with her about leg and arm pain? Please call pt she ask.

## 2018-11-16 ENCOUNTER — APPOINTMENT (OUTPATIENT)
Dept: GENERAL RADIOLOGY | Age: 78
End: 2018-11-16
Attending: PHYSICIAN ASSISTANT
Payer: MEDICARE

## 2018-11-16 ENCOUNTER — APPOINTMENT (OUTPATIENT)
Dept: ULTRASOUND IMAGING | Age: 78
End: 2018-11-16
Attending: PHYSICIAN ASSISTANT
Payer: MEDICARE

## 2018-11-16 ENCOUNTER — HOSPITAL ENCOUNTER (EMERGENCY)
Age: 78
Discharge: HOME | End: 2018-11-16
Attending: EMERGENCY MEDICINE
Payer: MEDICARE

## 2018-11-16 VITALS
WEIGHT: 101.85 LBS | TEMPERATURE: 97.7 F | HEIGHT: 58 IN | DIASTOLIC BLOOD PRESSURE: 77 MMHG | BODY MASS INDEX: 21.38 KG/M2 | HEART RATE: 68 BPM | OXYGEN SATURATION: 100 % | RESPIRATION RATE: 16 BRPM | SYSTOLIC BLOOD PRESSURE: 173 MMHG

## 2018-11-16 DIAGNOSIS — M71.21 BAKER'S CYST, RIGHT: ICD-10-CM

## 2018-11-16 DIAGNOSIS — M43.16 SPONDYLOLISTHESIS AT L3-L4 LEVEL: Primary | ICD-10-CM

## 2018-11-16 LAB
ANION GAP SERPL CALC-SCNC: 11 MMOL/L (ref 5–15)
BUN SERPL-MCNC: 27 MG/DL (ref 6–20)
BUN/CREAT SERPL: 26 (ref 12–20)
CALCIUM SERPL-MCNC: 9 MG/DL (ref 8.5–10.1)
CHLORIDE SERPL-SCNC: 108 MMOL/L (ref 97–108)
CO2 SERPL-SCNC: 20 MMOL/L (ref 21–32)
CREAT SERPL-MCNC: 1.03 MG/DL (ref 0.55–1.02)
GLUCOSE SERPL-MCNC: 104 MG/DL (ref 65–100)
POTASSIUM SERPL-SCNC: 4.2 MMOL/L (ref 3.5–5.1)
SODIUM SERPL-SCNC: 139 MMOL/L (ref 136–145)

## 2018-11-16 PROCEDURE — 80048 BASIC METABOLIC PNL TOTAL CA: CPT

## 2018-11-16 PROCEDURE — 99282 EMERGENCY DEPT VISIT SF MDM: CPT

## 2018-11-16 PROCEDURE — 72100 X-RAY EXAM L-S SPINE 2/3 VWS: CPT

## 2018-11-16 PROCEDURE — 73562 X-RAY EXAM OF KNEE 3: CPT

## 2018-11-16 PROCEDURE — 93971 EXTREMITY STUDY: CPT

## 2018-11-16 PROCEDURE — 36415 COLL VENOUS BLD VENIPUNCTURE: CPT

## 2018-11-16 RX ORDER — PREDNISONE 10 MG/1
TABLET ORAL
Qty: 21 TAB | Refills: 0 | Status: SHIPPED | OUTPATIENT
Start: 2018-11-16 | End: 2018-11-16

## 2018-11-16 NOTE — DISCHARGE INSTRUCTIONS
Baker's Cyst: Care Instructions  Your Care Instructions    A Baker's cyst is a swelling behind the knee. It may cause pain or stiffness when you bend your knee or straighten it all the way. Baker's cysts are also called popliteal cysts. If you have arthritis or another condition that is the cause of the Baker's cyst, your doctor may treat that condition. A Baker's cyst may go away on its own. If not, or if it is causing a lot of discomfort, your doctor may drain the fluid that has built up behind the knee. In some cases, a Baker's cyst is removed in surgery. There are things you can do at home, such as staying off your leg, to reduce the swelling and pain. Follow-up care is a key part of your treatment and safety. Be sure to make and go to all appointments, and call your doctor if you are having problems. It's also a good idea to know your test results and keep a list of the medicines you take. How can you care for yourself at home? · Rest your knee as much as possible. · Ask your doctor if you can take an over-the-counter pain medicine, such as acetaminophen (Tylenol), ibuprofen (Advil, Motrin), or naproxen (Aleve). Be safe with medicines. Read and follow all instructions on the label. · Use a cane, a crutch, a walker, or another device if you need help to get around. These can help rest your knees. · If you have an elastic bandage, make sure it is snug but not so tight that your leg is numb, tingles, or swells below the bandage. Ask your doctor if you can loosen the bandage if it is too tight. · Follow your doctor's instructions about how much weight you can put on your knee. · Stay at a healthy weight. Being overweight puts extra strain on your knee. When should you call for help? Call 911 anytime you think you may need emergency care.  For example, call if:    · You have chest pain, are short of breath, or you cough up blood.    Call your doctor now or seek immediate medical care if:    · You have new or worse pain.     · Your foot is cool or pale or changes color.     · You have tingling, weakness, or numbness in your foot or toes.     · You have signs of a blood clot in your leg (called a deep vein thrombosis), such as:  ? Pain in your calf, back of the knee, thigh, or groin. ? Redness or swelling in your leg.    Watch closely for changes in your health, and be sure to contact your doctor if:    · You do not get better as expected. Where can you learn more? Go to http://roel-lonnie.info/. Enter K495 in the search box to learn more about \"Baker's Cyst: Care Instructions. \"  Current as of: November 29, 2017  Content Version: 11.8  © 6653-2266 [employer]. Care instructions adapted under license by [employer] (which disclaims liability or warranty for this information). If you have questions about a medical condition or this instruction, always ask your healthcare professional. [place] [address] any warranty or liability for your use of this information. Getting Back to Normal After Low Back Pain: Care Instructions  Your Care Instructions  Almost everyone has low back pain at some time. The good news is that most low back pain will go away in a few days or weeks with some basic self-care. Some people are afraid that doing too much may make their pain worse. In the past, people stayed in bed, thinking this would help their backs. Now doctors think that, in most cases, getting back to your normal activities is good for your back, as long as you avoid doing things that make your pain worse. Follow-up care is a key part of your treatment and safety. Be sure to make and go to all appointments, and call your doctor if you are having problems. It's also a good idea to know your test results and keep a list of the medicines you take. How can you care for yourself at home?   Ease back into daily activities  · For the first day or two of pain, take it easy. But as soon as possible, get back to your normal daily life and activities. · Get gentle exercise, such as walking. Movement keeps your spine flexible and helps your muscles stay strong. · If you are an athlete, return to your activity carefully. Choose a low-impact option until your pain is under control. Avoid or change activities that cause pain  · Try to avoid too much bending, heavy lifting, or reaching. These movements put extra stress on your back. · In bed, try lying on your side with a pillow between your knees. Or lie on your back on the floor with a pillow under your knees. · When you sit, place a small pillow, a rolled-up towel, or a lumbar roll in the curve of your back for extra support. · Try putting one foot up on a stool or changing positions every few minutes if you have to stand still for a period of time. Pay attention to body mechanics and posture  Body mechanics are the way you use your body. Posture is the way you sit or stand. · Take extra care when you lift. When you must lift, bend your knees and keep your back straight. Avoid twisting, and keep the load close to your body. · Stand or sit tall, with your shoulders back and your stomach pulled in to support your back. Get support when you need it  · Let people know when you need a helping hand. Get family members or friends to help out with tasks you cannot do right now. · Be honest with your doctor about how the pain affects you. · If you have had to take time off work, talk to your doctor and boss about a gradual [id number] plan. Find out if there are other ways you could do your job to avoid hurting your back again. Reduce stress  Worrying about the pain can cause you to tense the muscles in your lower back. This in turn causes more pain. Here are a few things you can do to relax your mind and your muscles:  · Take 10 to 15 minutes to sit quietly and breathe deeply. Try to focus only on your breathing.  If you cannot keep thoughts away, think about things that make you feel good. · Get involved in your favorite hobby, or try something new. · Talk to a friend, read a book, or listen to your favorite music. · Find a counselor you like and trust. Talk openly and honestly about your problems. Be willing to make some changes. When should you call for help? Call 911 anytime you think you may need emergency care. For example, call if:    · You are unable to move a leg at all.   [facility] your doctor now or seek immediate medical care if:    · You have new or worse symptoms in your legs, belly, or buttocks. Symptoms may include:  ? Numbness or tingling. ? Weakness. ? Pain.     · You lose bladder or bowel control.    Watch closely for changes in your health, and be sure to contact your doctor if:    · You have a fever, lose weight, or don't feel well.     · You are not getting better as expected. Where can you learn more? Go to http://roel-lonnie.info/. Enter F753 in the search box to learn more about \"Getting Back to Normal After Low Back Pain: Care Instructions. \"  Current as of: November 29, 2017  Content Version: 11.8  © 3339-0656 Healthwise, Inc[employer]. Care instructions adapted under license by [employer] (which disclaims liability or warranty for this information). If you have questions about a medical condition or this instruction, always ask your healthcare professional. [place] [address] any warranty or liability for your use of this information. Spondylolysis and Spondylolisthesis: Exercises  Your Care Instructions  Here are some examples of typical rehabilitation exercises for your condition. Start each exercise slowly. Ease off the exercise if you start to have pain. Your doctor or physical therapist will tell you when you can start these exercises and which ones will work best for you. How to do the exercises  Single knee-to-chest    1.  Lie on your back with your knees bent and your feet flat on the floor. You can put a small pillow under your head and neck if it is more comfortable. 2. Bring one knee to your chest, keeping the other foot flat on the floor. 3. Keep your lower back pressed to the floor. Hold for 15 to 30 seconds. 4. Relax, and lower the knee to the starting position. 5. Repeat with the other leg. Repeat 2 to 4 times with each leg. 6. To get more stretch, put your other leg flat on the floor while pulling your knee to your chest.    Double knee-to-chest    1. Lie on your back with your knees bent and your feet flat on the floor. You can put a small pillow under your head and neck if it is more comfortable. 2. Bring both knees to your chest.  3. Keep your lower back pressed to the floor. Hold for 15 to 30 seconds. 4. Relax, and lower your knees to the starting position. 5. Repeat 2 to 4 times. Alternate arm and leg (bird dog) exercise    1. Start on the floor, on your hands and knees. 2. Tighten your belly muscles by pulling your belly button in toward your spine. Be sure you continue to breathe normally and do not hold your breath. 3. Raise one arm off the floor, and hold it straight out in front of you. Be careful not to let your shoulder drop down, because that will twist your trunk. 4. Hold for about 6 seconds, then lower your arm and switch to your other arm. 5. Repeat 8 to 12 times on each arm. 6. When you can do this exercise with ease and no pain, repeat steps 1 through 5. But this time do it with one leg raised off the floor, holding your leg straight out behind you. Be careful not to let your hip drop down, because that will twist your trunk. 7. When holding your leg straight out becomes easier, try raising your opposite arm at the same time, and repeat steps 1 through 5. Bridging    1. Lie on your back with both knees bent. Your knees should be bent about 90 degrees.   2. Then push your feet into the floor, squeeze your buttocks, and lift your hips off the floor until your shoulders, hips, and knees are all in a straight line. 3. Hold for about 6 seconds as you continue to breathe normally, and then slowly lower your hips back down to the floor and rest for up to 10 seconds. 4. Repeat 8 to 12 times. Curl-ups    1. Lie on the floor on your back with your knees bent at a 90-degree angle. Your feet should be flat on the floor, about 12 inches from your buttocks. 2. Cross your arms over your chest. If this bothers your neck, try putting your hands behind your neck (not your head), with your elbows spread apart. 3. Slowly tighten your belly muscles and raise your shoulder blades off the floor. 4. Keep your head in line with your body, and do not press your chin to your chest.  5. Hold this position for 1 or 2 seconds, then slowly lower yourself back down to the floor. 6. Repeat 8 to 12 times. Plank    1. Lie on your stomach, resting your upper body on your forearms. 2. Tighten your belly muscles by pulling your belly button in toward your spine. 3. Keeping your knees on the floor, press down with your forearms to lift your upper body off the floor. 4. Hold for about 6 seconds, then lower your body to the floor. Rest for up to 10 seconds. 5. Repeat 8 to 12 times. 6. Over time, work up to holding for 15 to 30 seconds each time. 7. If this exercise is easy to do with your knees on the floor, try doing this exercise with your knees and legs straight, supported by your toes on the floor. Follow-up care is a key part of your treatment and safety. Be sure to make and go to all appointments, and call your doctor if you are having problems. It's also a good idea to know your test results and keep a list of the medicines you take. Where can you learn more? Go to http://roel-lonnie.info/.   Enter [phone] in the search box to learn more about \"Spondylolysis and Spondylolisthesis: Exercises. \"  Current as of: November 29, 2017  Content Version: 11.8  © 6578-9783 Healthwise, [facility]. Care instructions adapted under license by [employer] (which disclaims liability or warranty for this information). If you have questions about a medical condition or this instruction, always ask your healthcare professional. [place] [address] any warranty or liability for your use of this information.

## 2018-11-16 NOTE — ED PROVIDER NOTES
EMERGENCY DEPARTMENT HISTORY AND PHYSICAL EXAM 
 
 
Date: 11/16/2018 Patient Name: Misael Cam History of Presenting Illness Chief Complaint Patient presents with  Back Pain  
  pt reports pain in lower back radiating down bilateral legs, worse on right, x3 weeks History Provided By: Patient HPI: Misael Cam, [de-identified] y.o. female presents to the ED with cc of low back pain that radiates down the right leg x 3 weeks. The patient notes that that pain is constant and has been progressively worsening. She denies trauma or injury but notes that she feels that her knee is cracking. She states that last night she began having calf pain on the right and is worried about at potential blood clot. She also notes a Hx of electrolyte abnormalities that have caused muscle cramps in the past that she would like checked. She has taken Tylenol at home without relief. There are no other complaints, changes, or physical findings at this time. Social Hx: Tobacco (denies), EtOH (denies), Illicit drug use (denies) PCP: Daniel Hobbs MD 
 
Current Outpatient Medications Medication Sig Dispense Refill  predniSONE (STERAPRED DS) 10 mg dose pack Standard 6 day taper 21 Tab 0  
 famotidine (PEPCID) 20 mg tablet Take 1 Tab by mouth two (2) times a day. Indications: gastroesophageal reflux disease 180 Tab 3  
 metoprolol succinate (TOPROL-XL) 50 mg XL tablet Take 1 Tab by mouth daily. 90 Tab 3  polyethylene glycol (MIRALAX) 17 gram/dose powder Take 17 g by mouth daily.  timolol (TIMOPTIC) 0.5 % ophthalmic solution  ACETAMINOPHEN (TYLENOL PO) Take  by mouth.  latanoprost (XALATAN) 0.005 % ophthalmic solution Administer 1 Drop to both eyes daily.  Aspirin, Buffered 81 mg tab Take 1 Tab by mouth daily. Past History Past Medical History: 
Past Medical History:  
Diagnosis Date  AIN (acute interstitial nephritis)  Arthritis  CKD (chronic kidney disease) stage 3, GFR 30-59 ml/min ([facility])  Gastrointestinal disorder GERD  GERD (gastroesophageal reflux disease)  Glaucoma  High cholesterol  Hypertension  Hypertension  Ill-defined condition   
 chronic cystitis  Kidney failure 10/14/2016 Past Surgical History: 
Past Surgical History:  
Procedure Laterality Date  HX GYN    
   HX GYN    
 HX HYSTERECTOMY  HX TONSILLECTOMY  HX UROLOGICAL    
 cauterization of bladder ulcers Family History: 
Family History Problem Relation Age of Onset  Cancer Father  Cancer Brother Social History: 
Social History Tobacco Use  Smoking status: Former Smoker Packs/day: 0.50 Years: 4.00 Pack years: 2.00 Last attempt to quit: 1997 Years since quittin.3  Smokeless tobacco: Former User Substance Use Topics  Alcohol use: Yes Comment: seldom  Drug use: Yes Types: Prescription, OTC Allergies: Allergies Allergen Reactions  Aleve [Naproxen Sodium] Other (comments) Renal issues  Ibuprofen Unknown (comments)  Lisinopril Other (comments) Renal dysfunction  Omeprazole Other (comments) Cause Kidney failure Review of Systems Review of Systems Constitutional: Negative for chills, diaphoresis and fever. HENT: Negative for congestion, ear pain, rhinorrhea and sore throat. Respiratory: Negative for cough and shortness of breath. Cardiovascular: Negative for chest pain. Gastrointestinal: Positive for diarrhea. Negative for abdominal pain, constipation, nausea and vomiting. Denies incontinence. Genitourinary: Negative for difficulty urinating, dysuria, frequency and hematuria. Denies incontinence. Musculoskeletal: Positive for back pain. Negative for arthralgias, gait problem and myalgias. Neurological: Negative for headaches. Denies saddle paresthesia. All other systems reviewed and are negative. Physical Exam  
Physical Exam  
Constitutional: She is oriented to person, place, and time. She appears well-developed and well-nourished. No distress. Pleasant [de-identified] y.o.  female HENT:  
Head: Normocephalic and atraumatic. Eyes: Conjunctivae are normal. Right eye exhibits no discharge. Left eye exhibits no discharge. Neck: Normal range of motion. Neck supple. Cardiovascular: Normal rate, regular rhythm and normal heart sounds. No murmur heard. Pulmonary/Chest: Effort normal and breath sounds normal. No respiratory distress. Musculoskeletal: BACK: Normal spinal curvatures. No step off or deformity. NT to palpation of the back. There is slight tenderness to palpation of the right knee but is otherwise NT to palpation of bilateral LE. Negative seated SLR bilaterally. Strength of the LE 5/5 and equal bilaterally. Ambulatory without difficulty. Neurological: She is alert and oriented to person, place, and time. Skin: Skin is warm and dry. She is not diaphoretic. Psychiatric: She has a normal mood and affect. Her behavior is normal.  
Nursing note and vitals reviewed. Diagnostic Study Results Labs - Recent Results (from the past 12 hour(s)) METABOLIC PANEL, BASIC Collection Time: 11/16/18 11:08 AM  
Result Value Ref Range Sodium 139 136 - 145 mmol/L Potassium 4.2 3.5 - 5.1 mmol/L Chloride 108 97 - 108 mmol/L  
 CO2 20 (L) 21 - 32 mmol/L Anion gap 11 5 - 15 mmol/L Glucose 104 (H) 65 - 100 mg/dL BUN 27 (H) 6 - 20 MG/DL Creatinine 1.03 (H) 0.55 - 1.02 MG/DL  
 BUN/Creatinine ratio 26 (H) 12 - 20 GFR est AA >60 >60 ml/min/1.73m2 GFR est non-AA 52 (L) >60 ml/min/1.73m2 Calcium 9.0 8.5 - 10.1 MG/DL Radiologic Studies -  
DUPLEX LOWER EXT VENOUS RIGHT (Final result) Result time 11/16/18 11:46:41 Final result by Pascual Richard Results In (11/16/18 11:45:46) Initial Result: Impression:  
 IMPRESSION: 
Right Baker's cyst. No evidence of deep venous thrombosis. Narrative: Indication: Right leg pain and swelling Duplex venous Doppler examination was performed from the right inguinal ligament 
to the mid-calf.  Deep venous structures were well imaged and appeared 
compressible throughout.  Both wave form and color flow analysis demonstrated 
normal flow patterns.  Augmentation was demonstrable. There is a 2.8 x 1.1 x 1.4 
cm Baker's cyst.  
  
  
  
   
   
XR KNEE RT 3 V (Final result) Result time 11/16/18 11:54:38 Final result by Jose Manuel, Rad Results In (11/16/18 11:54:21) Initial Result:  
Impression:  
 IMPRESSION:  No acute abnormality. Narrative: EXAM:  XR KNEE RT 3 V 
 
INDICATION:   Right knee pain. COMPARISON: None. FINDINGS: Three views of the right knee demonstrate no fracture or other acute 
osseous or articular abnormality.  There is no effusion.  
  
  
  
   
   
XR SPINE LUMB 2 OR 3 V (Final result) Result time 11/16/18 11:53:48 Final result by Jose Manuel, Rad Results In (11/16/18 11:53:13) Initial Result:  
Impression:  
 Impression: No acute process. Multilevel lumbosacral DDD. Grade 1 
spondylolisthesis L3-4. Narrative: Indication: Lower back pain radiating down both legs Comparison to 7/6/2017 Three views of the lumbar spine are negative for acute fracture. Multilevel 
degenerative disc disease is noted. Grade 1 spondylolisthesis is seen at L3-4. Vascular calcification is noted. Medical Decision Making I am the first provider for this patient. I reviewed the vital signs, available nursing notes, past medical history, past surgical history, family history and social history. Vital Signs-Reviewed the patient's vital signs. Patient Vitals for the past 12 hrs: 
 Temp Pulse Resp BP SpO2  
11/16/18 0957 97.7 °F (36.5 °C) 68 16 173/77 100 % Records Reviewed: Nursing Notes Provider Notes (Medical Decision Making):  
Sprain, strain, sciatica, DVT, electrolyte abnl,  
 
ED Course:  
Initial assessment performed. The patients presenting problems have been discussed, and they are in agreement with the care plan formulated and outlined with them. I have encouraged them to ask questions as they arise throughout their visit. Critical Care Time:  
None Disposition: 
DISCHARGE NOTE: 
12:35 PM 
The pt is ready for discharge. The pt's signs, symptoms, diagnosis, and discharge instructions have been discussed and pt has conveyed their understanding. The pt is to follow up as recommended or return to ER should their symptoms worsen. Plan has been discussed and pt is in agreement. PLAN: 
1. Current Discharge Medication List  
  
START taking these medications Details  
predniSONE (STERAPRED DS) 10 mg dose pack Standard 6 day taper Qty: 21 Tab, Refills: 0 CONTINUE these medications which have NOT CHANGED Details  
famotidine (PEPCID) 20 mg tablet Take 1 Tab by mouth two (2) times a day. Indications: gastroesophageal reflux disease Qty: 180 Tab, Refills: 3 Associated Diagnoses: Gastroesophageal reflux disease without esophagitis  
  
metoprolol succinate (TOPROL-XL) 50 mg XL tablet Take 1 Tab by mouth daily. Qty: 90 Tab, Refills: 3 Associated Diagnoses: Essential hypertension  
  
polyethylene glycol (MIRALAX) 17 gram/dose powder Take 17 g by mouth daily. timolol (TIMOPTIC) 0.5 % ophthalmic solution ACETAMINOPHEN (TYLENOL PO) Take  by mouth.  
  
latanoprost (XALATAN) 0.005 % ophthalmic solution Administer 1 Drop to both eyes daily. Aspirin, Buffered 81 mg tab Take 1 Tab by mouth daily. 2.  
Follow-up Information Follow up With Specialties Details Why Contact Info Rafal Carrero MD Internal Medicine In 1 week  [address] [facility] 83. [phone] Pietro Santiago MD Orthopedic Surgery In 1 week  [address] [facility] 
[phone] Return to ED if worse Diagnosis Clinical Impression: 1. Spondylolisthesis at L3-L4 level 2. Baker's cyst, right This note will not be viewable in [address]

## 2018-11-16 NOTE — ROUTINE PROCESS
I have reviewed discharge instructions with the  
patient. The patient verbalized understanding. Alert and stable for discharge.

## 2018-12-12 ENCOUNTER — TELEPHONE (OUTPATIENT)
Dept: INTERNAL MEDICINE CLINIC | Age: 78
End: 2018-12-12

## 2018-12-12 NOTE — TELEPHONE ENCOUNTER
MD Rebeca Banuelos Covert, LPN   Caller: Unspecified (Today,  9:46 AM)             Add metamucil daily. This is a bulk forming laxative that will give a formed stool.  Stay on vitamin D regularly. Responded to patients MyChart message with the above instructions from Dr. Candice Weiss.

## 2018-12-12 NOTE — TELEPHONE ENCOUNTER
Regarding: Non-Urgent Medical Question  Contact: [phone]  ----- Message from [address] [place] [address] 95Millie, Generic sent at 12/12/2018  9:36 AM EST -----    Dr. Bushra Myers: For a week or so now, I have had problems passing a good stool. It is not solid, but very soft and it will not drop out. I have to keep wiping and it just does not stop for a long time. I go up as far as I am comfortable and it just keeps coming - not liquid, but soft. I just don't feel that it is all coming out and feel that it might be impacted. I felt constipated a week ago and took MiraLax and this has been the result. Should I take a stronger laxative so maybe it will just clean me out? Also, I am still taking the Vitamin D3. How long should I take this? Thank you for you advice.

## 2019-03-01 ENCOUNTER — APPOINTMENT (OUTPATIENT)
Dept: GENERAL RADIOLOGY | Age: 79
End: 2019-03-01
Attending: PHYSICIAN ASSISTANT
Payer: MEDICARE

## 2019-03-01 ENCOUNTER — HOSPITAL ENCOUNTER (EMERGENCY)
Age: 79
Discharge: HOME | End: 2019-03-01
Attending: EMERGENCY MEDICINE
Payer: MEDICARE

## 2019-03-01 VITALS
DIASTOLIC BLOOD PRESSURE: 74 MMHG | TEMPERATURE: 97.8 F | RESPIRATION RATE: 16 BRPM | WEIGHT: 98.33 LBS | HEART RATE: 78 BPM | OXYGEN SATURATION: 100 % | SYSTOLIC BLOOD PRESSURE: 162 MMHG | BODY MASS INDEX: 20.55 KG/M2

## 2019-03-01 DIAGNOSIS — M79.605 BILATERAL LEG PAIN: ICD-10-CM

## 2019-03-01 DIAGNOSIS — M25.511 ACUTE PAIN OF RIGHT SHOULDER: Primary | ICD-10-CM

## 2019-03-01 DIAGNOSIS — M79.604 BILATERAL LEG PAIN: ICD-10-CM

## 2019-03-01 PROCEDURE — 99282 EMERGENCY DEPT VISIT SF MDM: CPT

## 2019-03-01 PROCEDURE — 73030 X-RAY EXAM OF SHOULDER: CPT

## 2019-03-01 RX ORDER — PREDNISONE 10 MG/1
TABLET ORAL
Qty: 21 TAB | Refills: 0 | Status: SHIPPED | OUTPATIENT
Start: 2019-03-01 | End: 2019-04-10

## 2019-03-01 RX ORDER — TRAMADOL HYDROCHLORIDE 50 MG/1
50 TABLET ORAL
Qty: 20 TAB | Refills: 0 | Status: SHIPPED | OUTPATIENT
Start: 2019-03-01 | End: 2019-03-06

## 2019-03-01 NOTE — ED PROVIDER NOTES
EMERGENCY DEPARTMENT HISTORY AND PHYSICAL EXAM 
 
 
Date: 3/1/2019 Patient Name: Leora Sim History of Presenting Illness Chief Complaint Patient presents with  Generalized Body Aches Ambulatory c/o generalized pain all over Pt seen in Nov for same Given Rx Prednisone States symptoms got better but are now progressively getting worse History Provided By: Patient HPI: Leora Sim, [de-identified] y.o. female presents ambulatory to the ED with months of cc of 8 out of 10 constant aching generalized pain--worse of the shoulders, upper back, upper legs, arms--that is worse at night and with movement. She does endorse a fall and stumble in January and hurting her right shoulder but her overall pain symptoms pre-date the injury. She was seen here in this ED in November 2018 for this generalized pain symptoms, had some imaging and ultimately was prescribed oral steroids which seemed to help but the pain symptoms returned. She specifically denies any fevers, chills, nausea, vomiting, chest pain, shortness of breath, headache, rash, diarrhea, sweating or weight loss. There are no other complaints, changes, or physical findings at this time. PCP: Sanjay Garnett MD 
 
Current Outpatient Medications Medication Sig Dispense Refill  predniSONE (STERAPRED DS) 10 mg dose pack Per Dose Pack instructions 21 Tab 0  
 traMADol (ULTRAM) 50 mg tablet Take 1 Tab by mouth every eight (8) hours as needed for Pain for up to 5 days. Max Daily Amount: 150 mg. 20 Tab 0  
 famotidine (PEPCID) 20 mg tablet Take 1 Tab by mouth two (2) times a day. Indications: gastroesophageal reflux disease 180 Tab 3  
 metoprolol succinate (TOPROL-XL) 50 mg XL tablet Take 1 Tab by mouth daily. 90 Tab 3  polyethylene glycol (MIRALAX) 17 gram/dose powder Take 17 g by mouth daily.  timolol (TIMOPTIC) 0.5 % ophthalmic solution  ACETAMINOPHEN (TYLENOL PO) Take  by mouth.  latanoprost (XALATAN) 0.005 % ophthalmic solution Administer 1 Drop to both eyes daily.  Aspirin, Buffered 81 mg tab Take 1 Tab by mouth daily. Past History Past Medical History: 
Past Medical History:  
Diagnosis Date  AIN (acute interstitial nephritis)  Arthritis  CKD (chronic kidney disease) stage 3, GFR 30-59 ml/min ([facility])  Gastrointestinal disorder GERD  GERD (gastroesophageal reflux disease)  Glaucoma  High cholesterol  Hypertension  Hypertension  Ill-defined condition   
 chronic cystitis  Kidney failure 10/14/2016 Past Surgical History: 
Past Surgical History:  
Procedure Laterality Date  HX GYN    
   HX GYN    
 HX HYSTERECTOMY  HX TONSILLECTOMY  HX UROLOGICAL    
 cauterization of bladder ulcers Family History: 
Family History Problem Relation Age of Onset  Cancer Father  Cancer Brother Social History: 
Social History Tobacco Use  Smoking status: Former Smoker Packs/day: 0.50 Years: 4.00 Pack years: 2.00 Last attempt to quit: 1997 Years since quittin.6  Smokeless tobacco: Former User Substance Use Topics  Alcohol use: Yes Comment: seldom  Drug use: Yes Types: Prescription, OTC Allergies: Allergies Allergen Reactions  Aleve [Naproxen Sodium] Other (comments) Renal issues  Ibuprofen Unknown (comments)  Lisinopril Other (comments) Renal dysfunction  Omeprazole Other (comments) Cause Kidney failure Review of Systems Review of Systems Constitutional: Negative for fatigue and fever. HENT: Negative for ear pain and sore throat. Eyes: Negative for pain, redness and visual disturbance. Respiratory: Negative for cough and shortness of breath. Cardiovascular: Negative for chest pain and palpitations. Gastrointestinal: Negative for abdominal pain, nausea and vomiting. Genitourinary: Negative for dysuria, frequency and urgency. Musculoskeletal: Positive for arthralgias and myalgias. Negative for back pain, gait problem, neck pain and neck stiffness. Skin: Negative for rash and wound. Neurological: Negative for dizziness, weakness, light-headedness, numbness and headaches. Physical Exam  
Physical Exam  
Constitutional: She is oriented to person, place, and time. She appears well-developed and well-nourished. Non-toxic appearance. No distress. HENT:  
Head: Normocephalic and atraumatic. Right Ear: External ear normal.  
Left Ear: External ear normal.  
Nose: Nose normal.  
Mouth/Throat: Uvula is midline. No trismus in the jaw. Eyes: Conjunctivae and EOM are normal. Pupils are equal, round, and reactive to light. No scleral icterus. Neck: Normal range of motion and full passive range of motion without pain. Cardiovascular: Normal rate and regular rhythm. Pulmonary/Chest: Effort normal. No accessory muscle usage. No tachypnea. No respiratory distress. She has no decreased breath sounds. She has no wheezes. Abdominal: Soft. There is no tenderness. Musculoskeletal: Normal range of motion. Neurological: She is alert and oriented to person, place, and time. She is not disoriented. No cranial nerve deficit. GCS eye subscore is 4. GCS verbal subscore is 5. GCS motor subscore is 6. Skin: Skin is intact. No rash noted. Psychiatric: She has a normal mood and affect. Her speech is normal.  
Nursing note and vitals reviewed. Diagnostic Study Results Labs - No results found for this or any previous visit (from the past 12 hour(s)). Radiologic Studies -  
XR SHOULDER RT AP/LAT MIN 2 V Final Result IMPRESSION: No acute abnormality. CT Results  (Last 48 hours) None CXR Results  (Last 48 hours) None Medical Decision Making I am the first provider for this patient. I reviewed the vital signs, available nursing notes, past medical history, past surgical history, family history and social history. Vital Signs-Reviewed the patient's vital signs. Patient Vitals for the past 12 hrs: 
 Temp Pulse Resp BP SpO2  
03/01/19 0809 97.8 °F (36.6 °C) 78 16 162/74 100 % Pulse Oximetry Analysis - 100% on RA Records Reviewed: Nursing Notes, Old Medical Records, Previous Radiology Studies, Previous Laboratory Studies and  Provider Notes (Medical Decision Making): DDx: sprain, strain, fracture, chronic pain Plain films are negative for acute process. Oral steroids were effective previously. Vital signs are normal. No blood work was performed during her stay. She does have a previous diagnosis of hypercalcemia which may contribute to her symptoms. She is followed by Rheumatology. For now, will prescribe oral steroids and refer to PCP. Patient may call Rheum. Return precautions. ED Course:  
Initial assessment performed. The patients presenting problems have been discussed, and they are in agreement with the care plan formulated and outlined with them. I have encouraged them to ask questions as they arise throughout their visit. Disposition: 
Discharge PLAN: 
1. Discharge Medication List as of 3/1/2019 11:28 AM  
  
 
2. Follow-up Information Follow up With Specialties Details Why Contact Info Reagan Rabago MD Internal Medicine Schedule an appointment as soon as possible for a visit PRIMARY CARE: call to schedule follow up [address] [facility] 
[phone] Return to ED if worse Diagnosis Clinical Impression: 1. Acute pain of right shoulder 2. Bilateral leg pain

## 2019-04-09 NOTE — PROGRESS NOTES
Rogelio Scott is a 78 y.o. female who presents for follow up. Last seen in October. Seen in ED in November and in March with back pain. Reports ongoing back pain. From buttocks down both thighs. Taking 4 tylenol daily. Has a baker's cyst in right knee. Reports pain in both arms from shoulders to wrists. Had hand OA. Has seen Dr. Coco Edwards, treated for pseudogout. Tried aspercreme. Tried tramadol, tried at bedtime, no change in symptoms. Tried 3 doses. Xray multilevel degenerative spondylosis. Better with walking 5 minutes. Worse at rest.  Will affect sleep at times. Sometimes has to keep moving legs.       Sees urologist, Dr. Nathalie Fields. 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  last treated 2016. No urinary pain today. Urine with calcium oxalate crystals. Is drinking a lot of water. Does not take calcium supplements. Had elevated blood calcium with confusion in the past.      Saw Dr. Coco Edwards, rheumatology. Pseudogout in both wrists, prior right wrist steroid injection with relief in March. Injection in left wrist was not helpful, but no wrist pain now.      BP controlled on toprol XL 50mg once a day. BP at home controlled. No dizziness, headaches, or change in exercise tolerance.      Up to date with eye doctor, for glaucoma,. On drops     Low vitamin D in November.   Taking supplement.           Past Medical History:   Diagnosis Date    AIN (acute interstitial nephritis)     Arthritis     CKD (chronic kidney disease) stage 3, GFR 30-59 ml/min ([facility])     Gastrointestinal disorder     GERD    GERD (gastroesophageal reflux disease)     Glaucoma     High cholesterol     Hypertension     Hypertension     Ill-defined condition     chronic cystitis    Kidney failure 10/14/2016       Family History   Problem Relation Age of Onset    Cancer Father     Cancer Brother        Social History     Socioeconomic History    Marital status:      Spouse name: Not on file    Number of children: Not on file    Years of education: Not on file    Highest education level: Not on file   Occupational History    Not on file   Social Needs    Financial resource strain: Not on file    Food insecurity:     Worry: Not on file     Inability: Not on file    Transportation needs:     Medical: Not on file     Non-medical: Not on file   Tobacco Use    Smoking status: Former Smoker     Packs/day: 0.50     Years: 4.00     Pack years: 2.00     Last attempt to quit: 1997     Years since quittin.7    Smokeless tobacco: Former User   Substance and Sexual Activity    Alcohol use: Yes     Comment: seldom    Drug use: Yes     Types: Prescription, OTC    Sexual activity: Never     Partners: Male     Birth control/protection: None   Lifestyle    Physical activity:     Days per week: Not on file     Minutes per session: Not on file    Stress: Not on file   Relationships    Social connections:     Talks on phone: Not on file     Gets together: Not on file     Attends [employer] service: Not on file     Active member of club or organization: Not on file     Attends meetings of clubs or organizations: Not on file     Relationship status: Not on file    Intimate partner violence:     Fear of current or ex partner: Not on file     Emotionally abused: Not on file     Physically abused: Not on file     Forced sexual activity: Not on file   Other Topics Concern    Not on file   Social History Narrative    ** Merged History Encounter **            Current Outpatient Medications on File Prior to Visit   Medication Sig Dispense Refill    famotidine (PEPCID) 20 mg tablet Take 1 Tab by mouth two (2) times a day. Indications: gastroesophageal reflux disease 180 Tab 3    metoprolol succinate (TOPROL-XL) 50 mg XL tablet Take 1 Tab by mouth daily. 90 Tab 3    polyethylene glycol (MIRALAX) 17 gram/dose powder Take 17 g by mouth daily.  timolol (TIMOPTIC) 0.5 % ophthalmic solution       ACETAMINOPHEN (TYLENOL PO) Take  by mouth.       latanoprost (XALATAN) 0.005 % ophthalmic solution Administer 1 Drop to both eyes daily.  Aspirin, Buffered 81 mg tab Take 1 Tab by mouth daily. No current facility-administered medications on file prior to visit. Review of Systems  Pertinent items are noted in HPI. Objective:     Visit Vitals  /68 (BP 1 Location: Right arm, BP Patient Position: Sitting)   Pulse 73   Temp 97.8 °F (36.6 °C) (Oral)   Resp 18   Ht 4' 10\" (1.473 m)   Wt 100 lb 8 oz (45.6 kg)   LMP  (LMP Unknown)   SpO2 99%   BMI 21.00 kg/m²     Gen: well appearing female  HEENT:   PERRL,normal conjunctiva. External ear and canals normal, TMs no opacification or erythema,  OP no erythema, no exudates, MMM  Neck:  Supple. Thyroid normal size, nontender, without nodules. No masses or LAD  Resp:  No wheezing, no rhonchi, no rales. CV:  RRR, normal S1S2, no murmur. GI: soft, nontender, without masses. No hepatosplenomegaly. Extrem:  +2 pulses, no edema, warm distally      Assessment/Plan:       ICD-10-CM ICD-9-CM    1. Myofascial pain M79.18 729.1 gabapentin (NEURONTIN) 100 mg capsule   2. Essential hypertension E84 339.4 METABOLIC PANEL, BASIC   3. Chronic pain of right knee M25.561 719.46 DISCONTINUED: diclofenac (VOLTAREN) 1 % gel    G89.29 338.29    4. Spondylosis of lumbar spine M47.816 721.3 gabapentin (NEURONTIN) 100 mg capsule      DISCONTINUED: diclofenac (VOLTAREN) 1 % gel   5. Screening for hyperlipidemia Z13.220 V77.91 LIPID PANEL       Follow-up and Dispositions    · Return in about 3 months (around 7/10/2019) for follow up on medication.   Sabine Morin MD

## 2019-04-10 ENCOUNTER — HOSPITAL ENCOUNTER (OUTPATIENT)
Dept: LAB | Age: 79
Discharge: HOME | End: 2019-04-10
Payer: MEDICARE

## 2019-04-10 ENCOUNTER — OFFICE VISIT (OUTPATIENT)
Dept: INTERNAL MEDICINE CLINIC | Age: 79
End: 2019-04-10

## 2019-04-10 VITALS
HEIGHT: 58 IN | DIASTOLIC BLOOD PRESSURE: 68 MMHG | TEMPERATURE: 97.8 F | WEIGHT: 100.5 LBS | RESPIRATION RATE: 18 BRPM | BODY MASS INDEX: 21.1 KG/M2 | HEART RATE: 73 BPM | SYSTOLIC BLOOD PRESSURE: 143 MMHG | OXYGEN SATURATION: 99 %

## 2019-04-10 DIAGNOSIS — M47.816 SPONDYLOSIS OF LUMBAR SPINE: ICD-10-CM

## 2019-04-10 DIAGNOSIS — Z13.220 SCREENING FOR HYPERLIPIDEMIA: ICD-10-CM

## 2019-04-10 DIAGNOSIS — M79.18 MYOFASCIAL PAIN: Primary | ICD-10-CM

## 2019-04-10 DIAGNOSIS — G89.29 CHRONIC PAIN OF RIGHT KNEE: ICD-10-CM

## 2019-04-10 DIAGNOSIS — I10 ESSENTIAL HYPERTENSION: ICD-10-CM

## 2019-04-10 DIAGNOSIS — M25.561 CHRONIC PAIN OF RIGHT KNEE: ICD-10-CM

## 2019-04-10 PROCEDURE — 80061 LIPID PANEL: CPT

## 2019-04-10 PROCEDURE — 80048 BASIC METABOLIC PNL TOTAL CA: CPT

## 2019-04-10 PROCEDURE — 36415 COLL VENOUS BLD VENIPUNCTURE: CPT

## 2019-04-10 RX ORDER — DICLOFENAC SODIUM 10 MG/G
GEL TOPICAL 4 TIMES DAILY
Qty: 100 G | Refills: 3 | Status: SHIPPED | OUTPATIENT
Start: 2019-04-10 | End: 2019-04-12 | Stop reason: SDUPTHER

## 2019-04-10 RX ORDER — GABAPENTIN 100 MG/1
100 CAPSULE ORAL
Qty: 30 CAP | Refills: 2 | Status: SHIPPED | OUTPATIENT
Start: 2019-04-10 | End: 2019-07-10 | Stop reason: SDUPTHER

## 2019-04-10 NOTE — PROGRESS NOTES
Reviewed record in preparation for visit and have obtained necessary documentation. Identified pt with two pt identifiers(name and ). Chief Complaint   Patient presents with    Medication Evaluation    Leg Pain    Arm Pain       Health Maintenance Due   Topic Date Due    Shingles Vaccine (1 of 2) 1988    Bone Mineral Density   2003       Ms. Sabino Calix has a reminder for a \"due or due soon\" health maintenance. I have asked that she discuss this further with her primary care provider for follow-up on this health maintenance. Coordination of Care Questionnaire:  :     1) Have you been to an emergency room, urgent care clinic since your last visit? yes   Hospitalized since your last visit? no             2) Have you seen or consulted any other health care providers outside of [address] [place] [address] since your last visit? no  (Include any pap smears or colon screenings in this section.)    3) In the event something were to happen to you and you were unable to speak on your behalf, do you have an Advance Directive/ Living Will in place stating your wishes? NO    Do you have an Advance Directive on file? no    4) Are you interested in receiving information on Advance Directives? NO    Patient is accompanied by self I have received verbal consent from Eliana Wade to discuss any/all medical information while they are present in the room.

## 2019-04-11 ENCOUNTER — TELEPHONE (OUTPATIENT)
Dept: INTERNAL MEDICINE CLINIC | Age: 79
End: 2019-04-11

## 2019-04-11 DIAGNOSIS — M25.561 CHRONIC PAIN OF RIGHT KNEE: ICD-10-CM

## 2019-04-11 DIAGNOSIS — G89.29 CHRONIC PAIN OF RIGHT KNEE: ICD-10-CM

## 2019-04-11 DIAGNOSIS — M47.816 SPONDYLOSIS OF LUMBAR SPINE: ICD-10-CM

## 2019-04-11 LAB
BUN SERPL-MCNC: 21 MG/DL (ref 8–27)
BUN/CREAT SERPL: 19 (ref 12–28)
CALCIUM SERPL-MCNC: 9.9 MG/DL (ref 8.7–10.3)
CHLORIDE SERPL-SCNC: 105 MMOL/L (ref 96–106)
CHOLEST SERPL-MCNC: 189 MG/DL (ref 100–199)
CO2 SERPL-SCNC: 19 MMOL/L (ref 20–29)
CREAT SERPL-MCNC: 1.12 MG/DL (ref 0.57–1)
GLUCOSE SERPL-MCNC: 102 MG/DL (ref 65–99)
HDLC SERPL-MCNC: 38 MG/DL
LDLC SERPL CALC-MCNC: 118 MG/DL (ref 0–99)
POTASSIUM SERPL-SCNC: 4.9 MMOL/L (ref 3.5–5.2)
SODIUM SERPL-SCNC: 142 MMOL/L (ref 134–144)
TRIGL SERPL-MCNC: 163 MG/DL (ref 0–149)
VLDLC SERPL CALC-MCNC: 33 MG/DL (ref 5–40)

## 2019-04-11 NOTE — TELEPHONE ENCOUNTER
Crystal[facility] states she needs a call back to get clarification on Diclofenac prescription received. Please call.  Thank you

## 2019-04-12 RX ORDER — DICLOFENAC SODIUM 10 MG/G
2 GEL TOPICAL 4 TIMES DAILY
Qty: 100 G | Refills: 3 | Status: SHIPPED | OUTPATIENT
Start: 2019-04-12 | End: 2019-10-11 | Stop reason: SDUPTHER

## 2019-04-22 ENCOUNTER — TELEPHONE (OUTPATIENT)
Dept: INTERNAL MEDICINE CLINIC | Age: 79
End: 2019-04-22

## 2019-04-22 NOTE — TELEPHONE ENCOUNTER
Regarding: Non-Urgent Medical Question  Contact: [phone]  ----- Message from [address] [place] [address] 951, Generic sent at 4/18/2019  3:27 PM EDT -----    Dr. Malissa Sarmiento:  I am taking Tylenol 500 to 650 mg.  I take 2 tablets and can seem to get them to last only about 8 hours. I take 2 at 7:30 in the am and 4:00, I am hurting again. I don't take another one until 7:30 in the pm and try to make it last through the night, but most of the time it does not. I don't like taking so many Tylenol; don't want to ruin my liver as well as my kidneys. The gel works a little but only for a little while. The Gabapentin works on my Restless Legs and sometimes the deep burning sensation, but sometimes this sneaks through. According to the blood work report, there was no liver function done, so I don't know just how the Tylenol is affecting my liver. Do you have any suggestions? Thanks.

## 2019-04-23 NOTE — TELEPHONE ENCOUNTER
Can take tramadol 1/2-1 tablet twice a day as needed for pain. Advise patient this is a narcotic medication and could be used for breakthrough pain. Can take up to 4 extra strength tylenol safely without liver monitoring. I will order tramadol if want to try it.

## 2019-04-23 NOTE — TELEPHONE ENCOUNTER
Afua Brody MD 9 hours ago (10:43 PM)         Can take tramadol 1/2-1 tablet twice a day as needed for pain. Advise patient this is a narcotic medication and could be used for breakthrough pain. Can take up to 4 extra strength tylenol safely without liver monitoring. I will order tramadol if want to try it. [employer] message sent to patient with the above instructions from Dr. Pamela Lamb.

## 2019-04-24 NOTE — PROGRESS NOTES
Notify patient kidney function is worsened, recommend she schedule a follow up with Dr. Denny Sultana, who she has been before, to be sure we are doing what we can to protect kidneys. Cholesterol improving. Also, awaiting response from patient regarding the tramadol.

## 2019-07-10 ENCOUNTER — HOSPITAL ENCOUNTER (OUTPATIENT)
Dept: LAB | Age: 79
Discharge: HOME | End: 2019-07-10
Payer: MEDICARE

## 2019-07-10 ENCOUNTER — OFFICE VISIT (OUTPATIENT)
Dept: INTERNAL MEDICINE CLINIC | Age: 79
End: 2019-07-10

## 2019-07-10 VITALS
OXYGEN SATURATION: 100 % | DIASTOLIC BLOOD PRESSURE: 69 MMHG | HEART RATE: 67 BPM | BODY MASS INDEX: 21.41 KG/M2 | TEMPERATURE: 97.9 F | HEIGHT: 58 IN | SYSTOLIC BLOOD PRESSURE: 154 MMHG | RESPIRATION RATE: 14 BRPM | WEIGHT: 102 LBS

## 2019-07-10 DIAGNOSIS — N28.9 RENAL INSUFFICIENCY: ICD-10-CM

## 2019-07-10 DIAGNOSIS — M79.18 MYOFASCIAL PAIN: ICD-10-CM

## 2019-07-10 DIAGNOSIS — M47.816 SPONDYLOSIS OF LUMBAR SPINE: ICD-10-CM

## 2019-07-10 DIAGNOSIS — R53.83 FATIGUE, UNSPECIFIED TYPE: ICD-10-CM

## 2019-07-10 DIAGNOSIS — D64.9 ANEMIA, UNSPECIFIED TYPE: ICD-10-CM

## 2019-07-10 DIAGNOSIS — I10 ESSENTIAL HYPERTENSION: Primary | ICD-10-CM

## 2019-07-10 PROCEDURE — 36415 COLL VENOUS BLD VENIPUNCTURE: CPT

## 2019-07-10 PROCEDURE — 84443 ASSAY THYROID STIM HORMONE: CPT

## 2019-07-10 PROCEDURE — 85027 COMPLETE CBC AUTOMATED: CPT

## 2019-07-10 PROCEDURE — 80048 BASIC METABOLIC PNL TOTAL CA: CPT

## 2019-07-10 PROCEDURE — 83550 IRON BINDING TEST: CPT

## 2019-07-10 PROCEDURE — 82728 ASSAY OF FERRITIN: CPT

## 2019-07-10 RX ORDER — GABAPENTIN 100 MG/1
200 CAPSULE ORAL
Qty: 60 CAP | Refills: 2
Start: 2019-07-10 | End: 2020-06-12 | Stop reason: SDUPTHER

## 2019-07-10 RX ORDER — SODIUM BICARBONATE 650 MG/1
650 TABLET ORAL 2 TIMES DAILY
COMMUNITY
Start: 2019-05-03 | End: 2020-06-23

## 2019-07-10 RX ORDER — GABAPENTIN 100 MG/1
200 CAPSULE ORAL
COMMUNITY
Start: 2019-07-08 | End: 2019-07-10 | Stop reason: SDUPTHER

## 2019-07-10 NOTE — PROGRESS NOTES
Reviewed record in preparation for visit and have obtained necessary documentation. Identified pt with two pt identifiers(name and ). Chief Complaint   Patient presents with    Hypertension     3 month follow up       Health Maintenance Due   Topic Date Due    Shingrix Vaccine Age 49> (1 of 2) 1988    Bone Densitometry (Dexa) Screening  2003       Ms. Kathy Trammell has a reminder for a \"due or due soon\" health maintenance. I have asked that she discuss health maintenance topic(s) due with Her  primary care provider. Coordination of Care Questionnaire:  :     1) Have you been to an emergency room, urgent care clinic since your last visit? no   Hospitalized since your last visit? no             2) Have you seen or consulted any other health care providers outside of [address] [place] [address] since your last visit? no  (Include any pap smears or colon screenings in this section.)    3) Do you have an Advance Directive on file? no    4) Are you interested in receiving information on Advance Directives? NO    Patient is accompanied by self I have received verbal consent from Malinda Pineda to discuss any/all medical information while they are present in the room.

## 2019-07-10 NOTE — PROGRESS NOTES
Christopher Farooq is a 78 y.o. female who presents for follow up. Last seen in April    [facility], bilateral leg pain, bilateral shoulder pain. Prior PT. Taking tylenol 650mg in am and afternoon and taking gabapentin. She has taken ibuprofen twice, is aware that she needs to avoid NSAIDS. Ortho increased gabapentin to 200mg at bedtime. Had hand OA. Has seen Dr. Ashlyn Hahn, treated for pseudogout. prior injections. Off calcium and vitamin D. Reports fatigue. Was started on iron by rheum, not tolerated. Started on bicarb by rheum. Mild anemia. Sleep is interrupted, due to urination 3-4 times at night.    [facility] urologist, Dr. Sol Gomez. 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  Last treated 2016. No urinary pain today. Is drinking a lot of water.      BP controlled on toprol XL 50mg once a day. BP at home controlled. No dizziness, headaches, or change in exercise tolerance.      Up to date with eye doctor, for glaucoma, On drops regularly.  To have cataract surgery.          Past Medical History:   Diagnosis Date    AIN (acute interstitial nephritis)     Arthritis     CKD (chronic kidney disease) stage 3, GFR 30-59 ml/min ([facility])     Gastrointestinal disorder     GERD    GERD (gastroesophageal reflux disease)     Glaucoma     High cholesterol     Hypertension     Hypertension     Ill-defined condition     chronic cystitis    Kidney failure 10/14/2016       Family History   Problem Relation Age of Onset    Cancer Father     Cancer Brother        Social History     Socioeconomic History    Marital status:      Spouse name: Not on file    Number of children: Not on file    Years of education: Not on file    Highest education level: Not on file   Occupational History    Not on file   Social Needs    Financial resource strain: Not on file    Food insecurity:     Worry: Not on file     Inability: Not on file    Transportation needs:     Medical: Not on file     Non-medical: Not on file   Tobacco Use    Smoking status: Former Smoker     Packs/day: 0.50     Years: 4.00     Pack years: 2.00     Last attempt to quit: 1997     Years since quittin.9    Smokeless tobacco: Former User   Substance and Sexual Activity    Alcohol use: Yes     Comment: seldom    Drug use: Yes     Types: Prescription, OTC    Sexual activity: Not Currently     Partners: Male     Birth control/protection: None   Lifestyle    Physical activity:     Days per week: Not on file     Minutes per session: Not on file    Stress: Not on file   Relationships    Social connections:     Talks on phone: Not on file     Gets together: Not on file     Attends Orthodoxy service: Not on file     Active member of club or organization: Not on file     Attends meetings of clubs or organizations: Not on file     Relationship status: Not on file    Intimate partner violence:     Fear of current or ex partner: Not on file     Emotionally abused: Not on file     Physically abused: Not on file     Forced sexual activity: Not on file   Other Topics Concern    Not on file   Social History Narrative    ** Merged History Encounter **            Current Outpatient Medications on File Prior to Visit   Medication Sig Dispense Refill    sodium bicarbonate 650 mg tablet sodium bicarbonate 650 mg tablet      diclofenac (VOLTAREN) 1 % gel Apply 2 g to affected area four (4) times daily. 100 g 3    famotidine (PEPCID) 20 mg tablet Take 1 Tab by mouth two (2) times a day. Indications: gastroesophageal reflux disease 180 Tab 3    metoprolol succinate (TOPROL-XL) 50 mg XL tablet Take 1 Tab by mouth daily. 90 Tab 3    polyethylene glycol (MIRALAX) 17 gram/dose powder Take 17 g by mouth daily.  timolol (TIMOPTIC) 0.5 % ophthalmic solution       ACETAMINOPHEN (TYLENOL PO) Take  by mouth.  latanoprost (XALATAN) 0.005 % ophthalmic solution Administer 1 Drop to both eyes daily.       Aspirin, Buffered 81 mg tab Take 1 Tab by mouth daily. No current facility-administered medications on file prior to visit. Review of Systems  Pertinent items are noted in HPI. Objective:     Visit Vitals  /69 (BP 1 Location: Left arm, BP Patient Position: Sitting)   Pulse 67   Temp 97.9 °F (36.6 °C) (Oral)   Resp 14   Ht 4' 10\" (1.473 m)   Wt 102 lb (46.3 kg)   LMP  (LMP Unknown)   SpO2 100%   BMI 21.32 kg/m²     Gen: well appearing female  HEENT:   PERRL,normal conjunctiva. External ear and canals normal, TMs no opacification or erythema,  OP no erythema, no exudates, MMM  Neck:   No masses or LAD  Resp:  No wheezing, no rhonchi, no rales. CV:  RRR, normal S1S2   GI: soft, nontender, without masses. Extrem:  +2 pulses, no edema, warm distally      Assessment/Plan:       ICD-10-CM ICD-9-CM    1. Essential hypertension T84 157.0 METABOLIC PANEL, BASIC      CBC W/O DIFF      TSH 3RD GENERATION   2. Myofascial pain M79.18 729.1 gabapentin (NEURONTIN) 100 mg capsule      TSH 3RD GENERATION   3. Spondylosis of lumbar spine M47.816 721.3 gabapentin (NEURONTIN) 100 mg capsule   4. Renal insufficiency O53.8 590.3 METABOLIC PANEL, BASIC      TSH 3RD GENERATION   5. Anemia, unspecified type D64.9 285.9 CBC W/O DIFF      FERRITIN      IRON PROFILE   6. Fatigue, unspecified type R53.83 780.79 TSH 3RD GENERATION   Recommend following a lower sodium diet and stay active. Blood pressure goal is less than 130/85 on average. Advised compliance with blood pressure medication and regular follow up. MEDICALLY CLEARED FOR CATARACT SURGERY. Follow-up and Dispositions    · Return for follow up pending labs and 3 months.  Tavo Denson MD

## 2019-07-11 LAB
BUN SERPL-MCNC: 22 MG/DL (ref 8–27)
BUN/CREAT SERPL: 22 (ref 12–28)
CALCIUM SERPL-MCNC: 9.8 MG/DL (ref 8.7–10.3)
CHLORIDE SERPL-SCNC: 106 MMOL/L (ref 96–106)
CO2 SERPL-SCNC: 20 MMOL/L (ref 20–29)
CREAT SERPL-MCNC: 1.01 MG/DL (ref 0.57–1)
ERYTHROCYTE [DISTWIDTH] IN BLOOD BY AUTOMATED COUNT: 14.8 % (ref 12.3–15.4)
FERRITIN SERPL-MCNC: 131 NG/ML (ref 15–150)
GLUCOSE SERPL-MCNC: 97 MG/DL (ref 65–99)
HCT VFR BLD AUTO: 34.9 % (ref 34–46.6)
HGB BLD-MCNC: 11 G/DL (ref 11.1–15.9)
IRON SATN MFR SERPL: 9 % (ref 15–55)
IRON SERPL-MCNC: 29 UG/DL (ref 27–139)
MCH RBC QN AUTO: 28 PG (ref 26.6–33)
MCHC RBC AUTO-ENTMCNC: 31.5 G/DL (ref 31.5–35.7)
MCV RBC AUTO: 89 FL (ref 79–97)
PLATELET # BLD AUTO: 403 X10E3/UL (ref 150–450)
POTASSIUM SERPL-SCNC: 4.9 MMOL/L (ref 3.5–5.2)
RBC # BLD AUTO: 3.93 X10E6/UL (ref 3.77–5.28)
SODIUM SERPL-SCNC: 143 MMOL/L (ref 134–144)
TIBC SERPL-MCNC: 310 UG/DL (ref 250–450)
TSH SERPL DL<=0.005 MIU/L-ACNC: 1.92 UIU/ML (ref 0.45–4.5)
UIBC SERPL-MCNC: 281 UG/DL (ref 118–369)
WBC # BLD AUTO: 8.8 X10E3/UL (ref 3.4–10.8)

## 2019-07-20 ENCOUNTER — TELEPHONE (OUTPATIENT)
Dept: INTERNAL MEDICINE CLINIC | Age: 79
End: 2019-07-20

## 2019-07-23 ENCOUNTER — TELEPHONE (OUTPATIENT)
Dept: INTERNAL MEDICINE CLINIC | Age: 79
End: 2019-07-23

## 2019-07-23 NOTE — TELEPHONE ENCOUNTER
Fax #[phone]   Please fax pre op to Dr. Kenia Amaya office as soon as possible as they have been waiting [facility].

## 2019-07-25 NOTE — PERIOP NOTES
[facility]  Ambulatory Surgery Unit  Pre-operative Instructions    Surgery/Procedure Date  Monday, July 29, 2019            Tentative Arrival Time 0845      1. On the day of your surgery/procedure, please report to the Ambulatory Surgery Unit Registration Desk and sign in at your designated time. The Ambulatory Surgery Unit is located in [facility] on the [facility] side of the [facility] across from the [address] [place] [address]. Please have all of your health insurance cards and a photo ID. 2. You must have someone with you to drive you home, as you should not drive a car for 24 hours following anesthesia. Please make arrangements for a responsible adult friend or family member to stay with you for at least the first 24 hours after your surgery. 3. Do not have anything to eat or drink (including water, gum, mints, coffee, juice) after 11:59 PM, Sunday. This may not apply to medications prescribed by your physician. (Please note below the special instructions with medications to take the morning of surgery, if applicable.)    4. We recommend you do not drink any alcoholic beverages for 24 hours before and after your surgery. 5. Contact your surgeons office for instructions on the following medications: non-steroidal anti-inflammatory drugs (i.e. Advil, Aleve), vitamins, and supplements. (Some surgeons will want you to stop these medications prior to surgery and others may allow you to take them)   **If you are currently taking Plavix, Coumadin, Aspirin and/or other blood-thinning agents, contact your surgeon for instructions. ** Your surgeon will partner with the physician prescribing these medications to determine if it is safe to stop or if you need to continue taking. Please do not stop taking these medications without instructions from your surgeon.     6. In an effort to help prevent surgical site infection, we ask that you shower with an anti-bacterial soap (i.e. Dial/Safeguard, or the soap provided to you at your preadmission testing appointment) for 3 days prior to and on the morning of surgery, using a fresh towel after each shower. (Please begin this process with fresh bed linens.) Do not apply any lotions, powders, or deodorants after the shower on the day of your procedure. If applicable, please do not shave the operative site for 48 hours prior to surgery. 7. Wear comfortable clothes. Wear glasses instead of contacts. Do not bring any jewelry or money (other than copays or fees as instructed). Do not wear make-up, particularly mascara, the morning of your surgery. Do not wear nail polish, particularly if you are having foot /hand surgery. Wear your hair loose or down, no ponytails, buns, ekta pins or clips. All body piercings must be removed. 8. You should understand that if you do not follow these instructions your surgery may be cancelled. If your physical condition changes (i.e. fever, cold or flu) please contact your surgeon as soon as possible. 9. It is important that you be on time. If a situation occurs where you may be late, or if you have any questions or problems, please call [phone].    10. Your surgery time may be subject to change. You will receive a phone call the day prior to surgery to confirm your arrival time. 11. Pediatric patients: please bring a change of clothes, diapers, bottle/sippy cup, pacifier, etc.      Special Instructions: Take all medications and inhalers, as prescribed, on the morning of surgery with a sip of water. I understand a pre-operative phone call will be made to verify my surgery time. In the event that I am not available, I give permission for a message to be left on my answering service and/or with another person?       yes         ___________________      ___________________      ________________  (Signature of Patient)          (Witness)                   (Date and Time)

## 2019-07-26 ENCOUNTER — ANESTHESIA EVENT (OUTPATIENT)
Dept: SURGERY | Age: 79
End: 2019-07-26
Payer: MEDICARE

## 2019-07-29 ENCOUNTER — ANESTHESIA (OUTPATIENT)
Dept: SURGERY | Age: 79
End: 2019-07-29
Payer: MEDICARE

## 2019-07-29 ENCOUNTER — HOSPITAL ENCOUNTER (OUTPATIENT)
Age: 79
Setting detail: OUTPATIENT SURGERY
Discharge: HOME | End: 2019-07-29
Attending: OPHTHALMOLOGY | Admitting: OPHTHALMOLOGY
Payer: MEDICARE

## 2019-07-29 VITALS
SYSTOLIC BLOOD PRESSURE: 155 MMHG | RESPIRATION RATE: 16 BRPM | DIASTOLIC BLOOD PRESSURE: 59 MMHG | HEART RATE: 66 BPM | WEIGHT: 101 LBS | TEMPERATURE: 97.6 F | OXYGEN SATURATION: 97 % | HEIGHT: 59 IN | BODY MASS INDEX: 20.36 KG/M2

## 2019-07-29 PROCEDURE — V2632 POST CHMBR INTRAOCULAR LENS: HCPCS | Performed by: OPHTHALMOLOGY

## 2019-07-29 PROCEDURE — 74011250636 HC RX REV CODE- 250/636

## 2019-07-29 PROCEDURE — 74011000250 HC RX REV CODE- 250: Performed by: OPHTHALMOLOGY

## 2019-07-29 PROCEDURE — 76210000040 HC AMBSU PH I REC FIRST 0.5 HR: Performed by: OPHTHALMOLOGY

## 2019-07-29 PROCEDURE — 77030018846 HC SOL IRR STRL H20 ICUM -A: Performed by: OPHTHALMOLOGY

## 2019-07-29 PROCEDURE — 76060000061 HC AMB SURG ANES 0.5 TO 1 HR: Performed by: OPHTHALMOLOGY

## 2019-07-29 PROCEDURE — 76210000046 HC AMBSU PH II REC FIRST 0.5 HR: Performed by: OPHTHALMOLOGY

## 2019-07-29 PROCEDURE — 76030000000 HC AMB SURG OR TIME 0.5 TO 1: Performed by: OPHTHALMOLOGY

## 2019-07-29 PROCEDURE — 77030021352 HC CBL LD SYS DISP COVD -B: Performed by: OPHTHALMOLOGY

## 2019-07-29 PROCEDURE — 74011250636 HC RX REV CODE- 250/636: Performed by: OPHTHALMOLOGY

## 2019-07-29 PROCEDURE — 74011000250 HC RX REV CODE- 250

## 2019-07-29 DEVICE — ACRYSOF(R) IQ ASPHERIC NATURAL IOL, SINGLE-PIECE ACRYLIC FOLDABLE PCL, UV WITH BLUE LIGHTFILTER, 13.0MM LENGTH, 6.0MM ANTERIORASYMMETRIC BICONVEX OPTIC, PLANAR HAPTICS.
Type: IMPLANTABLE DEVICE | Site: EYE | Status: FUNCTIONAL
Brand: ACRYSOF®

## 2019-07-29 RX ORDER — DIPHENHYDRAMINE HYDROCHLORIDE 50 MG/ML
12.5 INJECTION, SOLUTION INTRAMUSCULAR; INTRAVENOUS AS NEEDED
Status: DISCONTINUED | OUTPATIENT
Start: 2019-07-29 | End: 2019-07-29 | Stop reason: HOSPADM

## 2019-07-29 RX ORDER — SODIUM CHLORIDE 0.9 % (FLUSH) 0.9 %
5-40 SYRINGE (ML) INJECTION AS NEEDED
Status: DISCONTINUED | OUTPATIENT
Start: 2019-07-29 | End: 2019-07-29 | Stop reason: HOSPADM

## 2019-07-29 RX ORDER — SODIUM CHLORIDE 0.9 % (FLUSH) 0.9 %
5-40 SYRINGE (ML) INJECTION EVERY 8 HOURS
Status: DISCONTINUED | OUTPATIENT
Start: 2019-07-29 | End: 2019-07-29 | Stop reason: HOSPADM

## 2019-07-29 RX ORDER — ONDANSETRON 2 MG/ML
4 INJECTION INTRAMUSCULAR; INTRAVENOUS AS NEEDED
Status: DISCONTINUED | OUTPATIENT
Start: 2019-07-29 | End: 2019-07-29 | Stop reason: HOSPADM

## 2019-07-29 RX ORDER — TROPICAMIDE 10 MG/ML
1 SOLUTION/ DROPS OPHTHALMIC
Status: COMPLETED | OUTPATIENT
Start: 2019-07-29 | End: 2019-07-29

## 2019-07-29 RX ORDER — TIMOLOL MALEATE 5 MG/ML
SOLUTION/ DROPS OPHTHALMIC AS NEEDED
Status: DISCONTINUED | OUTPATIENT
Start: 2019-07-29 | End: 2019-07-29 | Stop reason: HOSPADM

## 2019-07-29 RX ORDER — NEOMYCIN SULFATE, POLYMYXIN B SULFATE, AND DEXAMETHASONE 3.5; 10000; 1 MG/G; [USP'U]/G; MG/G
OINTMENT OPHTHALMIC AS NEEDED
Status: DISCONTINUED | OUTPATIENT
Start: 2019-07-29 | End: 2019-07-29 | Stop reason: HOSPADM

## 2019-07-29 RX ORDER — CIPROFLOXACIN HYDROCHLORIDE 3.5 MG/ML
SOLUTION/ DROPS TOPICAL
Status: COMPLETED
Start: 2019-07-29 | End: 2019-07-29

## 2019-07-29 RX ORDER — PROPOFOL 10 MG/ML
INJECTION, EMULSION INTRAVENOUS AS NEEDED
Status: DISCONTINUED | OUTPATIENT
Start: 2019-07-29 | End: 2019-07-29 | Stop reason: HOSPADM

## 2019-07-29 RX ORDER — FENTANYL CITRATE 50 UG/ML
25 INJECTION, SOLUTION INTRAMUSCULAR; INTRAVENOUS
Status: DISCONTINUED | OUTPATIENT
Start: 2019-07-29 | End: 2019-07-29 | Stop reason: HOSPADM

## 2019-07-29 RX ORDER — SODIUM CHLORIDE, SODIUM LACTATE, POTASSIUM CHLORIDE, CALCIUM CHLORIDE 600; 310; 30; 20 MG/100ML; MG/100ML; MG/100ML; MG/100ML
25 INJECTION, SOLUTION INTRAVENOUS CONTINUOUS
Status: DISCONTINUED | OUTPATIENT
Start: 2019-07-29 | End: 2019-07-29 | Stop reason: HOSPADM

## 2019-07-29 RX ORDER — TROPICAMIDE 10 MG/ML
SOLUTION/ DROPS OPHTHALMIC
Status: COMPLETED
Start: 2019-07-29 | End: 2019-07-29

## 2019-07-29 RX ORDER — LIDOCAINE HYDROCHLORIDE 10 MG/ML
0.1 INJECTION, SOLUTION EPIDURAL; INFILTRATION; INTRACAUDAL; PERINEURAL AS NEEDED
Status: DISCONTINUED | OUTPATIENT
Start: 2019-07-29 | End: 2019-07-29 | Stop reason: HOSPADM

## 2019-07-29 RX ORDER — CIPROFLOXACIN HYDROCHLORIDE 3.5 MG/ML
1 SOLUTION/ DROPS TOPICAL
Status: DISCONTINUED | OUTPATIENT
Start: 2019-07-29 | End: 2019-07-29 | Stop reason: HOSPADM

## 2019-07-29 RX ORDER — PROPOFOL 10 MG/ML
INJECTION, EMULSION INTRAVENOUS
Status: DISCONTINUED | OUTPATIENT
Start: 2019-07-29 | End: 2019-07-29 | Stop reason: HOSPADM

## 2019-07-29 RX ORDER — TETRACAINE HYDROCHLORIDE 5 MG/ML
SOLUTION OPHTHALMIC AS NEEDED
Status: DISCONTINUED | OUTPATIENT
Start: 2019-07-29 | End: 2019-07-29 | Stop reason: HOSPADM

## 2019-07-29 RX ORDER — SODIUM CHLORIDE 9 MG/ML
25 INJECTION, SOLUTION INTRAVENOUS CONTINUOUS
Status: DISCONTINUED | OUTPATIENT
Start: 2019-07-29 | End: 2019-07-29 | Stop reason: HOSPADM

## 2019-07-29 RX ADMIN — TROPICAMIDE 1 DROP: 10 SOLUTION/ DROPS OPHTHALMIC at 09:24

## 2019-07-29 RX ADMIN — CIPROFLOXACIN 1 DROP: 3 SOLUTION OPHTHALMIC at 09:24

## 2019-07-29 RX ADMIN — PROPOFOL 10 MG: 10 INJECTION, EMULSION INTRAVENOUS at 09:59

## 2019-07-29 RX ADMIN — CIPROFLOXACIN HYDROCHLORIDE 1 DROP: 3.5 SOLUTION/ DROPS TOPICAL at 09:31

## 2019-07-29 RX ADMIN — SODIUM CHLORIDE 25 ML/HR: 900 INJECTION, SOLUTION INTRAVENOUS at 09:20

## 2019-07-29 RX ADMIN — PROPOFOL 10 MG: 10 INJECTION, EMULSION INTRAVENOUS at 09:55

## 2019-07-29 RX ADMIN — CIPROFLOXACIN HYDROCHLORIDE 1 DROP: 3.5 SOLUTION/ DROPS TOPICAL at 09:24

## 2019-07-29 RX ADMIN — TROPICAMIDE 1 DROP: 10 SOLUTION/ DROPS OPHTHALMIC at 09:31

## 2019-07-29 RX ADMIN — PROPOFOL 25 MCG/KG/MIN: 10 INJECTION, EMULSION INTRAVENOUS at 09:54

## 2019-07-29 RX ADMIN — CIPROFLOXACIN HYDROCHLORIDE 1 DROP: 3.5 SOLUTION/ DROPS TOPICAL at 09:27

## 2019-07-29 RX ADMIN — TROPICAMIDE 1 DROP: 10 SOLUTION/ DROPS OPHTHALMIC at 09:27

## 2019-07-29 NOTE — PERIOP NOTES
Pt awake/alert, sipping on pepsi. 1025  to bedside. Dr. Eder Ramirez spoke with pt/spouse. D/C instructions reviewed and he signed for them. 1051Discharged to home via/wc,accompanied to car per RN. Skin warm and dry, awake and alert. Respirations even, unlabored. Pt and family members questions and concerns addressed prior to discharge. All belongings with pt.

## 2019-07-29 NOTE — ANESTHESIA POSTPROCEDURE EVALUATION
Procedure(s):  CATARACT EXTRACTION WITH INTRA OCULAR LENS IMPLANT RIGHT EYE. MAC    Anesthesia Post Evaluation        Patient location during evaluation: PACU  Note status: Adequate. Level of consciousness: responsive to verbal stimuli and sleepy but conscious  Pain management: satisfactory to patient  Airway patency: patent  Anesthetic complications: no  Cardiovascular status: acceptable  Respiratory status: acceptable  Hydration status: acceptable  Comments: +Post-Anesthesia Evaluation and Assessment    Patient: Jerardo Fernando MRN: [id number]  SSN: [id number]   YOB: 1940  Age: 78 y.o. Sex: female      Cardiovascular Function/Vital Signs    /59   Pulse 66   Temp 36.4 °C (97.6 °F)   Resp 16   Ht 4' 11\" (1.499 m)   Wt 45.8 kg (101 lb)   SpO2 97%   BMI 20.40 kg/m²     Patient is status post Procedure(s):  CATARACT EXTRACTION WITH INTRA OCULAR LENS IMPLANT RIGHT EYE. Nausea/Vomiting: Controlled. Postoperative hydration reviewed and adequate. Pain:  Pain Scale 1: Numeric (0 - 10) (07/29/19 1045)  Pain Intensity 1: 0 (07/29/19 1050)   Managed. Neurological Status:   Neuro (WDL): Exceptions to WDL (07/29/19 1021)   At baseline. Mental Status and Level of Consciousness: Arousable. Pulmonary Status:   O2 Device: Room air (07/29/19 1021)   Adequate oxygenation and airway patent. Complications related to anesthesia: None    Post-anesthesia assessment completed. No concerns.     Signed By: Nabil Gagnon MD    7/29/2019  Post anesthesia nausea and vomiting:  controlled      Vitals Value Taken Time   /69 7/29/2019 10:21 AM   Temp 36.4 °C (97.6 °F) 7/29/2019 10:21 AM   Pulse 69 7/29/2019 10:21 AM   Resp 12 7/29/2019 10:21 AM   SpO2 97 % 7/29/2019 10:21 AM

## 2019-07-29 NOTE — ANESTHESIA PREPROCEDURE EVALUATION
Anesthetic History   No history of anesthetic complications            Review of Systems / Medical History  Patient summary reviewed, nursing notes reviewed and pertinent labs reviewed    Pulmonary  Within defined limits                 Neuro/Psych   Within defined limits           Cardiovascular    Hypertension              Exercise tolerance: >4 METS     GI/Hepatic/Renal     GERD           Endo/Other        Arthritis     Other Findings   Comments: Cataract            Physical Exam    Airway  Mallampati: II  TM Distance: 4 - 6 cm  Neck ROM: normal range of motion   Mouth opening: Normal     Cardiovascular  Regular rate and rhythm,  S1 and S2 normal,  no murmur, click, rub, or gallop             Dental    Dentition: Bridges     Pulmonary  Breath sounds clear to auscultation               Abdominal  GI exam deferred       Other Findings            Anesthetic Plan    ASA: 2  Anesthesia type: MAC          Induction: Intravenous  Anesthetic plan and risks discussed with: Patient

## 2019-07-29 NOTE — PERIOP NOTES
Charles Hummel  1940  [id number]    Situation:  Verbal report given from: YVROSE Lambert RN and KATINA BLACKMAN  Procedure: Procedure(s):  CATARACT EXTRACTION WITH INTRA OCULAR LENS IMPLANT RIGHT EYE    Background:    Preoperative diagnosis: CATARACT    Postoperative diagnosis: CATARACT    :  Dr. Kera Whitney    Assistant(s): Circ-1: Amy Laguna  Scrub Tech-1: Mejia Cao     Specimens: * No specimens in log *    Assessment:  Intra-procedure medications         Anesthesia gave intra-procedure sedation and medications, see anesthesia flow sheet     Intravenous fluids: LR@ KVO     Vital signs stable       Recommendation:    Permission to share finding with  Jodie Bella : yes

## 2019-07-29 NOTE — BRIEF OP NOTE
BRIEF OPERATIVE NOTE    Date of Procedure: 7/29/2019   Preoperative Diagnosis: Nuclear Sclerotic cataract right eye H25.11  Postoperative Diagnosis: Nuclear Sclerotic cataract right eye H25.11  Procedure(s):  CATARACT EXTRACTION WITH INTRA OCULAR LENS IMPLANT RIGHT EYE  Surgeon(s) and Role:     * Shaq Bautista MD - Primary         Surgical Assistant: none    Surgical Staff:  Circ-1: Molina Reina  Scrub Tech-1: Mike Cuadra Event Time In Time Out   Incision Start 1001    Incision Close 1018      Anesthesia: MAC   Estimated Blood Loss: none  Specimens: * No specimens in log *   Findings: cataract right eye  Complications: none  Implants:   Implant Name Type Inv.  Item Serial No.  Lot No. LRB No. Used Action   LENS IOL POST 1-PC 6X13 20.5 -- [employer] - [id number]  LENS IOL POST 1-PC 6X13 20.5 -- [employer] [id number]  Right 1 Implanted

## 2019-07-29 NOTE — DISCHARGE INSTRUCTIONS
Reynold Negron MD  [facility]  [address]  [place], [facility]  Phone: [phone]       Fax: [phone]  If you are unable to keep appointment, kindly give 24 hours notice please. REMOVE PATCH  START DROPS WHEN YOU GET HOME  PUT PATCH BACK ON AT BEDTIME    1. DO NOT RUB the eye that was operated on. 2. Do not strain excessively. It is all right to bend as long as you do not strain. 3. It is safe to take a shower, wash your face, and wash your hair. Just keep the eye closed. 4. Do not swim for 1 week after surgery. 5. If you have any problems or questions, do not hesitate to call. There is always a physician on call at [phone] ext. [phone].   6. Follow instructions on eye drops from office. 7. You may take Tylenol or Advil for discomfort. If it pressure not relieved by Tylenol or Advil, please call Dr. Bruce Patches office. TO PREVENT AN INFECTION      1. [address] YOUR HANDS     To prevent infection, good handwashing is the most important thing you or your caregiver can do.  Wash your hands with soap and water or use the hand  we gave you before you touch any wounds. 2.  USE CLEAN SHEETS     Use freshly cleaned sheets on your bed after surgery.  To keep the surgery site clean, do not allow pets to sleep with you while your wound is still healing. 3. STOP SMOKING    Stop smoking, or at least cut back on smoking     Smoking slows your healing. 4.  CONTROL YOUR BLOOD SUGAR     High blood sugars slow wound healing.  If you are diabetic, control your blood sugar levels before and after your surgery. If you were given prescriptions, please review the written information on the prescribed medications. DO NOT DRIVE WHILE TAKING NARCOTIC PAIN MEDICATIONS.     DISCHARGE SUMMARY from Nurse    The following personal items collected during your admission are returned to you:   Dental Appliance: Dental Appliances: None  Vision: Visual Aid: Glasses, At home  Hearing Aid:    Jewelry:    Clothing:    Other Valuables:    Valuables sent to safe:      PATIENT INSTRUCTIONS:    After general anesthesia or intravenous sedation, for 24 hours or while taking prescription Narcotics:  · Someone should be with you for the next 24 hours. · For your own safety, a responsible adult must drive you home. · Limit your activities  · Recommended activity: Rest today, Do not climb stairs or shower unattended for the next 24 hours. · Do not drive and operate hazardous machinery  · Do not make important personal or business decisions  · Do  not drink alcoholic beverages  · If you have not urinated within 8 hours after discharge, please contact your surgeon on call. Report the following to your surgeon:  · Excessive pain, swelling, redness or odor of or around the surgical area  · Temperature over 100.5  · Nausea and vomiting lasting longer than 4 hours or if unable to take medications  · Any signs of decreased circulation or nerve impairment to extremity: change in color, persistent  numbness, tingling, coldness or increase pain  ·   ·   · You will receive a Post Operative Call from one of the Recovery Room Nurses on the day after your surgery to check on you. It is very important for us to know how you are recovering after your surgery. · You may receive an e-mail or letter in the mail from [employer] regarding your experience with us in the Ambulatory Surgery Unit. Your feedback is valuable to us and we appreciate your participation in the survey. · We wish youre a speedy recovery ? What to do at Home:      *  Please give a list of your current medications to your Primary Care Provider. *  Please update this list whenever your medications are discontinued, doses are      changed, or new medications (including over-the-counter products) are added. *  Please carry medication information at all times in case of emergency situations.           These are general instructions for a healthy lifestyle:    No smoking/ No tobacco products/ Avoid exposure to second hand smoke    Surgeon General's Warning:  Quitting smoking now greatly reduces serious risk to your health. Obesity, smoking, and sedentary lifestyle greatly increases your risk for illness    A healthy diet, regular physical exercise & weight monitoring are important for maintaining a healthy lifestyle    You may be retaining fluid if you have a history of heart failure or if you experience any of the following symptoms:  Weight gain of 3 pounds or more overnight or 5 pounds in a week, increased swelling in our hands or feet or shortness of breath while lying flat in bed. Please call your doctor as soon as you notice any of these symptoms; do not wait until your next office visit. Recognize signs and symptoms of STROKE:    B - Balance  E - Eyes    F-face looks uneven    A-arms unable to move or move even    S-speech slurred or non-existent    T-time-call 911 as soon as signs and symptoms begin-DO NOT go       Back to bed or wait to see if you get better-TIME IS BRAIN. If you have not received your influenza and/or pneumococcal vaccine, please follow up with your primary care physician. The discharge information has been reviewed with the patient and caregiver. The patient and caregiver verbalized understanding.

## 2019-07-29 NOTE — OP NOTES
Date of Procedure: 7/29/19  Preoperative Diagnosis: Nuclear Sclerotic cataract right eye H25.11  Postoperative Diagnosis: Nuclear Sclerotic cataract right eye H25.11  Procedure: Extracapsular cataract extraction with lens implant right eye  Surgeon:  MARIA INES Marroquin MD  Assistants: None  Anesthesia: MAC with local  Estimated Blood Loss: None  Findings: Cataract right eye  Complications: None  Specimens: None  Prosthetic Devices: Intraocular lens implant right eye    The patient's right eye was dilated with mydriacyl 1% and ofloxacin 0.3% for 3 doses preoperatively. The patient was taken to the operating room and was given sedation. Tetracaine was given topically to the right eye, and the eye was prepped and draped in the usual manner for sterile eye surgery, including Betadine solution being dropped onto the conjunctiva at the beginning of the prep. The eyelashes were isolated with a plastic drape. A lid speculum was placed. A #15 blade was used to make a paracentesis at the 10:30 location. The eye was flushed with a lidocaine / epinephrine mixture (\"Shugarcaine\"). The eye was filled with Viscoat (Duovisc), and a crescent blade was used to make a 2.5 mm incision at the limbus temporally. This was dissected 2 mm into clear cornea, and the eye was entered with a 2.4 mm keratome. A 0.12 forceps was used for fixation during the procedure. A capsulorhexis flap was started with a cystotome, and this was completed 360 degrees with Utrata forceps. The capsular piece was removed. [place] dissection was performed with the \"Shugarcaine\" mixture on a cannula. The lens nucleus was removed using phacoemulsification with a total phaco time of 1:39 minutes at 8%. The lens was cracked and manipulated with a Sinsky hook. Residual cortex was removed using irrigation / aspiration. The capsule remained intact.     The capsule was refilled with Provisc (Duovisc), and an Tremaine Intraocular lens model [id number] power 20.50 was placed in a lens folding cartridge with Provisc. The lens was unfolded into the capsular bag. The lens centered well. Residual Provisc and Viscoat were removed using I / A. The Resure wound sealant was used to close the incision. The eye was flushed with BSS through the paracentesis. The eye was left soft and formed at the end of the case. Betadine solution was placed on the conjunctival surface at the end of the case. The incision site was water tight. The speculum was removed, and a drop of timolol 0.5% and zafar/poly/dex ointment was placed on the eye followed by a shield. The patient tolerated the procedure well and is to follow-up in one day.

## 2019-10-11 ENCOUNTER — TELEPHONE (OUTPATIENT)
Dept: INTERNAL MEDICINE CLINIC | Age: 79
End: 2019-10-11

## 2019-10-11 ENCOUNTER — OFFICE VISIT (OUTPATIENT)
Dept: INTERNAL MEDICINE CLINIC | Age: 79
End: 2019-10-11

## 2019-10-11 VITALS
BODY MASS INDEX: 21 KG/M2 | HEART RATE: 62 BPM | RESPIRATION RATE: 16 BRPM | WEIGHT: 104.2 LBS | OXYGEN SATURATION: 100 % | SYSTOLIC BLOOD PRESSURE: 140 MMHG | HEIGHT: 59 IN | TEMPERATURE: 97.4 F | DIASTOLIC BLOOD PRESSURE: 72 MMHG

## 2019-10-11 DIAGNOSIS — Z00.00 MEDICARE ANNUAL WELLNESS VISIT, SUBSEQUENT: ICD-10-CM

## 2019-10-11 DIAGNOSIS — K21.9 GASTROESOPHAGEAL REFLUX DISEASE WITHOUT ESOPHAGITIS: ICD-10-CM

## 2019-10-11 DIAGNOSIS — G89.29 CHRONIC PAIN OF RIGHT KNEE: ICD-10-CM

## 2019-10-11 DIAGNOSIS — Z78.0 POSTMENOPAUSAL: ICD-10-CM

## 2019-10-11 DIAGNOSIS — Z13.820 SCREENING FOR OSTEOPOROSIS: ICD-10-CM

## 2019-10-11 DIAGNOSIS — Z12.31 ENCOUNTER FOR SCREENING MAMMOGRAM FOR BREAST CANCER: ICD-10-CM

## 2019-10-11 DIAGNOSIS — M25.561 CHRONIC PAIN OF RIGHT KNEE: ICD-10-CM

## 2019-10-11 DIAGNOSIS — M47.816 SPONDYLOSIS OF LUMBAR SPINE: ICD-10-CM

## 2019-10-11 DIAGNOSIS — I10 ESSENTIAL HYPERTENSION: Primary | ICD-10-CM

## 2019-10-11 DIAGNOSIS — Z23 ENCOUNTER FOR IMMUNIZATION: ICD-10-CM

## 2019-10-11 DIAGNOSIS — Z13.31 SCREENING FOR DEPRESSION: ICD-10-CM

## 2019-10-11 RX ORDER — METOPROLOL SUCCINATE 50 MG/1
50 TABLET, EXTENDED RELEASE ORAL DAILY
Qty: 90 TAB | Refills: 3 | Status: SHIPPED | OUTPATIENT
Start: 2019-10-11 | End: 2022-04-14 | Stop reason: SDUPTHER

## 2019-10-11 RX ORDER — FAMOTIDINE 20 MG/1
20 TABLET, FILM COATED ORAL 2 TIMES DAILY
Qty: 180 TAB | Refills: 3 | Status: SHIPPED | OUTPATIENT
Start: 2019-10-11

## 2019-10-11 RX ORDER — DICLOFENAC SODIUM 10 MG/G
2 GEL TOPICAL 4 TIMES DAILY
Qty: 100 G | Refills: 3 | Status: SHIPPED | OUTPATIENT
Start: 2019-10-11

## 2019-10-11 NOTE — TELEPHONE ENCOUNTER
----- Message from Madyson Robin MD sent at 10/11/2019 11:52 AM EDT -----  Please call Dr. Minda Brito for eye exam.

## 2019-10-11 NOTE — PROGRESS NOTES
Jose Alberto Malone is a 78 y.o. female who presents for follow up. Seen in July. Reports right upper arm pain, pulled sheets off bed a few weeks ago. Using voltaren gel with relief. Had hand OA.  Has seen Dr. Melissa Barnard, treated for pseudogout.  prior injections. [facility], bilateral leg pain, bilateral shoulder pain. Prior PT. Taking tylenol 650mg in am and afternoon and taking gabapentin 200mg at bedtime.    [facility] urologist, Dr. Brooke Traylor. Last seen a few years ago. 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 No urinary symptoms.   Is drinking a lot of water. Has had frequent diarrhea, to see Dr. Soledad Ferreira. Usually sees Dr. Saurabh Catalan. Brother had colon cancer.      BP controlled on toprol XL 50mg once a day. No dizziness, headaches, or change in exercise tolerance.      Up to date with eye doctor, for glaucoma, On drops regularly. S/p right cataract surgery. has a left cataract. Reviewed labs. Creatinine 1.01. Sees Dr Rachel Hummel annually now. Due for dexa scan. Cannot tolerate vitamin d and calcium.   Is active at home.              Past Medical History:   Diagnosis Date    AIN (acute interstitial nephritis)     Arthritis     CKD (chronic kidney disease) stage 3, GFR 30-59 ml/min ([facility])     GERD (gastroesophageal reflux disease)     Glaucoma     High cholesterol     Hypertension     Ill-defined condition     chronic cystitis       Family History   Problem Relation Age of Onset    Cancer Father     Cancer Brother        Social History     Socioeconomic History    Marital status:      Spouse name: Not on file    Number of children: Not on file    Years of education: Not on file    Highest education level: Not on file   Occupational History    Not on file   Social Needs    Financial resource strain: Not on file    Food insecurity:     Worry: Not on file     Inability: Not on file    Transportation needs:     Medical: Not on file     Non-medical: Not on file   Tobacco Use    Smoking status: Former Smoker     Packs/day: 0.50     Years: 4.00     Pack years: 2.00     Last attempt to quit: 1997     Years since quittin.2    Smokeless tobacco: Former User   Substance and Sexual Activity    Alcohol use: Yes     Comment: seldom    Drug use: Yes     Types: Prescription, OTC     Comment: never recreational substance    Sexual activity: Not Currently     Partners: Male     Birth control/protection: None   Lifestyle    Physical activity:     Days per week: Not on file     Minutes per session: Not on file    Stress: Not on file   Relationships    Social connections:     Talks on phone: Not on file     Gets together: Not on file     Attends Orthodox service: Not on file     Active member of club or organization: Not on file     Attends meetings of clubs or organizations: Not on file     Relationship status: Not on file    Intimate partner violence:     Fear of current or ex partner: Not on file     Emotionally abused: Not on file     Physically abused: Not on file     Forced sexual activity: Not on file   Other Topics Concern    Not on file   Social History Narrative    ** Merged History Encounter **            Current Outpatient Medications on File Prior to Visit   Medication Sig Dispense Refill    gabapentin (NEURONTIN) 100 mg capsule Take 2 Caps by mouth nightly. Max Daily Amount: 200 mg. 60 Cap 2    polyethylene glycol (MIRALAX) 17 gram/dose powder Take 17 g by mouth as needed.  timolol (TIMOPTIC) 0.5 % ophthalmic solution Administer 1 Drop to both eyes daily.  ACETAMINOPHEN (TYLENOL PO) Take 650 mg by mouth as needed. Indications: two tabs      latanoprost (XALATAN) 0.005 % ophthalmic solution Administer 1 Drop to both eyes nightly.  Aspirin, Buffered 81 mg tab Take 1 Tab by mouth daily.  sodium bicarbonate 650 mg tablet Take 650 mg by mouth two (2) times a day. No current facility-administered medications on file prior to visit. Review of Systems  Pertinent items are noted in HPI. Objective:     Visit Vitals  /72 (BP 1 Location: Left arm, BP Patient Position: Sitting)   Pulse 62   Temp 97.4 °F (36.3 °C) (Oral)   Resp 16   Ht 4' 11\" (1.499 m)   Wt 104 lb 3.2 oz (47.3 kg)   LMP  (LMP Unknown)   SpO2 100%   BMI 21.05 kg/m²     Gen: well appearing female  HEENT:   PERRL,normal conjunctiva. External ear and canals normal, TMs no opacification or erythema,  OP no erythema, no exudates, MMM  Neck:  Supple. Thyroid normal size, nontender, without nodules. No masses or LAD  Resp:  No wheezing, no rhonchi, no rales. CV:  RRR, normal S1S2, no murmur. GI: soft, nontender, without masses. Extrem:  +2 pulses, no edema, warm distally      Assessment/Plan:       ICD-10-CM ICD-9-CM    1. Essential hypertension I10 401.9 metoprolol succinate (TOPROL-XL) 50 mg XL tablet   2. Gastroesophageal reflux disease without esophagitis K21.9 530.81 famotidine (PEPCID) 20 mg tablet   3. Chronic pain of right knee M25.561 719.46 diclofenac (VOLTAREN) 1 % gel    G89.29 338.29    4. Spondylosis of lumbar spine M47.816 721.3 diclofenac (VOLTAREN) 1 % gel   5. Encounter for immunization Z23 V03.89 INFLUENZA VIRUS VACCINE, HIGH DOSE SEASONAL, PRESERVATIVE FREE      ADMIN INFLUENZA VIRUS VAC      varicella-zoster recombinant, PF, (SHINGRIX) 50 mcg/0.5 mL susr injection   6. Medicare annual wellness visit, subsequent Z00.00 V70.0    7. Screening for depression Z13.31 V79.0 [facility] 68   8. Encounter for screening mammogram for breast cancer Z12.31 V76.12 [facility] MAMMO BI SCREENING INCL CAD   9. Postmenopausal Z78.0 V49.81 DEXA BONE DENSITY STUDY AXIAL   10. Screening for osteoporosis Z13.820 V82.81 DEXA BONE DENSITY STUDY AXIAL       Follow-up and Dispositions    · Return in about 6 months (around 4/11/2020) for follow up and fasting labs.   Samia Mccullough MD

## 2019-10-11 NOTE — PATIENT INSTRUCTIONS
Office Policies    Phone calls/patient messages:            Please allow up to 24 hours for someone in the office to contact you about your call or message. Be mindful your provider may be out of the office or your message may require further review. We encourage you to use [employer] for your messages as this is a faster, more efficient way to communicate with our office                         Medication Refills:            Prescription medications require 48-72 business hours to process. We encourage you to use [employer] for your refills. For controlled medications: Please allow 72 business hours to process. Certain medications may require you to  a written prescription at our office. NO narcotic/controlled medications will be prescribed after 4pm Monday through Friday or on weekends              Form/Paperwork Completion:            Please note a $25 fee may incur for all paperwork for completed by our providers. We ask that you allow 7-10 business days. Pre-payment is due prior to picking up/faxing the completed form. You may also download your forms to [employer] to have your doctor print off. Medicare Wellness Visit, Female     The best way to live healthy is to have a lifestyle where you eat a well-balanced diet, exercise regularly, limit alcohol use, and quit all forms of tobacco/nicotine, if applicable. Regular preventive services are another way to keep healthy. Preventive services (vaccines, screening tests, monitoring & exams) can help personalize your care plan, which helps you manage your own care. Screening tests can find health problems at the earliest stages, when they are easiest to treat. [facility] follows the current, evidence-based guidelines published by the [facility] (USPSTF) when recommending preventive services for our patients.  Because we follow these guidelines, sometimes recommendations change over time as research supports it. (For example, mammograms used to be recommended annually. Even though Medicare will still pay for an annual mammogram, the newer guidelines recommend a mammogram every two years for women of average risk.)  Of course, you and your doctor may decide to screen more often for some diseases, based on your risk and your health status. Preventive services for you include:  - Medicare offers their members a free annual wellness visit, which is time for you and your primary care provider to discuss and plan for your preventive service needs. Take advantage of this benefit every year!  -All adults over the age of 72 should receive the recommended pneumonia vaccines. Current USPSTF guidelines recommend a series of two vaccines for the best pneumonia protection.   -All adults should have a flu vaccine yearly and a tetanus vaccine every 10 years. All adults age 61 and older should receive a shingles vaccine once in their lifetime.    -A bone mass density test is recommended when a woman turns 65 to screen for osteoporosis. This test is only recommended one time, as a screening. Some providers will use this same test as a disease monitoring tool if you already have osteoporosis. -All adults age 38-68 who are overweight should have a diabetes screening test once every three years.   -Other screening tests and preventive services for persons with diabetes include: an eye exam to screen for diabetic retinopathy, a kidney function test, a foot exam, and stricter control over your cholesterol.   -Cardiovascular screening for adults with routine risk involves an electrocardiogram (ECG) at intervals determined by your doctor.   -Colorectal cancer screenings should be done for adults age 54-65 with no increased risk factors for colorectal cancer. There are a number of acceptable methods of screening for this type of cancer. Each test has its own benefits and drawbacks.  Discuss with your doctor what is most appropriate for you during your annual wellness visit. The different tests include: colonoscopy (considered the best screening method), a fecal occult blood test, a fecal DNA test, and sigmoidoscopy.   -Breast cancer screenings are recommended every other year for women of normal risk, age 54-69.  -Cervical cancer screenings for women over age 72 are only recommended with certain risk factors.   -All adults born between [facility] should be screened once for Hepatitis C.

## 2019-10-11 NOTE — PROGRESS NOTES
This is the Subsequent Medicare Annual Wellness Exam, performed 12 months or more after the Initial AWV or the last Subsequent AWV    I have reviewed the patient's medical history in detail and updated the computerized patient record. History     Past Medical History:   Diagnosis Date    AIN (acute interstitial nephritis)     Arthritis     CKD (chronic kidney disease) stage 3, GFR 30-59 ml/min ([facility])     GERD (gastroesophageal reflux disease)     Glaucoma     High cholesterol     Hypertension     Ill-defined condition     chronic cystitis      Past Surgical History:   Procedure Laterality Date    HX APPENDECTOMY      with hysterectomy    HX  SECTION      HX COLONOSCOPY      HX DILATION AND CURETTAGE      HX HYSTERECTOMY      HX RENAL BIOPSY Left     in office    HX TONSILLECTOMY      HX UROLOGICAL  2016    cauterization of bladder ulcers     Current Outpatient Medications   Medication Sig Dispense Refill    gabapentin (NEURONTIN) 100 mg capsule Take 2 Caps by mouth nightly. Max Daily Amount: 200 mg. 60 Cap 2    diclofenac (VOLTAREN) 1 % gel Apply 2 g to affected area four (4) times daily. 100 g 3    famotidine (PEPCID) 20 mg tablet Take 1 Tab by mouth two (2) times a day. Indications: gastroesophageal reflux disease 180 Tab 3    metoprolol succinate (TOPROL-XL) 50 mg XL tablet Take 1 Tab by mouth daily. 90 Tab 3    polyethylene glycol (MIRALAX) 17 gram/dose powder Take 17 g by mouth as needed.  timolol (TIMOPTIC) 0.5 % ophthalmic solution Administer 1 Drop to both eyes daily.  ACETAMINOPHEN (TYLENOL PO) Take 650 mg by mouth as needed. Indications: two tabs      latanoprost (XALATAN) 0.005 % ophthalmic solution Administer 1 Drop to both eyes nightly.  Aspirin, Buffered 81 mg tab Take 1 Tab by mouth daily.  sodium bicarbonate 650 mg tablet Take 650 mg by mouth two (2) times a day.        Allergies   Allergen Reactions    Aleve [Naproxen Sodium] Other (comments)     Renal issues    Ibuprofen Unknown (comments)    Lisinopril Other (comments)     Renal dysfunction    Omeprazole Other (comments)     Cause Kidney failure     Family History   Problem Relation Age of Onset    Cancer Father     Cancer Brother      Social History     Tobacco Use    Smoking status: Former Smoker     Packs/day: 0.50     Years: 4.00     Pack years: 2.00     Last attempt to quit: 1997     Years since quittin.2    Smokeless tobacco: Former User   Substance Use Topics    Alcohol use: Yes     Comment: seldom     Patient Active Problem List   Diagnosis Code    Hyperlipidemia E78.5    Glaucoma H40.9    Gastroesophageal reflux disease without esophagitis K21.9    Osteopenia M85.80    Family history of colon cancer Z80.0    Anemia D64.9    Essential hypertension I10    [facility]F [facility] failure) ([facility]) N17.9    Chronic interstitial cystitis N30.10    Diarrhea R19.7    Acute cystitis without hematuria N30.00    Interstitial nephritis N12    Hypercalcemia E83.52    Erosive osteoarthritis of both hands M15.4    Pseudogout M11.20    Spondylosis of lumbar spine M47.816       Depression Risk Factor Screening:     3 most recent PHQ Screens 10/11/2019   PHQ Not Done -   Little interest or pleasure in doing things Not at all   Feeling down, depressed, irritable, or hopeless Several days   Total Score PHQ 2 1     Alcohol Risk Factor Screening: You do not drink alcohol or very rarely. Functional Ability and Level of Safety:   Hearing Loss  Hearing is good. Activities of Daily Living  The home contains: no safety equipment. Patient does total self care    Fall Risk  Fall Risk Assessment, last 12 mths 10/11/2019   Able to walk? Yes   Fall in past 12 months?  No   Fall with injury? -   Number of falls in past 12 months -   Fall Risk Score -       Abuse Screen  Patient is not abused    Cognitive Screening   Evaluation of Cognitive Function:  Has your family/caregiver stated any concerns about your memory: no  Normal    Patient Care Team   Patient Care Team:  Mirza Bland MD as PCP - General (Internal Medicine)  Kellie Day MD as Consulting Provider (Cardiology)  Muna Lynne MD as Consulting Provider (Urology)  Chandana Davies MD as Consulting Provider (Ophthalmology)  Chandu Resendiz MD as Consulting Provider (Nephrology)    Assessment/Plan   Education and counseling provided:  Are appropriate based on today's review and evaluation    Diagnoses and all orders for this visit:    1. Gastroesophageal reflux disease without esophagitis  -     famotidine (PEPCID) 20 mg tablet; Take 1 Tab by mouth two (2) times a day. Indications: gastroesophageal reflux disease    2. Essential hypertension  -     metoprolol succinate (TOPROL-XL) 50 mg XL tablet; Take 1 Tab by mouth daily. 3. Chronic pain of right knee  -     diclofenac (VOLTAREN) 1 % gel; Apply 2 g to affected area four (4) times daily. 4. Spondylosis of lumbar spine  -     diclofenac (VOLTAREN) 1 % gel; Apply 2 g to affected area four (4) times daily. 5. Encounter for immunization  -     INFLUENZA VIRUS VACCINE, HIGH DOSE SEASONAL, PRESERVATIVE FREE  -     ADMIN INFLUENZA VIRUS VAC    6. Medicare annual wellness visit, subsequent    7.  Screening for depression  -     [place] Maintenance Due   Topic Date Due    Shingrix Vaccine Age 50> (1 of 2) 03/18/1988    Bone Densitometry (Dexa) Screening  03/18/2003    Influenza Age 9 to Adult  08/01/2019    MEDICARE YEARLY EXAM  10/11/2019    GLAUCOMA SCREENING Q2Y  11/21/2019

## 2019-10-15 ENCOUNTER — DOCUMENTATION ONLY (OUTPATIENT)
Dept: INTERNAL MEDICINE CLINIC | Age: 79
End: 2019-10-15

## 2019-10-15 NOTE — PROGRESS NOTES
Called after hours with dysphagia to solid food and pills. Having to crush pills. Will be Nichole Galvez next week. Having to clear throat this evening frequently but not having a current sensation of dysphagia or any pain.  Will continue pepcid, crush pills and soft or pureed diet until seen by GI MD

## 2019-10-21 ENCOUNTER — APPOINTMENT (OUTPATIENT)
Dept: GENERAL RADIOLOGY | Age: 79
End: 2019-10-21
Attending: EMERGENCY MEDICINE
Payer: MEDICARE

## 2019-10-21 ENCOUNTER — HOSPITAL ENCOUNTER (EMERGENCY)
Age: 79
Discharge: HOME | End: 2019-10-21
Attending: EMERGENCY MEDICINE
Payer: MEDICARE

## 2019-10-21 VITALS
BODY MASS INDEX: 20.84 KG/M2 | SYSTOLIC BLOOD PRESSURE: 159 MMHG | RESPIRATION RATE: 13 BRPM | TEMPERATURE: 97.5 F | OXYGEN SATURATION: 100 % | WEIGHT: 103.4 LBS | HEART RATE: 68 BPM | DIASTOLIC BLOOD PRESSURE: 57 MMHG | HEIGHT: 59 IN

## 2019-10-21 DIAGNOSIS — M79.602 BILATERAL ARM PAIN: Primary | ICD-10-CM

## 2019-10-21 DIAGNOSIS — M79.601 BILATERAL ARM PAIN: Primary | ICD-10-CM

## 2019-10-21 LAB
ALBUMIN SERPL-MCNC: 3.6 G/DL (ref 3.5–5)
ALBUMIN/GLOB SERPL: 0.9 {RATIO} (ref 1.1–2.2)
ALP SERPL-CCNC: 66 U/L (ref 45–117)
ALT SERPL-CCNC: 8 U/L (ref 12–78)
ANION GAP SERPL CALC-SCNC: 6 MMOL/L (ref 5–15)
AST SERPL-CCNC: 7 U/L (ref 15–37)
ATRIAL RATE: 66 BPM
BASOPHILS # BLD: 0.1 K/UL (ref 0–0.1)
BASOPHILS NFR BLD: 1 % (ref 0–1)
BILIRUB SERPL-MCNC: 0.9 MG/DL (ref 0.2–1)
BUN SERPL-MCNC: 28 MG/DL (ref 6–20)
BUN/CREAT SERPL: 27 (ref 12–20)
CALCIUM SERPL-MCNC: 9.4 MG/DL (ref 8.5–10.1)
CALCULATED P AXIS, ECG09: 54 DEGREES
CALCULATED R AXIS, ECG10: 8 DEGREES
CALCULATED T AXIS, ECG11: 49 DEGREES
CHLORIDE SERPL-SCNC: 108 MMOL/L (ref 97–108)
CK SERPL-CCNC: 19 U/L (ref 26–192)
CO2 SERPL-SCNC: 25 MMOL/L (ref 21–32)
CREAT SERPL-MCNC: 1.05 MG/DL (ref 0.55–1.02)
DIAGNOSIS, 93000: NORMAL
DIFFERENTIAL METHOD BLD: ABNORMAL
EOSINOPHIL # BLD: 0.2 K/UL (ref 0–0.4)
EOSINOPHIL NFR BLD: 2 % (ref 0–7)
ERYTHROCYTE [DISTWIDTH] IN BLOOD BY AUTOMATED COUNT: 15.6 % (ref 11.5–14.5)
GLOBULIN SER CALC-MCNC: 4.1 G/DL (ref 2–4)
GLUCOSE SERPL-MCNC: 90 MG/DL (ref 65–100)
HCT VFR BLD AUTO: 38.1 % (ref 35–47)
HGB BLD-MCNC: 11.3 G/DL (ref 11.5–16)
IMM GRANULOCYTES # BLD AUTO: 0.1 K/UL (ref 0–0.04)
IMM GRANULOCYTES NFR BLD AUTO: 1 % (ref 0–0.5)
LYMPHOCYTES # BLD: 2.6 K/UL (ref 0.8–3.5)
LYMPHOCYTES NFR BLD: 25 % (ref 12–49)
MCH RBC QN AUTO: 27.8 PG (ref 26–34)
MCHC RBC AUTO-ENTMCNC: 29.7 G/DL (ref 30–36.5)
MCV RBC AUTO: 93.6 FL (ref 80–99)
MONOCYTES # BLD: 1.1 K/UL (ref 0–1)
MONOCYTES NFR BLD: 10 % (ref 5–13)
NEUTS SEG # BLD: 6.4 K/UL (ref 1.8–8)
NEUTS SEG NFR BLD: 61 % (ref 32–75)
NRBC # BLD: 0 K/UL (ref 0–0.01)
NRBC BLD-RTO: 0 PER 100 WBC
P-R INTERVAL, ECG05: 186 MS
PLATELET # BLD AUTO: 350 K/UL (ref 150–400)
PMV BLD AUTO: 10.1 FL (ref 8.9–12.9)
POTASSIUM SERPL-SCNC: 4.5 MMOL/L (ref 3.5–5.1)
PROT SERPL-MCNC: 7.7 G/DL (ref 6.4–8.2)
Q-T INTERVAL, ECG07: 368 MS
QRS DURATION, ECG06: 66 MS
QTC CALCULATION (BEZET), ECG08: 385 MS
RBC # BLD AUTO: 4.07 M/UL (ref 3.8–5.2)
SODIUM SERPL-SCNC: 139 MMOL/L (ref 136–145)
TROPONIN I SERPL-MCNC: <0.05 NG/ML
TROPONIN I SERPL-MCNC: <0.05 NG/ML
VENTRICULAR RATE, ECG03: 66 BPM
WBC # BLD AUTO: 10.5 K/UL (ref 3.6–11)

## 2019-10-21 PROCEDURE — 85025 COMPLETE CBC W/AUTO DIFF WBC: CPT

## 2019-10-21 PROCEDURE — 99284 EMERGENCY DEPT VISIT MOD MDM: CPT

## 2019-10-21 PROCEDURE — A9270 NON-COVERED ITEM OR SERVICE: HCPCS | Performed by: EMERGENCY MEDICINE

## 2019-10-21 PROCEDURE — 82550 ASSAY OF CK (CPK): CPT

## 2019-10-21 PROCEDURE — 84484 ASSAY OF TROPONIN QUANT: CPT

## 2019-10-21 PROCEDURE — 93005 ELECTROCARDIOGRAM TRACING: CPT

## 2019-10-21 PROCEDURE — 74011636637 HC RX REV CODE- 636/637: Performed by: EMERGENCY MEDICINE

## 2019-10-21 PROCEDURE — 36415 COLL VENOUS BLD VENIPUNCTURE: CPT

## 2019-10-21 PROCEDURE — 74011000250 HC RX REV CODE- 250: Performed by: EMERGENCY MEDICINE

## 2019-10-21 PROCEDURE — 71046 X-RAY EXAM CHEST 2 VIEWS: CPT

## 2019-10-21 PROCEDURE — 80053 COMPREHEN METABOLIC PANEL: CPT

## 2019-10-21 RX ORDER — LIDOCAINE 4 G/100G
1 PATCH TOPICAL EVERY 24 HOURS
Status: DISCONTINUED | OUTPATIENT
Start: 2019-10-21 | End: 2019-10-21 | Stop reason: HOSPADM

## 2019-10-21 RX ORDER — PREDNISONE 50 MG/1
50 TABLET ORAL DAILY
Qty: 5 TAB | Refills: 0 | Status: SHIPPED | OUTPATIENT
Start: 2019-10-21 | End: 2019-10-26

## 2019-10-21 RX ADMIN — PREDNISONE 50 MG: 10 TABLET ORAL at 16:33

## 2019-10-21 NOTE — DISCHARGE INSTRUCTIONS
You were evaluated in the emergency department for arm pain and chest pain. Your examination was reassuring as was your work-up including blood work, EKG, and chest xray. It will be important for you to follow-up with your primary care physician in 2-3 days. If you develop worsening symptoms such as shortness of breath or worsening chest pain, please return to the emergency department immediately.

## 2019-10-21 NOTE — ED PROVIDER NOTES
EMERGENCY DEPARTMENT HISTORY AND PHYSICAL EXAM      Date: 10/21/2019  Patient Name: Zoie Patient    History of Presenting Illness     Chief Complaint   Patient presents with    Arm Pain     Pt ambulatory to triage with c/o R arm pain radiating across chest x Friday, radiating to bilateral shoulders    Chest Pain     radiating from R shoulder pain across chest; denies cardiac hx        History Provided By: Patient    HPI: Zoie Patient, 78 y.o. female with history of hypertension, hypercholesterolemia presents to the ED with cc of bilateral arm pain and chest pain. Bilateral arm pain has been present over the past 6 to 7 months. Pain is constant and described as a sharp burning pain that starts from the neck and radiates down into both arms. Pain is worse with positional changes of both arms. Pain feels like a pulled muscle. Over the past 2 to 3 days she has had increased pain in both arms which then spread across her chest described as a soreness. She denies any shortness of breath, lightheadedness, palpitations. She has been taking Tylenol with minimal relief in pain. She also endorses mild paresthesias down the left arm into her fingers. She has had chronic low back pain and has been on prednisone intermittently for many years. There are no other complaints, changes, or physical findings at this time. PCP: Mirza Bland MD    No current facility-administered medications on file prior to encounter. Current Outpatient Medications on File Prior to Encounter   Medication Sig Dispense Refill    famotidine (PEPCID) 20 mg tablet Take 1 Tab by mouth two (2) times a day. Indications: gastroesophageal reflux disease 180 Tab 3    metoprolol succinate (TOPROL-XL) 50 mg XL tablet Take 1 Tab by mouth daily. 90 Tab 3    diclofenac (VOLTAREN) 1 % gel Apply 2 g to affected area four (4) times daily. 100 g 3    sodium bicarbonate 650 mg tablet Take 650 mg by mouth two (2) times a day.       gabapentin (NEURONTIN) 100 mg capsule Take 2 Caps by mouth nightly. Max Daily Amount: 200 mg. 60 Cap 2    polyethylene glycol (MIRALAX) 17 gram/dose powder Take 17 g by mouth as needed.  timolol (TIMOPTIC) 0.5 % ophthalmic solution Administer 1 Drop to both eyes daily.  ACETAMINOPHEN (TYLENOL PO) Take 650 mg by mouth as needed. Indications: two tabs      latanoprost (XALATAN) 0.005 % ophthalmic solution Administer 1 Drop to both eyes nightly.  Aspirin, Buffered 81 mg tab Take 1 Tab by mouth daily. Past History     Past Medical History:  Past Medical History:   Diagnosis Date    AIN (acute interstitial nephritis)     Arthritis     CKD (chronic kidney disease) stage 3, GFR 30-59 ml/min ([facility])     GERD (gastroesophageal reflux disease)     Glaucoma     High cholesterol     Hypertension     Ill-defined condition     chronic cystitis       Past Surgical History:  Past Surgical History:   Procedure Laterality Date    HX APPENDECTOMY      with hysterectomy    HX  SECTION      HX COLONOSCOPY      HX DILATION AND CURETTAGE      HX HYSTERECTOMY      HX RENAL BIOPSY Left     in office    HX TONSILLECTOMY      HX UROLOGICAL  2016    cauterization of bladder ulcers       Family History:  Family History   Problem Relation Age of Onset    Cancer Father     Cancer Brother        Social History:  Social History     Tobacco Use    Smoking status: Former Smoker     Packs/day: 0.50     Years: 4.00     Pack years: 2.00     Last attempt to quit: 1997     Years since quittin.2    Smokeless tobacco: Former User   Substance Use Topics    Alcohol use: Yes     Comment: seldom    Drug use: Yes     Types: Prescription, OTC     Comment: never recreational substance       Allergies:   Allergies   Allergen Reactions    Aleve [Naproxen Sodium] Other (comments)     Renal issues    Ibuprofen Unknown (comments)    Lisinopril Other (comments)     Renal dysfunction    Omeprazole Other (comments)     Cause Kidney failure         Review of Systems   Review of Systems   Constitutional: Negative for chills and fever. HENT: Negative. Eyes: Negative for visual disturbance. Respiratory: Negative for cough and shortness of breath. Cardiovascular: Positive for chest pain. Negative for leg swelling. Gastrointestinal: Negative for abdominal pain, nausea and vomiting. Genitourinary: Negative. Musculoskeletal: Positive for arthralgias, back pain, myalgias and neck pain. Negative for gait problem. Skin: Negative for color change and rash. Neurological: Negative for dizziness, weakness, light-headedness and headaches. Hematological: Does not bruise/bleed easily. All other systems reviewed and are negative. Physical Exam   Physical Exam   Constitutional: She is oriented to person, place, and time. She appears well-developed and well-nourished. No distress. HENT:   Head: Normocephalic and atraumatic. Eyes: Pupils are equal, round, and reactive to light. EOM are normal.   Neck: Normal range of motion. Neck supple. No JVD present. There is midline C-spine TTP without step-off or deformity   Cardiovascular: Normal rate, regular rhythm and normal heart sounds. Exam reveals no friction rub. No murmur heard. Pulmonary/Chest: Effort normal and breath sounds normal. No respiratory distress. She has no wheezes. Abdominal: Soft. She exhibits no distension. There is no tenderness. There is no rebound. Musculoskeletal: Normal range of motion. She exhibits no edema. Neurological: She is alert and oriented to person, place, and time. Bilateral upper extremities 5/5 motor strength with normal intact sensation   Skin: Skin is warm and dry. No rash noted. She is not diaphoretic. No erythema.        Diagnostic Study Results     Labs -     Recent Results (from the past 12 hour(s))   TROPONIN I    Collection Time: 10/21/19  2:33 PM   Result Value Ref Range    Troponin-I, Qt. <0.05 <0.05 ng/mL       Radiologic Studies -   XR CHEST PA LAT   Final Result   Impression: No acute process. CT Results  (Last 48 hours)    None        CXR Results  (Last 48 hours)               10/21/19 1133  XR CHEST PA LAT Final result    Impression:  Impression: No acute process. Narrative:  Exam:  2 view chest       Indication: Chest pain radiating to right arm and shoulder. COMPARISON: 8/14/2017       PA and lateral views demonstrate normal heart size. There is no acute process in   the lung fields. The osseous structures are unremarkable. Medical Decision Making   I am the first provider for this patient. I reviewed the vital signs, available nursing notes, past medical history, past surgical history, family history and social history. Vital Signs-Reviewed the patient's vital signs. Patient Vitals for the past 12 hrs:   Pulse Resp BP SpO2   10/21/19 1630 68 13 159/57 100 %   10/21/19 1529 76 13 110/58 100 %   10/21/19 1500 69 11 143/66 100 %   10/21/19 1430 69 15 141/57 99 %   10/21/19 1400 68 15 151/56 100 %   10/21/19 1337 73 12 152/59 100 %     Records Reviewed: Nursing Notes, Old Medical Records, Previous electrocardiograms, Previous Radiology Studies and Previous Laboratory Studies    Provider Notes (Medical Decision Making): This is an 26-year-old female here with bilateral arm pain and mild chest pain that resolved. On examination patient is in no acute distress. Vital signs stable. She is afebrile. She appears clinically well and nontoxic. She is mostly concerned about pain in bilateral upper arms which seems to stem from her neck. She denies any active chest pain at this time, no shortness of breath. There is no weakness of her upper extremities and sensation is intact. Neuro troponins negative. Chest x-ray unremarkable. Her main risk factors for ACS include her age and hypertension and hypercholesterolemia.   I do believe that her pain is most likely stemming from bilateral cervical radiculopathy from diffuse osteoarthritis. ACS is certainly a consideration and I did offer admission however patient is feeling improved and would like to be discharged home to follow-up with her cardiologist and PCP as an outpatient. Shared medical decision making performed with myself patient and  at bedside. She was given strict return ED precautions and agrees with plan as above. All questions answered. Discharged home in stable condition. ED Course:   Initial assessment performed. The patients presenting problems have been discussed, and they are in agreement with the care plan formulated and outlined with them. I have encouraged them to ask questions as they arise throughout their visit. ED Course as of Oct 21 2322   Mon Oct 21, 2019   1242 EKG per my interpretation normal sinus rhythm, rate 66 bpm, normal axis, no acute ischemic changes. [AK]      ED Course User Index  [AK] Ruben Kang MD       Critical Care Time:   0    Disposition:  home    PLAN:  1. Discharge Medication List as of 10/21/2019  4:26 PM      START taking these medications    Details   predniSONE (DELTASONE) 50 mg tablet Take 1 Tab by mouth daily for 5 days. , Print, Disp-5 Tab, R-0         CONTINUE these medications which have NOT CHANGED    Details   famotidine (PEPCID) 20 mg tablet Take 1 Tab by mouth two (2) times a day. Indications: gastroesophageal reflux disease, Normal, Disp-180 Tab, R-3      metoprolol succinate (TOPROL-XL) 50 mg XL tablet Take 1 Tab by mouth daily. , Normal, Disp-90 Tab, R-3      diclofenac (VOLTAREN) 1 % gel Apply 2 g to affected area four (4) times daily. , Normal, Disp-100 g, R-3      sodium bicarbonate 650 mg tablet Take 650 mg by mouth two (2) times a day., Historical Med      gabapentin (NEURONTIN) 100 mg capsule Take 2 Caps by mouth nightly.  Max Daily Amount: 200 mg., No Print, Disp-60 Cap, R-2      polyethylene glycol (MIRALAX) 17 gram/dose powder Take 17 g by mouth as needed., Historical Med      timolol (TIMOPTIC) 0.5 % ophthalmic solution Administer 1 Drop to both eyes daily. , Historical Med      ACETAMINOPHEN (TYLENOL PO) Take 650 mg by mouth as needed. Indications: two tabs, Historical Med      latanoprost (XALATAN) 0.005 % ophthalmic solution Administer 1 Drop to both eyes nightly., Historical Med      Aspirin, Buffered 81 mg tab Take 1 Tab by mouth daily. , Historical Med           2. Follow-up Information     Follow up With Specialties Details Why Randall Giraldo MD Internal Medicine Schedule an appointment as soon as possible for a visit   [address] [facility]  [address] [facility] [address] [facility] [facility] [address]      John Hope MD Cardiology Schedule an appointment as soon as possible for a visit   [address]  [address] [id number] [phone]          Return to ED if worse     Diagnosis     Clinical Impression:   1. Bilateral arm pain        Attestations:    Susan Garcia MD    Please note that this dictation was completed with [employer], the computer voice recognition software. Quite often unanticipated grammatical, syntax, homophones, and other interpretive errors are inadvertently transcribed by the computer software. Please disregard these errors. Please excuse any errors that have escaped final proofreading. Thank you.

## 2019-10-22 ENCOUNTER — TELEPHONE (OUTPATIENT)
Dept: INTERNAL MEDICINE CLINIC | Age: 79
End: 2019-10-22

## 2019-10-22 NOTE — TELEPHONE ENCOUNTER
Pt requesting a return call to schedule ER follow up visit. [id number] [facility]. Pt was seen for pain in her arm and the docotor wants her to be checked for a heart condition. Please call to schedule.      Message received & copied from [facility]

## 2019-10-23 NOTE — TELEPHONE ENCOUNTER
Spoke with patient. Two pt identifiers confirmed. Patient offered an appointment with Dr. Ashly Adorno on 10/29/19. Appointment accepted. Patient advised if anything changes or if unable to keep this appointment to call the office  Pt verbalized understanding of information discussed w/ no further questions at this time.

## 2019-10-29 ENCOUNTER — OFFICE VISIT (OUTPATIENT)
Dept: INTERNAL MEDICINE CLINIC | Age: 79
End: 2019-10-29

## 2019-10-29 VITALS
OXYGEN SATURATION: 97 % | HEART RATE: 75 BPM | SYSTOLIC BLOOD PRESSURE: 136 MMHG | BODY MASS INDEX: 20.92 KG/M2 | WEIGHT: 103.8 LBS | HEIGHT: 59 IN | TEMPERATURE: 97.9 F | RESPIRATION RATE: 16 BRPM | DIASTOLIC BLOOD PRESSURE: 81 MMHG

## 2019-10-29 DIAGNOSIS — M25.511 ACUTE PAIN OF RIGHT SHOULDER: Primary | ICD-10-CM

## 2019-10-29 DIAGNOSIS — K21.9 GASTROESOPHAGEAL REFLUX DISEASE WITHOUT ESOPHAGITIS: ICD-10-CM

## 2019-10-29 DIAGNOSIS — I10 ESSENTIAL HYPERTENSION: ICD-10-CM

## 2019-10-29 DIAGNOSIS — R13.10 DYSPHAGIA, UNSPECIFIED TYPE: ICD-10-CM

## 2019-10-29 NOTE — PROGRESS NOTES
Ramu Norton is a 78 y.o. female who presents for follow up after ED visit on 10/21 with right arm pain and right shoulder pain. Given steroid course. Reports the pain is with bending and pushing out of a chair. The pain is better after steroid course. Normal labs. Taking tylenol as needed.             Past Medical History:   Diagnosis Date    AIN (acute interstitial nephritis)     Arthritis     CKD (chronic kidney disease) stage 3, GFR 30-59 ml/min ([facility])     GERD (gastroesophageal reflux disease)     Glaucoma     High cholesterol     Hypertension     Ill-defined condition     chronic cystitis       Family History   Problem Relation Age of Onset    Cancer Father     Cancer Brother        Social History     Socioeconomic History    Marital status:      Spouse name: Not on file    Number of children: Not on file    Years of education: Not on file    Highest education level: Not on file   Occupational History    Not on file   Social Needs    Financial resource strain: Not on file    Food insecurity:     Worry: Not on file     Inability: Not on file    Transportation needs:     Medical: Not on file     Non-medical: Not on file   Tobacco Use    Smoking status: Former Smoker     Packs/day: 0.50     Years: 4.00     Pack years: 2.00     Last attempt to quit: 1997     Years since quittin.2    Smokeless tobacco: Former User   Substance and Sexual Activity    Alcohol use: Yes     Comment: seldom    Drug use: Yes     Types: Prescription, OTC     Comment: never recreational substance    Sexual activity: Not Currently     Partners: Male     Birth control/protection: None   Lifestyle    Physical activity:     Days per week: Not on file     Minutes per session: Not on file    Stress: Not on file   Relationships    Social connections:     Talks on phone: Not on file     Gets together: Not on file     Attends Hinduism service: Not on file     Active member of club or organization: Not on file     Attends meetings of clubs or organizations: Not on file     Relationship status: Not on file    Intimate partner violence:     Fear of current or ex partner: Not on file     Emotionally abused: Not on file     Physically abused: Not on file     Forced sexual activity: Not on file   Other Topics Concern    Not on file   Social History Narrative    ** Merged History Encounter **            Current Outpatient Medications on File Prior to Visit   Medication Sig Dispense Refill    famotidine (PEPCID) 20 mg tablet Take 1 Tab by mouth two (2) times a day. Indications: gastroesophageal reflux disease 180 Tab 3    metoprolol succinate (TOPROL-XL) 50 mg XL tablet Take 1 Tab by mouth daily. 90 Tab 3    diclofenac (VOLTAREN) 1 % gel Apply 2 g to affected area four (4) times daily. 100 g 3    gabapentin (NEURONTIN) 100 mg capsule Take 2 Caps by mouth nightly. Max Daily Amount: 200 mg. 60 Cap 2    polyethylene glycol (MIRALAX) 17 gram/dose powder Take 17 g by mouth as needed.  timolol (TIMOPTIC) 0.5 % ophthalmic solution Administer 1 Drop to left eye daily.  ACETAMINOPHEN (TYLENOL PO) Take 650 mg by mouth as needed. Indications: two tabs      latanoprost (XALATAN) 0.005 % ophthalmic solution Administer 1 Drop to both eyes nightly.  Aspirin, Buffered 81 mg tab Take 1 Tab by mouth daily.  sodium bicarbonate 650 mg tablet Take 650 mg by mouth two (2) times a day. No current facility-administered medications on file prior to visit. Review of Systems  Pertinent items are noted in HPI. Objective:     Visit Vitals  /81 (BP 1 Location: Left arm, BP Patient Position: Sitting)   Pulse 75   Temp 97.9 °F (36.6 °C) (Oral)   Resp 16   Ht 4' 11\" (1.499 m)   Wt 103 lb 12.8 oz (47.1 kg)   LMP  (LMP Unknown)   SpO2 97%   BMI 20.97 kg/m²     Gen: well appearing female  Resp:  No wheezing, no rhonchi, no rales.   CV:  RRR, normal S1S2  Extrem:  no edema, warm distally, good ROM shoulder. Assessment/Plan:       ICD-10-CM ICD-9-CM    1. Acute pain of right shoulder M25.511 719.41    2. Essential hypertension I10 401.9    3. Gastroesophageal reflux disease without esophagitis K21.9 530.81    4. Dysphagia, unspecified type R13.10 787.20    recommend ROM exercises for shoulder. Follow-up and Dispositions    · Return if symptoms worsen or fail to improve, for follow up as scheduled.   Asmita Rousseau MD

## 2019-10-29 NOTE — PATIENT INSTRUCTIONS
Office Policies    Phone calls/patient messages:            Please allow up to 24 hours for someone in the office to contact you about your call or message. Be mindful your provider may be out of the office or your message may require further review. We encourage you to use [employer] for your messages as this is a faster, more efficient way to communicate with our office                         Medication Refills:            Prescription medications require 48-72 business hours to process. We encourage you to use [employer] for your refills. For controlled medications: Please allow 72 business hours to process. Certain medications may require you to  a written prescription at our office. NO narcotic/controlled medications will be prescribed after 4pm Monday through Friday or on weekends              Form/Paperwork Completion:            Please note a $25 fee may incur for all paperwork for completed by our providers. We ask that you allow 7-10 business days. Pre-payment is due prior to picking up/faxing the completed form. You may also download your forms to [employer] to have your doctor print off.

## 2019-10-31 ENCOUNTER — HOSPITAL ENCOUNTER (OUTPATIENT)
Dept: BONE DENSITY | Age: 79
Discharge: HOME | End: 2019-10-31
Attending: FAMILY MEDICINE
Payer: MEDICARE

## 2019-10-31 DIAGNOSIS — Z13.820 SCREENING FOR OSTEOPOROSIS: ICD-10-CM

## 2019-10-31 DIAGNOSIS — Z78.0 POSTMENOPAUSAL: ICD-10-CM

## 2019-10-31 PROCEDURE — 77080 DXA BONE DENSITY AXIAL: CPT

## 2019-11-01 ENCOUNTER — HOSPITAL ENCOUNTER (OUTPATIENT)
Dept: GENERAL RADIOLOGY | Age: 79
Discharge: HOME | End: 2019-11-01
Attending: INTERNAL MEDICINE
Payer: MEDICARE

## 2019-11-01 DIAGNOSIS — R19.4 ALTERED BOWEL HABITS: ICD-10-CM

## 2019-11-01 DIAGNOSIS — Z80.0 FAMILY HISTORY OF MALIGNANT NEOPLASM OF GASTROINTESTINAL TRACT: ICD-10-CM

## 2019-11-01 DIAGNOSIS — Z83.71 FAMILY HISTORY OF COLONIC POLYPS: ICD-10-CM

## 2019-11-01 DIAGNOSIS — R13.10 DYSPHAGIA: ICD-10-CM

## 2019-11-01 PROCEDURE — 74220 X-RAY XM ESOPHAGUS 1CNTRST: CPT

## 2019-11-04 ENCOUNTER — HOSPITAL ENCOUNTER (OUTPATIENT)
Dept: MAMMOGRAPHY | Age: 79
Discharge: HOME | End: 2019-11-04
Attending: FAMILY MEDICINE
Payer: MEDICARE

## 2019-11-04 ENCOUNTER — TELEPHONE (OUTPATIENT)
Dept: INTERNAL MEDICINE CLINIC | Age: 79
End: 2019-11-04

## 2019-11-04 DIAGNOSIS — Z12.31 ENCOUNTER FOR SCREENING MAMMOGRAM FOR BREAST CANCER: ICD-10-CM

## 2019-11-04 PROCEDURE — 77067 SCR MAMMO BI INCL CAD: CPT

## 2019-11-04 NOTE — TELEPHONE ENCOUNTER
----- Message from Rekha Larry sent at 11/4/2019  4:24 PM EST -----  Regarding: Dr. Gwyndolyn Phalen: [phone]  Caller's first and last name: n/a   Reason for call: Pt feels like she has sinus drainage, also runny nose, and lump in throat which is probably mucus. Pt would like to know what medication to take.   Callback required yes/no and why: yes  Best contact number(s): [phone]  Details to clarify the request: n/a      Copy/paste junior

## 2019-11-05 NOTE — TELEPHONE ENCOUNTER
Spoke with patient. Two pt identifiers confirmed. Patient advised that she can try plain claritin, plain mucinex and flonase. Patient encouraged to drink plenty of water. Patient advised if no better in a few days, may need to be seen. Pt verbalized understanding of information discussed w/ no further questions at this time.

## 2019-11-06 NOTE — PROGRESS NOTES
Spoke with patient. Two pt identifiers confirmed. Patient advised that her recent bone density shows that she has osteoporosis by bone density testing. I want patient to consider Prolia shots twice a year at the infusion center. She will need labs prior to injections. Patient states that she would like to give it some thought and will call back with her decision. Pt verbalized understanding of information discussed w/ no further questions at this time.

## 2019-11-11 ENCOUNTER — TELEPHONE (OUTPATIENT)
Dept: INTERNAL MEDICINE CLINIC | Age: 79
End: 2019-11-11

## 2019-11-12 NOTE — TELEPHONE ENCOUNTER
MD Tommy Tucker LPN   Caller: Unspecified Ulisses Adams,  1:36 PM)             Change claritin to zyrtec 10mg once a day, stop mucinex, that is an expectorant that thins the mucous and can make it more runny. Spoke with patient. Two pt identifiers confirmed. Patient advised of the above message from Dr. Benito Kocher. Pt verbalized understanding of information discussed w/ no further questions at this time.

## 2019-11-14 ENCOUNTER — TELEPHONE (OUTPATIENT)
Dept: INTERNAL MEDICINE CLINIC | Age: 79
End: 2019-11-14

## 2019-11-14 ENCOUNTER — HOSPITAL ENCOUNTER (EMERGENCY)
Age: 79
Discharge: HOME | End: 2019-11-14
Attending: EMERGENCY MEDICINE
Payer: MEDICARE

## 2019-11-14 VITALS
HEART RATE: 69 BPM | WEIGHT: 102.29 LBS | OXYGEN SATURATION: 98 % | RESPIRATION RATE: 15 BRPM | HEIGHT: 59 IN | TEMPERATURE: 98.4 F | DIASTOLIC BLOOD PRESSURE: 84 MMHG | SYSTOLIC BLOOD PRESSURE: 155 MMHG | BODY MASS INDEX: 20.62 KG/M2

## 2019-11-14 DIAGNOSIS — J02.9 SORE THROAT: Primary | ICD-10-CM

## 2019-11-14 LAB — DEPRECATED S PYO AG THROAT QL EIA: NEGATIVE

## 2019-11-14 PROCEDURE — 87070 CULTURE OTHR SPECIMN AEROBIC: CPT

## 2019-11-14 PROCEDURE — 99281 EMR DPT VST MAYX REQ PHY/QHP: CPT

## 2019-11-14 PROCEDURE — 87880 STREP A ASSAY W/OPTIC: CPT

## 2019-11-14 RX ORDER — PREDNISONE 10 MG/1
TABLET ORAL
Qty: 21 TAB | Refills: 0 | Status: SHIPPED | OUTPATIENT
Start: 2019-11-14 | End: 2019-11-20

## 2019-11-14 RX ORDER — LEVOCETIRIZINE DIHYDROCHLORIDE 5 MG/1
5 TABLET, FILM COATED ORAL DAILY
Qty: 30 TAB | Refills: 2 | Status: SHIPPED | OUTPATIENT
Start: 2019-11-14 | End: 2020-01-27 | Stop reason: CLARIF

## 2019-11-14 RX ORDER — CEPHALEXIN 500 MG/1
500 CAPSULE ORAL 4 TIMES DAILY
Qty: 28 CAP | Refills: 0 | Status: SHIPPED | OUTPATIENT
Start: 2019-11-14 | End: 2019-11-21

## 2019-11-14 NOTE — TELEPHONE ENCOUNTER
#[phone] pt states she has had the throat mucus since 11-1-19  She has tried almost all OTC and nothing helps. [place]meyer cough drop helps better than anything. Pt believes this is sinus drainage. It doesn't bother her when she is laying down, but when she is up it is constant. Please call to advise.

## 2019-11-14 NOTE — TELEPHONE ENCOUNTER
MD Peter Tesfaye LPN   Caller: Unspecified (Today, 10:47 AM)             Recommend zyrtec 10mg one in the evening.       Patient states that she has tried Claritin and zyrtec, neither has helped

## 2019-11-15 NOTE — TELEPHONE ENCOUNTER
MD Toya Cabrera LPN   Caller: Unspecified Ernesto Batman, 10:47 AM)             Advise patient that I sent in xyzal 5mg once a day to try for the postnasal drip. Spoke with patient. Two pt identifiers confirmed. Patient advised of the above message from Dr. Renae Courtney. Pt verbalized understanding of information discussed w/ no further questions at this time.

## 2019-11-15 NOTE — DISCHARGE INSTRUCTIONS

## 2019-11-15 NOTE — ED PROVIDER NOTES
EMERGENCY DEPARTMENT HISTORY AND PHYSICAL EXAM      Date: 11/14/2019  Patient Name: Michelle Anguiano    History of Presenting Illness     Chief Complaint   Patient presents with    Sore Throat     Reports sore throat and mucous in her throat for 2 weeks and can't seem to get it up. Called her PCP and was given suggestions with no relief. Intermittent cough but doesn't bring up anything       History Provided By: Patient    HPI: Michelle Anguiano, 78 y.o. female presents by POV to the ED with cc of URI Sx for the past 2 weeks. She notes that her symptoms have been constant despite taking numerous over-the-counter cough and cold medications. She denies fever, chills, cough, ear pain. She states that she feels that her throat is clogged with mucus that she has to continue to unclog her throat. There are no other complaints, changes, or physical findings at this time. Social Hx: Tobacco (denies), EtOH (denies), Illicit drug use (denies)     PCP: Leonarda Gandara MD    No current facility-administered medications on file prior to encounter. Current Outpatient Medications on File Prior to Encounter   Medication Sig Dispense Refill    levocetirizine (XYZAL) 5 mg tablet Take 1 Tab by mouth daily. 30 Tab 2    famotidine (PEPCID) 20 mg tablet Take 1 Tab by mouth two (2) times a day. Indications: gastroesophageal reflux disease 180 Tab 3    metoprolol succinate (TOPROL-XL) 50 mg XL tablet Take 1 Tab by mouth daily. 90 Tab 3    diclofenac (VOLTAREN) 1 % gel Apply 2 g to affected area four (4) times daily. 100 g 3    sodium bicarbonate 650 mg tablet Take 650 mg by mouth two (2) times a day.  gabapentin (NEURONTIN) 100 mg capsule Take 2 Caps by mouth nightly. Max Daily Amount: 200 mg. 60 Cap 2    polyethylene glycol (MIRALAX) 17 gram/dose powder Take 17 g by mouth as needed.  timolol (TIMOPTIC) 0.5 % ophthalmic solution Administer 1 Drop to left eye daily.       ACETAMINOPHEN (TYLENOL PO) Take 650 mg by mouth as needed. Indications: two tabs      latanoprost (XALATAN) 0.005 % ophthalmic solution Administer 1 Drop to both eyes nightly.  Aspirin, Buffered 81 mg tab Take 1 Tab by mouth daily. Past History     Past Medical History:  Past Medical History:   Diagnosis Date    AIN (acute interstitial nephritis)     Arthritis     CKD (chronic kidney disease) stage 3, GFR 30-59 ml/min ([facility])     GERD (gastroesophageal reflux disease)     Glaucoma     High cholesterol     Hypertension     Ill-defined condition     chronic cystitis       Past Surgical History:  Past Surgical History:   Procedure Laterality Date    HX APPENDECTOMY      with hysterectomy    HX  SECTION      HX COLONOSCOPY      HX DILATION AND CURETTAGE      HX HYSTERECTOMY      HX RENAL BIOPSY Left     in office    HX TONSILLECTOMY      HX UROLOGICAL  2016    cauterization of bladder ulcers       Family History:  Family History   Problem Relation Age of Onset    Cancer Father     Cancer Brother     Breast Cancer Cousin        Social History:  Social History     Tobacco Use    Smoking status: Former Smoker     Packs/day: 0.50     Years: 4.00     Pack years: 2.00     Last attempt to quit: 1997     Years since quittin.3    Smokeless tobacco: Former User   Substance Use Topics    Alcohol use: Yes     Comment: seldom    Drug use: Yes     Types: Prescription, OTC     Comment: never recreational substance       Allergies: Allergies   Allergen Reactions    Aleve [Naproxen Sodium] Other (comments)     Renal issues    Ibuprofen Unknown (comments)    Lisinopril Other (comments)     Renal dysfunction    Omeprazole Other (comments)     Cause Kidney failure         Review of Systems   Review of Systems   Constitutional: Negative for chills, diaphoresis and fever. HENT: Positive for congestion and sore throat. Negative for ear pain and rhinorrhea. Respiratory: Negative for cough and shortness of breath. Cardiovascular: Negative for chest pain. Gastrointestinal: Negative for abdominal pain, constipation, diarrhea, nausea and vomiting. Genitourinary: Negative for difficulty urinating, dysuria, frequency and hematuria. Musculoskeletal: Negative for arthralgias and myalgias. Neurological: Negative for headaches. All other systems reviewed and are negative. Physical Exam   Physical Exam   Constitutional: She is oriented to person, place, and time. She appears well-developed and well-nourished. No distress. 78 y.o.  female    HENT:   Head: Normocephalic and atraumatic. Right Ear: Tympanic membrane, external ear and ear canal normal. No middle ear effusion. Left Ear: Tympanic membrane, external ear and ear canal normal.  No middle ear effusion. Nose: Nose normal. No rhinorrhea. Right sinus exhibits no maxillary sinus tenderness and no frontal sinus tenderness. Left sinus exhibits no maxillary sinus tenderness and no frontal sinus tenderness. Mouth/Throat: Uvula is midline, oropharynx is clear and moist and mucous membranes are normal. No oropharyngeal exudate, posterior oropharyngeal edema or posterior oropharyngeal erythema. Eyes: Conjunctivae are normal. Right eye exhibits no discharge. Left eye exhibits no discharge. Neck: Normal range of motion. Neck supple. Cardiovascular: Normal rate, regular rhythm and normal heart sounds. No murmur heard. Pulmonary/Chest: Effort normal and breath sounds normal. No respiratory distress. Lymphadenopathy:     She has no cervical adenopathy. Neurological: She is alert and oriented to person, place, and time. Skin: Skin is warm and dry. She is not diaphoretic. Psychiatric: She has a normal mood and affect. Her behavior is normal.   Nursing note and vitals reviewed.       Diagnostic Study Results     Labs -     Recent Results (from the past 12 hour(s))   STREP AG SCREEN, GROUP A    Collection Time: 11/14/19  6:53 PM   Result Value Ref Range    Group A Strep Ag ID NEGATIVE  NEG         Radiologic Studies -none    Medical Decision Making   I am the first provider for this patient. I reviewed the vital signs, available nursing notes, past medical history, past surgical history, family history and social history. Vital Signs-Reviewed the patient's vital signs. Patient Vitals for the past 12 hrs:   Temp Pulse Resp BP SpO2   11/14/19 1851 98.4 °F (36.9 °C) 69 15 155/84 98 %       Records Reviewed: Nursing Notes and Old Medical Records    Provider Notes (Medical Decision Making):   Seasonal allergies, postnasal drainage, pharyngitis, tonsillitis, strep, URI    ED Course:   Initial assessment performed. The patients presenting problems have been discussed, and they are in agreement with the care plan formulated and outlined with them. I have encouraged them to ask questions as they arise throughout their visit. Critical Care Time: None    Disposition:  DISCHARGE NOTE:  7:44 PM  The pt is ready for discharge. The pt's signs, symptoms, diagnosis, and discharge instructions have been discussed and pt has conveyed their understanding. The pt is to follow up as recommended or return to ER should their symptoms worsen. Plan has been discussed and pt is in agreement. PLAN:  1. Current Discharge Medication List      START taking these medications    Details   cephALEXin (KEFLEX) 500 mg capsule Take 1 Cap by mouth four (4) times daily for 7 days. Qty: 28 Cap, Refills: 0      predniSONE (STERAPRED DS) 10 mg dose pack Standard 6 day taper  Qty: 21 Tab, Refills: 0         CONTINUE these medications which have NOT CHANGED    Details   levocetirizine (XYZAL) 5 mg tablet Take 1 Tab by mouth daily. Qty: 30 Tab, Refills: 2      famotidine (PEPCID) 20 mg tablet Take 1 Tab by mouth two (2) times a day.  Indications: gastroesophageal reflux disease  Qty: 180 Tab, Refills: 3    Associated Diagnoses: Gastroesophageal reflux disease without esophagitis metoprolol succinate (TOPROL-XL) 50 mg XL tablet Take 1 Tab by mouth daily. Qty: 90 Tab, Refills: 3    Associated Diagnoses: Essential hypertension      diclofenac (VOLTAREN) 1 % gel Apply 2 g to affected area four (4) times daily. Qty: 100 g, Refills: 3    Associated Diagnoses: Chronic pain of right knee; Spondylosis of lumbar spine      sodium bicarbonate 650 mg tablet Take 650 mg by mouth two (2) times a day.      gabapentin (NEURONTIN) 100 mg capsule Take 2 Caps by mouth nightly. Max Daily Amount: 200 mg. Qty: 60 Cap, Refills: 2    Associated Diagnoses: Myofascial pain; Spondylosis of lumbar spine      polyethylene glycol (MIRALAX) 17 gram/dose powder Take 17 g by mouth as needed. timolol (TIMOPTIC) 0.5 % ophthalmic solution Administer 1 Drop to left eye daily. ACETAMINOPHEN (TYLENOL PO) Take 650 mg by mouth as needed. Indications: two tabs      latanoprost (XALATAN) 0.005 % ophthalmic solution Administer 1 Drop to both eyes nightly. Aspirin, Buffered 81 mg tab Take 1 Tab by mouth daily. 2.   Follow-up Information     Follow up With Specialties Details Why Kary Mitchell MD Internal Medicine In 1 week As needed [address]  [facility] [address]  [facility]  [phone]          Return to ED if worse     Diagnosis     Clinical Impression:   1. Sore throat          Please note that this dictation was completed with ([employer], the computer voice recognition software. Quite often unanticipated grammatical, syntax, homophones, and other interpretive errors are inadvertently transcribed by the computer software. Please disregards these errors. Please excuse any errors that have escaped final proofreading. This note will not be viewable in [address]

## 2019-11-16 LAB
BACTERIA SPEC CULT: NORMAL
SERVICE CMNT-IMP: NORMAL

## 2020-01-08 ENCOUNTER — TELEPHONE (OUTPATIENT)
Dept: INTERNAL MEDICINE CLINIC | Age: 80
End: 2020-01-08

## 2020-01-08 NOTE — TELEPHONE ENCOUNTER
MD Ainsley Stinson LPN   Caller: Unspecified (Today,  7:38 AM)             Please call patient. She is not supposed to take any decongestants, like sudafed. She can take mucinex when she gets a cold. Let me know if patient has any other symptoms. Spoke with patient. Two pt identifiers confirmed. Patient advised of the above message from Dr. Isaac Dao. Pt verbalized understanding of information discussed w/ no further questions at this time.

## 2020-01-08 NOTE — TELEPHONE ENCOUNTER
Returned page Tuesday jan 7 at 19:30.  pts bp elevated, 180/100. She believes due to decongestant that she has been taking. On bp recheck, down to 170/90. Advised her to stop the decongestant, contact dr Sriram Fontana for any further assistance for her cold.

## 2020-01-28 ENCOUNTER — HOSPITAL ENCOUNTER (OUTPATIENT)
Age: 80
Setting detail: OUTPATIENT SURGERY
Discharge: HOME | End: 2020-01-28
Attending: INTERNAL MEDICINE | Admitting: INTERNAL MEDICINE
Payer: MEDICARE

## 2020-01-28 ENCOUNTER — ANESTHESIA EVENT (OUTPATIENT)
Dept: ENDOSCOPY | Age: 80
End: 2020-01-28
Payer: MEDICARE

## 2020-01-28 ENCOUNTER — ANESTHESIA (OUTPATIENT)
Dept: ENDOSCOPY | Age: 80
End: 2020-01-28
Payer: MEDICARE

## 2020-01-28 VITALS
HEART RATE: 79 BPM | HEIGHT: 59 IN | DIASTOLIC BLOOD PRESSURE: 65 MMHG | SYSTOLIC BLOOD PRESSURE: 156 MMHG | WEIGHT: 98.5 LBS | OXYGEN SATURATION: 98 % | TEMPERATURE: 97.4 F | BODY MASS INDEX: 19.86 KG/M2 | RESPIRATION RATE: 21 BRPM

## 2020-01-28 LAB
H PYLORI FROM TISSUE: NEGATIVE
KIT LOT NO., HCLOLOT: NORMAL
NEGATIVE CONTROL: NEGATIVE
POSITIVE CONTROL: POSITIVE

## 2020-01-28 PROCEDURE — C1726 CATH, BAL DIL, NON-VASCULAR: HCPCS | Performed by: INTERNAL MEDICINE

## 2020-01-28 PROCEDURE — 74011250636 HC RX REV CODE- 250/636: Performed by: INTERNAL MEDICINE

## 2020-01-28 PROCEDURE — 77030018712 HC DEV BLLN INFL BSC -B: Performed by: INTERNAL MEDICINE

## 2020-01-28 PROCEDURE — 77030013992 HC SNR POLYP ENDOSC BSC -B: Performed by: INTERNAL MEDICINE

## 2020-01-28 PROCEDURE — 74011000250 HC RX REV CODE- 250: Performed by: NURSE ANESTHETIST, CERTIFIED REGISTERED

## 2020-01-28 PROCEDURE — 76040000007: Performed by: INTERNAL MEDICINE

## 2020-01-28 PROCEDURE — 87077 CULTURE AEROBIC IDENTIFY: CPT | Performed by: INTERNAL MEDICINE

## 2020-01-28 PROCEDURE — 88305 TISSUE EXAM BY PATHOLOGIST: CPT

## 2020-01-28 PROCEDURE — 77030019988 HC FCPS ENDOSC DISP BSC -B: Performed by: INTERNAL MEDICINE

## 2020-01-28 PROCEDURE — 74011250636 HC RX REV CODE- 250/636: Performed by: NURSE ANESTHETIST, CERTIFIED REGISTERED

## 2020-01-28 PROCEDURE — 76060000032 HC ANESTHESIA 0.5 TO 1 HR: Performed by: INTERNAL MEDICINE

## 2020-01-28 RX ORDER — FLUMAZENIL 0.1 MG/ML
0.2 INJECTION INTRAVENOUS
Status: DISCONTINUED | OUTPATIENT
Start: 2020-01-28 | End: 2020-01-28 | Stop reason: HOSPADM

## 2020-01-28 RX ORDER — DEXTROMETHORPHAN/PSEUDOEPHED 2.5-7.5/.8
1.2 DROPS ORAL
Status: DISCONTINUED | OUTPATIENT
Start: 2020-01-28 | End: 2020-01-28 | Stop reason: HOSPADM

## 2020-01-28 RX ORDER — PHENYLEPHRINE HCL IN 0.9% NACL 0.4MG/10ML
SYRINGE (ML) INTRAVENOUS AS NEEDED
Status: DISCONTINUED | OUTPATIENT
Start: 2020-01-28 | End: 2020-01-28 | Stop reason: HOSPADM

## 2020-01-28 RX ORDER — LIDOCAINE HYDROCHLORIDE 20 MG/ML
INJECTION, SOLUTION EPIDURAL; INFILTRATION; INTRACAUDAL; PERINEURAL AS NEEDED
Status: DISCONTINUED | OUTPATIENT
Start: 2020-01-28 | End: 2020-01-28 | Stop reason: HOSPADM

## 2020-01-28 RX ORDER — ATROPINE SULFATE 0.1 MG/ML
0.5 INJECTION INTRAVENOUS
Status: DISCONTINUED | OUTPATIENT
Start: 2020-01-28 | End: 2020-01-28 | Stop reason: HOSPADM

## 2020-01-28 RX ORDER — SODIUM CHLORIDE 0.9 % (FLUSH) 0.9 %
5-40 SYRINGE (ML) INJECTION AS NEEDED
Status: DISCONTINUED | OUTPATIENT
Start: 2020-01-28 | End: 2020-01-28 | Stop reason: HOSPADM

## 2020-01-28 RX ORDER — SODIUM CHLORIDE 9 MG/ML
75 INJECTION, SOLUTION INTRAVENOUS CONTINUOUS
Status: DISCONTINUED | OUTPATIENT
Start: 2020-01-28 | End: 2020-01-28 | Stop reason: HOSPADM

## 2020-01-28 RX ORDER — EPHEDRINE SULFATE/0.9% NACL/PF 50 MG/5 ML
SYRINGE (ML) INTRAVENOUS AS NEEDED
Status: DISCONTINUED | OUTPATIENT
Start: 2020-01-28 | End: 2020-01-28 | Stop reason: HOSPADM

## 2020-01-28 RX ORDER — SODIUM CHLORIDE 0.9 % (FLUSH) 0.9 %
5-40 SYRINGE (ML) INJECTION EVERY 8 HOURS
Status: DISCONTINUED | OUTPATIENT
Start: 2020-01-28 | End: 2020-01-28 | Stop reason: HOSPADM

## 2020-01-28 RX ORDER — NALOXONE HYDROCHLORIDE 0.4 MG/ML
0.4 INJECTION, SOLUTION INTRAMUSCULAR; INTRAVENOUS; SUBCUTANEOUS
Status: DISCONTINUED | OUTPATIENT
Start: 2020-01-28 | End: 2020-01-28 | Stop reason: HOSPADM

## 2020-01-28 RX ORDER — MIDAZOLAM HYDROCHLORIDE 1 MG/ML
.25-5 INJECTION, SOLUTION INTRAMUSCULAR; INTRAVENOUS
Status: DISCONTINUED | OUTPATIENT
Start: 2020-01-28 | End: 2020-01-28 | Stop reason: HOSPADM

## 2020-01-28 RX ORDER — PROPOFOL 10 MG/ML
INJECTION, EMULSION INTRAVENOUS AS NEEDED
Status: DISCONTINUED | OUTPATIENT
Start: 2020-01-28 | End: 2020-01-28 | Stop reason: HOSPADM

## 2020-01-28 RX ORDER — EPINEPHRINE 0.1 MG/ML
1 INJECTION INTRACARDIAC; INTRAVENOUS
Status: DISCONTINUED | OUTPATIENT
Start: 2020-01-28 | End: 2020-01-28 | Stop reason: HOSPADM

## 2020-01-28 RX ADMIN — PROPOFOL 50 MG: 10 INJECTION, EMULSION INTRAVENOUS at 09:52

## 2020-01-28 RX ADMIN — PROPOFOL 50 MG: 10 INJECTION, EMULSION INTRAVENOUS at 09:53

## 2020-01-28 RX ADMIN — PROPOFOL 30 MG: 10 INJECTION, EMULSION INTRAVENOUS at 10:11

## 2020-01-28 RX ADMIN — SODIUM CHLORIDE 75 ML/HR: 900 INJECTION, SOLUTION INTRAVENOUS at 09:31

## 2020-01-28 RX ADMIN — Medication 80 MCG: at 10:08

## 2020-01-28 RX ADMIN — PROPOFOL 50 MG: 10 INJECTION, EMULSION INTRAVENOUS at 09:59

## 2020-01-28 RX ADMIN — Medication 40 MCG: at 10:01

## 2020-01-28 RX ADMIN — PROPOFOL 20 MG: 10 INJECTION, EMULSION INTRAVENOUS at 10:13

## 2020-01-28 RX ADMIN — LIDOCAINE HYDROCHLORIDE 80 MG: 20 INJECTION, SOLUTION EPIDURAL; INFILTRATION; INTRACAUDAL; PERINEURAL at 09:51

## 2020-01-28 RX ADMIN — Medication 20 MG: at 10:11

## 2020-01-28 RX ADMIN — PROPOFOL 50 MG: 10 INJECTION, EMULSION INTRAVENOUS at 09:56

## 2020-01-28 NOTE — ANESTHESIA PREPROCEDURE EVALUATION
Anesthetic History   No history of anesthetic complications            Review of Systems / Medical History  Patient summary reviewed, nursing notes reviewed and pertinent labs reviewed    Pulmonary  Within defined limits                 Neuro/Psych   Within defined limits           Cardiovascular    Hypertension          Hyperlipidemia    Exercise tolerance: >4 METS     GI/Hepatic/Renal     GERD    Renal disease: CRI       Endo/Other        Arthritis     Other Findings              Physical Exam    Airway  Mallampati: II  TM Distance: 4 - 6 cm  Neck ROM: normal range of motion   Mouth opening: Normal     Cardiovascular  Regular rate and rhythm,  S1 and S2 normal,  no murmur, click, rub, or gallop             Dental    Dentition: Bridges     Pulmonary  Breath sounds clear to auscultation               Abdominal  GI exam deferred       Other Findings            Anesthetic Plan    ASA: 2  Anesthesia type: general and total IV anesthesia          Induction: Intravenous  Anesthetic plan and risks discussed with: Patient      Propofol MAC

## 2020-01-28 NOTE — PERIOP NOTES
Endoscope was pre-cleaned at bedside immediately following procedure byVaishnavi Colón RN    Anesthesia reports 250mg Propofol, 80mg Lidocaine and 500mL NS given during procedure. Received report from anesthesia staff on vital signs and status of patient.

## 2020-01-28 NOTE — ROUTINE PROCESS
Rogelio Scott 1940 
[id number] Situation: 
Verbal report received from: MARIA INES Rouse RN Procedure: Procedure(s): 
COLONOSCOPY/EGD 
ESOPHAGOGASTRODUODENOSCOPY (EGD) ESOPHAGOGASTRODUODENAL (EGD) BIOPSY 
ESOPHAGEAL DILATION 
ENDOSCOPIC POLYPECTOMY Background: 
 
Preoperative diagnosis: ALTERED BOWEL FUNCTION/DYSPHAGIA/FAMILY HX COLON POLYPS/FAMILY HX MALIGNANT NEOPLASM GI TRACT Postoperative diagnosis: EGD: 
Hiatal Hernia Gastritis with Gastric errosions Colon: Polyps Duedenitis :  Dr. Bambi Ferrell 
Assistant(s): Endoscopy RN-1: Silvia Rogers RN Endoscopy RN-2: Reed Murillo Specimens:  
ID Type Source Tests Collected by Time Destination 1 : Duodenum Bx Preservative Duodenum  Ciara Ambrosio MD 1/28/2020 1003 Pathology 2 : Gastric BX Preservative Gastric  Ciara Ambrosio MD 1/28/2020 1004 Pathology 3 : Sigmoid Polyp Preservative Sigmoid  Ciara Ambrosio MD 1/28/2020 1016 Pathology H. Pylori  yes Assessment: 
Intra-procedure medications Anesthesia gave intra-procedure sedation and medications, see anesthesia flow sheet yes Intravenous fluids: NS@ Eduardo Barajas Vital signs stable  yes Abdominal assessment: round and soft  yes Recommendation: 
Discharge patient per MD order  yes. Family or Cy Anni, sister in law Permission to share finding with family or friend yes

## 2020-01-28 NOTE — ANESTHESIA POSTPROCEDURE EVALUATION
Procedure(s):  COLONOSCOPY/EGD  ESOPHAGOGASTRODUODENOSCOPY (EGD)  ESOPHAGOGASTRODUODENAL (EGD) BIOPSY  ESOPHAGEAL DILATION  ENDOSCOPIC POLYPECTOMY. general, total IV anesthesia    Anesthesia Post Evaluation        Patient location during evaluation: PACU  Note status: Adequate. Level of consciousness: responsive to verbal stimuli and sleepy but conscious  Pain management: satisfactory to patient  Airway patency: patent  Anesthetic complications: no  Cardiovascular status: acceptable  Respiratory status: acceptable  Hydration status: acceptable  Comments: +Post-Anesthesia Evaluation and Assessment    Patient: Mike Granado MRN: [id number]  SSN: [id number]   YOB: 1940  Age: 78 y.o. Sex: female      Cardiovascular Function/Vital Signs    /65   Pulse 79   Temp 36.3 °C (97.4 °F)   Resp 21   Ht 4' 11\" (1.499 m)   Wt 44.7 kg (98 lb 8 oz)   SpO2 98%   Breastfeeding No   BMI 19.89 kg/m²     Patient is status post Procedure(s):  COLONOSCOPY/EGD  ESOPHAGOGASTRODUODENOSCOPY (EGD)  ESOPHAGOGASTRODUODENAL (EGD) BIOPSY  ESOPHAGEAL DILATION  ENDOSCOPIC POLYPECTOMY. Nausea/Vomiting: Controlled. Postoperative hydration reviewed and adequate. Pain:  Pain Scale 1: Numeric (0 - 10) (01/28/20 1036)  Pain Intensity 1: 0 (01/28/20 1036)   Managed. Neurological Status: At baseline. Mental Status and Level of Consciousness: Arousable. Pulmonary Status:   O2 Device: Room air (01/28/20 1046)   Adequate oxygenation and airway patent. Complications related to anesthesia: None    Post-anesthesia assessment completed. No concerns. Signed By: Sandra Simons MD    1/28/2020  Post anesthesia nausea and vomiting:  controlled      Vitals Value Taken Time   /65 1/28/2020 10:49 AM   Temp     Pulse 86 1/28/2020 10:50 AM   Resp 19 1/28/2020 10:50 AM   SpO2 98 % 1/28/2020 10:50 AM   Vitals shown include unvalidated device data.

## 2020-01-28 NOTE — H&P
Pre-endoscopy H and P    The patient was seen and examined in the room/pre-op holding area. The airway was assessed and documented. The problem list, past medical history, and medications were reviewed.      Patient Active Problem List   Diagnosis Code    Hyperlipidemia E78.5    Glaucoma H40.9    Gastroesophageal reflux disease without esophagitis K21.9    Osteopenia M85.80    Family history of colon cancer Z80.0    Anemia D64.9    Essential hypertension I10    [facility]F (acute renal failure) ([facility]) N17.9    Chronic interstitial cystitis N30.10    Diarrhea R19.7    Acute cystitis without hematuria N30.00    Interstitial nephritis N12    Hypercalcemia E83.52    Erosive osteoarthritis of both hands M15.4    Pseudogout M11.20    Spondylosis of lumbar spine M47.816     Social History     Socioeconomic History    Marital status:      Spouse name: Not on file    Number of children: Not on file    Years of education: Not on file    Highest education level: Not on file   Occupational History    Not on file   Social Needs    Financial resource strain: Not on file    Food insecurity:     Worry: Not on file     Inability: Not on file    Transportation needs:     Medical: Not on file     Non-medical: Not on file   Tobacco Use    Smoking status: Former Smoker     Packs/day: 0.50     Years: 4.00     Pack years: 2.00     Last attempt to quit: 1997     Years since quittin.5    Smokeless tobacco: Former User   Substance and Sexual Activity    Alcohol use: Yes     Comment: seldom    Drug use: Yes     Types: Prescription, OTC     Comment: never recreational substance    Sexual activity: Not Currently     Partners: Male     Birth control/protection: None   Lifestyle    Physical activity:     Days per week: Not on file     Minutes per session: Not on file    Stress: Not on file   Relationships    Social connections:     Talks on phone: Not on file     Gets together: Not on file     Attends Taoism service: Not on file     Active member of club or organization: Not on file     Attends meetings of clubs or organizations: Not on file     Relationship status: Not on file    Intimate partner violence:     Fear of current or ex partner: Not on file     Emotionally abused: Not on file     Physically abused: Not on file     Forced sexual activity: Not on file   Other Topics Concern    Not on file   Social History Narrative    ** Merged History Encounter **          Past Medical History:   Diagnosis Date    AIN (acute interstitial nephritis)     Arthritis     CKD (chronic kidney disease) stage 3, GFR 30-59 ml/min ([facility])     GERD (gastroesophageal reflux disease)     Glaucoma     High cholesterol     Hypertension     Ill-defined condition     chronic cystitis         Prior to Admission Medications   Prescriptions Last Dose Informant Patient Reported? Taking? ACETAMINOPHEN (TYLENOL PO) 1/27/2020  Yes Yes   Sig: Take 650 mg by mouth as needed. Indications: two tabs   Aspirin, Buffered 81 mg tab 1/26/2020  Yes Yes   Sig: Take 1 Tab by mouth daily. diclofenac (VOLTAREN) 1 % gel 1/21/2020 at Unknown time  No Yes   Sig: Apply 2 g to affected area four (4) times daily. famotidine (PEPCID) 20 mg tablet 1/26/2020  No Yes   Sig: Take 1 Tab by mouth two (2) times a day. Indications: gastroesophageal reflux disease   gabapentin (NEURONTIN) 100 mg capsule 1/26/2020  No Yes   Sig: Take 2 Caps by mouth nightly. Max Daily Amount: 200 mg.   latanoprost (XALATAN) 0.005 % ophthalmic solution 1/27/2020 at Unknown time  Yes Yes   Sig: Administer 1 Drop to both eyes nightly. metoprolol succinate (TOPROL-XL) 50 mg XL tablet 1/26/2020  No Yes   Sig: Take 1 Tab by mouth daily. polyethylene glycol (MIRALAX) 17 gram/dose powder 1/27/2020  Yes Yes   Sig: Take 17 g by mouth as needed. sodium bicarbonate 650 mg tablet 1/26/2020  Yes Yes   Sig: Take 650 mg by mouth two (2) times a day.    timolol (TIMOPTIC) 0.5 % ophthalmic solution 1/27/2020 at Unknown time  Yes Yes   Sig: Administer 1 Drop to left eye daily. Facility-Administered Medications: None           The review of systems is:  Negative  for shortness of breath or chest pain      The heart, lungs, and mental status were satisfactory for the administration of deep sedation and for the procedure. I discussed with the patient the objectives, risks, consequences and alternatives to the procedure.       Gerson David MD  1/28/2020  9:47 AM

## 2020-01-28 NOTE — PROCEDURES
Colonoscopy Procedure Note    Thuna All  1940  [id number]    Indications:  Please see below. Pre-operative Diagnosis:FAMILY HX COLON POLYPS/FAMILY HX MALIGNANT NEOPLASM GI TRACT    Post-operative Diagnosis: Colon polyp,diverticulosis,internal hemorrhoids      : Matt Barnett MD    Referring Provider: Danay Rebollar MD    Sedation:  MAC anesthesia Propofol        Procedure Details:    After detailed informed consent was obtained with all risks and benefits of procedure explained and preoperative exam completed, the patient was taken to the endoscopy suite and placed in the left lateral decubitus position. Upon sequential sedation as per above, a digital rectal exam was performed  And was normal.  The Olympus videocolonoscope  was inserted in the rectum and carefully advanced to the cecum, which was identified by the ileocecal valve and appendiceal orifice. The quality of preparation was good. The colonoscope was slowly withdrawn with careful evaluation between folds. Retroflexion in the rectum was performed. Findings:     · A single 8-9 mm sessile sigmoid colon polyp was removed with a cold snare. · Mild sigmoid diverticulosis. · Rest of the colon to the cecum is normal appearing. · Small internal hemorrhoids are noted. · Mild decreased anal muscle tone on KEITH        Therapies:  1 complete polypectomy was performed using cold snare  and the polyp was retrieved    Specimen:  Specimens were collected as described above and sent to pathology. Complications: None were encountered during the procedure. EBL:  None. Recommendations:     -Await pathology. -Repeat colonoscopy in 3-4 years. Naturally, for new bleeding, unexplained weight loss,change in bowel habits and anemia, an earlier colonoscopy should be considered. Matt Barnett MD  1/28/2020  10:20 AM

## 2020-01-28 NOTE — PERIOP NOTES
Endoscope was pre-cleaned at bedside immediately following procedure by Mian Marshall RN Anesthesia reports ***mg Propofol, ***mg Lidocaine and ***mL NS given during procedure. Received report from anesthesia staff on vital signs and status of patient.

## 2020-01-28 NOTE — DISCHARGE INSTRUCTIONS
[place] Office: [phone]    Mike Granado  [id number]  1940    EGD/COLONOSCOPY DISCHARGE INSTRUCTIONS  Discomfort:  Sore throat- throat lozenges or warm salt water gargle  redness at IV site- apply warm compress to area; if redness or soreness persist- contact your physician  Gaseous discomfort- walking, belching will help relieve any discomfort  You may not operate a vehicle for 12 hours  You may not engage in an occupation involving machinery or appliances for rest of today. You may not drink alcoholic beverages for at least 12 hours  Avoid making any critical decisions for at least 24 hour  DIET  You may resume your regular diet - however -  remember your colon is empty and a heavy meal will produce gas. Avoid these foods:  fried / greasy foods, excessive carbonated drinks or too much caffeine  MEDICATIONS   Regarding Aspirin or Nonsteroidal medications specifically, please see below. ACTIVITY  You may resume your normal daily activities. Spend the remainder of the day resting -  avoid any strenuous activity. CALL M.D. ANY SIGN OF   Increasing pain, nausea, vomiting  Abdominal distension (swelling)  New increased bleeding (oral or rectal)  Fever (chills)  Pain in chest area  Bloody discharge from nose or mouth  Shortness of breath    You may not take any Advil, Aspirin, Ibuprofen, Motrin, Aleve, or Goodys for 7 days, ONLY  Tylenol as needed for pain. Follow-up Instructions:   Call  Matt Jones MD for any questions or concerns  Results of procedure / biopsy in 7 days   Telephone # [phone]      Follow-up Information    None

## 2020-01-28 NOTE — PROCEDURES
[place] Office: [phone]      Esophagogastroduodenoscopy Procedure Note      Thuan All  1940  [id number]    Indication:  Dyspepsia-acid, dysphagia; change in bowel function     : Hedy Hernandez MD    Referring Provider:  Danay Rebollar MD    Sedation:  MAC anesthesia Propofol    Procedure Details:  After detailed informed consent was obtained for the procedure, with all risks and benefits of procedure explained the patient was taken to the endoscopy suite and placed in the left lateral decubitus position. Following sequential administration of sedation as per above, the endoscope was inserted into the mouth and advanced under direct vision to second portion of the duodenum. A careful inspection was made as the gastroscope was withdrawn, including a retroflexed view of the proximal stomach; findings and interventions are described below. Findings:     Esophagus: The esophageal mucosa in the proximal, mid and distal esophagus is normal.   The squamo-columnar junction is at 36 cm where the Z-line was noted. A small 1-2 cm hiatal hernia is noted. Mild scope resistance in passing felt. TTS CRE 15-18 mm balloon dilation performed x 1 minute each station    Stomach: The gastric mucosa has erythema in the body(Biopsies taken for CLP testing) with a partial antral web and multiple shallow antral erosions(biopsies taken). The fundus was found to be normal with no lesions noted on retroflexion. Duodenum:   The bulb has mild erythema but post bulbar mucosa is normal in appearance. Biopsies taken. Therapies:  esophageal dilation with 15- 18 mm sized balloon  biopsy of stomach body, antrum(Body CHIKI)  biopsy of duodenal distal bulb, second portion    Specimen:  Specimens were collected as described and send to the laboratory. Complications:   None were encountered during the procedure. EBL:  None. Recommendations:     -Continue acid suppression. , -Await pathology. ,   -GERD diet: avoid fried and fatty foods. peppermint, chocolate, alcohol, coffee, citrus fruits and juices, tomoato products; avoid lying down for 2 to 3 hours after eating. Matt Melara MD  1/28/2020  10:02 AM

## 2020-03-15 ENCOUNTER — HOSPITAL ENCOUNTER (EMERGENCY)
Age: 80
Discharge: HOME | End: 2020-03-15
Attending: EMERGENCY MEDICINE
Payer: MEDICARE

## 2020-03-15 ENCOUNTER — APPOINTMENT (OUTPATIENT)
Dept: CT IMAGING | Age: 80
End: 2020-03-15
Attending: EMERGENCY MEDICINE
Payer: MEDICARE

## 2020-03-15 ENCOUNTER — APPOINTMENT (OUTPATIENT)
Dept: GENERAL RADIOLOGY | Age: 80
End: 2020-03-15
Attending: EMERGENCY MEDICINE
Payer: MEDICARE

## 2020-03-15 VITALS
WEIGHT: 100.75 LBS | TEMPERATURE: 98 F | RESPIRATION RATE: 18 BRPM | HEART RATE: 74 BPM | HEIGHT: 59 IN | OXYGEN SATURATION: 98 % | BODY MASS INDEX: 20.31 KG/M2 | SYSTOLIC BLOOD PRESSURE: 133 MMHG | DIASTOLIC BLOOD PRESSURE: 79 MMHG
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
## 2020-03-15 DIAGNOSIS — S09.90XA CLOSED HEAD INJURY, INITIAL ENCOUNTER: ICD-10-CM

## 2020-03-15 DIAGNOSIS — S02.2XXB OPEN FRACTURE OF NASAL BONE, INITIAL ENCOUNTER: Primary | ICD-10-CM

## 2020-03-15 LAB

## 2020-03-15 PROCEDURE — 73130 X-RAY EXAM OF HAND: CPT

## 2020-03-15 PROCEDURE — 70450 CT HEAD/BRAIN W/O DYE: CPT

## 2020-03-15 PROCEDURE — 75810000293 HC SIMP/SUPERF WND  RPR

## 2020-03-15 PROCEDURE — 74011000250 HC RX REV CODE- 250: Performed by: EMERGENCY MEDICINE

## 2020-03-15 PROCEDURE — 73562 X-RAY EXAM OF KNEE 3: CPT

## 2020-03-15 PROCEDURE — 81001 URINALYSIS AUTO W/SCOPE: CPT

## 2020-03-15 PROCEDURE — 70486 CT MAXILLOFACIAL W/O DYE: CPT

## 2020-03-15 PROCEDURE — 99284 EMERGENCY DEPT VISIT MOD MDM: CPT

## 2020-03-15 PROCEDURE — 73030 X-RAY EXAM OF SHOULDER: CPT

## 2020-03-15 PROCEDURE — 72125 CT NECK SPINE W/O DYE: CPT

## 2020-03-15 PROCEDURE — 74011250637 HC RX REV CODE- 250/637: Performed by: EMERGENCY MEDICINE

## 2020-03-15 RX ORDER — LIDOCAINE HYDROCHLORIDE 20 MG/ML
5 INJECTION, SOLUTION EPIDURAL; INFILTRATION; INTRACAUDAL; PERINEURAL ONCE
Status: COMPLETED | OUTPATIENT
Start: 2020-03-15 | End: 2020-03-15

## 2020-03-15 RX ORDER — ACETAMINOPHEN 500 MG
1000 TABLET ORAL
Status: COMPLETED | OUTPATIENT
Start: 2020-03-15 | End: 2020-03-15

## 2020-03-15 RX ORDER — CEPHALEXIN 500 MG/1
500 CAPSULE ORAL 4 TIMES DAILY
Qty: 28 CAP | Refills: 0 | Status: SHIPPED | OUTPATIENT
Start: 2020-03-15 | End: 2020-03-22

## 2020-03-15 RX ADMIN — ACETAMINOPHEN 1000 MG: 500 TABLET ORAL at 18:41

## 2020-03-15 RX ADMIN — LIDOCAINE HYDROCHLORIDE 100 MG: 20 INJECTION, SOLUTION EPIDURAL; INFILTRATION; INTRACAUDAL; PERINEURAL at 18:51

## 2020-03-15 NOTE — ED NOTES
Cleaned the nose wound, the right hand wound and right knee abrasion with shur clenz soap and water.

## 2020-03-15 NOTE — ED PROVIDER NOTES
EMERGENCY DEPARTMENT HISTORY AND PHYSICAL EXAM      Date: 3/15/2020  Patient Name: Osiris Talamantes    History of Presenting Illness     Chief Complaint   Patient presents with   Alyne Monica Fall     she was walking on the next block from her home. she slipped on a cracked sidewalk.  Laceration     laceration to bridge of nose deep after fall.  Laceration     right hand    Knee Injury     abrasion right knee       History Provided By: Patient    HPI: Osiris Talamantes, 78 y.o. female presents to the ED with cc of head injury after fall. Patient was walking and tripped on a curb outside. She fell forward striking her nose against the concrete pavement. No LOC reported. She fell on her right side and also sustained abrasions to the ulnar aspect of her right hand and to her knee. She did sustain laceration to the bridge of her nose. She denies any difficulty breathing out of her nose. Denies any visual changes. Denies any chest pain, shortness of breath, nausea, vomiting, abdominal pain, back pain, weakness or numbness of her extremities. She has been able to bear weight and ambulate. Tdap last updated 5 years ago. [facility], fall, 4pm  [facility] fall  R knee, R hand,   Nose, no LOC    There are no other complaints, changes, or physical findings at this time. PCP: Herman Gillis MD    No current facility-administered medications on file prior to encounter. Current Outpatient Medications on File Prior to Encounter   Medication Sig Dispense Refill    famotidine (PEPCID) 20 mg tablet Take 1 Tab by mouth two (2) times a day. Indications: gastroesophageal reflux disease 180 Tab 3    metoprolol succinate (TOPROL-XL) 50 mg XL tablet Take 1 Tab by mouth daily. 90 Tab 3    diclofenac (VOLTAREN) 1 % gel Apply 2 g to affected area four (4) times daily. 100 g 3    sodium bicarbonate 650 mg tablet Take 650 mg by mouth two (2) times a day.  gabapentin (NEURONTIN) 100 mg capsule Take 2 Caps by mouth nightly.  Max Daily Amount: 200 mg. 60 Cap 2    polyethylene glycol (MIRALAX) 17 gram/dose powder Take 17 g by mouth as needed.  timolol (TIMOPTIC) 0.5 % ophthalmic solution Administer 1 Drop to left eye daily.  ACETAMINOPHEN (TYLENOL PO) Take 650 mg by mouth as needed. Indications: two tabs      latanoprost (XALATAN) 0.005 % ophthalmic solution Administer 1 Drop to both eyes nightly.  Aspirin, Buffered 81 mg tab Take 1 Tab by mouth daily. Past History     Past Medical History:  Past Medical History:   Diagnosis Date    AIN (acute interstitial nephritis)     Arthritis     CKD (chronic kidney disease) stage 3, GFR 30-59 ml/min ([facility])     GERD (gastroesophageal reflux disease)     Glaucoma     High cholesterol     Hypertension     Ill-defined condition     chronic cystitis       Past Surgical History:  Past Surgical History:   Procedure Laterality Date    COLONOSCOPY N/A 2020    COLONOSCOPY/EGD performed [facility]ile [facility] ENDOSCOPY    HX APPENDECTOMY      with hysterectomy    HX  SECTION      HX COLONOSCOPY      HX DILATION AND CURETTAGE      HX HYSTERECTOMY      HX RENAL BIOPSY Left     in office    HX TONSILLECTOMY      HX UROLOGICAL  2016    cauterization of bladder ulcers       Family History:  Family History   Problem Relation Age of Onset    Cancer Father     Cancer Brother     Breast Cancer Cousin     Arthritis-osteo Mother        Social History:  Social History     Tobacco Use    Smoking status: Former Smoker     Packs/day: 0.50     Years: 4.00     Pack years: 2.00     Last attempt to quit: 1997     Years since quittin.6    Smokeless tobacco: Former User   Substance Use Topics    Alcohol use: Yes     Comment: seldom    Drug use: Yes     Types: Prescription, OTC     Comment: never recreational substance       Allergies:   Allergies   Allergen Reactions    Aleve [Naproxen Sodium] Other (comments)     Renal issues    Ibuprofen Unknown (comments)    Lisinopril Other (comments)     Renal dysfunction    Omeprazole Other (comments)     Cause Kidney failure         Review of Systems   Review of Systems   Constitutional: Negative for chills and fever. Eyes: Negative for visual disturbance. Respiratory: Negative for cough and shortness of breath. Cardiovascular: Negative for chest pain and leg swelling. Gastrointestinal: Negative for abdominal pain, nausea and vomiting. Musculoskeletal: Negative for back pain, gait problem, neck pain and neck stiffness. Skin: Positive for wound. Negative for color change and rash. Neurological: Negative for dizziness, weakness, light-headedness and headaches. Hematological: Does not bruise/bleed easily. All other systems reviewed and are negative. Physical Exam   Physical Exam  Vitals signs and nursing note reviewed. Constitutional:       General: She is not in acute distress. Appearance: Normal appearance. She is not ill-appearing or toxic-appearing. HENT:      Head: Normocephalic and atraumatic. Nose: No congestion or rhinorrhea. Comments: There is ecchymosis as well as a 2.5 cm linear laceration noted to the bridge of the nose. Bleeding controlled. There is no nasal septal hematoma noted. Nares are clear     Mouth/Throat:      Mouth: Mucous membranes are moist.      Pharynx: Oropharynx is clear. No oropharyngeal exudate or posterior oropharyngeal erythema. Eyes:      Extraocular Movements: Extraocular movements intact. Pupils: Pupils are equal, round, and reactive to light. Neck:      Musculoskeletal: Normal range of motion and neck supple. No neck rigidity or muscular tenderness. Cardiovascular:      Rate and Rhythm: Normal rate and regular rhythm. Heart sounds: No murmur. Pulmonary:      Effort: Pulmonary effort is normal. No respiratory distress. Breath sounds: Normal breath sounds. No wheezing. Chest:      Chest wall: No tenderness. Abdominal:      General: There is no distension. Palpations: Abdomen is soft. Tenderness: There is no abdominal tenderness. There is no guarding or rebound. Musculoskeletal: Normal range of motion. General: No swelling or tenderness. Right lower leg: No edema. Left lower leg: No edema. Comments: There is no thoracolumbar midline TTP, step-off, deformity. Skin:     General: Skin is warm and dry. Coloration: Skin is not pale. Findings: No erythema. Comments: Small abrasions noted to the ulnar aspect of the right fifth metatarsal as well as a another small abrasion noted to the patella of the right knee. Mild ecchymosis noted to the right knee. Neurological:      General: No focal deficit present. Mental Status: She is alert and oriented to person, place, and time. Cranial Nerves: No cranial nerve deficit. Sensory: No sensory deficit. Motor: No weakness. Gait: Gait normal.         Diagnostic Study Results     Labs -     Recent Results (from the past 12 hour(s))   URINALYSIS W/ REFLEX CULTURE    Collection Time: 03/15/20  6:39 PM   Result Value Ref Range    Color YELLOW/STRAW      Appearance CLEAR CLEAR      Specific gravity 1.010 1.003 - 1.030      pH (UA) 6.5 5.0 - 8.0      Protein NEGATIVE  NEG mg/dL    Glucose NEGATIVE  NEG mg/dL    Ketone NEGATIVE  NEG mg/dL    Bilirubin NEGATIVE  NEG      Blood NEGATIVE  NEG      Urobilinogen 0.2 0.2 - 1.0 EU/dL    Nitrites NEGATIVE  NEG      Leukocyte Esterase NEGATIVE  NEG      WBC 0-4 0 - 4 /hpf    RBC 0-5 0 - 5 /hpf    Epithelial cells FEW FEW /lpf    Bacteria NEGATIVE  NEG /hpf    UA:UC IF INDICATED CULTURE NOT INDICATED BY UA RESULT CNI      Hyaline cast 0-2 0 - 5 /lpf       Radiologic Studies -   CT HEAD WO CONT   Final Result   IMPRESSION:   1. No evidence of acute intracranial abnormality. CT SPINE CERV WO CONT   Final Result   IMPRESSION:      1. No evidence of acute fracture.    2. Severe degenerative disc disease throughout the mid and lower cervical spine   with severe multilevel facet arthropathy. Multilevel severe neural foraminal   stenoses as above. Grade 1 anterolisthesis of C3 on C4. CT MAXILLOFACIAL WO CONT   Final Result   IMPRESSION:    1. Acute minimally displaced fractures of the bilateral nasal bones. XR SHOULDER RT AP/LAT MIN 2 V   Final Result   IMPRESSION: No acute abnormality. XR HAND RT MIN 3 V   Final Result   IMPRESSION: No acute abnormality. Severe first CMC degenerative disease. XR KNEE RT 3 V   Final Result   IMPRESSION: No acute abnormality. CT Results  (Last 48 hours)               03/15/20 1936  CT HEAD WO CONT Final result    Impression:  IMPRESSION:   1. No evidence of acute intracranial abnormality. Narrative:  EXAM:  CT HEAD WO CONT       INDICATION:   fall,CHI, on ASA       COMPARISON: CT head 1/17/2017. TECHNIQUE: Unenhanced CT of the head was performed using 5 mm images. Brain and   bone windows were generated. CT dose reduction was achieved through use of a   standardized protocol tailored for this examination and automatic exposure   control for dose modulation. FINDINGS:   The ventricles are normal in size and position. Basilar cisterns are patent. No   midline shift. There is no evidence of acute infarct, hemorrhage, or extraaxial   fluid collection. The paranasal sinuses, mastoid air cells, and middle ears are clear. The orbital   contents are within normal limits with right lens implant. There are no   significant osseous or extracranial soft tissue lesions. 03/15/20 1936  CT SPINE CERV WO CONT Final result    Impression:  IMPRESSION:       1. No evidence of acute fracture. 2. Severe degenerative disc disease throughout the mid and lower cervical spine   with severe multilevel facet arthropathy. Multilevel severe neural foraminal   stenoses as above.  Grade 1 anterolisthesis of C3 on C4.           Narrative:  EXAM:  CT CERVICAL SPINE WITHOUT CONTRAST       INDICATION: fall, CHI. COMPARISON: None. CONTRAST:  None. TECHNIQUE: Multislice helical CT of the cervical spine was performed without   intravenous contrast administration. Sagittal and coronal reconstructions were   generated. CT dose reduction was achieved through use of a standardized   protocol tailored for this examination and automatic exposure control for dose   modulation. FINDINGS:       Grade 1 anterolisthesis of C3 on C4. Alignment is otherwise within normal   limits. Vertebral body heights are preserved without evidence of acute fracture. Severe degenerative disc disease in the mid and lower cervical spine. Multilevel   severe facet arthropathy throughout the cervical spine. There is mild spinal   canal stenosis at C5-C6 and C6-C7. There are multilevel severe neural foraminal   stenoses, including severe right neural foraminal stenosis at C3-C4, C4-C5,   C5-C6, and C6-C7, and severe left neural foraminal stenosis at C5-C6. Visualized   soft tissues of the neck are unremarkable. Visualized lung apices are clear. 03/15/20 1936  CT MAXILLOFACIAL WO CONT Final result    Impression:  IMPRESSION:    1. Acute minimally displaced fractures of the bilateral nasal bones. Narrative:  EXAM: CT MAXILLOFACIAL WO CONT       INDICATION: fall, CHI, nose lac and injury       COMPARISON: None. CONTRAST:   None. TECHNIQUE:  Multislice helical CT of the facial bones was performed in the axial   plane without intravenous contrast administration. Coronal and sagittal   reformations were generated. CT dose reduction was achieved through use of a   standardized protocol tailored for this examination and automatic exposure   control for dose modulation. FINDINGS:       Acute minimally displaced fractures of the bilateral nasal bones with mild   overlying soft tissue swelling. No additional acute fractures are identified. Mild mucosal thickening in the   bilateral inferior maxillary sinuses without air-fluid level. The mastoid air   cells and middle ears are clear. The globes, optic nerves and extraocular   muscles are normal. Right lens implant. No abnormalities are identified within   the visualized portions of the brain or nasopharynx. CXR Results  (Last 48 hours)    None          Medical Decision Making   I am the first provider for this patient. I reviewed the vital signs, available nursing notes, past medical history, past surgical history, family history and social history. Vital Signs-Reviewed the patient's vital signs. Patient Vitals for the past 12 hrs:   Temp Pulse Resp BP SpO2   03/15/20 2030 98 °F (36.7 °C)  18 133/79    03/15/20 2029     98 %   03/15/20 2010    151/75 98 %   03/15/20 1900    160/80    03/15/20 1726 97.8 °F (36.6 °C) 74 16 (!) 166/91 100 %     Records Reviewed: Nursing Notes, Old Medical Records, Previous Radiology Studies and Previous Laboratory Studies    Provider Notes (Medical Decision Making):   49-year-old female here with closed head injury and laceration over bridge of nose after mechanical fall after she tripped on sidewalk. On my examination she is in no acute distress. She is afebrile vital signs stable through entire ED stay. She does have ecchymosis and abrasion noted to right knee as well as bridge of her nose. She does have 2 smaller abrasions to the fifth metacarpal of the right hand. X-rays obtained of right knee, right hand, right shoulder which were all unremarkable. CT imaging of head, face, C-spine obtained which did not show any acute injury except for broken nasal bone. Patient has no signs of nasal septal hematoma and has patent nares. Encouraged to follow-up with ENT in 1 week as needed for broken nose. She was given strict return ED precautions and agrees plan as above.   She is ambulatory and has no concerns about going home at this time. Procedure Note - Laceration Repair:  8:10 PM  Procedure by Estela Goodell, PA-C. Complexity: Simple  2.5 cm inverted \"T\" shaped laceration to midline of the bridge of the nose was irrigated copiously with NS under jet lavage, prepped with Hibiclens and draped in a sterile fashion. The area was anesthetized with 1.5 mLs of  Lidocaine 2% with epinephrine via local infiltration. The wound was explored with the following results: No foreign bodies found. The wound was repaired with One layer suture closure: Skin Layer:  7 sutures placed, stitch type:simple interrupted, suture: 6-0 nylon. .  The wound was closed with good hemostasis and approximation. Sterile dressing applied. Estimated blood loss: Less than 1 mL  The procedure took 16-30 minutes, and pt tolerated well. ED Course:   Initial assessment performed. The patients presenting problems have been discussed, and they are in agreement with the care plan formulated and outlined with them. I have encouraged them to ask questions as they arise throughout their visit. Discharge Note:  The patient has been re-evaluated and is ready for discharge. Reviewed available results with patient. Counseled patient on diagnosis and care plan. Patient has expressed understanding, and all questions have been answered. Patient agrees with plan and agrees to follow up as recommended, or to return to the ED if their symptoms worsen. Discharge instructions have been provided and explained to the patient, along with reasons to return to the ED. Disposition:  Home    DISCHARGE PLAN:  1. Discharge Medication List as of 3/15/2020  9:18 PM      START taking these medications    Details   cephALEXin (Keflex) 500 mg capsule Take 1 Cap by mouth four (4) times daily for 7 days. , Print, Disp-28 Cap, R-0         CONTINUE these medications which have NOT CHANGED    Details   famotidine (PEPCID) 20 mg tablet Take 1 Tab by mouth two (2) times a day. Indications: gastroesophageal reflux disease, Normal, Disp-180 Tab, R-3      metoprolol succinate (TOPROL-XL) 50 mg XL tablet Take 1 Tab by mouth daily. , Normal, Disp-90 Tab, R-3      diclofenac (VOLTAREN) 1 % gel Apply 2 g to affected area four (4) times daily. , Normal, Disp-100 g, R-3      sodium bicarbonate 650 mg tablet Take 650 mg by mouth two (2) times a day., Historical Med      gabapentin (NEURONTIN) 100 mg capsule Take 2 Caps by mouth nightly. Max Daily Amount: 200 mg., No Print, Disp-60 Cap, R-2      polyethylene glycol (MIRALAX) 17 gram/dose powder Take 17 g by mouth as needed., Historical Med      timolol (TIMOPTIC) 0.5 % ophthalmic solution Administer 1 Drop to left eye daily. , Historical Med      ACETAMINOPHEN (TYLENOL PO) Take 650 mg by mouth as needed. Indications: two tabs, Historical Med      latanoprost (XALATAN) 0.005 % ophthalmic solution Administer 1 Drop to both eyes nightly., Historical Med      Aspirin, Buffered 81 mg tab Take 1 Tab by mouth daily. , Historical Med           2. Follow-up Information     Follow up With Specialties Details Why Contact Info    Marlyn Villa MD Internal Medicine Call in 1 week For suture removal, For wound re-check [facility]  [phone]      [facility] EMERGENCY DEPT Emergency Medicine In 1 week As needed, If symptoms worsen, For suture removal, For wound re-check [address]  [address]    Choco Holly MD Otolaryngology Call in 1 week As needed, For wound re-check [address] [phone]          3. Return to ED if worse     Diagnosis     Clinical Impression:   1. Open fracture of nasal bone, initial encounter    2. Closed head injury, initial encounter    3.  Fall, initial encounter        Attestations:    Asad Smith MD    Please note that this dictation was completed with [employer], the [employer] voice recognition software. Quite often unanticipated grammatical, syntax, homophones, and other interpretive errors are inadvertently transcribed by the computer software. Please disregard these errors. Please excuse any errors that have escaped final proofreading. Thank you.

## 2020-03-16 NOTE — DISCHARGE INSTRUCTIONS
You were evaluated in the emergency department for nasal bone fracture after a fall with head injury. Your examination was reassuring as was your work-up including CT scans and xrays. It will be important for you to follow-up with your primary care physician in 7 days for suture removal, this can also be done at any urgent care or ER. If you develop worsening symptoms such as worsening headaches, fevers, or pus draining from the wound, please return to the emergency department immediately.

## 2020-03-17 ENCOUNTER — TELEPHONE (OUTPATIENT)
Dept: INTERNAL MEDICINE CLINIC | Age: 80
End: 2020-03-17

## 2020-03-17 NOTE — TELEPHONE ENCOUNTER
Spoke with patient. Two pt identifiers confirmed. Patient offered an appointment with Dr. Marie Cooper for suture removal on 03/23/2020. Appointment accepted. Patient advised if anything changes or if unable to keep this appointment to call the office  Pt verbalized understanding of information discussed w/ no further questions at this time.

## 2020-03-17 NOTE — TELEPHONE ENCOUNTER
#[phone] pt went to ED on 3-15-20 and had stiches in her nose. Pt would like an appt to get those removed. Please call to schedule.

## 2020-03-22 NOTE — PROGRESS NOTES
Lo Collins is a 78 y.o. female who presents for follow up after ED visit on 3/15. Patient fell when she tripped on the sidewalk. 2.5cm laceration to bridge of nose with fracture of nasal bone minimally displaced as seen by CT, sutured x 7. Given keflex in ED. There was CHI with no LOC. CT head negative, CT neck severe DJD.          Past Medical History:   Diagnosis Date    AIN (acute interstitial nephritis)     Arthritis     CKD (chronic kidney disease) stage 3, GFR 30-59 ml/min ([facility])     GERD (gastroesophageal reflux disease)     Glaucoma     High cholesterol     Hypertension     Ill-defined condition     chronic cystitis       Family History   Problem Relation Age of Onset    Cancer Father     Cancer Brother     Breast Cancer Cousin     Arthritis-osteo Mother        Social History     Socioeconomic History    Marital status:      Spouse name: Not on file    Number of children: Not on file    Years of education: Not on file    Highest education level: Not on file   Occupational History    Not on file   Social Needs    Financial resource strain: Not on file    Food insecurity     Worry: Not on file     Inability: Not on file    Transportation needs     Medical: Not on file     Non-medical: Not on file   Tobacco Use    Smoking status: Former Smoker     Packs/day: 0.50     Years: 4.00     Pack years: 2.00     Last attempt to quit: 1997     Years since quittin.6    Smokeless tobacco: Former User   Substance and Sexual Activity    Alcohol use: Yes     Comment: seldom    Drug use: Yes     Types: Prescription, OTC     Comment: never recreational substance    Sexual activity: Not Currently     Partners: Male     Birth control/protection: None   Lifestyle    Physical activity     Days per week: Not on file     Minutes per session: Not on file    Stress: Not on file   Relationships    Social connections     Talks on phone: Not on file     Gets together: Not on file Attends Sabianism service: Not on file     Active member of club or organization: Not on file     Attends meetings of clubs or organizations: Not on file     Relationship status: Not on file    Intimate partner violence     Fear of current or ex partner: Not on file     Emotionally abused: Not on file     Physically abused: Not on file     Forced sexual activity: Not on file   Other Topics Concern    Not on file   Social History Narrative    ** Merged History Encounter **            Current Outpatient Medications on File Prior to Visit   Medication Sig Dispense Refill    famotidine (PEPCID) 20 mg tablet Take 1 Tab by mouth two (2) times a day. Indications: gastroesophageal reflux disease 180 Tab 3    metoprolol succinate (TOPROL-XL) 50 mg XL tablet Take 1 Tab by mouth daily. 90 Tab 3    diclofenac (VOLTAREN) 1 % gel Apply 2 g to affected area four (4) times daily. (Patient taking differently: Apply 2 g to affected area as needed.) 100 g 3    gabapentin (NEURONTIN) 100 mg capsule Take 2 Caps by mouth nightly. Max Daily Amount: 200 mg. 60 Cap 2    polyethylene glycol (MIRALAX) 17 gram/dose powder Take 17 g by mouth as needed.  timolol (TIMOPTIC) 0.5 % ophthalmic solution Administer 1 Drop to left eye daily.  ACETAMINOPHEN (TYLENOL PO) Take 650 mg by mouth as needed. Indications: two tabs      latanoprost (XALATAN) 0.005 % ophthalmic solution Administer 1 Drop to both eyes nightly.  Aspirin, Buffered 81 mg tab Take 1 Tab by mouth daily.  [] cephALEXin (Keflex) 500 mg capsule Take 1 Cap by mouth four (4) times daily for 7 days. 28 Cap 0    sodium bicarbonate 650 mg tablet Take 650 mg by mouth two (2) times a day. No current facility-administered medications on file prior to visit. Review of Systems  Pertinent items are noted in HPI.     Objective:     Visit Vitals  /87 (BP 1 Location: Right arm, BP Patient Position: Sitting)   Pulse 67   Resp 16   Ht 4' 11\" (1.499 m) Wt 96 lb 6.4 oz (43.7 kg)   LMP  (LMP Unknown)   SpO2 100%   BMI 19.47 kg/m²     Gen: well appearing female  HEENT:   Wound nasal bridge with sutures x 7. No superinfection. Extensive bruising of nasal bridge and cheeks  Resp:  No wheezing, no rhonchi, no rales. CV:  RRR, normal S1S2  Extrem: right knee bruising. Assessment/Plan:       ICD-10-CM ICD-9-CM    1. Contusion of nose, subsequent encounter S00.33XD V58.89      920    2. Contusion of right knee, subsequent encounter S80. 01XD V58.89      924.11    sutures removed. Instructed on wound care. Follow-up and Dispositions    · Return if symptoms worsen or fail to improve.          Korin Zhu MD

## 2020-03-23 ENCOUNTER — OFFICE VISIT (OUTPATIENT)
Dept: INTERNAL MEDICINE CLINIC | Age: 80
End: 2020-03-23

## 2020-03-23 VITALS
SYSTOLIC BLOOD PRESSURE: 158 MMHG | HEIGHT: 59 IN | HEART RATE: 67 BPM | DIASTOLIC BLOOD PRESSURE: 87 MMHG | BODY MASS INDEX: 19.44 KG/M2 | WEIGHT: 96.4 LBS | RESPIRATION RATE: 16 BRPM | OXYGEN SATURATION: 100 %

## 2020-03-23 DIAGNOSIS — Z48.02 VISIT FOR SUTURE REMOVAL: ICD-10-CM

## 2020-03-23 DIAGNOSIS — S80.01XD CONTUSION OF RIGHT KNEE, SUBSEQUENT ENCOUNTER: ICD-10-CM

## 2020-03-23 DIAGNOSIS — S00.33XD CONTUSION OF NOSE, SUBSEQUENT ENCOUNTER: ICD-10-CM

## 2020-03-23 DIAGNOSIS — S02.2XXD CLOSED FRACTURE OF NASAL BONE WITH ROUTINE HEALING, SUBSEQUENT ENCOUNTER: Primary | ICD-10-CM

## 2020-03-23 NOTE — PATIENT INSTRUCTIONS
Office Policies    Phone calls/patient messages:            Please allow up to 24 hours for someone in the office to contact you about your call or message. Be mindful your provider may be out of the office or your message may require further review. We encourage you to use [employer] for your messages as this is a faster, more efficient way to communicate with our office                         Medication Refills:            Prescription medications require 48-72 business hours to process. We encourage you to use [employer] for your refills. For controlled medications: Please allow 72 business hours to process. Certain medications may require you to  a written prescription at our office. NO narcotic/controlled medications will be prescribed after 4pm Monday through Friday or on weekends              Form/Paperwork Completion:            Please note a $25 fee may incur for all paperwork for completed by our providers. We ask that you allow 7-10 business days. Pre-payment is due prior to picking up/faxing the completed form. You may also download your forms to [employer] to have your doctor print off.

## 2020-04-01 ENCOUNTER — TELEPHONE (OUTPATIENT)
Dept: INTERNAL MEDICINE CLINIC | Age: 80
End: 2020-04-01

## 2020-04-01 NOTE — TELEPHONE ENCOUNTER
Called, spoke to pt. Two pt identifiers confirmed. VV appointment 04/06/2020 at 10:30 am.    Pt verbalized understanding of information discussed w/ no further questions at this time.

## 2020-04-01 NOTE — TELEPHONE ENCOUNTER
Samuel ERAZO Amberly   Phone Number: [phone]             .   . Patient return call     Caller's first and last name and relationship (if not the patient):       Best contact number(s):[phone]       Whose call is being returned:Liz       Details to clarify the request:       [facility]

## 2020-04-06 ENCOUNTER — E-VISIT (OUTPATIENT)
Dept: INTERNAL MEDICINE CLINIC | Age: 80
End: 2020-04-06

## 2020-04-06 ENCOUNTER — VIRTUAL VISIT (OUTPATIENT)
Dept: INTERNAL MEDICINE CLINIC | Age: 80
End: 2020-04-06

## 2020-04-06 DIAGNOSIS — Z91.09 ENVIRONMENTAL ALLERGIES: Primary | ICD-10-CM

## 2020-04-06 DIAGNOSIS — G25.81 RLS (RESTLESS LEGS SYNDROME): ICD-10-CM

## 2020-04-06 DIAGNOSIS — I10 ESSENTIAL HYPERTENSION: ICD-10-CM

## 2020-04-06 NOTE — PROGRESS NOTES
Meghan Soto is a 78 y.o. female who presents for followup. Seen March 23 after a fall, wound healed. Reports sinus drainage. No discolored mucous. No sinus pain, no ear pain. no fever. No chest sx. Taking claritin and  mucinex. With relief. Taking tylenol for joint pain. Stopped gabapentin 200mg at bedtime and leg pain returned, resumed. Has RLS. This is an established visit by phone. The patient has been instructed that this meets HIPAA criteria and acknowledges and agrees to this method of visitation. Pursuant to the emergency declaration under the [facility] waiver authority and the [employer] and [employer] Act, this Virtual Visit was conducted, with patient's consent, to reduce the patient's risk of exposure to COVID-19 and provide continuity of care for an established patient. Services were provided by phone to substitute for in-person clinic visit.         Past Medical History:   Diagnosis Date    AIN (acute interstitial nephritis)     Arthritis     CKD (chronic kidney disease) stage 3, GFR 30-59 ml/min ([facility])     GERD (gastroesophageal reflux disease)     Glaucoma     High cholesterol     Hypertension     Ill-defined condition     chronic cystitis       Family History   Problem Relation Age of Onset    Cancer Father     Cancer Brother     Breast Cancer Cousin     Arthritis-osteo Mother        Social History     Socioeconomic History    Marital status:      Spouse name: Not on file    Number of children: Not on file    Years of education: Not on file    Highest education level: Not on file   Occupational History    Not on file   Social Needs    Financial resource strain: Not on file    Food insecurity     Worry: Not on file     Inability: Not on file    Transportation needs     Medical: Not on file     Non-medical: Not on file   Tobacco Use    Smoking status: Former Smoker     Packs/day: 0.50     Years: 4.00     Pack years: 2.00     Last attempt to quit: 1997     Years since quittin.7    Smokeless tobacco: Former User   Substance and Sexual Activity    Alcohol use: Yes     Comment: seldom    Drug use: Yes     Types: Prescription, OTC     Comment: never recreational substance    Sexual activity: Not Currently     Partners: Male     Birth control/protection: None   Lifestyle    Physical activity     Days per week: Not on file     Minutes per session: Not on file    Stress: Not on file   Relationships    Social connections     Talks on phone: Not on file     Gets together: Not on file     Attends Jewish service: Not on file     Active member of club or organization: Not on file     Attends meetings of clubs or organizations: Not on file     Relationship status: Not on file    Intimate partner violence     Fear of current or ex partner: Not on file     Emotionally abused: Not on file     Physically abused: Not on file     Forced sexual activity: Not on file   Other Topics Concern    Not on file   Social History Narrative    ** Merged History Encounter **            Current Outpatient Medications on File Prior to Visit   Medication Sig Dispense Refill    famotidine (PEPCID) 20 mg tablet Take 1 Tab by mouth two (2) times a day. Indications: gastroesophageal reflux disease 180 Tab 3    metoprolol succinate (TOPROL-XL) 50 mg XL tablet Take 1 Tab by mouth daily. 90 Tab 3    diclofenac (VOLTAREN) 1 % gel Apply 2 g to affected area four (4) times daily. (Patient taking differently: Apply 2 g to affected area as needed.) 100 g 3    sodium bicarbonate 650 mg tablet Take 650 mg by mouth two (2) times a day.  gabapentin (NEURONTIN) 100 mg capsule Take 2 Caps by mouth nightly. Max Daily Amount: 200 mg. 60 Cap 2    polyethylene glycol (MIRALAX) 17 gram/dose powder Take 17 g by mouth as needed.  ACETAMINOPHEN (TYLENOL PO) Take 650 mg by mouth as needed. Indications: two tabs      latanoprost (XALATAN) 0.005 % ophthalmic solution Administer 1 Drop to both eyes nightly.  Aspirin, Buffered 81 mg tab Take 1 Tab by mouth daily.  timolol (TIMOPTIC) 0.5 % ophthalmic solution Administer 1 Drop to left eye daily. No current facility-administered medications on file prior to visit. Review of Systems  Pertinent items are noted in HPI. Objective:     Gen: healthy sounding female  HEENT: no audible congestion, patient does not see oral erythema and sees MMM  Neck: patient does not feel enlarged or tender LAD or masses  Resp: normal respiratory effort, no audible wheezing. CV: patient does not feel palpitations or heart irregularity  Abd: patient does not feel abdominal tenderness or mass, patient does not notice distension  Extrem: patient does not see swelling in ankles or joints. Neuro: Alert and oriented, able to answer questions without difficulty, patient reports able to move all extremities and walk normally        Assessment/Plan:       ICD-10-CM ICD-9-CM    1. Environmental allergies Z91.09 V15.09    2. RLS (restless legs syndrome) G25.81 333.94    3. Essential hypertension I10 401.9    continue present medications. Continue BP checks and call with readings in 2 weeks. This was a telemedicine visit by phone, duration 13 minutes      Aneta Mark MD    Follow-up and Dispositions    · Return in about 7 months (around 11/1/2020) for follow up on medications. Rhonda Peters

## 2020-04-17 ENCOUNTER — TELEPHONE (OUTPATIENT)
Dept: INTERNAL MEDICINE CLINIC | Age: 80
End: 2020-04-17

## 2020-04-17 ENCOUNTER — VIRTUAL VISIT (OUTPATIENT)
Dept: INTERNAL MEDICINE CLINIC | Age: 80
End: 2020-04-17

## 2020-04-17 DIAGNOSIS — Z91.09 ENVIRONMENTAL ALLERGIES: Primary | ICD-10-CM

## 2020-04-17 NOTE — TELEPHONE ENCOUNTER
DR Heidy Holt / Jerardo Main   Received: Yesterday   Message Contents   [facility] Office Pool             Patient return call         Best contact number(s):[phone]      Copy/paste Karson hamilton

## 2020-04-17 NOTE — PROGRESS NOTES
Troy Mo is a 78 y.o. female who presents with allergy symptoms. Feels a \"glob\" in throat. No sinus or ear pain. No cough. No fever. No chest symptoms. Taking claritin in am, helpful most of the day. Will take benadryl at times. This is an established visit conducted via telemedicine, by phone. The patient has been instructed that this meets HIPAA criteria and acknowledges and agrees to this method of visitation. Pursuant to the emergency declaration under the [address] [place] [address] [facility] authority and the [employer] and [employer] Act, this Virtual Visit was conducted, with patient's consent, to reduce the patient's risk of exposure to COVID-19 and provide continuity of care for an established patient. Services were provided by phone to substitute for in-person clinic visit.        Past Medical History:   Diagnosis Date    AIN (acute interstitial nephritis)     Arthritis     CKD (chronic kidney disease) stage 3, GFR 30-59 ml/min [facility])     GERD (gastroesophageal reflux disease)     Glaucoma     High cholesterol     Hypertension     Ill-defined condition     chronic cystitis       Family History   Problem Relation Age of Onset    Cancer Father     Cancer Brother     Breast Cancer Cousin     Arthritis-osteo Mother        Social History     Socioeconomic History    Marital status:      Spouse name: Not on file    Number of children: Not on file    Years of education: Not on file    Highest education level: Not on file   Occupational History    Not on file   Social Needs    Financial resource strain: Not on file    Food insecurity     Worry: Not on file     Inability: Not on file    Transportation needs     Medical: Not on file     Non-medical: Not on file   Tobacco Use    Smoking status: Former Smoker     Packs/day: 0.50     Years: 4.00     Pack years: 2.00     Last attempt to quit: 7/29/1997 Years since quittin.7    Smokeless tobacco: Former User   Substance and Sexual Activity    Alcohol use: Yes     Comment: seldom    Drug use: Yes     Types: Prescription, OTC     Comment: never recreational substance    Sexual activity: Not Currently     Partners: Male     Birth control/protection: None   Lifestyle    Physical activity     Days per week: Not on file     Minutes per session: Not on file    Stress: Not on file   Relationships    Social connections     Talks on phone: Not on file     Gets together: Not on file     Attends Yarsani service: Not on file     Active member of club or organization: Not on file     Attends meetings of clubs or organizations: Not on file     Relationship status: Not on file    Intimate partner violence     Fear of current or ex partner: Not on file     Emotionally abused: Not on file     Physically abused: Not on file     Forced sexual activity: Not on file   Other Topics Concern    Not on file   Social History Narrative    ** Merged History Encounter **            Current Outpatient Medications on File Prior to Visit   Medication Sig Dispense Refill    famotidine (PEPCID) 20 mg tablet Take 1 Tab by mouth two (2) times a day. Indications: gastroesophageal reflux disease 180 Tab 3    metoprolol succinate (TOPROL-XL) 50 mg XL tablet Take 1 Tab by mouth daily. 90 Tab 3    diclofenac (VOLTAREN) 1 % gel Apply 2 g to affected area four (4) times daily. (Patient taking differently: Apply 2 g to affected area as needed.) 100 g 3    sodium bicarbonate 650 mg tablet Take 650 mg by mouth two (2) times a day.  gabapentin (NEURONTIN) 100 mg capsule Take 2 Caps by mouth nightly. Max Daily Amount: 200 mg. 60 Cap 2    polyethylene glycol (MIRALAX) 17 gram/dose powder Take 17 g by mouth as needed.  timolol (TIMOPTIC) 0.5 % ophthalmic solution Administer 1 Drop to left eye daily.  ACETAMINOPHEN (TYLENOL PO) Take 650 mg by mouth as needed.  Indications: two tabs  latanoprost (XALATAN) 0.005 % ophthalmic solution Administer 1 Drop to both eyes nightly.  Aspirin, Buffered 81 mg tab Take 1 Tab by mouth daily. No current facility-administered medications on file prior to visit. Review of Systems  Pertinent items are noted in HPI. Objective:     Gen: healthy sounding female  HEENT: some audible congestion, patient does not see oral erythema and sees MMM  Neck: patient does not feel enlarged or tender LAD or masses  Resp: normal respiratory effort, no audible wheezing. CV: patient does not feel palpitations or heart irregularity   Neuro: Alert and oriented, able to answer questions without difficulty    Assessment/Plan:       ICD-10-CM ICD-9-CM    1. Environmental allergies Z91.09 V15.09    claritin 10mg daily and benadryl 12.5-25mg daily. This was a telemedicine visit by phone, duration 8 minutes. Gabriel Aden MD    Follow-up and Dispositions    · Return if symptoms worsen or fail to improve.

## 2020-04-23 ENCOUNTER — HOSPITAL ENCOUNTER (EMERGENCY)
Age: 80
Discharge: HOME | End: 2020-04-23
Attending: EMERGENCY MEDICINE
Payer: MEDICARE

## 2020-04-23 VITALS
SYSTOLIC BLOOD PRESSURE: 164 MMHG | OXYGEN SATURATION: 97 % | RESPIRATION RATE: 16 BRPM | TEMPERATURE: 98.7 F | WEIGHT: 97.44 LBS | HEIGHT: 59 IN | HEART RATE: 76 BPM | BODY MASS INDEX: 19.64 KG/M2 | DIASTOLIC BLOOD PRESSURE: 75 MMHG

## 2020-04-23 DIAGNOSIS — R19.8 SENSATION OF FOREIGN BODY IN ESOPHAGUS: Primary | ICD-10-CM

## 2020-04-23 PROCEDURE — 99283 EMERGENCY DEPT VISIT LOW MDM: CPT

## 2020-04-23 RX ORDER — SUCRALFATE 1 G/10ML
1 SUSPENSION ORAL
Qty: 414 ML | Refills: 0 | Status: SHIPPED | OUTPATIENT
Start: 2020-04-23 | End: 2020-05-23

## 2020-04-23 NOTE — ED PROVIDER NOTES
EMERGENCY DEPARTMENT HISTORY AND PHYSICAL EXAM      Date: 4/23/2020  Patient Name: Troy Mo    History of Presenting Illness     Chief Complaint   Patient presents with   Kiya Piggs     states she felt like something sticking in the throat after eating 2 walnuts and half banana at 1400 today, states she has excess mucous that prevents her from swallowing PCP is aware and treating patient for allergies       History Provided By: Patient    HPI: Troy Mo, 78 y.o. female  With past medical history of arthritis, GERD, CKD, and previous history of esophageal stricture presenting today with esophageal foreign body sensation. The patient notes that she has had a long-term history of difficulty with swallowing pills, and she places her pills in applesauce. She notes that today she was eating some walnuts and felt that she had them stuck in her throat. She states that she started having this sensation around 2 PM.  Normally if she turns her head or lays down this resolves the sensation. She came to the emergency department while she was in the waiting room she had resolution of her symptoms. The patient denies any vomiting, cough, chest pain. She has been seen by GI in the past and had a scope revealing esophageal stricture requiring dilation. No other complaints at this time. There are no other complaints, changes, or physical findings at this time. PCP: Francesca Bergman MD    No current facility-administered medications on file prior to encounter. Current Outpatient Medications on File Prior to Encounter   Medication Sig Dispense Refill    famotidine (PEPCID) 20 mg tablet Take 1 Tab by mouth two (2) times a day. Indications: gastroesophageal reflux disease 180 Tab 3    metoprolol succinate (TOPROL-XL) 50 mg XL tablet Take 1 Tab by mouth daily. 90 Tab 3    diclofenac (VOLTAREN) 1 % gel Apply 2 g to affected area four (4) times daily.  (Patient taking differently: Apply 2 g to affected area as needed.) 100 g 3    sodium bicarbonate 650 mg tablet Take 650 mg by mouth two (2) times a day.  gabapentin (NEURONTIN) 100 mg capsule Take 2 Caps by mouth nightly. Max Daily Amount: 200 mg. 60 Cap 2    polyethylene glycol (MIRALAX) 17 gram/dose powder Take 17 g by mouth as needed.  timolol (TIMOPTIC) 0.5 % ophthalmic solution Administer 1 Drop to left eye daily.  ACETAMINOPHEN (TYLENOL PO) Take 650 mg by mouth as needed. Indications: two tabs      latanoprost (XALATAN) 0.005 % ophthalmic solution Administer 1 Drop to both eyes nightly.  Aspirin, Buffered 81 mg tab Take 1 Tab by mouth daily.          Past History     Past Medical History:  Past Medical History:   Diagnosis Date    AIN (acute interstitial nephritis)     Arthritis     CKD (chronic kidney disease) stage 3, GFR 30-59 ml/min ([facility])     GERD (gastroesophageal reflux disease)     Glaucoma     High cholesterol     Hypertension     Ill-defined condition     chronic cystitis       Past Surgical History:  Past Surgical History:   Procedure Laterality Date    COLONOSCOPY N/A 2020    COLONOSCOPY/EGD performed [facility] Staci Jama [facility] END[facility]COPY    HX APPENDECTOMY      with hysterectomy    HX  SECTION      HX COLONOSCOPY      HX DILATION AND CURETTAGE      HX HYSTERECTOMY      HX RENAL BIOPSY Left     in office    HX TONSILLECTOMY      HX UROLOGICAL  2016    cauterization of bladder ulcers       Family History:  Family History   Problem Relation Age of Onset    Cancer Father     Cancer Brother     Breast Cancer Cousin     Arthritis-osteo Mother        Social History:  Social History     Tobacco Use    Smoking status: Former Smoker     Packs/day: 0.50     Years: 4.00     Pack years: 2.00     Last attempt to quit: 1997     Years since quittin.7    Smokeless tobacco: Former User   Substance Use Topics    Alcohol use: Yes     Comment: seldom    Drug use: Yes     Types: Prescription, OTC     Comment: never recreational substance       Allergies: Allergies   Allergen Reactions    Aleve [Naproxen Sodium] Other (comments)     Renal issues    Ibuprofen Unknown (comments)    Lisinopril Other (comments)     Renal dysfunction    Omeprazole Other (comments)     Cause Kidney failure         Review of Systems   Constitutional: No  fever  Skin: No  rash  HEENT: No  nasal congestion  Resp: No cough  CV: No chest pain  GI: No vomiting  : No dysuria  MSK: No joint pain  Neuro: No numbness  Psych: No suicidal      Physical Exam     Patient Vitals for the past 12 hrs:   Temp Pulse Resp BP SpO2   04/23/20 1758 98.5 °F (36.9 °C) 72 18 195/72 100 %       General: alert, No acute distress  Eyes: EOMI, normal conjunctiva  ENT: moist mucous membranes. Normal posterior oropharynx  Neck: Active, full ROM of neck. Skin: No rashes. no jaundice              Lungs: Equal chest expansion. no respiratory distress. Heart: regular rate     no peripheral edema    Abd:  non distended soft  Back: Full ROM  MSK: Full, active ROM in all 4 extremities. Neuro: alert  Person, Place, Time and Situation; normal speech;   Psych: Cooperative with exam; Appropriate mood and affect             Diagnostic Study Results     Labs -   No results found for this or any previous visit (from the past 12 hour(s)). Radiologic Studies -   No orders to display     CT Results  (Last 48 hours)    None        CXR Results  (Last 48 hours)    None          Medical Decision Making   I am the first provider for this patient. I reviewed the vital signs, available nursing notes, past medical history, past surgical history, family history and social history. Provider Notes (Medical Decision Making):     Differential Diagnosis: Globus sensation, esophageal stricture, esophagitis    Initial Plan: Patient no longer has any esophageal foreign body sensation. She is in no acute distress with normal vital signs.   I have discussed with her using Carafate for her symptoms, and refer her back to GI for possible repeat endoscopy if she continues to have issues with foreign body sensation. ED Course:   Initial assessment performed. The patients presenting problems have been discussed, and they are in agreement with the care plan formulated and outlined with them. I have encouraged them to ask questions as they arise throughout their visit. Torrie Bran MD, am the attending of record for this patient encounter. Dispo: Discharged. The patient has been re-evaluated and is ready for discharge. Reviewed available results with patient. Counseled patient on diagnosis and care plan. Patient has expressed understanding, and all questions have been answered. Patient agrees with plan and agrees to follow up as recommended, or to return to the ED if their symptoms worsen. Discharge instructions have been provided and explained to the patient, along with reasons to return to the ED. PLAN:  Current Discharge Medication List      START taking these medications    Details   sucralfate (Carafate) 100 mg/mL suspension Take 10 mL by mouth four (4) times daily as needed (abdominal discomfort) for up to 30 days. Qty: 414 mL, Refills: 0         1.   2.     Follow-up Information     Follow up With Specialties Details Why Contact Info    GI    Please call your GI doctor as you may end up needing another endoscopy        3. Return to ED if worse       Diagnosis     Clinical Impression:   1. Sensation of foreign body in esophagus        Attestations:    Carmen Mitchell MD    Please note that this dictation was completed with [employer], the computer voice recognition software. Quite often unanticipated grammatical, syntax, homophones, and other interpretive errors are inadvertently transcribed by the computer software. Please disregard these errors. Please excuse any errors that have escaped final proofreading. Thank you.

## 2020-04-24 NOTE — ED NOTES
Patient verbalized understanding of discharge instructions, no questions. Patient ambulated out of ED with steady gait in no distress.

## 2020-05-02 ENCOUNTER — APPOINTMENT (OUTPATIENT)
Dept: GENERAL RADIOLOGY | Age: 80
End: 2020-05-02
Attending: EMERGENCY MEDICINE
Payer: MEDICARE

## 2020-05-02 ENCOUNTER — HOSPITAL ENCOUNTER (EMERGENCY)
Age: 80
Discharge: HOME | End: 2020-05-02
Attending: EMERGENCY MEDICINE
Payer: MEDICARE

## 2020-05-02 VITALS
TEMPERATURE: 98.1 F | WEIGHT: 98.11 LBS | SYSTOLIC BLOOD PRESSURE: 187 MMHG | OXYGEN SATURATION: 100 % | HEART RATE: 69 BPM | HEIGHT: 59 IN | BODY MASS INDEX: 19.78 KG/M2 | DIASTOLIC BLOOD PRESSURE: 96 MMHG | RESPIRATION RATE: 14 BRPM

## 2020-05-02 DIAGNOSIS — T18.9XXA SWALLOWED FOREIGN BODY, INITIAL ENCOUNTER: Primary | ICD-10-CM

## 2020-05-02 PROCEDURE — 99282 EMERGENCY DEPT VISIT SF MDM: CPT

## 2020-05-02 PROCEDURE — 96374 THER/PROPH/DIAG INJ IV PUSH: CPT

## 2020-05-02 PROCEDURE — 74011250636 HC RX REV CODE- 250/636: Performed by: EMERGENCY MEDICINE

## 2020-05-02 PROCEDURE — 70360 X-RAY EXAM OF NECK: CPT

## 2020-05-02 PROCEDURE — 96375 TX/PRO/DX INJ NEW DRUG ADDON: CPT

## 2020-05-02 RX ORDER — METOCLOPRAMIDE 5 MG/1
5 TABLET ORAL
Qty: 12 TAB | Refills: 0 | Status: SHIPPED | OUTPATIENT
Start: 2020-05-02 | End: 2020-05-12

## 2020-05-02 RX ORDER — METOCLOPRAMIDE HYDROCHLORIDE 5 MG/ML
10 INJECTION INTRAMUSCULAR; INTRAVENOUS
Status: COMPLETED | OUTPATIENT
Start: 2020-05-02 | End: 2020-05-02

## 2020-05-02 RX ORDER — SODIUM CHLORIDE 0.9 % (FLUSH) 0.9 %
5-40 SYRINGE (ML) INJECTION AS NEEDED
Status: DISCONTINUED | OUTPATIENT
Start: 2020-05-02 | End: 2020-05-02 | Stop reason: HOSPADM

## 2020-05-02 RX ORDER — SODIUM CHLORIDE 0.9 % (FLUSH) 0.9 %
5-40 SYRINGE (ML) INJECTION EVERY 8 HOURS
Status: DISCONTINUED | OUTPATIENT
Start: 2020-05-02 | End: 2020-05-02 | Stop reason: HOSPADM

## 2020-05-02 RX ADMIN — GLUCAGON HYDROCHLORIDE 1 MG: 1 INJECTION, POWDER, FOR SOLUTION INTRAMUSCULAR; INTRAVENOUS; SUBCUTANEOUS at 18:36

## 2020-05-02 RX ADMIN — METOCLOPRAMIDE 10 MG: 5 INJECTION, SOLUTION INTRAMUSCULAR; INTRAVENOUS at 18:36

## 2020-05-02 NOTE — DISCHARGE INSTRUCTIONS
Patient Education        Swallowed Object in Throat or Esophagus: Care Instructions  Your Care Instructions  When you swallow food, liquid, or an object, it passes from your mouth and goes down your throat and esophagus and into your stomach. But sometimes these things can get stuck in your throat or esophagus. This may make you choke, cough, or gag. Some objects can cause more problems than others. Sharp, long, or large objects can scratch or cut your throat, your esophagus, and your stomach if they get stuck or if they are swallowed. When this happens, these areas can bleed or get infected. If the object was stuck in your throat or esophagus, your doctor probably removed it. If you swallowed the object, your doctor may have suggested that you wait and see if the object comes out in your stool. Most swallowed objects will pass through your body without any problem and show up in your stool within 3 days. If the object does not show up in your stool within 7 days, your doctor may order tests to find out where it is in your body. Your throat may feel sore after you have had an object removed or have swallowed an object that has scratched your throat. It may hurt for a few days when you eat or swallow. The scratch itself may make it feel as if something is still stuck in your throat. Follow-up care is a key part of your treatment and safety. Be sure to make and go to all appointments, and call your doctor if you are having problems. It is also a good idea to know your test results and keep a list of the medicines you take. How can you care for yourself at home? · Take pain medicines exactly as directed. ? If the doctor gave you a prescription medicine for pain, take it as prescribed. ? If you are not taking a prescription pain medicine, ask your doctor if you can take an over-the-counter medicine. · If your doctor prescribed antibiotics, take them as directed.  Do not stop taking them just because you feel better. You need to take the full course of antibiotics. · Drink liquids. If swallowing liquids is easy, try eating soft foods like bread or bananas. If these foods are easy to swallow, start to add other foods. · Avoid very hot or very cold foods. · If you swallowed an object and it has passed through to your stomach, try eating foods that are high in fiber, such as fruits, vegetables, and whole grains. These foods may help you pass the object more quickly. · Watch your stools to see if the object has passed. Do not use a laxative unless your doctor says that it is okay. · Do not smoke. Smoking can irritate your throat and your esophagus even more. If you need help quitting, talk to your doctor about stop-smoking programs and medicines. These can increase your chances of quitting for good. To prevent swallowing objects or choking:  · Cut food into small pieces. · Eat slowly, take small bites, and chew your food all the way. · Do not laugh or talk with food in your mouth. · Do not eat or drink while you are doing something else, such as when you drive. · Do not hold objects, such as pins, nails, or toothpicks, in your mouth or between your lips. · Limit how much alcohol you drink while you eat. When should you call for help? Call 911 anytime you think you may need emergency care. For example, call if:    · You have chest pain.     · You vomit a large amount of blood or what looks like coffee grounds.     · You have severe stomach pain.     · You pass maroon or very bloody stools.     · You can't swallow.     · You have severe trouble breathing.    Call your doctor now or seek immediate medical care if:    · You have any stomach pain.     · You have signs of an infection, such as:  ? Pain, swelling, or tenderness in or around your throat, neck, chest, or belly. ? A fever. ? A cough.   ? Shortness of breath.     · You vomit a small amount of blood or what looks like coffee grounds.     · You have trouble breathing.     · You have trouble swallowing.     · You vomited more than one time since you had an object removed from your throat or esophagus or since you swallowed an object.     · Your stools are black and tarlike or have streaks of blood.    Watch closely for changes in your health, and be sure to contact your doctor if:    · You still feel like you have something stuck in your throat or esophagus.     · You do not get better as expected. Where can you learn more? Go to http://roel-lonnie.info/  Enter Q282 in the search box to learn more about \"Swallowed Object in Throat or Esophagus: Care Instructions. \"  Current as of: June 26, 2019Content Version: 12.4  © 4469-5971 [employer]. Care instructions adapted under license by [employer] (which disclaims liability or warranty for this information). If you have questions about a medical condition or this instruction, always ask your healthcare professional. [place] [address] any warranty or liability for your use of this information.

## 2020-05-02 NOTE — ED NOTES
Pt given discharge instructions by Dr. Melba Jessica. Saline lock removed. Pt discharged ambulatory with steady gait, declines wheelchair. No acute distress at time of discharge.

## 2020-05-02 NOTE — ED NOTES
I have assumed care of the patient. The patient has been placed in a position of comfort with the call bell within reach. The patient presents to the ED with complaints of pain and difficulty swallowing. States that she has a history of esophageal strictures and was told that she is just having sinus drainage but is unsure because it is getting harder to swallow.

## 2020-05-12 ENCOUNTER — HOSPITAL ENCOUNTER (EMERGENCY)
Age: 80
Discharge: HOME | End: 2020-05-12
Attending: EMERGENCY MEDICINE
Payer: MEDICARE

## 2020-05-12 ENCOUNTER — APPOINTMENT (OUTPATIENT)
Dept: CT IMAGING | Age: 80
End: 2020-05-12
Attending: EMERGENCY MEDICINE
Payer: MEDICARE

## 2020-05-12 VITALS
DIASTOLIC BLOOD PRESSURE: 66 MMHG | RESPIRATION RATE: 15 BRPM | OXYGEN SATURATION: 99 % | BODY MASS INDEX: 18.84 KG/M2 | HEIGHT: 59 IN | HEART RATE: 77 BPM | WEIGHT: 93.47 LBS | TEMPERATURE: 97.6 F | SYSTOLIC BLOOD PRESSURE: 171 MMHG

## 2020-05-12 DIAGNOSIS — S02.2XXB OPEN FRACTURE OF NASAL BONE, INITIAL ENCOUNTER: Primary | ICD-10-CM

## 2020-05-12 PROCEDURE — 99284 EMERGENCY DEPT VISIT MOD MDM: CPT

## 2020-05-12 PROCEDURE — 70450 CT HEAD/BRAIN W/O DYE: CPT

## 2020-05-12 PROCEDURE — 70486 CT MAXILLOFACIAL W/O DYE: CPT

## 2020-05-12 PROCEDURE — 96372 THER/PROPH/DIAG INJ SC/IM: CPT

## 2020-05-12 PROCEDURE — 75810000293 HC SIMP/SUPERF WND  RPR

## 2020-05-12 PROCEDURE — 74011250636 HC RX REV CODE- 250/636: Performed by: EMERGENCY MEDICINE

## 2020-05-12 RX ORDER — CEPHALEXIN 500 MG/1
500 CAPSULE ORAL 4 TIMES DAILY
Qty: 28 CAP | Refills: 0 | Status: SHIPPED | OUTPATIENT
Start: 2020-05-12 | End: 2020-05-19

## 2020-05-12 RX ORDER — CEFAZOLIN SODIUM 1 G/3ML
1 INJECTION, POWDER, FOR SOLUTION INTRAMUSCULAR; INTRAVENOUS
Status: COMPLETED | OUTPATIENT
Start: 2020-05-12 | End: 2020-05-12

## 2020-05-12 RX ADMIN — CEFAZOLIN 1 G: 1 INJECTION, POWDER, FOR SOLUTION INTRAMUSCULAR; INTRAVENOUS at 15:48

## 2020-05-12 NOTE — ED PROVIDER NOTES
EMERGENCY DEPARTMENT HISTORY AND PHYSICAL EXAM      Date: 5/12/2020  Patient Name: Praveena Marlow  Patient Age and Sex: 78 y.o. female     History of Presenting Illness     Chief Complaint   Patient presents with    Facial Pain     Ambulatory into the ED with c/o laceration/pain to bridge of nose. Reports tripping, causing her to fall face foreward on a sidewalk. Denies LOC.  Laceration       History Provided By: Patient    HPI: Praveena Marlow is an 49-year-old female with a history of arthritis, CKD presenting for ground-level fall with facial pain. Patient states that she was at [facility], [facility] when she tripped over a cord falling face forward and hitting the ground. States she did not lose consciousness and remembers the entire event. Is on baby aspirin only. States that she suffered a small laceration to her nose and feels like she is congested. She is worried that she might have a broken nose. Also been having some bleeding in her mouth and thinks she might have cut her lip. Patient denies any extremity injury. States that the pain is currently a 1 out of 10 and just feels dull. There are no other complaints, changes, or physical findings at this time. PCP: Armando Ewing MD    No current facility-administered medications on file prior to encounter. Current Outpatient Medications on File Prior to Encounter   Medication Sig Dispense Refill    metoclopramide HCl (REGLAN) 5 mg tablet Take 1 Tab by mouth every six (6) hours as needed for Nausea for up to 10 days. 12 Tab 0    sucralfate (Carafate) 100 mg/mL suspension Take 10 mL by mouth four (4) times daily as needed (abdominal discomfort) for up to 30 days. 414 mL 0    famotidine (PEPCID) 20 mg tablet Take 1 Tab by mouth two (2) times a day. Indications: gastroesophageal reflux disease 180 Tab 3    metoprolol succinate (TOPROL-XL) 50 mg XL tablet Take 1 Tab by mouth daily.  90 Tab 3    diclofenac (VOLTAREN) 1 % gel Apply 2 g to affected area four (4) times daily. (Patient taking differently: Apply 2 g to affected area as needed.) 100 g 3    sodium bicarbonate 650 mg tablet Take 650 mg by mouth two (2) times a day.  gabapentin (NEURONTIN) 100 mg capsule Take 2 Caps by mouth nightly. Max Daily Amount: 200 mg. 60 Cap 2    polyethylene glycol (MIRALAX) 17 gram/dose powder Take 17 g by mouth as needed.  timolol (TIMOPTIC) 0.5 % ophthalmic solution Administer 1 Drop to left eye daily.  ACETAMINOPHEN (TYLENOL PO) Take 650 mg by mouth as needed. Indications: two tabs      latanoprost (XALATAN) 0.005 % ophthalmic solution Administer 1 Drop to both eyes nightly.  Aspirin, Buffered 81 mg tab Take 1 Tab by mouth daily.          Past History     Past Medical History:  Past Medical History:   Diagnosis Date    AIN (acute interstitial nephritis)     Arthritis     CKD (chronic kidney disease) stage 3, GFR 30-59 ml/min ([facility])     GERD (gastroesophageal reflux disease)     Glaucoma     High cholesterol     Hypertension     Ill-defined condition     chronic cystitis       Past Surgical History:  Past Surgical History:   Procedure Laterality Date    COLONOSCOPY N/A 2020    COLONOSCOPY/EGD performed by Mark Rutherford [facility] at [facility] ENDOSCOPY    HX APPENDECTOMY      with hysterectomy    HX  SECTION      HX COLONOSCOPY      HX DILATION AND CURETTAGE      HX HYSTERECTOMY      HX RENAL BIOPSY Left     in office    HX TONSILLECTOMY      HX UROLOGICAL  2016    cauterization of bladder ulcers       Family History:  Family History   Problem Relation Age of Onset    Cancer Father     Cancer Brother     Breast Cancer Cousin     Arthritis-osteo Mother        Social History:  Social History     Tobacco Use    Smoking status: Former Smoker     Packs/day: 0.50     Years: 4.00     Pack years: 2.00     Last attempt to quit: 1997     Years since quittin.8    Smokeless tobacco: Former User   Substance Use Topics    Alcohol use: Yes     Comment: seldom    Drug use: Yes     Types: Prescription, OTC     Comment: never recreational substance       Allergies: Allergies   Allergen Reactions    Aleve [Naproxen Sodium] Other (comments)     Renal issues    Ibuprofen Unknown (comments)    Lisinopril Other (comments)     Renal dysfunction    Omeprazole Other (comments)     Cause Kidney failure         Review of Systems   Review of Systems   Constitutional: Negative for chills and fever. HENT: Positive for congestion. Respiratory: Negative for cough and shortness of breath. Cardiovascular: Negative for chest pain. Gastrointestinal: Negative for abdominal pain, constipation, diarrhea, nausea and vomiting. Genitourinary: Negative for dysuria, frequency and hematuria. Skin: Positive for wound. Neurological: Negative for weakness and numbness. All other systems reviewed and are negative. Physical Exam   Physical Exam  Vitals signs and nursing note reviewed. Constitutional:       Appearance: She is well-developed. HENT:      Head: Normocephalic. Comments: Patient has a 1 cm laceration over the bridge of her nose with minimal bleeding. Her nose is slightly swollen. No nasal septal hematoma. Mouth/Throat:      Mouth: Mucous membranes are moist.      Comments: Patient has a laceration to the frenulum of the upper lip connecting the lip to the gums of the teeth. Eyes:      Conjunctiva/sclera: Conjunctivae normal.   Neck:      Musculoskeletal: Normal range of motion and neck supple. Cardiovascular:      Rate and Rhythm: Normal rate and regular rhythm. Pulmonary:      Effort: Pulmonary effort is normal. No respiratory distress. Breath sounds: Normal breath sounds. Abdominal:      General: There is no distension. Palpations: Abdomen is soft. Tenderness: There is no abdominal tenderness. Musculoskeletal: Normal range of motion. Skin:     General: Skin is warm and dry. Neurological:      General: No focal deficit present. Mental Status: She is alert and oriented to person, place, and time. Mental status is at baseline. Psychiatric:         Mood and Affect: Mood normal.          Diagnostic Study Results     Labs -   No results found for this or any previous visit (from the past 12 hour(s)). Radiologic Studies -   CT MAXILLOFACIAL WO CONT   Final Result   IMPRESSION:    Bilateral comminuted nasal fractures with LeFort II variant acute involvement of   maxillary sinuses and nasal septum. Incidental bilateral jarvis bullosa. CT HEAD WO CONT   Final Result   IMPRESSION: No acute intracranial hemorrhage, mass or infarct. CT Results  (Last 48 hours)               05/12/20 1427  CT MAXILLOFACIAL WO CONT Final result    Impression:  IMPRESSION:    Bilateral comminuted nasal fractures with LeFort II variant acute involvement of   maxillary sinuses and nasal septum. Incidental bilateral jarvis bullosa. Narrative:  EXAM: CT MAXILLOFACIAL WO CONT       INDICATION: Patient fell, striking her face. Evaluation for facial fracture. COMPARISON: None. CONTRAST:   None. TECHNIQUE:  Multislice helical CT of the facial bones was performed in the axial   plane without intravenous contrast administration. Coronal and sagittal   reformations were generated. CT dose reduction was achieved through use of a   standardized protocol tailored for this examination and automatic exposure   control for dose modulation. FINDINGS:       Bones: Acute comminuted bilateral nasal fractures with impaction on the right   and elevation on the left (axial image 45). Acute vertically oriented septal   fracture of with slight offset (series 2, image 46). Fracture then extends   horizontally through the base of the nose (coronal image 14), then obliquely   through the lateral aspects of the lateral maxillary sinuses (image 32),   essentially nondisplaced). Paranasal sinuses: Bilateral maxillary mucous retention cysts. Bilateral jarvis   bullosa. Septal deviation to the right with formation of the septal spur. Orbits: The globes, optic nerves, and extraocular muscles are within normal   limits. .       Base of brain and soft tissues: Within normal limits. No evidence of mass. .           05/12/20 1427  CT HEAD WO CONT Final result    Impression:  IMPRESSION: No acute intracranial hemorrhage, mass or infarct. Narrative:  INDICATION: Laceration to bridge of nose. Trip and fall on sidewalk. Exam: Noncontrast CT of the brain is performed with 5 mm collimation. CT dose reduction was achieved with the use of the standardized protocol   tailored for this examination and automatic exposure control for dose   modulation. Direct comparison is made to prior CT dated March 2020. FINDINGS: Mild, age-appropriate diffuse cortical atrophy. There is no acute   intracranial hemorrhage, mass, mass effect or herniation. Ventricular system is   normal. The gray-white matter differentiation is well-preserved. The mastoid air   cells are well pneumatized. CXR Results  (Last 48 hours)    None            Medical Decision Making   I am the first provider for this patient. I reviewed the vital signs, available nursing notes, past medical history, past surgical history, family history and social history. Vital Signs-Reviewed the patient's vital signs. Patient Vitals for the past 12 hrs:   Temp Pulse Resp BP SpO2   05/12/20 1400    171/66 99 %   05/12/20 1330    160/65 98 %   05/12/20 1259 97.6 °F (36.4 °C) 77 15 173/81 100 %       Records Reviewed: Nursing Notes and Old Medical Records    Provider Notes (Medical Decision Making):   Patient presenting with trauma to her face after ground-level fall. Given her age, we will get CT of the head as well as the face. Concern for possible fracture versus ICH.   Will also need to repair the sutures. Tetanus is up-to-date. ED Course:   Initial assessment performed. The patients presenting problems have been discussed, and they are in agreement with the care plan formulated and outlined with them. I have encouraged them to ask questions as they arise throughout their visit. ED Course as of May 12 2248   Tue May 12, 2020   1505 Patient CT does show LeFort II fracture variant. States that she has seen ENT before for sinusitis issues and has a history of nasal bone fractures. Spoke with her ENT, Dr. Milla Martin. He stated that as long as her maxilla is not unstable, and she is not in severe pain (of which she is not) she can dc home, on abx and soft food diet. To follow up with  Dr Charissa North at 11am.  Pt did not have any instability of maxilla with no pain currently. [JS]      ED Course User Index  [JS] Maite Lisa MD     Procedure Note - Laceration Repair:  10:43 PM  Procedure by Overton Eisenmenger  Complexity: complex  5mm linear laceration to upper frenulum of mouth. Wound irrigated. The wound was explored with the following results: No foreign bodies found. The wound was repaired with One layer suture closure: Skin Layer:  1 sutures placed, stitch type:simple interrupted, suture: 4-0 chromic gut. .  The wound was closed with good hemostasis and approximation. Sterile dressing applied. Vermillion border intact: no  Estimated blood loss: minimal  The procedure took 1-15 minutes, and pt tolerated well. Procedure Note - Laceration Repair:  10:47 PM  Procedure by Overton Eisenmenger. Complexity: simple  1cm linear laceration to face  was irrigated copiously with NS under jet lavage, prepped with Chlorprep and draped in a sterile fashion. The area was anesthetized with 2 mLs of  Lidocaine 1% with epinephrine via local infiltration. The wound was explored with the following results: No foreign bodies found.   The wound was repaired with One layer suture closure: Skin Layer:  3 sutures placed, stitch type:simple interrupted, suture: 4-0 nylon. .  The wound was closed with good hemostasis and approximation. Sterile dressing applied. Estimated blood loss: minimal  The procedure took 1-15 minutes, and pt tolerated well. Critical Care Time:   0    Disposition:  Discharge Note:  The patient has been re-evaluated and is ready for discharge. Reviewed available results with patient. Counseled patient on diagnosis and care plan. Patient has expressed understanding, and all questions have been answered. Patient agrees with plan and agrees to follow up as recommended, or to return to the ED if their symptoms worsen. Discharge instructions have been provided and explained to the patient, along with reasons to return to the ED. PLAN:  Discharge Medication List as of 5/12/2020  3:39 PM      CONTINUE these medications which have NOT CHANGED    Details   metoclopramide HCl (REGLAN) 5 mg tablet Take 1 Tab by mouth every six (6) hours as needed for Nausea for up to 10 days. , Normal, Disp-12 Tab, R-0      sucralfate (Carafate) 100 mg/mL suspension Take 10 mL by mouth four (4) times daily as needed (abdominal discomfort) for up to 30 days. , Normal, Disp-414 mL, R-0      famotidine (PEPCID) 20 mg tablet Take 1 Tab by mouth two (2) times a day. Indications: gastroesophageal reflux disease, Normal, Disp-180 Tab, R-3      metoprolol succinate (TOPROL-XL) 50 mg XL tablet Take 1 Tab by mouth daily. , Normal, Disp-90 Tab, R-3      diclofenac (VOLTAREN) 1 % gel Apply 2 g to affected area four (4) times daily. , Normal, Disp-100 g, R-3      sodium bicarbonate 650 mg tablet Take 650 mg by mouth two (2) times a day., Historical Med      gabapentin (NEURONTIN) 100 mg capsule Take 2 Caps by mouth nightly.  Max Daily Amount: 200 mg., No Print, Disp-60 Cap, R-2      polyethylene glycol (MIRALAX) 17 gram/dose powder Take 17 g by mouth as needed., Historical Med      timolol (TIMOPTIC) 0.5 % ophthalmic solution Administer 1 Drop to left eye daily. , Historical Med      ACETAMINOPHEN (TYLENOL PO) Take 650 mg by mouth as needed. Indications: two tabs, Historical Med      latanoprost (XALATAN) 0.005 % ophthalmic solution Administer 1 Drop to both eyes nightly., Historical Med      Aspirin, Buffered 81 mg tab Take 1 Tab by mouth daily. , Historical Med           2. Follow-up Information     Follow up With Specialties Details Why Jazmyne Gallardo MD Otolaryngology In 3 days Friday at [address]  [phone]          3. Return to ED if worse     Diagnosis     Clinical Impression:   1. Open fracture of nasal bone, initial encounter        Attestations:    Veronica Bal M.D. Please note that this dictation was completed with [employer] the [employer] voice recognition software. Quite often unanticipated grammatical, syntax, homophones, and other interpretive errors are inadvertently transcribed by the computer software. Please disregard these errors. Please excuse any errors that have escaped final proofreading. Thank you.

## 2020-05-12 NOTE — DISCHARGE INSTRUCTIONS
Patient Education        Broken Nose: Care Instructions  Your Care Instructions    A broken nose is a break, or fracture, of the bone or cartilage. Most broken noses need only home care and a follow-up visit with a doctor. The swelling should go down in a few days. Bruises around your eyes and nose should go away in 2 to 3 weeks. You heal best when you take good care of yourself. Eat a variety of healthy foods, and don't smoke. Follow-up care is a key part of your treatment and safety. Be sure to make and go to all appointments, and call your doctor if you are having problems. It's also a good idea to know your test results and keep a list of the medicines you take. How can you care for yourself at home? · If you have a nasal splint or packing, leave it in place until a doctor removes it. · If your doctor prescribed antibiotics, take them as directed. Do not stop taking them just because you feel better. You need to take the full course of antibiotics. · Take decongestants as directed to help you breathe after the splint or packing is removed. Your doctor may give you a prescription or suggest over-the-counter medicine. · Be safe with medicines. Take pain medicines exactly as directed. ? If the doctor gave you a prescription medicine for pain, take it as prescribed. ? If you are not taking a prescription pain medicine, ask your doctor if you can take an over-the-counter medicine. · Put ice or a cold pack on your nose for 10 to 20 minutes at a time. Try to do this every 1 to 2 hours for the first 3 days (when you are awake) or until the swelling goes down. Put a thin cloth between the ice pack and your skin. · Sleep with your head slightly raised until the swelling goes down. Prop up your head and shoulders on pillows. · Do not play contact sports for 6 weeks. When should you call for help? Call 911 anytime you think you may need emergency care.  For example, call if:    · You have trouble breathing.     · You passed out (lost consciousness).    Call your doctor now or seek immediate medical care if:    · You have signs of infection, such as:  ? Increased pain, swelling, warmth, or redness. ? Red streaks leading from the area. ? Pus draining from the area. ? A fever.     · You have clear fluid draining from your nose.     · You have vision changes.     · Your nose is bleeding.     · You have new or worse pain.    Watch closely for changes in your health, and be sure to contact your doctor if:    · You do not get better as expected. Where can you learn more? Go to http://roel-lonnie.info/  Enter Y345 in the search box to learn more about \"Broken Nose: Care Instructions. \"  Current as of: June 26, 2019Content Version: 12.4  © 2069-4610 [employer]. Care instructions adapted under license by [employer] (which disclaims liability or warranty for this information). If you have questions about a medical condition or this instruction, always ask your healthcare professional. [address] any warranty or liability for your use of this information.

## 2020-05-12 NOTE — ED NOTES
Dr. Chaya Roe at bedside to provide discharge paperwork. Vital signs stable. Pt in no apparent distress at this time. Mental status at baseline. Ambulatory to waiting room with steady gate, discharge paperwork in hand. Patient and or family acknowledged and signed discharge instructions that were created/provided by the Physician. Signed discharge form with patient label placed in scan box. Accompanied by family to drive pt home.

## 2020-05-25 ENCOUNTER — HOSPITAL ENCOUNTER (EMERGENCY)
Age: 80
Discharge: HOME | End: 2020-05-25
Attending: EMERGENCY MEDICINE
Payer: MEDICARE

## 2020-05-25 ENCOUNTER — APPOINTMENT (OUTPATIENT)
Dept: CT IMAGING | Age: 80
End: 2020-05-25
Attending: EMERGENCY MEDICINE
Payer: MEDICARE

## 2020-05-25 VITALS
TEMPERATURE: 97.7 F | BODY MASS INDEX: 18.44 KG/M2 | HEIGHT: 59 IN | SYSTOLIC BLOOD PRESSURE: 145 MMHG | OXYGEN SATURATION: 98 % | WEIGHT: 91.49 LBS | DIASTOLIC BLOOD PRESSURE: 62 MMHG | HEART RATE: 73 BPM | RESPIRATION RATE: 14 BRPM

## 2020-05-25 DIAGNOSIS — S01.112A LACERATION OF LEFT EYEBROW, INITIAL ENCOUNTER: Primary | ICD-10-CM

## 2020-05-25 PROCEDURE — 99283 EMERGENCY DEPT VISIT LOW MDM: CPT

## 2020-05-25 PROCEDURE — 77030010507 HC ADH SKN DERMBND J&J -B

## 2020-05-25 PROCEDURE — 77030018846 HC SOL IRR STRL H20 ICUM -A

## 2020-05-25 PROCEDURE — 75810000293 HC SIMP/SUPERF WND  RPR

## 2020-05-25 PROCEDURE — 70450 CT HEAD/BRAIN W/O DYE: CPT

## 2020-05-25 NOTE — ED PROVIDER NOTES
EMERGENCY DEPARTMENT HISTORY AND PHYSICAL EXAM      Date: 2020  Patient Name: Ann-Marie Jones  Patient Age and Sex: 78 y.o. female    History of Presenting Illness     Chief Complaint   Patient presents with    Fall     Pt arrived from home, states she was cleaning out the fridge when the door came out of her hand and she fell. Pt reports hitting head, no loc, laceration to L eyebrow, dizziness and headache after the fall.  Laceration    Headache    Dizziness       History Provided By: Patient    Ability to gather history was limited by:     HPI: Ann-Marie Jones, 78 y.o. female who fell backward while trying to open her refrigerator. Her hand slipped off of the handle of the refrigerator and she fell backward but turned, striking her left forehead against the counter or floor. She is not anticoagulated. She complains of mild, 3 out of 10 frontal headache, and some bleeding and a laceration over her left eyebrow. She denies any nausea or vomiting or any other injuries on the body such as the hands or wrists or hips. No neck pain, no weakness or numbness. Injury occurred 2 hours ago. Location:    Quality:      Severity:    Duration:   Timing:      Context:    Modifying factors:   Associated symptoms:       The patient's medical, surgical, family, and social history on file were reviewed by me today.       Past Medical History:   Diagnosis Date    AIN (acute interstitial nephritis)     Arthritis     CKD (chronic kidney disease) stage 3, GFR 30-59 ml/min ([facility])     GERD (gastroesophageal reflux disease)     Glaucoma     High cholesterol     Hypertension     Ill-defined condition     chronic cystitis     Past Surgical History:   Procedure Laterality Date    COLONOSCOPY N/A 2020    COLONOSCOPY/EGD performed by [facility] at [facility] ENDOSCOPY    HX APPENDECTOMY      with hysterectomy    HX  SECTION      HX COLONOSCOPY      HX DILATION AND CURETTAGE      HX HYSTERECTOMY  HX RENAL BIOPSY Left 2016    in office    HX TONSILLECTOMY      HX UROLOGICAL  2016    cauterization of bladder ulcers       PCP: Olivia Montes MD    Past History     Past Medical History:  Past Medical History:   Diagnosis Date    AIN (acute interstitial nephritis)     Arthritis     CKD (chronic kidney disease) stage 3, GFR 30-59 ml/min ([facility]     GERD (gastroesophageal reflux disease)     Glaucoma     High cholesterol     Hypertension     Ill-defined condition     chronic cystitis       Past Surgical History:  Past Surgical History:   Procedure Laterality Date    COLONOSCOPY N/A 2020    [facility]COPY    HX APPENDECTOMY      with hysterectomy    HX  SECTION      HX COLONOSCOPY      HX DILATION AND CURETTAGE      HX HYSTERECTOMY      HX RENAL BIOPSY Left 2016    in office    HX TONSILLECTOMY      HX UROLOGICAL  2016    cauterization of bladder ulcers       Family History:  Family History   Problem Relation Age of Onset    Cancer Father     Cancer Brother     Breast Cancer Cousin     Arthritis-osteo Mother        Social History:  Social History     Tobacco Use    Smoking status: Former Smoker     Packs/day: 0.50     Years: 4.00     Pack years: 2.00     Last attempt to quit: 1997     Years since quittin.8    Smokeless tobacco: Former User   Substance Use Topics    Alcohol use: Yes     Comment: seldom    Drug use: Yes     Types: Prescription, OTC     Comment: never recreational substance       Allergies: Allergies   Allergen Reactions    Aleve [Naproxen Sodium] Other (comments)     Renal issues    Ibuprofen Unknown (comments)    Lisinopril Other (comments)     Renal dysfunction    Omeprazole Other (comments)     Cause Kidney failure       Current Medications:  No current facility-administered medications on file prior to encounter.       Current Outpatient Medications on File Prior to Encounter Medication Sig Dispense Refill    famotidine (PEPCID) 20 mg tablet Take 1 Tab by mouth two (2) times a day. Indications: gastroesophageal reflux disease 180 Tab 3    metoprolol succinate (TOPROL-XL) 50 mg XL tablet Take 1 Tab by mouth daily. 90 Tab 3    diclofenac (VOLTAREN) 1 % gel Apply 2 g to affected area four (4) times daily. (Patient taking differently: Apply 2 g to affected area as needed.) 100 g 3    sodium bicarbonate 650 mg tablet Take 650 mg by mouth two (2) times a day.  gabapentin (NEURONTIN) 100 mg capsule Take 2 Caps by mouth nightly. Max Daily Amount: 200 mg. 60 Cap 2    polyethylene glycol (MIRALAX) 17 gram/dose powder Take 17 g by mouth as needed.  timolol (TIMOPTIC) 0.5 % ophthalmic solution Administer 1 Drop to left eye daily.  ACETAMINOPHEN (TYLENOL PO) Take 650 mg by mouth as needed. Indications: two tabs      latanoprost (XALATAN) 0.005 % ophthalmic solution Administer 1 Drop to both eyes nightly.  Aspirin, Buffered 81 mg tab Take 1 Tab by mouth daily. Review of Systems   Review of Systems   Constitutional: Negative for fever. Respiratory: Negative for shortness of breath. Cardiovascular: Negative for chest pain. Gastrointestinal: Negative for abdominal pain. Neurological: Positive for headaches. Negative for syncope. All other systems reviewed and are negative. Physical Exam   Vital Signs  Patient Vitals for the past 8 hrs:   Temp Pulse Resp BP SpO2   05/25/20 1530    145/62 98 %   05/25/20 1450 97.7 °F (36.5 °C) 73 14 176/71 100 %          Physical Exam  Vitals signs and nursing note reviewed. Constitutional:       General: She is not in acute distress. Appearance: She is well-developed. HENT:      Head: Normocephalic. Laceration present. Comments: Uncomplicated 2 cm linear laceration at lateral left eyebrow     Mouth/Throat:      Mouth: Mucous membranes are moist.   Eyes:      General:         Right eye: No discharge. Left eye: No discharge. Extraocular Movements:      Right eye: Normal extraocular motion. Left eye: Normal extraocular motion. Conjunctiva/sclera: Conjunctivae normal.      Pupils: Pupils are equal, round, and reactive to light. Pupils are equal.   Neck:      Musculoskeletal: Full passive range of motion without pain, normal range of motion and neck supple. No spinous process tenderness or muscular tenderness. Cardiovascular:      Rate and Rhythm: Normal rate and regular rhythm. Heart sounds: Normal heart sounds. No murmur. Pulmonary:      Effort: Pulmonary effort is normal. No respiratory distress. Breath sounds: Normal breath sounds. No wheezing. Abdominal:      General: There is no distension. Palpations: Abdomen is soft. Tenderness: There is no abdominal tenderness. Musculoskeletal: Normal range of motion. General: No deformity. Skin:     General: Skin is warm and dry. Findings: No rash. Neurological:      General: No focal deficit present. Mental Status: She is alert and oriented to person, place, and time. GCS: GCS eye subscore is 4. GCS verbal subscore is 5. GCS motor subscore is 6. Psychiatric:         Behavior: Behavior normal.         Thought Content: Thought content normal.         Diagnostic Study Results   Labs  No results found for this or any previous visit (from the past 24 hour(s)). Radiologic Studies  CT HEAD WO CONT   Final Result   impression: No acute changes. CT Results  (Last 48 hours)               05/25/20 1612  CT HEAD WO CONT Final result    Impression:  impression: No acute changes. Narrative:  EXAM: CT HEAD WO CONT       INDICATION: fall, mild frontal headache       COMPARISON: None. CONTRAST: None. TECHNIQUE: Unenhanced CT of the head was performed using 5 mm images. Brain and   bone windows were generated. Coronal and sagittal reformats.  CT dose reduction   was achieved through use of a standardized protocol tailored for this   examination and automatic exposure control for dose modulation. FINDINGS:   There is atrophy and white matter disease. There is no extra-axial fluid   collection hemorrhage or shift. No mass. CXR Results  (Last 48 hours)    None          Procedures   Wound Closure by Adhesive  Date/Time: 2020 4:06 PM  Performed by: Yadiel Ortiz MD  Authorized by: Yadiel Ortiz MD     Consent:     Consent obtained:  Verbal    Risks discussed:  Infection and poor cosmetic result  Anesthesia (see MAR for exact dosages): Anesthesia method:  None  Laceration details:     Location:  Face    Face location:  L eyebrow    Length (cm):  2  Repair type:     Repair type:  Simple  Exploration:     Contaminated: no    Treatment:     Area cleansed with:  Saline    Amount of cleaning:  Standard  Skin repair:     Repair method:  Tissue adhesive  Approximation:     Approximation:  Close  Post-procedure details:     Dressing:  Open (no dressing)    Patient tolerance of procedure: Tolerated well, no immediate complications        Medical Decision Making     I reviewed the patient's most recent Emergency Dept notes and diagnostic tests  in formulating my MDM on today's visit. Provider Notes (Medical Decision Making):   70-year-old female who fell backward while opening her refrigerator, no syncope. Mild headache, minor laceration over left eyebrow, no neck pain or weakness or numbness. Benign H&P, minor eyebrow laceration. Normal neurologic exam, normal C-spine exam.    No EKG or laboratories are indicated. Will check head CT to rule out ICH. I closed the laceration with tissue adhesive.     Kristal Holman MD  3:58 PM        Social History     Tobacco Use    Smoking status: Former Smoker     Packs/day: 0.50     Years: 4.00     Pack years: 2.00     Last attempt to quit: 1997     Years since quittin.8    Smokeless tobacco: Former User Substance Use Topics    Alcohol use: Yes     Comment: seldom    Drug use: Yes     Types: Prescription, OTC     Comment: never recreational substance     Patient Vitals for the past 4 hrs:   Temp Pulse Resp BP SpO2   05/25/20 1530    145/62 98 %   05/25/20 1450 97.7 °F (36.5 °C) 73 14 176/71 100 %            Consults:      Medications Administered during ED course:  Medications - No data to display       Current Discharge Medication List             Diagnosis and Disposition     Disposition:  Discharged    Clinical Impression:   1. Laceration of left eyebrow, initial encounter        Attestation:  I personally performed the services described in this documentation on this date 5/25/2020 for patient Karthik Us. Brandi Miller MD        I was the first provider for this patient on this visit. To the best of my ability I reviewed relevant prior medical records, electrocardiograms, laboratories, and radiologic studies. The patient's presenting problems were discussed, and the patient was in agreement with the care plan formulated and outlined with them. Brandi Miller MD    Please note that this dictation was completed with Dragon voice recognition software. Quite often unanticipated grammatical, syntax, homophones, and other interpretive errors are inadvertently transcribed by the computer software. Please disregard these errors and excuse any errors that have escaped final proofreading.

## 2020-05-25 NOTE — ED NOTES
Off unit for CT. Head laceration irrigated with sterile water and gauze dressing applied.   Dermabond left at bedside for MD.

## 2020-05-25 NOTE — ED NOTES
Dr. Nell Snowden reviewed discharge instructions with the patient. The patient verbalized understanding. All questions and concerns were addressed. The patient declined a wheelchair and is discharged ambulatory in the care of family members with instructions and prescriptions in hand. Pt is alert and oriented x 4. Respirations are clear and unlabored.

## 2020-05-25 NOTE — DISCHARGE INSTRUCTIONS
Your laceration will heal up over the next couple weeks. Be gentle with the adhesive for the next 48 hours.

## 2020-05-28 ENCOUNTER — PATIENT MESSAGE (OUTPATIENT)
Dept: INTERNAL MEDICINE CLINIC | Age: 80
End: 2020-05-28

## 2020-05-28 ENCOUNTER — TELEPHONE (OUTPATIENT)
Dept: INTERNAL MEDICINE CLINIC | Age: 80
End: 2020-05-28

## 2020-05-28 NOTE — TELEPHONE ENCOUNTER
----- Message from Jayna Correia sent at 5/28/2020  2:44 PM EDT -----  Regarding: FW: Non-Urgent Medical Question  Contact: [phone]    ----- Message -----  From: Troy Chely  Sent: 5/28/2020   2:29 PM EDT  To: Kennedy Bates  Subject: Non-Urgent Medical Question                      Dr. King Quick:  I am at the end of my rope with the mucous problems I have been having for about 9 months now. I just cannot seem to find a solution. When I eat or take pills (which I take crushed with pudding), it seems to form mucous and most of the time I cannot get rid of it except through  just lying down until they roll around and out. I know this all sounds so silly on my part. I just don't know how to handle. It is so frustrating and scary. I am afraid to keep eating (but maybe that is what I should do). Would they disappear if I try to ignore that they are there? I use Mucininex, nose spray, etc.  I went to an ENT and cannot say he understood me either. I would rather talk to you if you would know what  I should do.

## 2020-05-29 ENCOUNTER — VIRTUAL VISIT (OUTPATIENT)
Dept: INTERNAL MEDICINE CLINIC | Age: 80
End: 2020-05-29

## 2020-05-29 DIAGNOSIS — R09.82 POSTNASAL DRIP: ICD-10-CM

## 2020-05-29 DIAGNOSIS — R13.10 DYSPHAGIA, UNSPECIFIED TYPE: Primary | ICD-10-CM

## 2020-05-29 DIAGNOSIS — K21.9 GASTROESOPHAGEAL REFLUX DISEASE WITHOUT ESOPHAGITIS: ICD-10-CM

## 2020-05-29 NOTE — PROGRESS NOTES
Jess Page is a 78 y.o. female who presents with concern of difficulty swallowing for 8 months. She notices mucous in throat and has to clear throat. Trouble swallowing pills, afraid she will choke. No nausea or vomiting. Not eating as much. Weight was 91#   Swallowing study Nov 2019. No obstruction. There was a moderate amount of reflux. On pepcid, off PPI. Had xray neck soft tissues, negative for FB. Saw ENT, Dr. Anthony Urena. She was told she has postnasal drip. Given atrovent nasal and using nasal saline. On claritin. Recent fall, left eye bruising. No LOC. Some aches in back and legs. This is an established visit conducted via telemedicine with video. The patient has been instructed that this meets HIPAA criteria and acknowledges and agrees to this method of visitation. Pursuant to the emergency declaration under the [facility], [facility] waiver authority and the [email or web address] and [employer] Act, this Virtual Visit was conducted, with patient's consent, to reduce the patient's risk of exposure to COVID-19 and provide continuity of care for an established patient. Services were provided through a video synchronous discussion virtually to substitute for in-person clinic visit.         Past Medical History:   Diagnosis Date    AIN (acute interstitial nephritis)     Arthritis     CKD (chronic kidney disease) stage 3, GFR 30-59 ml/min ([facility]     GERD (gastroesophageal reflux disease)     Glaucoma     High cholesterol     Hypertension     Ill-defined condition     chronic cystitis       Family History   Problem Relation Age of Onset    Cancer Father     Cancer Brother     Breast Cancer Cousin     Arthritis-osteo Mother        Social History     Socioeconomic History    Marital status:      Spouse name: Not on file    Number of children: Not on file    Years of education: Not on file   [facility] Highest education level: Not on file   Occupational History    Not on file   Social Needs    Financial resource strain: Not on file    Food insecurity     Worry: Not on file     Inability: Not on file    Transportation needs     Medical: Not on file     Non-medical: Not on file   Tobacco Use    Smoking status: Former Smoker     Packs/day: 0.50     Years: 4.00     Pack years: 2.00     Last attempt to quit: 1997     Years since quittin.8    Smokeless tobacco: Former User   Substance and Sexual Activity    Alcohol use: Yes     Comment: seldom    Drug use: Yes     Types: Prescription, OTC     Comment: never recreational substance    Sexual activity: Not Currently     Partners: Male     Birth control/protection: None   Lifestyle    Physical activity     Days per week: Not on file     Minutes per session: Not on file    Stress: Not on file   Relationships    Social connections     Talks on phone: Not on file     Gets together: Not on file     Attends Bahai service: Not on file     Active member of club or organization: Not on file     Attends meetings of clubs or organizations: Not on file     Relationship status: Not on file    Intimate partner violence     Fear of current or ex partner: Not on file     Emotionally abused: Not on file     Physically abused: Not on file     Forced sexual activity: Not on file   Other Topics Concern    Not on file   Social History Narrative    ** Merged History Encounter **            Current Outpatient Medications on File Prior to Visit   Medication Sig Dispense Refill    famotidine (PEPCID) 20 mg tablet Take 1 Tab by mouth two (2) times a day. Indications: gastroesophageal reflux disease 180 Tab 3    metoprolol succinate (TOPROL-XL) 50 mg XL tablet Take 1 Tab by mouth daily. 90 Tab 3    diclofenac (VOLTAREN) 1 % gel Apply 2 g to affected area four (4) times daily.  (Patient taking differently: Apply 2 g to affected area as needed.) 100 g 3    sodium bicarbonate 650 mg tablet Take 650 mg by mouth two (2) times a day.  gabapentin (NEURONTIN) 100 mg capsule Take 2 Caps by mouth nightly. Max Daily Amount: 200 mg. 60 Cap 2    polyethylene glycol (MIRALAX) 17 gram/dose powder Take 17 g by mouth as needed.  timolol (TIMOPTIC) 0.5 % ophthalmic solution Administer 1 Drop to left eye daily.  ACETAMINOPHEN (TYLENOL PO) Take 650 mg by mouth as needed. Indications: two tabs      latanoprost (XALATAN) 0.005 % ophthalmic solution Administer 1 Drop to both eyes nightly.  Aspirin, Buffered 81 mg tab Take 1 Tab by mouth daily. No current facility-administered medications on file prior to visit. Review of Systems  Pertinent items are noted in HPI. Objective:     Gen: well appearing female  HEENT: bruising around left eye,  no audible congestion, patient does not see oral erythema, has MMM  Neck: patient does not feel enlarged or tender LAD or masses  Resp: normal respiratory effort, no audible wheezing. CV: patient does not feel palpitations or heart irregularity  Abd: patient does not feel abdominal tenderness or mass, patient does not notice distension  Extrem: patient does not see swelling in ankles or joints. Neuro: Alert and oriented, able to answer questions without difficulty    Assessment/Plan:       ICD-10-CM ICD-9-CM    1. Dysphagia, unspecified type R13.10 787.20    2. Gastroesophageal reflux disease without esophagitis K21.9 530.81    3. Postnasal drip R09.82 784.91      Stop claritin, start allegra 180mg daily. This was a telemedicine visit with video. Huber Issa MD    Follow-up and Dispositions    · Return if symptoms worsen or fail to improve.

## 2020-05-29 NOTE — TELEPHONE ENCOUNTER
Spoke with patient. Two pt identifiers confirmed. Patient offered an appointment today, 05/29/2020. Appointment accepted. Patient advised if anything changes or if unable to keep this appointment to call the office  Pt verbalized understanding of information discussed w/ no further questions at this time.

## 2020-06-01 ENCOUNTER — TELEPHONE (OUTPATIENT)
Dept: INTERNAL MEDICINE CLINIC | Age: 80
End: 2020-06-01

## 2020-06-01 NOTE — TELEPHONE ENCOUNTER
Identified patient 2 identifiers verified. Patient complaining of pain in hips and ankles with swelling. Patient was given information about [facility] walk in clinic on [address] [facility].

## 2020-06-09 ENCOUNTER — TELEPHONE (OUTPATIENT)
Dept: INTERNAL MEDICINE CLINIC | Age: 80
End: 2020-06-09

## 2020-06-09 DIAGNOSIS — S93.409D SPRAIN OF ANKLE, UNSPECIFIED LATERALITY, UNSPECIFIED LIGAMENT, SUBSEQUENT ENCOUNTER: Primary | ICD-10-CM

## 2020-06-09 DIAGNOSIS — I10 ESSENTIAL HYPERTENSION: Primary | ICD-10-CM

## 2020-06-09 RX ORDER — ONDANSETRON 4 MG/1
4 TABLET, ORALLY DISINTEGRATING ORAL
Qty: 20 TAB | Refills: 0 | Status: SHIPPED | OUTPATIENT
Start: 2020-06-09 | End: 2022-04-14

## 2020-06-09 RX ORDER — HYDROCODONE BITARTRATE AND ACETAMINOPHEN 5; 325 MG/1; MG/1
1 TABLET ORAL
Qty: 12 TAB | Refills: 0 | Status: SHIPPED | OUTPATIENT
Start: 2020-06-09 | End: 2020-07-09

## 2020-06-09 RX ORDER — ONDANSETRON 4 MG/1
4 TABLET, FILM COATED ORAL
Status: CANCELLED | OUTPATIENT
Start: 2020-06-09

## 2020-06-09 NOTE — TELEPHONE ENCOUNTER
#[phone] pt states she is feeling worse now as she has nausea. I did let her know a script was called in for her. She will get that and is asking if anything for nausea was called in? Pt wants to know if she should go to the ED? She didn't want a VV as that wouldn't help her she states. Pt needs a call back as soon as possible.

## 2020-06-09 NOTE — TELEPHONE ENCOUNTER
----- Message from [address] sent at 6/9/2020  7:42 AM EDT -----  Regarding: FW: Non-Urgent Medical Question  Contact: [phone]    ----- Message -----  From: Chana Jansen  Sent: 6/8/2020   6:28 PM EDT  To: David Bates  Subject: Non-Urgent Medical Question                      Dr. Pamela Villalobos:  I have been having for a while now periods of confusion as I did 3 year ago when my calcium was high and was put in the hospital at that time. Example:  getting up to go to the bathroom and not being able to find it. Having to go to my  to ask where it is and calling him \"Daddy\". .  And it seems that I was awake at the time. I remember the incident. Could I come in to have my blood work done? I need to have the kidney function, liver function (since I take quite a bit of Tylenol, as well as calcium and any you think I need. I have not have any that I am aware of since April 2019. Thank you very much.   Duane Her

## 2020-06-09 NOTE — TELEPHONE ENCOUNTER
Patient having ankle pain from sprain, already saw ortho. I sent in pain medication. She is nauseated, I sent in nausea medication, advise patient bland diet. She is not describing anything at this point that necessitates ED visit right now.

## 2020-06-09 NOTE — TELEPHONE ENCOUNTER
#[phone] pt states she fell 3 wks ago. She has tendinitis and a sprained ankle per the ortho that she saw on 6-8-20. Pt is is a lot of pain and needs something strong to help relieve the pain. Has taken OTC and that is not working. Tramadol did not work either pt states. This was prescribed by ortho. Please call pt to let her know what you can do. Thanks.

## 2020-06-09 NOTE — TELEPHONE ENCOUNTER
Called, spoke to pt. Two pt identifiers confirmed. Pt informed prescription has been sent to the pharmacy. Pt verbalized understanding of information discussed w/ no further questions at this time.

## 2020-06-09 NOTE — TELEPHONE ENCOUNTER
Called, spoke to pt. Two pt identifiers confirmed. Pt states she fell 3 wks ago. She has tendinitis and a sprained ankle per the ortho that she saw on 6-8-20. Pt is is a lot of pain and needs something strong to help relieve the pain. Has taken OTC and that is not working.     Tramadol did not work either pt states. This was prescribed by ortho.     Pt requesting Hydrocodone- acetaminophen 5-325 mg tablet and  Zofran.       Pt verbalized understanding of information discussed w/ no further questions at this time. Requested Prescriptions     Pending Prescriptions Disp Refills    HYDROcodone-acetaminophen (NORCO) 5-325 mg per tablet  0     Sig: Take  by mouth.  ondansetron hcl (ZOFRAN) 4 mg tablet       Sig: Take 1 Tab by mouth every eight (8) hours as needed for Nausea or Vomiting. PCP: Nathalie Eaton MD    Last appt: 5/29/2020  No future appointments. Requested Prescriptions     Pending Prescriptions Disp Refills    HYDROcodone-acetaminophen (NORCO) 5-325 mg per tablet  0     Sig: Take  by mouth.  ondansetron hcl (ZOFRAN) 4 mg tablet       Sig: Take 1 Tab by mouth every eight (8) hours as needed for Nausea or Vomiting.

## 2020-06-12 ENCOUNTER — APPOINTMENT (OUTPATIENT)
Dept: GENERAL RADIOLOGY | Age: 80
End: 2020-06-12
Attending: EMERGENCY MEDICINE
Payer: MEDICARE

## 2020-06-12 ENCOUNTER — HOSPITAL ENCOUNTER (EMERGENCY)
Age: 80
Discharge: HOME | End: 2020-06-12
Attending: EMERGENCY MEDICINE
Payer: MEDICARE

## 2020-06-12 ENCOUNTER — APPOINTMENT (OUTPATIENT)
Dept: CT IMAGING | Age: 80
End: 2020-06-12
Attending: EMERGENCY MEDICINE
Payer: MEDICARE

## 2020-06-12 ENCOUNTER — TELEPHONE (OUTPATIENT)
Dept: INTERNAL MEDICINE CLINIC | Age: 80
End: 2020-06-12

## 2020-06-12 VITALS
DIASTOLIC BLOOD PRESSURE: 106 MMHG | HEIGHT: 58 IN | RESPIRATION RATE: 20 BRPM | OXYGEN SATURATION: 96 % | BODY MASS INDEX: 18.89 KG/M2 | SYSTOLIC BLOOD PRESSURE: 161 MMHG | TEMPERATURE: 97.8 F | HEART RATE: 85 BPM | WEIGHT: 90 LBS

## 2020-06-12 DIAGNOSIS — M54.17 LUMBOSACRAL RADICULOPATHY: ICD-10-CM

## 2020-06-12 DIAGNOSIS — M47.816 SPONDYLOSIS OF LUMBAR SPINE: ICD-10-CM

## 2020-06-12 DIAGNOSIS — M79.604 RIGHT LEG PAIN: Primary | ICD-10-CM

## 2020-06-12 DIAGNOSIS — M71.20 SYNOVIAL CYST OF POPLITEAL SPACE, UNSPECIFIED LATERALITY: ICD-10-CM

## 2020-06-12 DIAGNOSIS — M79.18 MYOFASCIAL PAIN: ICD-10-CM

## 2020-06-12 LAB
ANION GAP BLD CALC-SCNC: 16 MMOL/L (ref 10–20)
BUN BLD-MCNC: 8 MG/DL (ref 9–20)
CA-I BLD-MCNC: 1.2 MMOL/L (ref 1.12–1.32)
CHLORIDE BLD-SCNC: 100 MMOL/L (ref 98–107)
CO2 BLD-SCNC: 24 MMOL/L (ref 21–32)
CREAT BLD-MCNC: 0.9 MG/DL (ref 0.6–1.3)
GLUCOSE BLD-MCNC: 110 MG/DL (ref 65–100)
HCT VFR BLD CALC: 39 % (ref 35–47)
POTASSIUM BLD-SCNC: 3.4 MMOL/L (ref 3.5–5.1)
SERVICE CMNT-IMP: ABNORMAL
SODIUM BLD-SCNC: 135 MMOL/L (ref 136–145)

## 2020-06-12 PROCEDURE — 74011250637 HC RX REV CODE- 250/637: Performed by: EMERGENCY MEDICINE

## 2020-06-12 PROCEDURE — 99285 EMERGENCY DEPT VISIT HI MDM: CPT

## 2020-06-12 PROCEDURE — 73590 X-RAY EXAM OF LOWER LEG: CPT

## 2020-06-12 PROCEDURE — 75635 CT ANGIO ABDOMINAL ARTERIES: CPT

## 2020-06-12 PROCEDURE — 80047 BASIC METABLC PNL IONIZED CA: CPT

## 2020-06-12 PROCEDURE — 74011636320 HC RX REV CODE- 636/320: Performed by: EMERGENCY MEDICINE

## 2020-06-12 RX ORDER — OXYCODONE HYDROCHLORIDE 5 MG/1
5 TABLET ORAL
Status: COMPLETED | OUTPATIENT
Start: 2020-06-12 | End: 2020-06-12

## 2020-06-12 RX ORDER — GABAPENTIN 100 MG/1
100 CAPSULE ORAL ONCE
Status: DISCONTINUED | OUTPATIENT
Start: 2020-06-12 | End: 2020-06-12

## 2020-06-12 RX ORDER — GABAPENTIN 100 MG/1
300 CAPSULE ORAL 2 TIMES DAILY
Qty: 60 CAP | Refills: 2 | Status: SHIPPED | OUTPATIENT
Start: 2020-06-12 | End: 2021-03-16

## 2020-06-12 RX ORDER — GABAPENTIN 300 MG/1
300 CAPSULE ORAL ONCE
Status: COMPLETED | OUTPATIENT
Start: 2020-06-12 | End: 2020-06-12

## 2020-06-12 RX ORDER — ACETAMINOPHEN 500 MG
1000 TABLET ORAL ONCE
Status: COMPLETED | OUTPATIENT
Start: 2020-06-12 | End: 2020-06-12

## 2020-06-12 RX ORDER — SODIUM CHLORIDE 0.9 % (FLUSH) 0.9 %
10 SYRINGE (ML) INJECTION
Status: COMPLETED | OUTPATIENT
Start: 2020-06-12 | End: 2020-06-12

## 2020-06-12 RX ADMIN — OXYCODONE 5 MG: 5 TABLET ORAL at 06:42

## 2020-06-12 RX ADMIN — GABAPENTIN 300 MG: 300 CAPSULE ORAL at 06:42

## 2020-06-12 RX ADMIN — ACETAMINOPHEN 1000 MG: 500 TABLET ORAL at 04:42

## 2020-06-12 RX ADMIN — OXYCODONE 5 MG: 5 TABLET ORAL at 04:42

## 2020-06-12 RX ADMIN — Medication 10 ML: at 05:39

## 2020-06-12 RX ADMIN — IOPAMIDOL 100 ML: 755 INJECTION, SOLUTION INTRAVENOUS at 05:39

## 2020-06-12 NOTE — TELEPHONE ENCOUNTER
Patient called complains of severe R leg pain calf pain for 3 weeks progressing, jazmyne to urgent care and had x rays. Family is concerned she may have a blood clot.  I am ordering an US to rule out DVT  Advised to follow up with PCP

## 2020-06-12 NOTE — ED PROVIDER NOTES
EMERGENCY DEPARTMENT HISTORY AND PHYSICAL EXAM      Date: 6/12/2020  Patient Name: Ulysses Posey    History of Presenting Illness     Chief Complaint   Patient presents with    Leg Pain     Right sided leg pain x1 week, Pt had a fall 3 weeks ago. Pt Denies CP, SOB, cough, vomiting, ABD pain. Pt admits to nausea. Denies Hx of DVT. History Provided By: Patient    HPI: Ulysses Posey, 78 y.o. female  With past medical history of CKD, GERD, hypertension, presenting today with right leg pain. Patient notes that she started having pain around 1 week ago. She states that this is on the distal part of her right leg near the ankle. She denies any redness, fever, or injury to this leg. She does note that she had a fall around 3 weeks ago and she did strike the back of both of her legs. No previous history of DVT or PE. She has tried hydrocodone at home without significant relief of her symptoms. There are no other complaints, changes, or physical findings at this time. PCP: Dorcas Stone MD    No current facility-administered medications on file prior to encounter. Current Outpatient Medications on File Prior to Encounter   Medication Sig Dispense Refill    HYDROcodone-acetaminophen (NORCO) 5-325 mg per tablet Take 1 Tab by mouth every eight (8) hours as needed for Pain for up to 30 days. Max Daily Amount: 3 Tabs. 12 Tab 0    ondansetron (ZOFRAN ODT) 4 mg disintegrating tablet Take 1 Tab by mouth every eight (8) hours as needed for Nausea or Vomiting. 20 Tab 0    famotidine (PEPCID) 20 mg tablet Take 1 Tab by mouth two (2) times a day. Indications: gastroesophageal reflux disease 180 Tab 3    metoprolol succinate (TOPROL-XL) 50 mg XL tablet Take 1 Tab by mouth daily. 90 Tab 3    diclofenac (VOLTAREN) 1 % gel Apply 2 g to affected area four (4) times daily.  (Patient taking differently: Apply 2 g to affected area as needed.) 100 g 3    sodium bicarbonate 650 mg tablet Take 650 mg by mouth two (2) times a day.  [DISCONTINUED] gabapentin (NEURONTIN) 100 mg capsule Take 2 Caps by mouth nightly. Max Daily Amount: 200 mg. 60 Cap 2    polyethylene glycol (MIRALAX) 17 gram/dose powder Take 17 g by mouth as needed.  timolol (TIMOPTIC) 0.5 % ophthalmic solution Administer 1 Drop to left eye daily.  ACETAMINOPHEN (TYLENOL PO) Take 650 mg by mouth as needed. Indications: two tabs      latanoprost (XALATAN) 0.005 % ophthalmic solution Administer 1 Drop to both eyes nightly.  Aspirin, Buffered 81 mg tab Take 1 Tab by mouth daily.          Past History     Past Medical History:  Past Medical History:   Diagnosis Date    AIN (acute interstitial nephritis)     Arthritis     CKD (chronic kidney disease) stage 3, GFR 30-59 ml/min ([facility])     GERD (gastroesophageal reflux disease)     Glaucoma     High cholesterol     Hypertension     Ill-defined condition     chronic cystitis       Past Surgical History:  Past Surgical History:   Procedure Laterality Date    COLONOSCOPY N/A 2020    COLONOSCOPY/EGD performed by Jerrod Jamison MD at [facility] ENDOSCOPY    HX APPENDECTOMY      with hysterectomy    HX  SECTION      HX COLONOSCOPY      HX DILATION AND CURETTAGE      HX HYSTERECTOMY      HX RENAL BIOPSY Left     in office    HX TONSILLECTOMY      HX UROLOGICAL  2016    cauterization of bladder ulcers       Family History:  Family History   Problem Relation Age of Onset    Cancer Father     Cancer Brother     Breast Cancer Cousin     Arthritis-osteo Mother        Social History:  Social History     Tobacco Use    Smoking status: Former Smoker     Packs/day: 0.50     Years: 4.00     Pack years: 2.00     Last attempt to quit: 1997     Years since quittin.8    Smokeless tobacco: Former User   Substance Use Topics    Alcohol use: Yes     Comment: seldom    Drug use: Yes     Types: Prescription, OTC     Comment: never recreational substance Allergies: Allergies   Allergen Reactions    Aleve [Naproxen Sodium] Other (comments)     Renal issues    Cefuroxime Other (comments)     Kidney failure    Ibuprofen Unknown (comments)    Lisinopril Other (comments)     Renal dysfunction    Omeprazole Other (comments)     Cause Kidney failure         Review of Systems   Constitutional: No  fever  Skin: No  rash  HEENT: No  nasal congestion  Resp: No cough  CV: No chest pain  GI: No vomiting  : No dysuria  MSK: + joint pain  Neuro: No numbness  Psych: No suicidal      Physical Exam     Patient Vitals for the past 12 hrs:   Temp Pulse Resp BP SpO2   06/12/20 0354 97.8 °F (36.6 °C) 85 20 180/87 98 %       General: alert, No acute distress  Eyes: EOMI, normal conjunctiva  ENT: moist mucous membranes. Neck: Active, full ROM of neck. Skin: No rashes. no jaundice              Lungs: Equal chest expansion. no respiratory distress. Heart: regular rate     no peripheral edema    Abd:  non distended soft  Back: Full ROM  MSK: Tender to palpation in the right leg just proximal to the ankle on the lateral aspect, no skin changes, 2+ PT on the right and difficult to palpate DP on the right. Neuro: alert  Person, Place, Time and Situation; normal speech;   Psych: Cooperative with exam; Appropriate mood and affect             Diagnostic Study Results     Labs -     Recent Results (from the past 12 hour(s))   POC CHEM8    Collection Time: 06/12/20  5:03 AM   Result Value Ref Range    Calcium, ionized (POC) 1.20 1. 12 - 1.32 mmol/L    Sodium (POC) 135 (L) 136 - 145 mmol/L    Potassium (POC) 3.4 (L) 3.5 - 5.1 mmol/L    Chloride (POC) 100 98 - 107 mmol/L    CO2 (POC) 24 21 - 32 mmol/L    Anion gap (POC) 16 10 - 20 mmol/L    Glucose (POC) 110 (H) 65 - 100 mg/dL    BUN (POC) 8 (L) 9 - 20 mg/dL    Creatinine (POC) 0.9 0.6 - 1.3 mg/dL    GFRAA, POC >60 >60 ml/min/1.73m2    GFRNA, POC 60 (L) >60 ml/min/1.73m2    Hematocrit (POC) 39 35.0 - 47.0 %    Comment Notified RN or MD immediately by          Radiologic Studies -   CTA ABD ART W RUNOFF W WO CONT   Final Result   IMPRESSION:    1. Unremarkable CT arteriogram and runoff to the lower extremities bilaterally. 2. Cholelithiasis. Billing note: The Facility order (procedure) was incorrect at the time of   interpretation and signature of this exam.? This discrepancy may have been   corrected after final signature. XR TIB/FIB RT   Final Result   IMPRESSION: No acute abnormality. CT Results  (Last 48 hours)               06/12/20 0539  CTA ABD ART W RUNOFF W WO CONT Final result    Impression:  IMPRESSION:    1. Unremarkable CT arteriogram and runoff to the lower extremities bilaterally. 2. Cholelithiasis. Billing note: The Facility order (procedure) was incorrect at the time of   interpretation and signature of this exam.? This discrepancy may have been   corrected after final signature. Narrative:  EXAM: CTA ABD ART W RUNOFF W WO CONT dated 6/12/2020 5:39 AM       INDICATION:  Right leg pain with decreased pulse       COMPARISON: None. CONTRAST:  100 mL of  Isovue 300. TECHNIQUE:    Spiral CT through the abdomen, pelvis, and lower extremities with IV contrast   material was performed. 2.5 mm axial images with sagittal and coronal   reconstructions produced. Oral contrast was not administered. Multiplanar 2D   reformations generated in all regions from helical data. MIP images were   obtained. One or more of the following dose reduction techniques were used:   automated exposure control, adjustment of the mA and/or kV according to patient   size, use of iterative reconstruction technique. ?           FINDINGS:     The aorta is normal in caliber. The branch vessels are patent. There are single   renal arteries bilaterally. There are atherosclerotic vascular calcifications. There is mild atherosclerosis of the left common femoral artery.  There is no   significant stenosis bilaterally. There is three-vessel runoff bilaterally. Evaluation of solid organs is limited due to phase of contrast administration. The liver appears unremarkable. There is cholelithiasis. The spleen, pancreas,   adrenal glands, kidneys, and bowel are unremarkable. The bladder is   fluid-filled. The patient is status post appendectomy and hysterectomy. There is   no free fluid. There are degenerative changes of the spine. There is a diffuse disc bulge at   L4-L5. CXR Results  (Last 48 hours)    None          Medical Decision Making   I am the first provider for this patient. I reviewed the vital signs, available nursing notes, past medical history, past surgical history, family history and social history. Provider Notes (Medical Decision Making):     Differential Diagnosis: Arterial embolus, peripheral vascular disease, cellulitis, DVT    Initial Plan: Will obtain CT imaging of the right leg, treat the patient with oral analgesics and reassess. She has a difficult to palpate PT pulse on the right side that is asymmetric from the left side. She does not have any skin changes, does not have any swelling that would concern me for DVT at this time. Will reassess after treatment and CT imaging. ED Course:   Initial assessment performed. The patients presenting problems have been discussed, and they are in agreement with the care plan formulated and outlined with them. I have encouraged them to ask questions as they arise throughout their visit. ED Course as of Jun 12 0638 Fri Jun 12, 2020   0610 On my interpretation of the patient's laboratory studies creatinine is normal.    [NW]   0610 On my interpretation of the patient's plain film no evidence of fracture, CT angiography of the lower extremity shows no evidence of vascular abnormality. [NW]   5295 Patient presents today with pain in the right leg.   She does have evidence on her CT scan of a disc bulge at the L4-L5 area which could be contributing to this patient's pain. Possibly radiculopathy. Upon my ultrasound of the lower extremity she does not have any evidence of DVT. Ultimately patient discharged, Increased gabapentin to 300 BID and to follow-up with her primary care provider.    [NW]      ED Course User Index  [NW] Miller Guerrier MD       I, Ivana Albright MD, am the attending of record for this patient encounter. Dispo: Discharged. The patient has been re-evaluated and is ready for discharge. Reviewed available results with patient. Counseled patient on diagnosis and care plan. Patient has expressed understanding, and all questions have been answered. Patient agrees with plan and agrees to follow up as recommended, or to return to the ED if their symptoms worsen. Discharge instructions have been provided and explained to the patient, along with reasons to return to the ED. PLAN:  Current Discharge Medication List      CONTINUE these medications which have CHANGED    Details   gabapentin (NEURONTIN) 100 mg capsule Take 3 Caps by mouth two (2) times a day. Max Daily Amount: 600 mg. Qty: 60 Cap, Refills: 2    Associated Diagnoses: Myofascial pain; Spondylosis of lumbar spine         1.   2.     Follow-up Information     Follow up With Specialties Details Why Contact Info    Janie Ferrara MD Internal Medicine  As needed [address] [facility] [address] [phone]          3. Return to ED if worse       Diagnosis     Clinical Impression:   1. Right leg pain    2. Lumbosacral radiculopathy    3. Myofascial pain    4. Spondylosis of lumbar spine        Attestations:    Ivana Albright MD    Please note that this dictation was completed with [employer], the [employer] voice recognition software. Quite often unanticipated grammatical, syntax, homophones, and other interpretive errors are inadvertently transcribed by the computer software.   Please disregard these errors. Please excuse any errors that have escaped final proofreading. Thank you.

## 2020-06-12 NOTE — ED TRIAGE NOTES
Pt arrived to ED via ems. Pt ambulated to bed. CC Right lower leg pain. Pt stated this has been bothering her for 1 week. Pt had a fall 3 weeks ago. Pt stated that she came in tonight because her pain got worse. Pt on monitor x2. Dr Michelle Norman at Bedside. Pt denies HX of DVT. Pt denies CP, SOB, Vomiting, ABD pain.

## 2020-06-12 NOTE — TELEPHONE ENCOUNTER
----- Message from Lorenzo Villafana sent at 6/11/2020  5:02 PM EDT -----  Regarding: Dr. Qasim Ellis  Level 1/Escalated Issue      Caller's first and last name and relationship (if not the patient):      Best contact number(s):  [phone]      What are the symptoms:  Pt having continued pain in legs even since taking \"Hydrocodone\" Dr. Hawthorne Client recently prescribed. Transfer successful - yes/no (include outcome):  No.  No answer at backline at 5 pm.      Transfer declined - yes/no (include reason): Was caller advised to seek appropriate level of care - yes/no:  Yes. Pt stated she would see if the the pain increased during the rest of the evening and would seek immediate medical care if it became too much for her.       Details to clarify the request:        Lorenzo Villafana

## 2020-06-12 NOTE — ED NOTES
Dr Meka Mcgovern reviewed discharge instructions with the patient. The patient verbalized understanding. All questions and concerns were addressed. The patient declined a wheelchair and is discharged ambulatory in the care of family members with instructions and prescriptions in hand. Pt is alert and oriented x 4. Respirations are clear and unlabored.

## 2020-06-15 ENCOUNTER — TELEPHONE (OUTPATIENT)
Dept: INTERNAL MEDICINE CLINIC | Age: 80
End: 2020-06-15

## 2020-06-15 ENCOUNTER — HOSPITAL ENCOUNTER (OUTPATIENT)
Dept: GENERAL RADIOLOGY | Age: 80
Discharge: HOME | End: 2020-06-15
Payer: MEDICARE

## 2020-06-15 ENCOUNTER — VIRTUAL VISIT (OUTPATIENT)
Dept: INTERNAL MEDICINE CLINIC | Age: 80
End: 2020-06-15

## 2020-06-15 DIAGNOSIS — M79.604 RIGHT LEG PAIN: Primary | ICD-10-CM

## 2020-06-15 DIAGNOSIS — M51.36 DDD (DEGENERATIVE DISC DISEASE), LUMBAR: ICD-10-CM

## 2020-06-15 DIAGNOSIS — M79.604 RIGHT LEG PAIN: ICD-10-CM

## 2020-06-15 DIAGNOSIS — M51.36 DDD (DEGENERATIVE DISC DISEASE), LUMBAR: Primary | ICD-10-CM

## 2020-06-15 PROCEDURE — 72100 X-RAY EXAM L-S SPINE 2/3 VWS: CPT

## 2020-06-15 RX ORDER — METHYLPREDNISOLONE 4 MG/1
4 TABLET ORAL
Qty: 1 DOSE PACK | Refills: 0 | Status: SHIPPED | OUTPATIENT
Start: 2020-06-15 | End: 2020-06-23

## 2020-06-15 NOTE — TELEPHONE ENCOUNTER
----- Message from [employer] sent at 6/15/2020  9:26 AM EDT -----  Regarding: Dr. Mejia Dwyer first and last name and relationship (if not the patient): PT  Best contact number(s): [phone]  Whose call is being returned: Unknown   Details to clarify the request:      Copy/paste envera

## 2020-06-15 NOTE — TELEPHONE ENCOUNTER
Notify patient xray of lumbar spine shows degenerative changes in the area of concern in the spine. Ordering MRI of lumbar spine.

## 2020-06-15 NOTE — PROGRESS NOTES
Claudean Bogus is a 78 y.o. female who presents for follow up after ED visit June 12. With right leg pain. She reports that the pain is getting worse each day. The pain is in the mid calf. CT arteriogram, normal.  Xray right tib-fib. No DVT ultrasound done- no leg swelling. Has lumbar DDD. Denies back pain. She reports right gluteal pain at times. Denies right sciatica. Denies numbness or tingling. She feels that her leg is going to give way with walking. This is an established visit conducted via telemedicine with video. The patient has been instructed that this meets HIPAA criteria and acknowledges and agrees to this method of visitation. Pursuant to the emergency declaration under the [facility], [facility] waiver authority and the [employer] Act, this Virtual Visit was conducted, with patient's consent, to reduce the patient's risk of exposure to COVID-19 and provide continuity of care for an established patient. Services were provided through a video synchronous discussion virtually to substitute for in-person clinic visit.         Past Medical History:   Diagnosis Date    AIN (acute interstitial nephritis)     Arthritis     CKD (chronic kidney disease) stage 3, GFR 30-59 ml/min ([facility]     GERD (gastroesophageal reflux disease)     Glaucoma     High cholesterol     Hypertension     Ill-defined condition     chronic cystitis       Family History   Problem Relation Age of Onset    Cancer Father     Cancer Brother     Breast Cancer Cousin     Arthritis-osteo Mother        Social History     Socioeconomic History    Marital status:      Spouse name: Not on file    Number of children: Not on file    Years of education: Not on file    Highest education level: Not on file   Occupational History    Not on file   Social Needs    Financial resource strain: Not on file    Food insecurity Worry: Not on file     Inability: Not on file    Transportation needs     Medical: Not on file     Non-medical: Not on file   Tobacco Use    Smoking status: Former Smoker     Packs/day: 0.50     Years: 4.00     Pack years: 2.00     Last attempt to quit: 1997     Years since quittin.8    Smokeless tobacco: Former User   Substance and Sexual Activity    Alcohol use: Yes     Comment: seldom    Drug use: Yes     Types: Prescription, OTC     Comment: never recreational substance    Sexual activity: Not Currently     Partners: Male     Birth control/protection: None   Lifestyle    Physical activity     Days per week: Not on file     Minutes per session: Not on file    Stress: Not on file   Relationships    Social connections     Talks on phone: Not on file     Gets together: Not on file     Attends Scientologist service: Not on file     Active member of club or organization: Not on file     Attends meetings of clubs or organizations: Not on file     Relationship status: Not on file    Intimate partner violence     Fear of current or ex partner: Not on file     Emotionally abused: Not on file     Physically abused: Not on file     Forced sexual activity: Not on file   Other Topics Concern    Not on file   Social History Narrative    ** Merged History Encounter **            Current Outpatient Medications on File Prior to Visit   Medication Sig Dispense Refill    gabapentin (NEURONTIN) 100 mg capsule Take 3 Caps by mouth two (2) times a day. Max Daily Amount: 600 mg. 60 Cap 2    HYDROcodone-acetaminophen (NORCO) 5-325 mg per tablet Take 1 Tab by mouth every eight (8) hours as needed for Pain for up to 30 days. Max Daily Amount: 3 Tabs. 12 Tab 0    ondansetron (ZOFRAN ODT) 4 mg disintegrating tablet Take 1 Tab by mouth every eight (8) hours as needed for Nausea or Vomiting. 20 Tab 0    famotidine (PEPCID) 20 mg tablet Take 1 Tab by mouth two (2) times a day.  Indications: gastroesophageal reflux disease 180 Tab 3    metoprolol succinate (TOPROL-XL) 50 mg XL tablet Take 1 Tab by mouth daily. 90 Tab 3    diclofenac (VOLTAREN) 1 % gel Apply 2 g to affected area four (4) times daily. (Patient taking differently: Apply 2 g to affected area as needed.) 100 g 3    sodium bicarbonate 650 mg tablet Take 650 mg by mouth two (2) times a day.  polyethylene glycol (MIRALAX) 17 gram/dose powder Take 17 g by mouth as needed.  timolol (TIMOPTIC) 0.5 % ophthalmic solution Administer 1 Drop to left eye daily.  ACETAMINOPHEN (TYLENOL PO) Take 650 mg by mouth as needed. Indications: two tabs      latanoprost (XALATAN) 0.005 % ophthalmic solution Administer 1 Drop to both eyes nightly.  Aspirin, Buffered 81 mg tab Take 1 Tab by mouth daily. No current facility-administered medications on file prior to visit. Review of Systems  Pertinent items are noted in HPI. Objective:     Gen: well appearing female  HEENT: normal conjunctiva, no audible congestion, patient does not see oral erythema, has MMM  Neck: patient does not feel enlarged or tender LAD or masses  Resp: normal respiratory effort, no audible wheezing. CV: patient does not feel palpitations or heart irregularity  Abd: patient does not feel abdominal tenderness or mass, patient does not notice distension  Extrem: patient does not see leg swelling. Neuro: Alert and oriented, no problem walking. Assessment/Plan:       ICD-10-CM ICD-9-CM    1. Right leg pain M79.604 729.5 XR SPINE LUMB 2 OR 3 V   2. DDD (degenerative disc disease), lumbar M51.36 722. 52 XR SPINE LUMB 2 OR 3 V       This was a telemedicine visit with video.         Sumit Yu MD

## 2020-06-15 NOTE — PROGRESS NOTES
New prescription for Ropinirole 0.25mg sent in today was not covered by insurance.  Would you like to change medication (no alternative given) or try for a PA?  Thanks!    Pt's insurance requires a PA on Ropinirole    Diagnosis Code:     Please provide reasoning / documentation and forward to PA team at [id number].  Thanks!!    PA NEEDED ON: Ropinirole 0.25mg  INS IS: [employer] [place]  BIN: [id number]  PHONE # IS: [phone]  ID # IS: [id number]  GROUP: [id number]  PCN: [id number]    PLEASE LET US KNOW WHEN PA IS GRANTED/DENIED.  THANK YOU!    Jeanne Lux  [facility]  [phone]   Report  Received from PERCY Hale. CARLIE, Norman, MAR, Recent Results or Cardiac Rhythm nsr were discussed. , RN assumed care of the pt.     Lisa Burgos RN     Primary Nurse Lisa Burgos RN and Lobito Palomo RN performed a dual skin assessment on this patient No impairment noted  Bernard score is 19

## 2020-06-16 ENCOUNTER — HOSPITAL ENCOUNTER (OUTPATIENT)
Dept: ULTRASOUND IMAGING | Age: 80
Discharge: HOME | End: 2020-06-16
Attending: INTERNAL MEDICINE
Payer: MEDICARE

## 2020-06-16 ENCOUNTER — TELEPHONE (OUTPATIENT)
Dept: INTERNAL MEDICINE CLINIC | Age: 80
End: 2020-06-16

## 2020-06-16 DIAGNOSIS — M79.604 RIGHT LEG PAIN: ICD-10-CM

## 2020-06-16 PROCEDURE — 93971 EXTREMITY STUDY: CPT

## 2020-06-16 NOTE — TELEPHONE ENCOUNTER
ANNYI:    I spoke with patient regarding venous ultrasound regarding baker's cyst. Agree with Dr. Ines Martinez, referred to Dr. Man Jordan, ortho. Patient to call in AM.  Advised to use ice. Is taking hydrocodone prn. Advised patient MRI LS spine on hold for now.

## 2020-06-16 NOTE — TELEPHONE ENCOUNTER
----- Message from Kenton Triplett MD sent at 6/16/2020  3:20 PM EDT -----  No DVT   However there is a fluid filled cyst  on right popliteral fossa that could indicate a bakers cyst cyst is 2.7 cm X1.4 given her severe pain I recommend ortho eval for possible drainage, these are very painful -   Dr Nathalie Herrera patient I ordered when I was on call

## 2020-06-16 NOTE — PROGRESS NOTES
No DVT   However there is a fluid filled cyst  on right popliteral fossa that could indicate a bakers cyst cyst is 2.7 cm X1.4 given her severe pain I recommend ortho eval for possible drainage, these are very painful -   Dr Tori Quinones patient I ordered when I was on call

## 2020-06-17 NOTE — TELEPHONE ENCOUNTER
Pt states she was able to get an appt with Dr. Susana Livingston at [facility] on 6-23-20 and she wanted doctor to know this.

## 2020-06-21 ENCOUNTER — HOSPITAL ENCOUNTER (EMERGENCY)
Age: 80
Discharge: HOME | End: 2020-06-21
Attending: EMERGENCY MEDICINE
Payer: MEDICARE

## 2020-06-21 VITALS
HEART RATE: 73 BPM | OXYGEN SATURATION: 97 % | TEMPERATURE: 98.1 F | BODY MASS INDEX: 17.54 KG/M2 | WEIGHT: 87 LBS | RESPIRATION RATE: 18 BRPM | HEIGHT: 59 IN | DIASTOLIC BLOOD PRESSURE: 76 MMHG | SYSTOLIC BLOOD PRESSURE: 185 MMHG

## 2020-06-21 DIAGNOSIS — M51.36 DEGENERATIVE DISC DISEASE, LUMBAR: ICD-10-CM

## 2020-06-21 DIAGNOSIS — M43.10 ANTEROLISTHESIS: ICD-10-CM

## 2020-06-21 DIAGNOSIS — M54.41 ACUTE RIGHT-SIDED LOW BACK PAIN WITH RIGHT-SIDED SCIATICA: Primary | ICD-10-CM

## 2020-06-21 DIAGNOSIS — I10 ACCELERATED HYPERTENSION: ICD-10-CM

## 2020-06-21 DIAGNOSIS — M54.17 LUMBOSACRAL RADICULOPATHY AT L4: ICD-10-CM

## 2020-06-21 PROCEDURE — 74011250636 HC RX REV CODE- 250/636: Performed by: EMERGENCY MEDICINE

## 2020-06-21 PROCEDURE — 99284 EMERGENCY DEPT VISIT MOD MDM: CPT

## 2020-06-21 PROCEDURE — 96372 THER/PROPH/DIAG INJ SC/IM: CPT

## 2020-06-21 PROCEDURE — 74011250637 HC RX REV CODE- 250/637: Performed by: EMERGENCY MEDICINE

## 2020-06-21 PROCEDURE — 74011000250 HC RX REV CODE- 250: Performed by: EMERGENCY MEDICINE

## 2020-06-21 RX ORDER — KETOROLAC TROMETHAMINE 30 MG/ML
30 INJECTION, SOLUTION INTRAMUSCULAR; INTRAVENOUS
Status: COMPLETED | OUTPATIENT
Start: 2020-06-21 | End: 2020-06-21

## 2020-06-21 RX ORDER — PREDNISONE 10 MG/1
TABLET ORAL
Qty: 21 TAB | Refills: 0 | Status: SHIPPED | OUTPATIENT
Start: 2020-06-21 | End: 2021-03-16

## 2020-06-21 RX ORDER — MORPHINE SULFATE 2 MG/ML
2 INJECTION, SOLUTION INTRAMUSCULAR; INTRAVENOUS ONCE
Status: COMPLETED | OUTPATIENT
Start: 2020-06-21 | End: 2020-06-21

## 2020-06-21 RX ORDER — DEXAMETHASONE SODIUM PHOSPHATE 4 MG/ML
10 INJECTION, SOLUTION INTRA-ARTICULAR; INTRALESIONAL; INTRAMUSCULAR; INTRAVENOUS; SOFT TISSUE ONCE
Status: COMPLETED | OUTPATIENT
Start: 2020-06-21 | End: 2020-06-21

## 2020-06-21 RX ORDER — LIDOCAINE 4 G/100G
1 PATCH TOPICAL EVERY 24 HOURS
Status: DISCONTINUED | OUTPATIENT
Start: 2020-06-21 | End: 2020-06-21 | Stop reason: HOSPADM

## 2020-06-21 RX ORDER — MORPHINE SULFATE 2 MG/ML
4 INJECTION, SOLUTION INTRAMUSCULAR; INTRAVENOUS ONCE
Status: DISCONTINUED | OUTPATIENT
Start: 2020-06-21 | End: 2020-06-21

## 2020-06-21 RX ORDER — CYCLOBENZAPRINE HCL 10 MG
10 TABLET ORAL
Status: COMPLETED | OUTPATIENT
Start: 2020-06-21 | End: 2020-06-21

## 2020-06-21 RX ORDER — LIDOCAINE 50 MG/G
PATCH TOPICAL
Qty: 5 EACH | Refills: 0 | Status: SHIPPED | OUTPATIENT
Start: 2020-06-21 | End: 2021-03-16

## 2020-06-21 RX ORDER — TIZANIDINE 4 MG/1
4 TABLET ORAL 3 TIMES DAILY
Qty: 20 TAB | Refills: 0 | Status: SHIPPED | OUTPATIENT
Start: 2020-06-21 | End: 2020-06-23

## 2020-06-21 RX ADMIN — DEXAMETHASONE SODIUM PHOSPHATE 10 MG: 4 INJECTION, SOLUTION INTRAMUSCULAR; INTRAVENOUS at 04:54

## 2020-06-21 RX ADMIN — CYCLOBENZAPRINE HYDROCHLORIDE 10 MG: 10 TABLET, FILM COATED ORAL at 04:54

## 2020-06-21 RX ADMIN — MORPHINE SULFATE 2 MG: 2 INJECTION, SOLUTION INTRAMUSCULAR; INTRAVENOUS at 04:54

## 2020-06-21 RX ADMIN — KETOROLAC TROMETHAMINE 30 MG: 30 INJECTION, SOLUTION INTRAMUSCULAR at 04:55

## 2020-06-21 NOTE — ED NOTES
Patient discharged by Dr. Anand. Verbalized understanding of instructions and signed discharge paperwork. Patient discharged in stable condition via wheelchair accompanied by family.

## 2020-06-21 NOTE — ED NOTES
Alexandrea Rogers MD at bedside for eval     ED visit d/t (R) hip pain with radiation down (R) leg - recently seen in the ED with similar sxs - reports having Rx given for acute severe pain, \"Hydrocodone\" last taken at [address] 06/20/20 with no relief - pain worsened this AM leading to ED visit - reports pain similar to last visit     Denies no acute fall trauma since last visit / blood thinners / Therman Marlee / cp / N / V / D / Coughing / sick contacts / weakness, general nor focal / change in mentation    Hx of baker cyst to popliteal of (R) knee     A/Ox4 - lung sounds CTA - abd is soft and non tender at this time     Spouse in the waiting for a ride home if she were to be [facility]

## 2020-06-21 NOTE — ED PROVIDER NOTES
EMERGENCY DEPARTMENT HISTORY AND PHYSICAL EXAM      Please note that this dictation was completed with [employer], the computer voice recognition software. Quite often unanticipated grammatical, syntax, homophones, and other interpretive errors are inadvertently transcribed by the computer software. Please disregard these errors and any errors that have escaped final proofreading. Thank you. Date: 6/21/2020  Patient Name: Delgado Huynh  Patient Age and Sex: 78 y.o. female    History of Presenting Illness     Chief Complaint   Patient presents with    Leg Pain     Patient complaining of right leg pain which has been persistent. She was seen here recently for the same thing. Patient stated that the pain has been so intense that she hasn't been able to sleep tonight. Patient is tearful intermittently during triage. Patient took Hydrocodone with no relief. Patient ambulatory with a limp. Uses cane at baseline. History Provided By: Patient    HPI: Delgado Huynh, 78 y.o. female with past medical history as documented below presents to the ED with c/o of 2 weeks of intermittent low back pain with radiation to right leg. She currently rates the pain at 8/10, dull and throbbing in quality. Pt reports being seen here in the ED on 6/12 and had blood work and CTA that were normal. She was discharged home on gabapentin. She reports that she was told the CT showed \"lumbar disc disease. \" Pt also had a venous duplex that showed a Baker's cyst on her right leg. She reports that tonight the pain was so bad she couldn't fall asleep. She denies any trauma or injuries. She denies saddle anesthesia, loss of bladder or bowel control, numbness or weakness. Pt did take a Hydrocodone PTA with no relief. Pt denies any other alleviating or exacerbating factors.  Additionally, pt specifically denies any recent fever, chills, headache, nausea, vomiting, abdominal pain, CP, SOB, lightheadedness, dizziness, numbness, weakness, lower extremity swelling, heart palpitations, urinary sxs, diarrhea, constipation, melena, hematochezia, cough, or congestion. There are no other complaints, changes or physical findings at this time. PCP: Dedra Riggins MD    Past History   Past Medical History:  Past Medical History:   Diagnosis Date    AIN (acute interstitial nephritis)     Arthritis     CKD (chronic kidney disease) stage 3, GFR 30-59 ml/min ([facility])     GERD (gastroesophageal reflux disease)     Glaucoma     High cholesterol     Hypertension     Ill-defined condition     chronic cystitis       Past Surgical History:  Past Surgical History:   Procedure Laterality Date    COLONOSCOPY N/A 2020    COLONOSCOPY/EGD performed by Lucy Suazo MD at [facility] ENDOSCOPY    HX APPENDECTOMY      with hysterectomy    HX  SECTION      HX COLONOSCOPY      HX DILATION AND CURETTAGE      HX HYSTERECTOMY      HX RENAL BIOPSY Left     in office    HX TONSILLECTOMY      HX UROLOGICAL  2016    cauterization of bladder ulcers       Family History:  Family History   Problem Relation Age of Onset    Cancer Father     Cancer Brother     Breast Cancer Cousin     Arthritis-osteo Mother        Social History:  Social History     Tobacco Use    Smoking status: Former Smoker     Packs/day: 0.50     Years: 4.00     Pack years: 2.00     Last attempt to quit: 1997     Years since quittin.9    Smokeless tobacco: Former User   Substance Use Topics    Alcohol use: Yes     Comment: seldom    Drug use: Yes     Types: Prescription, OTC     Comment: never recreational substance       Allergies:   Allergies   Allergen Reactions    Aleve [Naproxen Sodium] Other (comments)     Renal issues    Cefuroxime Other (comments)     Kidney failure    Ibuprofen Unknown (comments)    Lisinopril Other (comments)     Renal dysfunction    Omeprazole Other (comments)     Cause Kidney failure       Current Medications:  No current facility-administered medications on file prior to encounter. Current Outpatient Medications on File Prior to Encounter   Medication Sig Dispense Refill    methylPREDNISolone (MEDROL DOSEPACK) 4 mg tablet Take 1 Tab by mouth Specific Days and Specific Times. 1 Dose Pack 0    gabapentin (NEURONTIN) 100 mg capsule Take 3 Caps by mouth two (2) times a day. Max Daily Amount: 600 mg. 60 Cap 2    HYDROcodone-acetaminophen (NORCO) 5-325 mg per tablet Take 1 Tab by mouth every eight (8) hours as needed for Pain for up to 30 days. Max Daily Amount: 3 Tabs. 12 Tab 0    ondansetron (ZOFRAN ODT) 4 mg disintegrating tablet Take 1 Tab by mouth every eight (8) hours as needed for Nausea or Vomiting. 20 Tab 0    famotidine (PEPCID) 20 mg tablet Take 1 Tab by mouth two (2) times a day. Indications: gastroesophageal reflux disease 180 Tab 3    metoprolol succinate (TOPROL-XL) 50 mg XL tablet Take 1 Tab by mouth daily. 90 Tab 3    diclofenac (VOLTAREN) 1 % gel Apply 2 g to affected area four (4) times daily. (Patient taking differently: Apply 2 g to affected area as needed.) 100 g 3    sodium bicarbonate 650 mg tablet Take 650 mg by mouth two (2) times a day.  polyethylene glycol (MIRALAX) 17 gram/dose powder Take 17 g by mouth as needed.  timolol (TIMOPTIC) 0.5 % ophthalmic solution Administer 1 Drop to left eye daily.  ACETAMINOPHEN (TYLENOL PO) Take 650 mg by mouth as needed. Indications: two tabs      latanoprost (XALATAN) 0.005 % ophthalmic solution Administer 1 Drop to both eyes nightly.  Aspirin, Buffered 81 mg tab Take 1 Tab by mouth daily. Review of Systems   Review of Systems   Constitutional: Negative. Negative for chills and fever. HENT: Negative. Negative for congestion, facial swelling, rhinorrhea, sore throat, trouble swallowing and voice change. Eyes: Negative. Respiratory: Negative. Negative for apnea, cough, chest tightness, shortness of breath and wheezing. Cardiovascular: Negative. Negative for chest pain, palpitations and leg swelling. Gastrointestinal: Negative. Negative for abdominal distention, abdominal pain, blood in stool, constipation, diarrhea, nausea and vomiting. Endocrine: Negative. Negative for cold intolerance, heat intolerance and polyuria. Genitourinary: Negative. Negative for difficulty urinating, dysuria, flank pain, frequency, hematuria and urgency. Musculoskeletal: Positive for back pain. Negative for arthralgias, myalgias, neck pain and neck stiffness. Skin: Negative. Negative for color change and rash. Neurological: Negative. Negative for dizziness, syncope, facial asymmetry, speech difficulty, weakness, light-headedness, numbness and headaches. Hematological: Negative. Does not bruise/bleed easily. Psychiatric/Behavioral: Negative. Negative for confusion and self-injury. The patient is not nervous/anxious. Physical Exam   Physical Exam  Vitals signs and nursing note reviewed. Constitutional:       General: She is not in acute distress. Appearance: She is well-developed. She is not diaphoretic. HENT:      Head: Normocephalic and atraumatic. Mouth/Throat:      Pharynx: No oropharyngeal exudate. Eyes:      Conjunctiva/sclera: Conjunctivae normal.      Pupils: Pupils are equal, round, and reactive to light. Neck:      Musculoskeletal: Normal range of motion. Cardiovascular:      Rate and Rhythm: Normal rate and regular rhythm. Heart sounds: Normal heart sounds. No murmur. No friction rub. No gallop. Pulmonary:      Effort: Pulmonary effort is normal. No respiratory distress. Breath sounds: Normal breath sounds. No wheezing or rales. Chest:      Chest wall: No tenderness. Abdominal:      General: Bowel sounds are normal. There is no distension. Palpations: Abdomen is soft. There is no mass. Tenderness: There is no abdominal tenderness. There is no guarding or rebound. Musculoskeletal: Normal range of motion. General: Tenderness (TTP lumbar region, no midline TTP, +SLR on right, NVI distally) present. No deformity. Skin:     General: Skin is warm. Findings: No rash. Neurological:      Mental Status: She is alert and oriented to person, place, and time. Cranial Nerves: No cranial nerve deficit. Motor: No abnormal muscle tone. Coordination: Coordination normal.      Deep Tendon Reflexes: Reflexes normal.         Diagnostic Study Results     Labs -  No results found for this or any previous visit (from the past 24 hour(s)). Radiologic Studies -   No orders to display     CT Results  (Last 48 hours)    None        CXR Results  (Last 48 hours)    None          Medical Decision Making   I am the first provider for this patient. I reviewed the vital signs, available nursing notes, past medical history, past surgical history, family history and social history. Vital Signs-Reviewed the patient's vital signs.   Patient Vitals for the past 24 hrs:   Temp Pulse Resp BP SpO2   06/21/20 0440 98.1 °F (36.7 °C) 76 18 (!) 212/87 100 %     ED Vitals     Date/Time Temp Temp Source Pulse Resp BP MAP SpO2 Wt Who   06/21/20 0630 98.1 °F (36.7 °C) Oral 73 18 185/76 -- 97 % -- PPD   06/21/20 0615 -- -- -- -- -- -- 97 % -- PPD   06/21/20 0600 -- -- -- -- -- -- 98 % -- PPD   06/21/20 0545 -- -- -- -- -- -- 100 % -- PPD   06/21/20 0530 -- -- -- -- 208/74 -- 100 % -- PPD   06/21/20 0515 -- -- -- -- 199/77 -- 99 % -- PPD   06/21/20 0500 -- -- -- -- -- -- 99 % -- PPD   06/21/20 0452 -- -- -- -- 197/73 -- 100 % -- PPD   06/21/20 0440 98.1 °F (36.7 °C) Oral 76 18 212/87 129 100 % 39.5 kg (87 lb) [facility]       Pulse Oximetry Analysis - 100% on RA    Cardiac Monitor:   Rate: 76 bpm  Rhythm: Normal Sinus Rhythm      Records Reviewed: Nursing Notes, Old Medical Records, Previous electrocardiograms, Previous Radiology Studies and Previous Laboratory Studies    Provider Notes (Medical Decision Making): The patient presents with acute low back pain. Stable vitals and benign exam. DDx: strain, sprain, sciatica, MSK pain. Clinical presentation not consistent with  pathology, aortic dissection or AAA. There is no urine/bowel incontinence or perianal numbess to suggest cauda equina. No fever/chills, IVDA to suggest epidural abscess or discitis. No focal weakness or sensory changes to suggest transverse myelitis. Therefore MRI not indicated. Pt with recent CTA imaging showing L4-L5 disease. Pt will need Ortho f/u with outpatient MRI, outpatient physical therapy. I have recommended rest, avoiding heavy lifting until better, use of intermittent heat (avoid sleeping on a heating pad), and use of OTC NSAID's (Advil, Aleve etc) or Tylenol prn for pain. Call PCP if back pain persists or she develops leg symptoms or other concerning red flags. Will provide pain control and refer to PT. ED Course:   Initial assessment performed. The patients presenting problems have been discussed, and they are in agreement with the care plan formulated and outlined with them. I have encouraged them to ask questions as they arise throughout their visit. HYPERTENSION COUNSELING  For 10 minutes, education was provided to the patient today regarding their hypertension. Patient is made aware of their elevated blood pressure and is instructed to follow up this week with their Primary Care for a recheck. Patient is counseled regarding consequences of chronic, uncontrolled hypertension including kidney disease, heart disease, stroke or even death. Patient states their understanding and agrees to follow up this week. Additionally, during their visit, I discussed sodium restriction, maintaining ideal body weight and regular exercise program as physiologic means to achieve blood pressure control. The patient will strive towards this.     I reviewed our electronic medical record system for any past medical records that were available that may contribute to the patient's current condition, the nursing notes and vital signs from today's visit. Kraig Lainez MD    ED Orders Placed :  Orders Placed This Encounter    VITAL SIGNS    DISCONTD: morphine injection 4 mg    morphine injection 2 mg    cyclobenzaprine (FLEXERIL) tablet 10 mg    dexamethasone (DECADRON) 4 mg/mL injection 10 mg    lidocaine 4 % patch 1 Patch    ketorolac (TORADOL) injection 30 mg    tiZANidine (ZANAFLEX) 4 mg tablet    lidocaine (Lidoderm) 5 %    predniSONE (STERAPRED DS) 10 mg dose pack     ED Medications Administered:  Medications   morphine injection 2 mg (2 mg IntraMUSCular Given 6/21/20 0454)   cyclobenzaprine (FLEXERIL) tablet 10 mg (10 mg Oral Given 6/21/20 0454)   dexamethasone (DECADRON) 4 mg/mL injection 10 mg (10 mg IntraMUSCular Given 6/21/20 0454)   ketorolac (TORADOL) injection 30 mg (30 mg IntraMUSCular Given 6/21/20 0455)         Progress Note:  I have reviewed previous ED visit and workup. CTA abd with runoff showed \"Unremarkable CT arteriogram and runoff to the lower extremities bilaterally. There are degenerative changes of the spine. There is a diffuse disc bulge at L4-L5. \" Duplex on 6/16/20 showed \"No evidence of deep vein thrombosis in the right lower extremity. There is a fluid filled area seen in the right popliteal fossa that could be indicative of a Baker's cyst.\"    Progress Note:  Patient has been reassessed and reports feeling better and symptoms have improved significantly after ED treatment. Patient feels comfortable going home with close follow-up. Osman Guajardo's final labs and imaging have been reviewed with her and available family and/or caregiver. They have been counseled regarding her diagnosis.  She verbally conveys understanding and agreement of the signs, symptoms, diagnosis, treatment and prognosis and additionally agrees to follow up as recommended with Dr. Chapincito Mckenzie MD and/or specialist in 24 - 50 hours. She also agrees with the care-plan we created together and conveys that all of her questions have been answered. I have also put together some discharge instructions for her that include: 1) educational information regarding their diagnosis, 2) how to care for their diagnosis at home, as well a 3) list of reasons why they would want to return to the ED prior to their follow-up appointment should the patient's condition change or symptoms worsen. I have answered all questions to the patient's satisfaction. Strict return precautions given. She both understood and agreed with plan as discussed. Vital signs stable for discharge. Pt very appreciative of care today. Disposition: Discharge  The pt is ready for discharge. The pt's signs, symptoms, diagnosis, and discharge instructions have been discussed and pt has conveyed their understanding. The pt is to follow up as recommended or return to ER should their symptoms worsen. Plan has been discussed and pt is in full agreement. Plan:  1. Return precautions as discussed. 2.   Current Discharge Medication List      START taking these medications    Details   tiZANidine (ZANAFLEX) 4 mg tablet Take 1 Tab by mouth three (3) times daily. Qty: 20 Tab, Refills: 0      lidocaine (Lidoderm) 5 % Apply patch to the affected area for 12 hours a day and remove for 12 hours a day. Qty: 5 Each, Refills: 0      predniSONE (STERAPRED DS) 10 mg dose pack Take as prescribed  Qty: 21 Tab, Refills: 0         CONTINUE these medications which have NOT CHANGED    Details   methylPREDNISolone (MEDROL DOSEPACK) 4 mg tablet Take 1 Tab by mouth Specific Days and Specific Times. Qty: 1 Dose Pack, Refills: 0    Associated Diagnoses: DDD (degenerative disc disease), lumbar      gabapentin (NEURONTIN) 100 mg capsule Take 3 Caps by mouth two (2) times a day. Max Daily Amount: 600 mg.   Qty: 60 Cap, Refills: 2    Associated Diagnoses: Myofascial pain; Spondylosis of lumbar spine HYDROcodone-acetaminophen (NORCO) 5-325 mg per tablet Take 1 Tab by mouth every eight (8) hours as needed for Pain for up to 30 days. Max Daily Amount: 3 Tabs. Qty: 12 Tab, Refills: 0    Associated Diagnoses: Sprain of ankle, unspecified laterality, unspecified ligament, subsequent encounter      ondansetron (ZOFRAN ODT) 4 mg disintegrating tablet Take 1 Tab by mouth every eight (8) hours as needed for Nausea or Vomiting. Qty: 20 Tab, Refills: 0      famotidine (PEPCID) 20 mg tablet Take 1 Tab by mouth two (2) times a day. Indications: gastroesophageal reflux disease  Qty: 180 Tab, Refills: 3    Associated Diagnoses: Gastroesophageal reflux disease without esophagitis      metoprolol succinate (TOPROL-XL) 50 mg XL tablet Take 1 Tab by mouth daily. Qty: 90 Tab, Refills: 3    Associated Diagnoses: Essential hypertension      diclofenac (VOLTAREN) 1 % gel Apply 2 g to affected area four (4) times daily. Qty: 100 g, Refills: 3    Associated Diagnoses: Chronic pain of right knee; Spondylosis of lumbar spine      sodium bicarbonate 650 mg tablet Take 650 mg by mouth two (2) times a day. polyethylene glycol (MIRALAX) 17 gram/dose powder Take 17 g by mouth as needed. timolol (TIMOPTIC) 0.5 % ophthalmic solution Administer 1 Drop to left eye daily. ACETAMINOPHEN (TYLENOL PO) Take 650 mg by mouth as needed. Indications: two tabs      latanoprost (XALATAN) 0.005 % ophthalmic solution Administer 1 Drop to both eyes nightly. Aspirin, Buffered 81 mg tab Take 1 Tab by mouth daily.            3.   Follow-up Information     Follow up With Specialties Details Why Contact Info    Baljeet Moreira MD Internal Medicine   [address]  [facility] [address]  [address] [address] [phone]      [facility] EMERGENCY DEPT Emergency Medicine  As needed, If symptoms worsen [address] [place] [address]  [phone]    [facility] [address] SPINE Med/Surg Schedule an appointment as soon as possible for a visit in 2 days  [address] [address]          Instructed to return to ED if worse  Diagnosis     Clinical Impression:   1. Acute right-sided low back pain with right-sided sciatica    2. Anterolisthesis    3. Lumbosacral radiculopathy at L4    4. Degenerative disc disease, lumbar    5. Accelerated hypertension      Attestation:  Carmen Luna MD, am the attending of record for this patient. I personally performed the services described in this documentation on this date, 6/21/2020 for patient, Ulysses Posey. I have reviewed the chart and verified that the record is accurate and complete. This note will not be viewable in [address]

## 2020-06-21 NOTE — DISCHARGE INSTRUCTIONS
Thank you for allowing us to take care of you today! We hope we addressed all of your concerns and needs. We strive to provide excellent quality care in the Emergency Department. You will receive a survey after your visit to evaluate the care you were provided. Should you receive a survey from us, we invite you to share your experience and tell us what made it excellent. It was a pleasure serving you, we invite you to share your experience with us, in our pursuit for excellence, should you be selected to receive a survey. The exam and treatment you received in the Emergency Department were for an urgent problem and are not intended as complete care. It is important that you follow up with a doctor, nurse practitioner, or physician assistant for ongoing care. If your symptoms become worse or you do not improve as expected and you are unable to reach your usual health care provider, you should return to the Emergency Department. We are available 24 hours a day. Please take your discharge instructions with you when you go to your follow-up appointment. If you have any problem arranging a follow-up appointment, contact the Emergency Department immediately. If a prescription has been provided, please have it filled as soon as possible to prevent a delay in treatment. Read the entire medication instruction sheet provided to you by the pharmacy. If you have any questions or reservations about taking the medication due to side effects or interactions with other medications, please call your primary care physician or contact the ER to speak with the charge nurse. Make an appointment with your family doctor or the physician you were referred to for follow-up of this visit as instructed on your discharge paperwork, as this is mandatory follow-up. Return to the ER if you are unable to be seen or if you are unable to be seen in a timely manner.     If you have any problem arranging the follow-up visit, contact the Emergency Department immediately. I hope you feel better and thank you again for allow us to provide you with excellent care today at [facility]! Warmest regards,    Ryanne Segura MD  Emergency Medicine Physician  [facility]      _____________________________________________________________________________________________________________    Vitals:    06/21/20 0440   BP: (!) 212/87   BP 1 Location: Left arm   BP Patient Position: At rest;Sitting   Pulse: 76   Resp: 18   Temp: 98.1 °F (36.7 °C)   SpO2: 100%   Weight: 39.5 kg (87 lb)   Height: 4' 11\" (1.499 m)       No results found for this or any previous visit (from the past 12 hour(s)).     No orders to display     CT Results  (Last 48 hours)    None          Local Primary Care Physicians   [facility] [phone]  MD Mercedes Mcrae, MD Olamide Shepherd MD [place] [facility] [phone]  Radha Rabago, P  Paola Marie, MD Geovanna Kaminski MD [address]  [phone]  MD Dion Edwards MD [address] [facility] [phone]  MD Marce Suarez, MD Erika Pham, MD Aylin Gilman MD   [facility] [phone]  OPCQ CAIIZAIAH XT, MD Radha Pérez, MD Skip Moscoso, NP [address] [phone]  Karey Gonsalez, MD Berry, MD Nimisha June, MD Marilu Foss, MD Kamala Gant, MD Nico Armenta, MD Alvin Watkins, MD   [facility] Leroy Pereira MD [address] [address] [phone]  Koby Livingston, MD Elisha Lizarraga, NP  Ganga Ruiz, MD Sandie Funez, MD Jacki Hawkins, MD Mary Pabon, MD Sheryle Hopping, MD   [address] [facility] [phone]  MD Erin Lomeli, P  Steve Barahona, MD Tanner Garcia, MD Jenkins Shayy, MD Paulie Mcdaniel MD [facility] [phone]  MD Shereen Womack MD Adelene Scudder, MD Merrell Semen, MD Nida Bullocks, MD   [facility] [phone]  MD Bryon Gil MD [place] [phone]  Lizeth Avery, MD Searcy Meckel, MD Randel Shearer, MD   [address] [facility] [phone]  Mandeep Crooks, MD Jia Navas, MD Irwin Rhodes, MD Alexus Avitia, MD Chandu Mcdonald, NP  Eugenia Jacobs MD [address] [facility]   [phone]  MD Kamran Rodas MD Jonne Precise, MD     [address] [facility] [phone]  Tariq Guajardo, MD Partha Rosario, FNP  Diego Cooley, PATommyC  Diego Cooley, SKYEP  DEZ Canseco MD Gleen Reap, NP   Elizabeth Vinson,    Miscellaneous:  Sohail Aguilera MD [facility] Departments   For adult and child immunizations, family planning, TB screening, STD testing and women's health services. [facility]: [place] [phone]     [address]: [address] [phone]     [address] [place] [address]        [address] [place] [address]  For primary care services, woman and child wellness, and some clinics providing specialty care. [facility] -- [address] [facility] [phone] [address] [facility] [address] [facility] [address] [facility] [phone]   [address] [facility] [address]  [phone] [address] [place] [address] [address] [facility] [address] [phone] [address] [facility]  [address] [place] [phone]   [address] [phone]   Pat Bearden [address], [address] [place] [address]   [facility] [address]     [place] [address]  [address] [phone]   [employer]  [address] ([email or web address])         Sexual Health/Woman Wellness Clinics   For STD/HIV testing and treatment, pregnancy testing and services, men's health, birth control services, LGBT services, and hepatitis/HPV vaccine services. [place] [facility] [place] [facility] [place] [employer] [address] [facility] [address] [facility] [address] [facility] [phone]   [facility] [address] , 5th floor [phone]   Pregnancy [address] Women [address] [place] [phone]        [place] [address] [facility]   [phone]   [place]   [phone]   Women, Infant and Children's Services: [address] [phone]       [facility] the [address]   [phone]   [address]   [phone]   [address]   [address]       Patient Education        Back Pain: Care Instructions  Your Care Instructions     Back pain has many possible causes. It is often related to problems with muscles and ligaments of the back. It may also be related to problems with the nerves, discs, or bones of the back. Moving, lifting, standing, sitting, or sleeping in an awkward way can strain the back. Sometimes you don't notice the injury until later. Arthritis is another common cause of back pain. Although it may hurt a lot, back pain usually improves on its own within several weeks. Most people recover in 12 weeks or less.  Using good home treatment and being careful not to stress your back can help you feel better sooner. Follow-up care is a key part of your treatment and safety. Be sure to make and go to all appointments, and call your doctor if you are having problems. It's also a good idea to know your test results and keep a list of the medicines you take. How can you care for yourself at home? · Sit or lie in positions that are most comfortable and reduce your pain. Try one of these positions when you lie down:  ? Lie on your back with your knees bent and supported by large pillows. ? Lie on the floor with your legs on the seat of a sofa or chair. ? Lie on your side with your knees and hips bent and a pillow between your legs. ? Lie on your stomach if it does not make pain worse. · Do not sit up in bed, and avoid soft couches and twisted positions. Bed rest can help relieve pain at first, but it delays healing. Avoid bed rest after the first day of back pain. · Change positions every 30 minutes. If you must sit for long periods of time, take breaks from sitting. Get up and walk around, or lie in a comfortable position. · Try using a heating pad on a low or medium setting for 15 to 20 minutes every 2 or 3 hours. Try a warm shower in place of one session with the heating pad. · You can also try an ice pack for 10 to 15 minutes every 2 to 3 hours. Put a thin cloth between the ice pack and your skin. · Take pain medicines exactly as directed. ? If the doctor gave you a prescription medicine for pain, take it as prescribed. ? If you are not taking a prescription pain medicine, ask your doctor if you can take an over-the-counter medicine. · Take short walks several times a day. You can start with 5 to 10 minutes, 3 or 4 times a day, and work up to longer walks. Walk on level surfaces and avoid hills and stairs until your back is better. · Return to work and other activities as soon as you can. Continued rest without activity is usually not good for your back.   · To prevent future back pain, do exercises to stretch and strengthen your back and stomach. Learn how to use good posture, safe lifting techniques, and proper body mechanics. When should you call for help? Call your doctor now or seek immediate medical care if:  · You have new or worsening numbness in your legs. · You have new or worsening weakness in your legs. (This could make it hard to stand up.)  · You lose control of your bladder or bowels. Watch closely for changes in your health, and be sure to contact your doctor if:  · You have a fever, lose weight, or don't feel well. · You do not get better as expected. Where can you learn more? Go to http://roel-lonnie.info/  Enter I594 in the search box to learn more about \"Back Pain: Care Instructions. \"  Current as of: March 2, 2020               Content Version: 12.5  © 6831-5635 Healthwise, Incorporated. Care instructions adapted under license by [employer] (which disclaims liability or warranty for this information). If you have questions about a medical condition or this instruction, always ask your healthcare professional. [address] any warranty or liability for your use of this information.

## 2020-06-22 ENCOUNTER — TELEPHONE (OUTPATIENT)
Dept: INTERNAL MEDICINE CLINIC | Age: 80
End: 2020-06-22

## 2020-06-22 NOTE — TELEPHONE ENCOUNTER
Jaleesa Bruce [facility] Office Pool   Phone Number: [phone]             Caller's first and last name and relationship (if not the patient): Lilian Acevedo   Best contact number(s):  [phone]   What are the symptoms: Over the past 2-3 days Pt has had a decline in mental status leading to an inability to care for herself.  Pt has had a few falls and visits to ERs and Urgent care over the past several weeks and has many medications.  The caller is confused over what the pt should and should not be taking as many of the medications are narcotics. Transfer successful - yes/no (include outcome):  No, no answer   Transfer declined - yes/no (include reason): No, no answer   Was caller advised to seek appropriate level of care - yes/no:  Yes, but declined (as well as declined a 4pm appt today, 6/22, citing scheduling issues) as they prefer to speak with PCP to help manage Pts overall health.    Details to clarify the request: n/a     Copy/Paste  Martha

## 2020-06-22 NOTE — TELEPHONE ENCOUNTER
Called, spoke to Olen Schwab. Two pt identifiers confirmed. Olen Schwab informed patient has a VV appointment 06/23/2020 at 9:30 am.    Pt verbalized understanding of information discussed w/ no further questions at this time.

## 2020-06-22 NOTE — TELEPHONE ENCOUNTER
----- Message from [employer] sent at 6/22/2020  2:35 PM EDT -----  Regarding: Dr. Genoveva Jung first and last name and relationship (if not the patient): Janett Duarte - Son  Best contact number(s): [phone]  Whose call is being returned: \"Mara\"  Details to clarify the request:

## 2020-06-22 NOTE — TELEPHONE ENCOUNTER
Called, spoke to Rachel Kidd. Two pt identifiers confirmed. Rachel Kidd informed patient has a VV appointment 06/23/2020 at 9:30 am.    Pt verbalized understanding of information discussed w/ no further questions at this time.

## 2020-06-23 ENCOUNTER — VIRTUAL VISIT (OUTPATIENT)
Dept: INTERNAL MEDICINE CLINIC | Age: 80
End: 2020-06-23

## 2020-06-23 DIAGNOSIS — M79.604 RIGHT LEG PAIN: ICD-10-CM

## 2020-06-23 DIAGNOSIS — M71.20 SYNOVIAL CYST OF POPLITEAL SPACE, UNSPECIFIED LATERALITY: ICD-10-CM

## 2020-06-23 DIAGNOSIS — T50.905A ADVERSE EFFECT OF DRUG, INITIAL ENCOUNTER: Primary | ICD-10-CM

## 2020-06-23 DIAGNOSIS — M51.36 DDD (DEGENERATIVE DISC DISEASE), LUMBAR: ICD-10-CM

## 2020-06-23 RX ORDER — LORATADINE 10 MG/1
10 TABLET ORAL 2 TIMES DAILY
COMMUNITY

## 2020-06-23 NOTE — PROGRESS NOTES
Kofi Addison is a 78 y.o. female who presents altered mental status. Son, Janett Duarte, called stating that over the past few days patient has been more confused and he is concerned that she cannot care for herself. Patient has been taking pain medication for right leg pain. Prior xray tib/fib negative, CTA abd negative, venous US baker's cyst, Xray LS spine DDD. Has been on gabapentin for chronic back pain and RLS. Recommending stopping it on June 17 due to sedation. Stopped gabapentin. Seen in ED on June 21 with right leg pain. Reported the pain was right hip radiating down leg. Had taken hydrocodone, no relief. Denied falls. Was A/O in ED. In ED given morphine, flexeril, toradol, lidocaine patch and decadron. Discharged with zanaflex, lidocaine patch and sterapred DS dose pack. ED ordered MRI LS spine. She reports she has been hydrocodone at bedtime, allowed her to sleep for a few hours. She did not take the muscle relaxant. Has not used lidocaine. Has a baker's cyst right knee. To see ortho, Dr Lizeth Dimas. Son is concerned regarding the medications she has in the house. He reports she is taking a lot of tylenol and ibuprofen and aspirin. This is an established visit conducted via telemedicine with video. The patient has been instructed that this meets HIPAA criteria and acknowledges and agrees to this method of visitation. Pursuant to the emergency declaration under the [facility] waiver authority and the [employer] Act, this Virtual Visit was conducted, with patient's consent, to reduce the patient's risk of exposure to COVID-19 and provide continuity of care for an established patient. Services were provided through a video synchronous discussion virtually to substitute for in-person clinic visit.         Past Medical History:   Diagnosis Date    AIN (acute interstitial nephritis)     Arthritis     CKD (chronic kidney disease) stage 3, GFR 30-59 ml/min ([facility])     GERD (gastroesophageal reflux disease)     Glaucoma     High cholesterol     Hypertension     Ill-defined condition     chronic cystitis       Family History   Problem Relation Age of Onset    Cancer Father     Cancer Brother     Breast Cancer Cousin     Arthritis-osteo Mother        Social History     Socioeconomic History    Marital status:      Spouse name: Not on file    Number of children: Not on file    Years of education: Not on file    Highest education level: Not on file   Occupational History    Not on file   Social Needs    Financial resource strain: Not on file    Food insecurity     Worry: Not on file     Inability: Not on file    Transportation needs     Medical: Not on file     Non-medical: Not on file   Tobacco Use    Smoking status: Former Smoker     Packs/day: 0.50     Years: 4.00     Pack years: 2.00     Last attempt to quit: 1997     Years since quittin.9    Smokeless tobacco: Former User   Substance and Sexual Activity    Alcohol use: Yes     Comment: seldom    Drug use: Yes     Types: Prescription, OTC     Comment: never recreational substance    Sexual activity: Not Currently     Partners: Male     Birth control/protection: None   Lifestyle    Physical activity     Days per week: Not on file     Minutes per session: Not on file    Stress: Not on file   Relationships    Social connections     Talks on phone: Not on file     Gets together: Not on file     Attends Roman [employer] service: Not on file     Active member of club or organization: Not on file     Attends meetings of clubs or organizations: Not on file     Relationship status: Not on file    Intimate partner violence     Fear of current or ex partner: Not on file     Emotionally abused: Not on file     Physically abused: Not on file     Forced sexual activity: Not on file   Other Topics Concern    Not on file   Social History Narrative    ** Merged History Encounter **            Current Outpatient Medications on File Prior to Visit   Medication Sig Dispense Refill    loratadine (Claritin) 10 mg tablet Take 10 mg by mouth.  predniSONE (STERAPRED DS) 10 mg dose pack Take as prescribed 21 Tab 0    HYDROcodone-acetaminophen (NORCO) 5-325 mg per tablet Take 1 Tab by mouth every eight (8) hours as needed for Pain for up to 30 days. Max Daily Amount: 3 Tabs. 12 Tab 0    famotidine (PEPCID) 20 mg tablet Take 1 Tab by mouth two (2) times a day. Indications: gastroesophageal reflux disease 180 Tab 3    metoprolol succinate (TOPROL-XL) 50 mg XL tablet Take 1 Tab by mouth daily. 90 Tab 3    diclofenac (VOLTAREN) 1 % gel Apply 2 g to affected area four (4) times daily. (Patient taking differently: Apply 2 g to affected area as needed.) 100 g 3    timolol (TIMOPTIC) 0.5 % ophthalmic solution Administer 1 Drop to left eye daily.  ACETAMINOPHEN (TYLENOL PO) Take 650 mg by mouth as needed. Indications: two tabs      latanoprost (XALATAN) 0.005 % ophthalmic solution Administer 1 Drop to both eyes nightly.  Aspirin, Buffered 81 mg tab Take 1 Tab by mouth daily.  lidocaine (Lidoderm) 5 % Apply patch to the affected area for 12 hours a day and remove for 12 hours a day. 5 Each 0    [DISCONTINUED] tiZANidine (ZANAFLEX) 4 mg tablet Take 1 Tab by mouth three (3) times daily. 20 Tab 0    [DISCONTINUED] methylPREDNISolone (MEDROL DOSEPACK) 4 mg tablet Take 1 Tab by mouth Specific Days and Specific Times. 1 Dose Pack 0    gabapentin (NEURONTIN) 100 mg capsule Take 3 Caps by mouth two (2) times a day. Max Daily Amount: 600 mg. 60 Cap 2    ondansetron (ZOFRAN ODT) 4 mg disintegrating tablet Take 1 Tab by mouth every eight (8) hours as needed for Nausea or Vomiting. 20 Tab 0    [DISCONTINUED] sodium bicarbonate 650 mg tablet Take 650 mg by mouth two (2) times a day.       polyethylene glycol (MIRALAX) 17 gram/dose powder Take 17 g by mouth as needed. No current facility-administered medications on file prior to visit. Review of Systems  Pertinent items are noted in HPI. Objective:     Gen: well appearing female  HEENT: normal conjunctiva, no audible congestion, patient does not see oral erythema, has MMM  Neck: patient does not feel enlarged or tender LAD or masses  Resp: normal respiratory effort, no audible wheezing. CV: patient does not feel palpitations or heart irregularity  Abd: patient does not feel abdominal tenderness or mass, patient does not notice distension  Extrem: patient does not see swelling in ankles or joints. Neuro: Alert and oriented, able to answer questions without difficulty, able to move all extremities and walk normally        Assessment/Plan:       ICD-10-CM ICD-9-CM    1. Adverse effect of drug, initial encounter T50.905A E947.9    2. DDD (degenerative disc disease), lumbar M51.36 722.52    3. Right leg pain M79.604 729.5    4. Synovial cyst of popliteal space, unspecified laterality M71.20 727.51    reviewed medications in detail. To see ortho today. This was a telemedicine visit with video. Viviane Aguirre MD    Follow-up and Dispositions    · Return for follow up as scheduled. Davidson Donovan

## 2020-06-25 ENCOUNTER — TELEPHONE (OUTPATIENT)
Dept: INTERNAL MEDICINE CLINIC | Age: 80
End: 2020-06-25

## 2020-06-25 NOTE — TELEPHONE ENCOUNTER
----- Message from Kristal Haynes sent at 6/25/2020  2:31 PM EDT -----  Regarding: Dr. Stephen : [phone]  Caller's first and last name: N/A  Reason for call: Pt is unable to obtain an MRI appointment. Stated Dr. Arnoldo Ronquillo told her to notify the office if she can't get an appointment. Callback required yes/no and why: Yes, to inform.    Best contact number(s): [phone]  Details to clarify the request: N/A        Copy/paste junior

## 2020-06-26 NOTE — TELEPHONE ENCOUNTER
Spoke with patient. Two pt identifiers confirmed. Patient advised that I have scheduled her MRI for 07/01/2020. Patient advised that she is to arrive at [facility] for a 6:30am appointment. Patient advised that she needs to report to [place] [address] outpatient registration. Patient advised that she needs to bring her insurance card, picture ID and a list of her medications. Patient advised that she should not wear any metal clips in her hair or metal anywhere on her body. Pt verbalized understanding of information discussed w/ no further questions at this time.

## 2020-07-01 ENCOUNTER — HOSPITAL ENCOUNTER (OUTPATIENT)
Dept: MRI IMAGING | Age: 80
Discharge: HOME | End: 2020-07-01
Attending: FAMILY MEDICINE
Payer: MEDICARE

## 2020-07-01 ENCOUNTER — TELEPHONE (OUTPATIENT)
Dept: INTERNAL MEDICINE CLINIC | Age: 80
End: 2020-07-01

## 2020-07-01 DIAGNOSIS — M51.36 DDD (DEGENERATIVE DISC DISEASE), LUMBAR: ICD-10-CM

## 2020-07-01 PROCEDURE — 72148 MRI LUMBAR SPINE W/O DYE: CPT

## 2020-07-01 NOTE — TELEPHONE ENCOUNTER
Notify patient MRI of lumbar spine shows mild degenerative arthritis, no herniated disc. Not the cause of her leg pain.

## 2020-07-07 ENCOUNTER — HOME HEALTH ADMISSION (OUTPATIENT)
Dept: HOME HEALTH SERVICES | Facility: HOME HEALTH | Age: 80
End: 2020-07-07
Payer: MEDICARE

## 2020-07-07 ENCOUNTER — HOME CARE VISIT (OUTPATIENT)
Dept: HOME HEALTH SERVICES | Facility: HOME HEALTH | Age: 80
End: 2020-07-07

## 2020-07-07 ENCOUNTER — HOSPITAL ENCOUNTER (EMERGENCY)
Age: 80
Discharge: HOME | End: 2020-07-07
Attending: EMERGENCY MEDICINE
Payer: MEDICARE

## 2020-07-07 ENCOUNTER — APPOINTMENT (OUTPATIENT)
Dept: GENERAL RADIOLOGY | Age: 80
End: 2020-07-07
Attending: EMERGENCY MEDICINE
Payer: MEDICARE

## 2020-07-07 ENCOUNTER — APPOINTMENT (OUTPATIENT)
Dept: CT IMAGING | Age: 80
End: 2020-07-07
Attending: EMERGENCY MEDICINE
Payer: MEDICARE

## 2020-07-07 VITALS
OXYGEN SATURATION: 99 % | SYSTOLIC BLOOD PRESSURE: 111 MMHG | HEIGHT: 58 IN | WEIGHT: 85 LBS | HEART RATE: 85 BPM | DIASTOLIC BLOOD PRESSURE: 69 MMHG | TEMPERATURE: 98.9 F | RESPIRATION RATE: 18 BRPM | BODY MASS INDEX: 17.84 KG/M2

## 2020-07-07 DIAGNOSIS — E86.0 DEHYDRATION: Primary | ICD-10-CM

## 2020-07-07 DIAGNOSIS — S90.511A ABRASION OF RIGHT ANKLE, INITIAL ENCOUNTER: ICD-10-CM

## 2020-07-07 DIAGNOSIS — S70.01XA CONTUSION OF RIGHT HIP, INITIAL ENCOUNTER: ICD-10-CM
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
## 2020-07-07 DIAGNOSIS — S61.411A SKIN TEAR OF RIGHT HAND WITHOUT COMPLICATION, INITIAL ENCOUNTER: ICD-10-CM

## 2020-07-07 DIAGNOSIS — H10.32 ACUTE BACTERIAL CONJUNCTIVITIS OF LEFT EYE: ICD-10-CM

## 2020-07-07 LAB
ALBUMIN SERPL-MCNC: 3.1 G/DL (ref 3.5–5)
ALBUMIN/GLOB SERPL: 0.7 {RATIO} (ref 1.1–2.2)
ALP SERPL-CCNC: 69 U/L (ref 45–117)
ALT SERPL-CCNC: 13 U/L (ref 12–78)
ANION GAP SERPL CALC-SCNC: 5 MMOL/L (ref 5–15)
APPEARANCE UR: CLEAR
AST SERPL-CCNC: 8 U/L (ref 15–37)
ATRIAL RATE: 78 BPM
BACTERIA URNS QL MICRO: NEGATIVE /HPF
BASOPHILS # BLD: 0 K/UL (ref 0–0.1)
BASOPHILS NFR BLD: 0 % (ref 0–1)
BILIRUB SERPL-MCNC: 0.8 MG/DL (ref 0.2–1)
BILIRUB UR QL: NEGATIVE
BUN SERPL-MCNC: 18 MG/DL (ref 6–20)
BUN/CREAT SERPL: 18 (ref 12–20)
CALCIUM SERPL-MCNC: 9.3 MG/DL (ref 8.5–10.1)
CALCULATED P AXIS, ECG09: 53 DEGREES
CALCULATED R AXIS, ECG10: 8 DEGREES
CALCULATED T AXIS, ECG11: 60 DEGREES
CHLORIDE SERPL-SCNC: 103 MMOL/L (ref 97–108)
CO2 SERPL-SCNC: 27 MMOL/L (ref 21–32)
COLOR UR: ABNORMAL
CREAT SERPL-MCNC: 1.02 MG/DL (ref 0.55–1.02)
DIAGNOSIS, 93000: NORMAL
DIFFERENTIAL METHOD BLD: ABNORMAL
EOSINOPHIL # BLD: 0.3 K/UL (ref 0–0.4)
EOSINOPHIL NFR BLD: 3 % (ref 0–7)
EPITH CASTS URNS QL MICRO: ABNORMAL /LPF
ERYTHROCYTE [DISTWIDTH] IN BLOOD BY AUTOMATED COUNT: 15.9 % (ref 11.5–14.5)
GLOBULIN SER CALC-MCNC: 4.5 G/DL (ref 2–4)
GLUCOSE SERPL-MCNC: 109 MG/DL (ref 65–100)
GLUCOSE UR STRIP.AUTO-MCNC: NEGATIVE MG/DL
HCT VFR BLD AUTO: 40.9 % (ref 35–47)
HGB BLD-MCNC: 13 G/DL (ref 11.5–16)
HGB UR QL STRIP: NEGATIVE
HYALINE CASTS URNS QL MICRO: ABNORMAL /LPF (ref 0–5)
IMM GRANULOCYTES # BLD AUTO: 0.1 K/UL (ref 0–0.04)
IMM GRANULOCYTES NFR BLD AUTO: 1 % (ref 0–0.5)
KETONES UR QL STRIP.AUTO: NEGATIVE MG/DL
LEUKOCYTE ESTERASE UR QL STRIP.AUTO: ABNORMAL
LYMPHOCYTES # BLD: 1.3 K/UL (ref 0.8–3.5)
LYMPHOCYTES NFR BLD: 13 % (ref 12–49)
MAGNESIUM SERPL-MCNC: 2.2 MG/DL (ref 1.6–2.4)
MCH RBC QN AUTO: 29.1 PG (ref 26–34)
MCHC RBC AUTO-ENTMCNC: 31.8 G/DL (ref 30–36.5)
MCV RBC AUTO: 91.7 FL (ref 80–99)
MONOCYTES # BLD: 1.1 K/UL (ref 0–1)
MONOCYTES NFR BLD: 11 % (ref 5–13)
NEUTS SEG # BLD: 7.3 K/UL (ref 1.8–8)
NEUTS SEG NFR BLD: 72 % (ref 32–75)
NITRITE UR QL STRIP.AUTO: NEGATIVE
NRBC # BLD: 0 K/UL (ref 0–0.01)
NRBC BLD-RTO: 0 PER 100 WBC
P-R INTERVAL, ECG05: 170 MS
PH UR STRIP: 7.5 [PH] (ref 5–8)
PLATELET # BLD AUTO: 191 K/UL (ref 150–400)
PMV BLD AUTO: 11.1 FL (ref 8.9–12.9)
POTASSIUM SERPL-SCNC: 4.1 MMOL/L (ref 3.5–5.1)
PROT SERPL-MCNC: 7.6 G/DL (ref 6.4–8.2)
PROT UR STRIP-MCNC: NEGATIVE MG/DL
Q-T INTERVAL, ECG07: 354 MS
QRS DURATION, ECG06: 68 MS
QTC CALCULATION (BEZET), ECG08: 403 MS
RBC # BLD AUTO: 4.46 M/UL (ref 3.8–5.2)
RBC #/AREA URNS HPF: ABNORMAL /HPF (ref 0–5)
SODIUM SERPL-SCNC: 135 MMOL/L (ref 136–145)
SP GR UR REFRACTOMETRY: 1.01 (ref 1–1.03)
TROPONIN I SERPL-MCNC: <0.05 NG/ML
UA: UC IF INDICATED,UAUC: ABNORMAL
UROBILINOGEN UR QL STRIP.AUTO: 0.2 EU/DL (ref 0.2–1)
VENTRICULAR RATE, ECG03: 78 BPM
WBC # BLD AUTO: 10.1 K/UL (ref 3.6–11)
WBC URNS QL MICRO: ABNORMAL /HPF (ref 0–4)

## 2020-07-07 PROCEDURE — 96361 HYDRATE IV INFUSION ADD-ON: CPT

## 2020-07-07 PROCEDURE — 73700 CT LOWER EXTREMITY W/O DYE: CPT

## 2020-07-07 PROCEDURE — 72170 X-RAY EXAM OF PELVIS: CPT

## 2020-07-07 PROCEDURE — 87086 URINE CULTURE/COLONY COUNT: CPT

## 2020-07-07 PROCEDURE — 75810000293 HC SIMP/SUPERF WND  RPR

## 2020-07-07 PROCEDURE — 80053 COMPREHEN METABOLIC PANEL: CPT

## 2020-07-07 PROCEDURE — 74011250636 HC RX REV CODE- 250/636: Performed by: EMERGENCY MEDICINE

## 2020-07-07 PROCEDURE — 85025 COMPLETE CBC W/AUTO DIFF WBC: CPT

## 2020-07-07 PROCEDURE — 93005 ELECTROCARDIOGRAM TRACING: CPT

## 2020-07-07 PROCEDURE — 83735 ASSAY OF MAGNESIUM: CPT

## 2020-07-07 PROCEDURE — 73610 X-RAY EXAM OF ANKLE: CPT

## 2020-07-07 PROCEDURE — 81001 URINALYSIS AUTO W/SCOPE: CPT

## 2020-07-07 PROCEDURE — 99285 EMERGENCY DEPT VISIT HI MDM: CPT

## 2020-07-07 PROCEDURE — 96360 HYDRATION IV INFUSION INIT: CPT

## 2020-07-07 PROCEDURE — 84484 ASSAY OF TROPONIN QUANT: CPT

## 2020-07-07 PROCEDURE — 36415 COLL VENOUS BLD VENIPUNCTURE: CPT

## 2020-07-07 RX ORDER — ERYTHROMYCIN 5 MG/G
0.1 OINTMENT OPHTHALMIC EVERY 6 HOURS
Qty: 2 TUBE | Refills: 0 | Status: SHIPPED | OUTPATIENT
Start: 2020-07-07 | End: 2020-07-12

## 2020-07-07 RX ADMIN — SODIUM CHLORIDE 500 ML: 900 INJECTION, SOLUTION INTRAVENOUS at 12:47

## 2020-07-07 RX ADMIN — SODIUM CHLORIDE 1000 ML: 900 INJECTION, SOLUTION INTRAVENOUS at 11:05

## 2020-07-07 NOTE — ED TRIAGE NOTES
Pt presents to ED from home after GLF at home when she became dizzy while on the way to the restroom. Pt reports hitting back of head on bathroom floor. Denies LOC or blood thinners. Pt obtained small laceration to right dorsal aspect of hand.  Pt reports 4 falls this year and decreased appetite with 15 lbs of weight loss

## 2020-07-07 NOTE — PROGRESS NOTES
CECILY: Home Health order submitted to [facility] (has accepted)    in ED room and will transport home at discharge   No other CM needs identified at this time and patient ready for discharge from a CM standpoint      CM  received phone call and CM consult from attending physician indicating that patient will need HH evaluation in the home due to patient having decreased strength, change in mobility that has lead to new rolling walker use and decline in overall functioning. CM spoke with patient and  in the ED room to discuss past [facility] experience and freedom of choice for [facility] agency to come to the home for evaluation.  states that he has had [facility] in the past but does not remember which agency. They do not have a preference for a [facility] agency and open to using [facility] if they can accept and service their home area. Consult for [facility] Eval and Treat RN/PT/OT/SW and Care Aide sent to [facility] for review and waiting for information on acceptance. Francis Ellis, [address] ED [facility]  Advanced Steps ACP Facilitator  Zone Phone: [phone]      Mobile: [phone]

## 2020-07-07 NOTE — ED PROVIDER NOTES
EMERGENCY DEPARTMENT HISTORY AND PHYSICAL EXAM      Date: 7/7/2020  Patient Name: Manny Ayala    History of Presenting Illness     Chief Complaint   Patient presents with   [facility] Laceration       History Provided By: Patient    HPI: Manny Ayala, 78 y.o. female presents to the ED with history of hypertension, acid reflux, high cholesterol, chronic kidney disease and decreased p.o. intake secondary to globus sensation the patient has had chronically, has seen GI once already here with fall, her  says her fifth with concern that she has not been able to eat and drink enough. She said she did not hit her head or lose consciousness but that she fell when she got up to use the bathroom. Denies any chest pain, shortness of breath or any other medical concern prior to fall and she felt well when going to bed last night. She was seen by Dr. Reta Vang in January of this year for colonoscopy and endoscopy and had some gastritis changes. She says she is not currently having any pain anywhere including her head arms or legs. There are no other complaints, changes, or physical findings at this time. PCP: Misha Junior MD    No current facility-administered medications on file prior to encounter. Current Outpatient Medications on File Prior to Encounter   Medication Sig Dispense Refill    loratadine (Claritin) 10 mg tablet Take 10 mg by mouth.  lidocaine (Lidoderm) 5 % Apply patch to the affected area for 12 hours a day and remove for 12 hours a day. 5 Each 0    predniSONE (STERAPRED DS) 10 mg dose pack Take as prescribed 21 Tab 0    gabapentin (NEURONTIN) 100 mg capsule Take 3 Caps by mouth two (2) times a day. Max Daily Amount: 600 mg. 60 Cap 2    HYDROcodone-acetaminophen (NORCO) 5-325 mg per tablet Take 1 Tab by mouth every eight (8) hours as needed for Pain for up to 30 days. Max Daily Amount: 3 Tabs.  12 Tab 0    ondansetron (ZOFRAN ODT) 4 mg disintegrating tablet Take 1 Tab by mouth every eight (8) hours as needed for Nausea or Vomiting. 20 Tab 0    famotidine (PEPCID) 20 mg tablet Take 1 Tab by mouth two (2) times a day. Indications: gastroesophageal reflux disease 180 Tab 3    metoprolol succinate (TOPROL-XL) 50 mg XL tablet Take 1 Tab by mouth daily. 90 Tab 3    diclofenac (VOLTAREN) 1 % gel Apply 2 g to affected area four (4) times daily. (Patient taking differently: Apply 2 g to affected area as needed.) 100 g 3    polyethylene glycol (MIRALAX) 17 gram/dose powder Take 17 g by mouth as needed.  timolol (TIMOPTIC) 0.5 % ophthalmic solution Administer 1 Drop to left eye daily.  ACETAMINOPHEN (TYLENOL PO) Take 650 mg by mouth as needed. Indications: two tabs      latanoprost (XALATAN) 0.005 % ophthalmic solution Administer 1 Drop to both eyes nightly.  Aspirin, Buffered 81 mg tab Take 1 Tab by mouth daily.          Past History     Past Medical History:  Past Medical History:   Diagnosis Date    AIN (acute interstitial nephritis)     Arthritis     CKD (chronic kidney disease) stage 3, GFR 30-59 ml/min ([facility])     GERD (gastroesophageal reflux disease)     Glaucoma     High cholesterol     Hypertension     Ill-defined condition     chronic cystitis       Past Surgical History:  Past Surgical History:   Procedure Laterality Date    COLONOSCOPY N/A 2020    COLONOSCOPY/EGD performed by [facility]ney [facility] at [facility] ENDOSCOPY    HX APPENDECTOMY      with hysterectomy    HX  SECTION      HX COLONOSCOPY      HX DILATION AND CURETTAGE      HX HYSTERECTOMY      HX RENAL BIOPSY Left     in office    HX TONSILLECTOMY      HX UROLOGICAL  2016    cauterization of bladder ulcers       Family History:  Family History   Problem Relation Age of Onset    Cancer Father     Cancer Brother     Breast Cancer Cousin     Arthritis-osteo Mother        Social History:  Social History     Tobacco Use    Smoking status: Former Smoker     Packs/day: 0.50 Years: 4.00     Pack years: 2.00     Last attempt to quit: 1997     Years since quittin.9    Smokeless tobacco: Former User   Substance Use Topics    Alcohol use: Yes     Comment: seldom    Drug use: Yes     Types: Prescription, OTC     Comment: never recreational substance       Allergies: Allergies   Allergen Reactions    Aleve [Naproxen Sodium] Other (comments)     Renal issues    Cefuroxime Other (comments)     Kidney failure    Ibuprofen Unknown (comments)    Lisinopril Other (comments)     Renal dysfunction    Omeprazole Other (comments)     Cause Kidney failure         Review of Systems   Review of Systems   Constitutional: Positive for appetite change. Negative for activity change, chills, diaphoresis, fatigue, fever and unexpected weight change. HENT: Negative. Respiratory: Negative for chest tightness and shortness of breath. Cardiovascular: Negative for chest pain and leg swelling. Gastrointestinal: Negative for abdominal pain. Genitourinary: Negative for difficulty urinating, frequency and urgency. Musculoskeletal: Negative for back pain and neck pain. Neurological: Negative for dizziness, seizures, syncope and light-headedness. Hematological: Negative for adenopathy. All other systems reviewed and are negative. Physical Exam   Physical Exam   Vital signs and nursing notes reviewed    CONSTITUTIONAL: Alert, in mild distress; well-developed; well-nourished. Thin build, pleasant disposition. HEAD:  Normocephalic, atraumatic  EYES: PERRL; EOM's intact. Anicteric sclera. Left lower eyelid with crusty dried discharge and erythema, mild conjunctival irritation. ENTM: Nose: no rhinorrhea; Throat: no erythema or exudate, mucous membranes moist  Neck:  Supple. trachea is midline. C-spine tenderness on palpation. RESP: Chest clear, equal breath sounds. - W/R/R  CV: S1 and S2 WNL; No murmurs, gallops or rubs. 2+ radial and DP pulses bilaterally.   GI: non-distended, normal bowel sounds, abdomen soft and non-tender. No masses or organomegaly. Mild tenderness of the right pelvic area with no crepitus on downward and inward palpation. : No costo-vertebral angle tenderness. BACK:  Non-tender, normal appearance  UPPER EXT:  Normal inspection. no joint or soft tissue swelling except for 2.5 cm superficial linear laceration along the right hand overlying the second metatarsal.  No active bleeding. LOWER EXT: No edema, no calf tenderness. No shortening or rotation of the legs, able to lift both legs off the bed and patient does not have pain in the right hip area when lifting the right leg. Small abrasion along the right lateral medialmost which is slightly tender, distal PMS intact in the right lower extremity. NEURO: Alert and oriented x3, 5/5 strength and light touch sensation intact in bilateral upper and lower extremities. SKIN: No rashes;  Warm and dry  PSYCH: Normal mood, normal affect    Diagnostic Study Results     Labs -     Recent Results (from the past 12 hour(s))   EKG, 12 LEAD, INITIAL    Collection Time: 07/07/20  7:55 AM   Result Value Ref Range    Ventricular Rate 78 BPM    Atrial Rate 78 BPM    P-R Interval 170 ms    QRS Duration 68 ms    Q-T Interval 354 ms    QTC Calculation (Bezet) 403 ms    Calculated P Axis 53 degrees    Calculated R Axis 8 degrees    Calculated T Axis 60 degrees    Diagnosis       Normal sinus rhythm  Normal ECG  When compared with ECG of 21-OCT-2019 10:47,  No significant change was found     CBC WITH AUTOMATED DIFF    Collection Time: 07/07/20  8:39 AM   Result Value Ref Range    WBC 10.1 3.6 - 11.0 K/uL    RBC 4.46 3.80 - 5.20 M/uL    HGB 13.0 11.5 - 16.0 g/dL    HCT 40.9 35.0 - 47.0 %    MCV 91.7 80.0 - 99.0 FL    MCH 29.1 26.0 - 34.0 PG    MCHC 31.8 30.0 - 36.5 g/dL    RDW 15.9 (H) 11.5 - 14.5 %    PLATELET 523 808 - 023 K/uL    MPV 11.1 8.9 - 12.9 FL    NRBC 0.0 0  WBC    ABSOLUTE NRBC 0.00 0.00 - 0.01 K/uL    NEUTROPHILS 72 32 - 75 %    LYMPHOCYTES 13 12 - 49 %    MONOCYTES 11 5 - 13 %    EOSINOPHILS 3 0 - 7 %    BASOPHILS 0 0 - 1 %    IMMATURE GRANULOCYTES 1 (H) 0.0 - 0.5 %    ABS. NEUTROPHILS 7.3 1.8 - 8.0 K/UL    ABS. LYMPHOCYTES 1.3 0.8 - 3.5 K/UL    ABS. MONOCYTES 1.1 (H) 0.0 - 1.0 K/UL    ABS. EOSINOPHILS 0.3 0.0 - 0.4 K/UL    ABS. BASOPHILS 0.0 0.0 - 0.1 K/UL    ABS. IMM. GRANS. 0.1 (H) 0.00 - 0.04 K/UL    DF AUTOMATED     METABOLIC PANEL, COMPREHENSIVE    Collection Time: 07/07/20  8:39 AM   Result Value Ref Range    Sodium 135 (L) 136 - 145 mmol/L    Potassium 4.1 3.5 - 5.1 mmol/L    Chloride 103 97 - 108 mmol/L    CO2 27 21 - 32 mmol/L    Anion gap 5 5 - 15 mmol/L    Glucose 109 (H) 65 - 100 mg/dL    BUN 18 6 - 20 MG/DL    Creatinine 1.02 0.55 - 1.02 MG/DL    BUN/Creatinine ratio 18 12 - 20      GFR est AA >60 >60 ml/min/1.73m2    GFR est non-AA 52 (L) >60 ml/min/1.73m2    Calcium 9.3 8.5 - 10.1 MG/DL    Bilirubin, total 0.8 0.2 - 1.0 MG/DL    ALT (SGPT) 13 12 - 78 U/L    AST (SGOT) 8 (L) 15 - 37 U/L    Alk.  phosphatase 69 45 - 117 U/L    Protein, total 7.6 6.4 - 8.2 g/dL    Albumin 3.1 (L) 3.5 - 5.0 g/dL    Globulin 4.5 (H) 2.0 - 4.0 g/dL    A-G Ratio 0.7 (L) 1.1 - 2.2     TROPONIN I    Collection Time: 07/07/20  8:39 AM   Result Value Ref Range    Troponin-I, Qt. <0.05 <0.05 ng/mL   MAGNESIUM    Collection Time: 07/07/20  8:39 AM   Result Value Ref Range    Magnesium 2.2 1.6 - 2.4 mg/dL   URINALYSIS W/ REFLEX CULTURE    Collection Time: 07/07/20  8:51 AM   Result Value Ref Range    Color YELLOW/STRAW      Appearance CLEAR CLEAR      Specific gravity 1.011 1.003 - 1.030      pH (UA) 7.5 5.0 - 8.0      Protein Negative NEG mg/dL    Glucose Negative NEG mg/dL    Ketone Negative NEG mg/dL    Bilirubin Negative NEG      Blood Negative NEG      Urobilinogen 0.2 0.2 - 1.0 EU/dL    Nitrites Negative NEG      Leukocyte Esterase MODERATE (A) NEG      WBC 10-20 0 - 4 /hpf    RBC 0-5 0 - 5 /hpf    Epithelial cells FEW FEW /lpf Bacteria Negative NEG /hpf    UA:UC IF INDICATED URINE CULTURE ORDERED (A) CNI      Hyaline cast 0-2 0 - 5 /lpf       Radiologic Studies -   XR PELV AP ONLY   Final Result   IMPRESSION:  No fracture is identified. XR ANKLE RT MIN 3 V   Final Result   IMPRESSION: The lateral malleolus is within normal limits. There is questionable lucency through the anterior process of the calcaneus and   correlation would be helpful to assess for nondisplaced fracture. CT Results  (Last 48 hours)    None        CXR Results  (Last 48 hours)    None          Medical Decision Making   I am the first provider for this patient. I reviewed the vital signs, available nursing notes, past medical history, past surgical history, family history and social history. Vital Signs-Reviewed the patient's vital signs. Patient Vitals for the past 12 hrs:   Temp Pulse Resp BP SpO2   07/07/20 1032  (!) 111  104/68    07/07/20 1000  76 20 119/56 96 %   07/07/20 0916  84 13 138/85 98 %   07/07/20 0900  84 20 129/75 93 %   07/07/20 0830  80 23 123/62 94 %   07/07/20 0815  79 18 152/68 98 %   07/07/20 0800  81 19 131/61 99 %   07/07/20 0755 98.9 °F (37.2 °C) 78 16 130/69 98 %       EKG interpretation: (Preliminary)  EKG is performed at 7:55 AM, as interpreted by me shows a sinus rhythm at a rate of 78, normal axis, normal intervals, no visible acute ischemic changes. Records Reviewed: Nursing Notes, Old Medical Records, Previous electrocardiograms, Previous Radiology Studies and Previous Laboratory Studies    Provider Notes (Medical Decision Making):   78-year-old female status post fall in the setting of some decline at home secondary to less eating and drinking due to globus sensation. No acute respiratory distress today, stable vital signs, reassuring right ankle and pelvis, very low suspicion for pelvic fracture after fall.   No UTI but urine culture sent, reassuring EKG, patient lightheaded with standing with nurse suggesting dehydration. Plan for hydration here    ED Course:   Initial assessment performed. The patients presenting problems have been discussed, and they are in agreement with the care plan formulated and outlined with them. I have encouraged them to ask questions as they arise throughout their visit. ED Course as of Jul 09 0630 Tue Jul 07, 2020   1251 Patient had orthostatics done and she was still slightly orthostatic with her blood pressure going from 515 systolic on supine to 438 systolic when standing. She did not get lightheaded with this however. Giving 500more ns. Was able to ambulate with a walker but had severe pain in her right heel. In reviewing patient's foot x-ray, there was a lucency around her calcaneus so there could be a calcaneal fracture here. Thus will get a CT of her calcaneus. [JS]   1400 CT came back negative for acute fracture. She did well with a walker so we will write her a written prescription for a walker. [JS]      ED Course User Index  [JS] Pasco Claude, MD       Procedure Note - Laceration Repair:  11:48 AM  Procedure by Alexi Marinelli MD    Complexity: Simple  2.5cm linear laceration to hand  was irrigated copiously with NS under jet lavage, prepped with Normal saline and draped in a sterile fashion. The wound was explored with the following results: No foreign bodies found. The wound was repaired with Dermabond. The wound was closed with good hemostasis and approximation. Sterile dressing applied. 4 inch Steri-Strips were applied overlying the Dermabond glue. Estimated blood loss: , 1cc  The procedure took 1-15 minutes, and pt tolerated well. Disposition:  Discharge    Discharge Note:  2.47pm  The pt is ready for discharge. The pt's signs, symptoms, diagnosis, and discharge instructions have been discussed and pt has conveyed their understanding. The pt is to follow up as recommended or return to ER should their symptoms worsen. Plan has been discussed and pt is in agreement. DISCHARGE PLAN:  1. Current Discharge Medication List        2. Follow-up Information    None please help with Dr. Ethan Diaz in the next 3 days for a follow-up after today's fall. A home health assessment has been set up for you. A skin adhesive applied to your hand should stay dry for the next 24 hours. It, along with the Steri-Strips will fall off in the next 7 to 10 days. 3.  Return to ED if worse     Diagnosis     Clinical Impression:   1. Dehydration    2. Skin tear of right hand without complication, initial encounter    3. Abrasion of right ankle, initial encounter    4. Contusion of right hip, initial encounter    5. Fall, initial encounter        Attestations:    Fabio Muro MD    Please note that this dictation was completed with [employer], the computer voice recognition software. Quite often unanticipated grammatical, syntax, homophones, and other interpretive errors are inadvertently transcribed by the computer software. Please disregard these errors. Please excuse any errors that have escaped final proofreading. Thank you.

## 2020-07-07 NOTE — DISCHARGE INSTRUCTIONS
Patient Education        Cuts Closed With Adhesives: Care Instructions  Your Care Instructions  A cut can happen anywhere on your body. The doctor used an adhesive to close the cut. When the adhesive dries, it forms a film that holds the edges of the cut together. Skin adhesives are sometimes called liquid stitches. If the cut went deep and through the skin, the doctor may have put in a layer of stitches below the adhesive. The deeper layer of stitches brings the deep part of the cut together. These stitches will dissolve and don't need to be removed. You don't see the stitches, only the adhesive. You may have a bandage. The doctor has checked you carefully, but problems can develop later. If you notice any problems or new symptoms, get medical treatment right away. Follow-up care is a key part of your treatment and safety. Be sure to make and go to all appointments, and call your doctor if you are having problems. It's also a good idea to know your test results and keep a list of the medicines you take. How can you care for yourself at home? · Keep the cut dry for the first 24 to 48 hours. After this, you can shower if your doctor okays it. Pat the cut dry. · Don't soak the cut, such as in a bathtub. Your doctor will tell you when it's safe to get the cut wet. · If your doctor told you how to care for your cut, follow your doctor's instructions. If you did not get instructions, follow this general advice:  ? Do not put any kind of ointment, cream, or lotion over the area. This can make the adhesive fall off too soon. ? After the first 24 to 48 hours, wash around the cut with clean water 2 times a day. Do not use hydrogen peroxide or alcohol, which can slow healing. ? If the doctor told you to use a bandage, put on a new bandage after cleaning the cut or if the bandage gets wet or dirty. · Prop up the sore area on a pillow anytime you sit or lie down during the next 3 days.  Try to keep it above the level of your heart. This will help reduce swelling. · Leave the skin adhesive on your skin until it falls off on its own. This may take 5 to 10 days. · Do not scratch, rub, or pick at the adhesive. · Do not put the sticky part of a bandage directly on the adhesive. · Avoid any activity that could cause your cut to reopen. · Be safe with medicines. Read and follow all instructions on the label. ? If the doctor gave you a prescription medicine for pain, take it as prescribed. ? If you are not taking a prescription pain medicine, ask your doctor if you can take an over-the-counter medicine. When should you call for help? Call your doctor now or seek immediate medical care if:  · You have new pain, or your pain gets worse. · The skin near the cut is cold or pale or changes color. · You have tingling, weakness, or numbness near the cut. · The cut starts to bleed. · You have trouble moving the area near the cut. · You have symptoms of infection, such as:  ? Increased pain, swelling, warmth, or redness around the cut.  ? Red streaks leading from the cut.  ? Pus draining from the cut.  ? A fever. Watch closely for changes in your health, and be sure to contact your doctor if:  · The cut reopens. · You do not get better as expected. Where can you learn more? Go to http://www.gray.com/  Enter P174 in the search box to learn more about \"Cuts Closed With Adhesives: Care Instructions. \"  Current as of: June 26, 2019               Content Version: 12.5  © 4610-3367 [employer]. Care instructions adapted under license by [employer] (which disclaims liability or warranty for this information). If you have questions about a medical condition or this instruction, always ask your healthcare professional. [address] any warranty or liability for your use of this information.

## 2020-07-08 ENCOUNTER — PATIENT OUTREACH (OUTPATIENT)
Dept: CASE MANAGEMENT | Age: 80
End: 2020-07-08

## 2020-07-08 ENCOUNTER — HOME CARE VISIT (OUTPATIENT)
Dept: SCHEDULING | Facility: HOME HEALTH | Age: 80
End: 2020-07-08
Payer: MEDICARE

## 2020-07-08 ENCOUNTER — HOME CARE VISIT (OUTPATIENT)
Dept: HOME HEALTH SERVICES | Facility: HOME HEALTH | Age: 80
End: 2020-07-08
Payer: MEDICARE

## 2020-07-08 VITALS
OXYGEN SATURATION: 98 % | HEART RATE: 77 BPM | TEMPERATURE: 97.3 F | RESPIRATION RATE: 18 BRPM | DIASTOLIC BLOOD PRESSURE: 70 MMHG | SYSTOLIC BLOOD PRESSURE: 120 MMHG

## 2020-07-08 LAB
BACTERIA SPEC CULT: NORMAL
SERVICE CMNT-IMP: NORMAL

## 2020-07-08 PROCEDURE — 3331090002 HH PPS REVENUE DEBIT

## 2020-07-08 PROCEDURE — 3331090001 HH PPS REVENUE CREDIT

## 2020-07-08 PROCEDURE — G0151 HHCP-SERV OF PT,EA 15 MIN: HCPCS

## 2020-07-08 PROCEDURE — 400013 HH SOC

## 2020-07-09 ENCOUNTER — PATIENT OUTREACH (OUTPATIENT)
Dept: CASE MANAGEMENT | Age: 80
End: 2020-07-09

## 2020-07-09 ENCOUNTER — HOME CARE VISIT (OUTPATIENT)
Dept: HOME HEALTH SERVICES | Facility: HOME HEALTH | Age: 80
End: 2020-07-09
Payer: MEDICARE

## 2020-07-09 ENCOUNTER — APPOINTMENT (OUTPATIENT)
Dept: CT IMAGING | Age: 80
End: 2020-07-09
Attending: EMERGENCY MEDICINE
Payer: MEDICARE

## 2020-07-09 ENCOUNTER — TELEPHONE (OUTPATIENT)
Dept: INTERNAL MEDICINE CLINIC | Age: 80
End: 2020-07-09

## 2020-07-09 ENCOUNTER — HOME CARE VISIT (OUTPATIENT)
Dept: SCHEDULING | Facility: HOME HEALTH | Age: 80
End: 2020-07-09
Payer: MEDICARE

## 2020-07-09 ENCOUNTER — APPOINTMENT (OUTPATIENT)
Dept: GENERAL RADIOLOGY | Age: 80
End: 2020-07-09
Attending: EMERGENCY MEDICINE
Payer: MEDICARE

## 2020-07-09 ENCOUNTER — HOSPITAL ENCOUNTER (EMERGENCY)
Age: 80
Discharge: HOME | End: 2020-07-09
Attending: EMERGENCY MEDICINE
Payer: MEDICARE

## 2020-07-09 VITALS
BODY MASS INDEX: 17.84 KG/M2 | SYSTOLIC BLOOD PRESSURE: 148 MMHG | HEART RATE: 93 BPM | DIASTOLIC BLOOD PRESSURE: 70 MMHG | OXYGEN SATURATION: 99 % | TEMPERATURE: 100 F | HEIGHT: 58 IN | WEIGHT: 85 LBS | RESPIRATION RATE: 17 BRPM

## 2020-07-09 DIAGNOSIS — R63.0 ANOREXIA: ICD-10-CM

## 2020-07-09 DIAGNOSIS — R53.1 WEAKNESS: Primary | ICD-10-CM

## 2020-07-09 LAB
ALBUMIN SERPL-MCNC: 2.3 G/DL (ref 3.5–5)
ALBUMIN/GLOB SERPL: 0.5 {RATIO} (ref 1.1–2.2)
ALP SERPL-CCNC: 69 U/L (ref 45–117)
ALT SERPL-CCNC: 17 U/L (ref 12–78)
ANION GAP SERPL CALC-SCNC: 7 MMOL/L (ref 5–15)
APPEARANCE UR: CLEAR
AST SERPL-CCNC: 15 U/L (ref 15–37)
ATRIAL RATE: 92 BPM
BACTERIA URNS QL MICRO: NEGATIVE /HPF
BASOPHILS # BLD: 0 K/UL (ref 0–0.1)
BASOPHILS NFR BLD: 0 % (ref 0–1)
BILIRUB SERPL-MCNC: 0.5 MG/DL (ref 0.2–1)
BILIRUB UR QL: NEGATIVE
BUN SERPL-MCNC: 14 MG/DL (ref 6–20)
BUN/CREAT SERPL: 16 (ref 12–20)
CALCIUM SERPL-MCNC: 8.3 MG/DL (ref 8.5–10.1)
CALCULATED P AXIS, ECG09: 25 DEGREES
CALCULATED R AXIS, ECG10: -12 DEGREES
CALCULATED T AXIS, ECG11: 45 DEGREES
CHLORIDE SERPL-SCNC: 104 MMOL/L (ref 97–108)
CK SERPL-CCNC: 17 U/L (ref 26–192)
CO2 SERPL-SCNC: 23 MMOL/L (ref 21–32)
COLOR UR: ABNORMAL
CREAT SERPL-MCNC: 0.9 MG/DL (ref 0.55–1.02)
DIAGNOSIS, 93000: NORMAL
DIFFERENTIAL METHOD BLD: ABNORMAL
EOSINOPHIL # BLD: 0.3 K/UL (ref 0–0.4)
EOSINOPHIL NFR BLD: 4 % (ref 0–7)
EPITH CASTS URNS QL MICRO: ABNORMAL /LPF
ERYTHROCYTE [DISTWIDTH] IN BLOOD BY AUTOMATED COUNT: 15.8 % (ref 11.5–14.5)
GLOBULIN SER CALC-MCNC: 4.2 G/DL (ref 2–4)
GLUCOSE SERPL-MCNC: 100 MG/DL (ref 65–100)
GLUCOSE UR STRIP.AUTO-MCNC: NEGATIVE MG/DL
HCT VFR BLD AUTO: 34 % (ref 35–47)
HEMOCCULT STL QL: NEGATIVE
HGB BLD-MCNC: 10.7 G/DL (ref 11.5–16)
HGB UR QL STRIP: ABNORMAL
HYALINE CASTS URNS QL MICRO: ABNORMAL /LPF (ref 0–5)
IMM GRANULOCYTES # BLD AUTO: 0.1 K/UL (ref 0–0.04)
IMM GRANULOCYTES NFR BLD AUTO: 1 % (ref 0–0.5)
KETONES UR QL STRIP.AUTO: NEGATIVE MG/DL
LEUKOCYTE ESTERASE UR QL STRIP.AUTO: NEGATIVE
LYMPHOCYTES # BLD: 0.9 K/UL (ref 0.8–3.5)
LYMPHOCYTES NFR BLD: 11 % (ref 12–49)
MAGNESIUM SERPL-MCNC: 1.9 MG/DL (ref 1.6–2.4)
MCH RBC QN AUTO: 29.9 PG (ref 26–34)
MCHC RBC AUTO-ENTMCNC: 31.5 G/DL (ref 30–36.5)
MCV RBC AUTO: 95 FL (ref 80–99)
MONOCYTES # BLD: 0.8 K/UL (ref 0–1)
MONOCYTES NFR BLD: 10 % (ref 5–13)
NEUTS SEG # BLD: 6.3 K/UL (ref 1.8–8)
NEUTS SEG NFR BLD: 74 % (ref 32–75)
NITRITE UR QL STRIP.AUTO: NEGATIVE
NRBC # BLD: 0 K/UL (ref 0–0.01)
NRBC BLD-RTO: 0 PER 100 WBC
P-R INTERVAL, ECG05: 170 MS
PH UR STRIP: 7.5 [PH] (ref 5–8)
PHOSPHATE SERPL-MCNC: 2.5 MG/DL (ref 2.6–4.7)
PLATELET # BLD AUTO: 151 K/UL (ref 150–400)
PMV BLD AUTO: 11.2 FL (ref 8.9–12.9)
POTASSIUM SERPL-SCNC: 3.9 MMOL/L (ref 3.5–5.1)
PROT SERPL-MCNC: 6.5 G/DL (ref 6.4–8.2)
PROT UR STRIP-MCNC: ABNORMAL MG/DL
Q-T INTERVAL, ECG07: 320 MS
QRS DURATION, ECG06: 68 MS
QTC CALCULATION (BEZET), ECG08: 395 MS
RBC # BLD AUTO: 3.58 M/UL (ref 3.8–5.2)
RBC #/AREA URNS HPF: ABNORMAL /HPF (ref 0–5)
SODIUM SERPL-SCNC: 134 MMOL/L (ref 136–145)
SP GR UR REFRACTOMETRY: 1.01 (ref 1–1.03)
TROPONIN I SERPL-MCNC: <0.05 NG/ML
TSH SERPL DL<=0.05 MIU/L-ACNC: 1.14 UIU/ML (ref 0.36–3.74)
UA: UC IF INDICATED,UAUC: ABNORMAL
UROBILINOGEN UR QL STRIP.AUTO: 1 EU/DL (ref 0.2–1)
VENTRICULAR RATE, ECG03: 92 BPM
WBC # BLD AUTO: 8.3 K/UL (ref 3.6–11)
WBC URNS QL MICRO: ABNORMAL /HPF (ref 0–4)

## 2020-07-09 PROCEDURE — 36415 COLL VENOUS BLD VENIPUNCTURE: CPT

## 2020-07-09 PROCEDURE — 74011250636 HC RX REV CODE- 250/636: Performed by: EMERGENCY MEDICINE

## 2020-07-09 PROCEDURE — 81001 URINALYSIS AUTO W/SCOPE: CPT

## 2020-07-09 PROCEDURE — 85025 COMPLETE CBC W/AUTO DIFF WBC: CPT

## 2020-07-09 PROCEDURE — 99285 EMERGENCY DEPT VISIT HI MDM: CPT

## 2020-07-09 PROCEDURE — 3331090002 HH PPS REVENUE DEBIT

## 2020-07-09 PROCEDURE — 80053 COMPREHEN METABOLIC PANEL: CPT

## 2020-07-09 PROCEDURE — 82550 ASSAY OF CK (CPK): CPT

## 2020-07-09 PROCEDURE — 74176 CT ABD & PELVIS W/O CONTRAST: CPT

## 2020-07-09 PROCEDURE — 83735 ASSAY OF MAGNESIUM: CPT

## 2020-07-09 PROCEDURE — 84484 ASSAY OF TROPONIN QUANT: CPT

## 2020-07-09 PROCEDURE — 71046 X-RAY EXAM CHEST 2 VIEWS: CPT

## 2020-07-09 PROCEDURE — 93005 ELECTROCARDIOGRAM TRACING: CPT

## 2020-07-09 PROCEDURE — 70450 CT HEAD/BRAIN W/O DYE: CPT

## 2020-07-09 PROCEDURE — 84443 ASSAY THYROID STIM HORMONE: CPT

## 2020-07-09 PROCEDURE — G0299 HHS/HOSPICE OF RN EA 15 MIN: HCPCS

## 2020-07-09 PROCEDURE — 87635 SARS-COV-2 COVID-19 AMP PRB: CPT

## 2020-07-09 PROCEDURE — 3331090001 HH PPS REVENUE CREDIT

## 2020-07-09 PROCEDURE — 82272 OCCULT BLD FECES 1-3 TESTS: CPT

## 2020-07-09 PROCEDURE — 84100 ASSAY OF PHOSPHORUS: CPT

## 2020-07-09 RX ORDER — SODIUM CHLORIDE 0.9 % (FLUSH) 0.9 %
10 SYRINGE (ML) INJECTION
Status: DISCONTINUED | OUTPATIENT
Start: 2020-07-09 | End: 2020-07-09

## 2020-07-09 RX ADMIN — SODIUM CHLORIDE 500 ML: 900 INJECTION, SOLUTION INTRAVENOUS at 17:21

## 2020-07-09 NOTE — TELEPHONE ENCOUNTER
#[phone] Brandon Ledesma is reporting that pt has depression everyday and feels hopeless. Pt is not eating well at all.

## 2020-07-09 NOTE — ED NOTES
Bedside and Verbal shift change report given to Mónica Katz RN (oncoming nurse) by Leticia Bello RN (offgoing nurse). Report included the following information SBAR, ED Summary, MAR and Recent Results.

## 2020-07-09 NOTE — TELEPHONE ENCOUNTER
Spoke with [facility]. Marge Das is reporting that patient has a temperature of 101, dehydrated,and been has depression everyday and feels hopeless. Marge Das advised to have patient go to ER because of dehydration and fever.

## 2020-07-10 ENCOUNTER — PATIENT OUTREACH (OUTPATIENT)
Dept: CASE MANAGEMENT | Age: 80
End: 2020-07-10

## 2020-07-10 ENCOUNTER — VIRTUAL VISIT (OUTPATIENT)
Dept: INTERNAL MEDICINE CLINIC | Age: 80
End: 2020-07-10

## 2020-07-10 ENCOUNTER — HOME CARE VISIT (OUTPATIENT)
Dept: HOME HEALTH SERVICES | Facility: HOME HEALTH | Age: 80
End: 2020-07-10
Payer: MEDICARE

## 2020-07-10 ENCOUNTER — HOME CARE VISIT (OUTPATIENT)
Dept: SCHEDULING | Facility: HOME HEALTH | Age: 80
End: 2020-07-10
Payer: MEDICARE

## 2020-07-10 DIAGNOSIS — R63.0 LOSS OF APPETITE: Primary | ICD-10-CM

## 2020-07-10 DIAGNOSIS — F32.A DEPRESSION, UNSPECIFIED DEPRESSION TYPE: ICD-10-CM

## 2020-07-10 LAB
SARS-COV-2, COV2: NOT DETECTED
SOURCE, COVRS: NORMAL
SPECIMEN SOURCE, FCOV2M: NORMAL

## 2020-07-10 PROCEDURE — 3331090001 HH PPS REVENUE CREDIT

## 2020-07-10 PROCEDURE — 3331090002 HH PPS REVENUE DEBIT

## 2020-07-10 PROCEDURE — G0151 HHCP-SERV OF PT,EA 15 MIN: HCPCS

## 2020-07-10 RX ORDER — MIRTAZAPINE 7.5 MG/1
7.5 TABLET, FILM COATED ORAL
Qty: 30 TAB | Refills: 1 | Status: SHIPPED | OUTPATIENT
Start: 2020-07-10 | End: 2021-03-16

## 2020-07-10 NOTE — DISCHARGE INSTRUCTIONS
Patient Education        Anorexia: Care Instructions  Your Care Instructions     Anorexia is a type of eating disorder. People who have anorexia don't eat enough to stay at a normal weight. Sometimes they also exercise too much. They do these things because they're so afraid of gaining weight. They believe that they're fat, even when they're thin. Often they think that losing even more weight will solve their problems and make their lives better. If you have anorexia, it can really harm your health. This is because your body doesn't get the nutrition it needs. The first step to controlling the problem is admitting that something is wrong. Then counseling can help you change how you think about food, the way you see your body, and any other emotional issues. It may take months or years, but you can recover from anorexia. Follow-up care is a key part of your treatment and safety. Be sure to make and go to all appointments, and call your doctor if you are having problems. It's also a good idea to know your test results and keep a list of the medicines you take. How can you care for yourself at home? Follow your treatment plan  · Go to your counseling sessions. Call or talk with your counselor if you can't go or if you think the sessions aren't helping. Don't just stop going. · Take your medicines exactly as prescribed. Call your doctor if you think you are having a problem with your medicine. You will get more details on the specific medicines your doctor prescribes. Trust people who are helping you  · Listen to what counselors and nutrition experts say about healthy eating. · Learn about what is included in a balanced diet. Then discuss what you learn. · Let people know how you are feeling. Listen to how they are feeling too. · Accept support and feedback from other people. Learn to be easier on yourself  · Learn to focus on your good qualities. · If your body feels weak, slow down and do less.   · Remind yourself that feeling bad about yourself is all part of your disorder. · Remember that it takes time to recover from anorexia. · Spend time with other people doing things you enjoy. · Try to focus on one goal at a time. · Don't blame yourself for your disorder. Develop healthy eating habits  · With your doctor and counselor, you will decide on a good weight for you. You will also decide how much weight you will gain each week. · Try not to argue with the people you eat with. Arguing increases stress. And stress can make make it harder for you to relax and eat. · Talk with other people during meals about things that interest you. Don't talk about food or gaining weight. Caring for a loved one with anorexia  · Remind yourself that anorexia is a long-lasting disorder. It takes time for changes to occur. · Show love and support for your loved one, especially if he or she gets discouraged. · Support your loved one as he or she goes through the steps of recovery. Focus on wellness. Don't focus on food. · Take care of yourself. Continue with your own interests, career, hobbies, and friends. · Don't blame yourself or look for the reason for the disorder. · If you are having a hard time, talk with a counselor. · Learn what to do if your loved one relapses. When should you call for help? [id number] anytime you think you may need emergency care. For example, call if:  · A person with anorexia seems depressed and is talking about suicide. If the talk about suicide seems real, stay with the person, or ask someone you trust to stay with the person, until you get emergency help. · You have a rapid or irregular heartbeat. · You passed out (lost consciousness). Call your doctor now or seek immediate medical care if:  · You feel hopeless or have thoughts of hurting yourself. Watch closely for changes in your health, and be sure to contact your doctor if:  · You have trouble sleeping.   · You feel anxious or depressed. Where can you learn more? Go to http://www.gray.com/  Enter J920 in the search box to learn more about \"Anorexia: Care Instructions. \"  Current as of: January 31, 2020               Content Version: 12.5  © 2006-2020 Healthwise, Incorporated. Care instructions adapted under license by [employer] (which disclaims liability or warranty for this information). If you have questions about a medical condition or this instruction, always ask your healthcare professional. [address] any warranty or liability for your use of this information. Patient Education        Fatigue: Care Instructions  Your Care Instructions     Fatigue is a feeling of tiredness, exhaustion, or lack of energy. You may feel fatigue because of too much or not enough activity. It can also come from stress, lack of sleep, boredom, and poor diet. Many medical problems, such as viral infections, can cause fatigue. Emotional problems, especially depression, are often the cause of fatigue. Fatigue is most often a symptom of another problem. Treatment for fatigue depends on the cause. For example, if you have fatigue because you have a certain health problem, treating this problem also treats your fatigue. If depression or anxiety is the cause, treatment may help. Follow-up care is a key part of your treatment and safety. Be sure to make and go to all appointments, and call your doctor if you are having problems. It's also a good idea to know your test results and keep a list of the medicines you take. How can you care for yourself at home? · Get regular exercise. But don't overdo it. Go back and forth between rest and exercise. · Get plenty of rest.  · Eat a healthy diet. Do not skip meals, especially breakfast.  · Reduce your use of caffeine, tobacco, and alcohol. Caffeine is most often found in coffee, tea, cola drinks, and chocolate.   · Limit medicines that can cause fatigue. This includes tranquilizers and cold and allergy medicines. When should you call for help? Watch closely for changes in your health, and be sure to contact your doctor if:  · You have new symptoms such as fever or a rash. · Your fatigue gets worse. · You have been feeling down, depressed, or hopeless. Or you may have lost interest in things that you usually enjoy. · You are not getting better as expected. Where can you learn more? Go to http://www.gray.com/  Enter [id number] in the search box to learn more about \"Fatigue: Care Instructions. \"  Current as of: June 26, 2019               Content Version: 12.5  © 0502-0226 [employer]. Care instructions adapted under license by [employer] (which disclaims liability or warranty for this information). If you have questions about a medical condition or this instruction, always ask your healthcare professional. [place] [address] any warranty or liability for your use of this information. Patient Education        Fatigue: Care Instructions  Your Care Instructions     Fatigue is a feeling of tiredness, exhaustion, or lack of energy. You may feel fatigue because of too much or not enough activity. It can also come from stress, lack of sleep, boredom, and poor diet. Many medical problems, such as viral infections, can cause fatigue. Emotional problems, especially depression, are often the cause of fatigue. Fatigue is most often a symptom of another problem. Treatment for fatigue depends on the cause. For example, if you have fatigue because you have a certain health problem, treating this problem also treats your fatigue. If depression or anxiety is the cause, treatment may help. Follow-up care is a key part of your treatment and safety. Be sure to make and go to all appointments, and call your doctor if you are having problems.  It's also a good idea to know your test results and keep a list of the medicines you take. How can you care for yourself at home? · Get regular exercise. But don't overdo it. Go back and forth between rest and exercise. · Get plenty of rest.  · Eat a healthy diet. Do not skip meals, especially breakfast.  · Reduce your use of caffeine, tobacco, and alcohol. Caffeine is most often found in coffee, tea, cola drinks, and chocolate. · Limit medicines that can cause fatigue. This includes tranquilizers and cold and allergy medicines. When should you call for help? Watch closely for changes in your health, and be sure to contact your doctor if:  · You have new symptoms such as fever or a rash. · Your fatigue gets worse. · You have been feeling down, depressed, or hopeless. Or you may have lost interest in things that you usually enjoy. · You are not getting better as expected. Where can you learn more? Go to http://www.gray.com/  Enter [id number] in the search box to learn more about \"Fatigue: Care Instructions. \"  Current as of: June 26, 2019               Content Version: 12.5  © 6344-9582 [employer]. Care instructions adapted under license by [employer]) (which disclaims liability or warranty for this information). If you have questions about a medical condition or this instruction, always ask your healthcare professional. [address] any warranty or liability for your use of this information. Patient Education        How to Get Up Safely After a Fall: Care Instructions  Your Care Instructions     If you have injuries, health problems, or other reasons that may make it easy for you to fall at home, it is a good idea to learn how to get up safely after a fall. Learning how to get up correctly can help you avoid making an injury worse. Also, knowing what to do if you cannot get up can help you stay safe until help arrives. Follow-up care is a key part of your treatment and safety.  Be sure to make and go to all appointments, and call your doctor if you are having problems. It's also a good idea to know your test results and keep a list of the medicines you take. How can you care for yourself after a fall? If you think you can get up  First lie still for a few minutes and think about how you feel. If your body feels okay and you think you can get up safely, follow the rest of the steps below:  1. Look for a chair or other piece of furniture that is close to you. 2. Roll onto your side and rest. Roll by turning your head in the direction you want to roll, move your shoulder and arm, then hip and leg in the same direction. 3. Lie still for a moment to let your blood pressure adjust.  4. Slowly push your upper body up, lift your head, and take a moment to rest.  5. Slowly get up on your hands and knees, and crawl to the chair or other stable piece of furniture. 6. Put your hands on the chair. 7. Move one foot forward, and place it flat on the floor. Your other leg should be bent with the knee on the floor. 8. Rise slowly, turn your body, and sit in the chair. Stay seated for a bit and think about how you feel. Call for help. Even if you feel okay, let someone know what happened to you. You might not know that you have a serious injury. If you cannot get up  1. If you think you are injured after a fall or you cannot get up, try not to panic. 2. Call out for help. 3. If you have a phone within reach or you have an emergency call device, use it to call for help. 4. If you do not have a phone within reach, try to slide yourself toward it. If you cannot get to the phone, try to slide toward a door or window or a place where you think you can be heard. 5. [place] or use an object to make noise so someone might hear you. 6. If you can reach something that you can use for a pillow, place it under your head. Try to stay warm by covering yourself with a blanket or clothing while you wait for help.   When should you call for help? [id number] anytime you think you may need emergency care. For example, call if:  · You passed out (lost consciousness). · You cannot get up after a fall. · You have severe pain. Call your doctor now or seek immediate medical care if:  · You have new or worse pain. · You are dizzy or lightheaded. · You hit your head. Watch closely for changes in your health, and be sure to contact your doctor if:  · You do not get better as expected. Where can you learn more? Go to http://roel-lonnie.info/  Enter G513 in the search box to learn more about \"How to Get Up Safely After a Fall: Care Instructions. \"  Current as of: August 7, 2019               Content Version: 12.5  © 0299-0240 Healthwise, Incorporated. Care instructions adapted under license by [employer] (which disclaims liability or warranty for this information). If you have questions about a medical condition or this instruction, always ask your healthcare professional. [address] any warranty or liability for your use of this information.

## 2020-07-10 NOTE — PROGRESS NOTES
Jyothi Martin is a 78 y.o. female who presents with family in attendance. Reports depression, some anxiety. eports sleep is okay. Appetite is not great. Low body weight. Ensure 2 cans a day. Had knee injection with relief of pain. Dr Dede Sesay. This is an established visit conducted via telemedicine with video. The patient has been instructed that this meets HIPAA criteria and acknowledges and agrees to this method of visitation. Pursuant to the emergency declaration under the [address] [place] [address] [facility] authority and the [employer] and [employer] Act, this Virtual Visit was conducted, with patient's consent, to reduce the patient's risk of exposure to COVID-19 and provide continuity of care for an established patient. Services were provided through a video synchronous discussion virtually to substitute for in-person clinic visit.         Past Medical History:   Diagnosis Date    AIN (acute interstitial nephritis)     Arthritis     CKD (chronic kidney disease) stage 3, GFR 30-59 ml/min ([facility])     GERD (gastroesophageal reflux disease)     Glaucoma     High cholesterol     Hypertension     Ill-defined condition     chronic cystitis       Family History   Problem Relation Age of Onset    Cancer Father     Cancer Brother     Breast Cancer Cousin     Arthritis-osteo Mother        Social History     Socioeconomic History    Marital status:      Spouse name: Not on file    Number of children: Not on file    Years of education: Not on file    Highest education level: Not on file   Occupational History    Not on file   Social Needs    Financial resource strain: Not on file    Food insecurity     Worry: Not on file     Inability: Not on file    Transportation needs     Medical: Not on file     Non-medical: Not on file   Tobacco Use    Smoking status: Former Smoker     Packs/day: 0.50     Years: 4.00 Pack years: 2.00     Last attempt to quit: 1997     Years since quittin.9    Smokeless tobacco: Former User   Substance and Sexual Activity    Alcohol use: Yes     Comment: seldom    Drug use: Yes     Types: Prescription, OTC     Comment: never recreational substance    Sexual activity: Not Currently     Partners: Male     Birth control/protection: None   Lifestyle    Physical activity     Days per week: Not on file     Minutes per session: Not on file    Stress: Not on file   Relationships    Social connections     Talks on phone: Not on file     Gets together: Not on file     Attends [employer] service: Not on file     Active member of club or organization: Not on file     Attends meetings of clubs or organizations: Not on file     Relationship status: Not on file    Intimate partner violence     Fear of current or ex partner: Not on file     Emotionally abused: Not on file     Physically abused: Not on file     Forced sexual activity: Not on file   Other Topics Concern    Not on file   Social History Narrative    ** Merged History Encounter **            Current Outpatient Medications on File Prior to Visit   Medication Sig Dispense Refill    loratadine (Claritin) 10 mg tablet Take 10 mg by mouth.  lidocaine (Lidoderm) 5 % Apply patch to the affected area for 12 hours a day and remove for 12 hours a day. 5 Each 0    gabapentin (NEURONTIN) 100 mg capsule Take 3 Caps by mouth two (2) times a day. Max Daily Amount: 600 mg. 60 Cap 2    famotidine (PEPCID) 20 mg tablet Take 1 Tab by mouth two (2) times a day. Indications: gastroesophageal reflux disease 180 Tab 3    metoprolol succinate (TOPROL-XL) 50 mg XL tablet Take 1 Tab by mouth daily. 90 Tab 3    diclofenac (VOLTAREN) 1 % gel Apply 2 g to affected area four (4) times daily. (Patient taking differently: Apply 2 g to affected area as needed.) 100 g 3    timolol (TIMOPTIC) 0.5 % ophthalmic solution Administer 1 Drop to left eye daily.  latanoprost (XALATAN) 0.005 % ophthalmic solution Administer 1 Drop to both eyes nightly.  Aspirin, Buffered 81 mg tab Take 1 Tab by mouth daily.  erythromycin (ILOTYCIN) ophthalmic ointment Administer 0.1 g to left eye every six (6) hours for 5 days. 2 Tube 0    predniSONE (STERAPRED DS) 10 mg dose pack Take as prescribed 21 Tab 0    [] HYDROcodone-acetaminophen (NORCO) 5-325 mg per tablet Take 1 Tab by mouth every eight (8) hours as needed for Pain for up to 30 days. Max Daily Amount: 3 Tabs. 12 Tab 0    ondansetron (ZOFRAN ODT) 4 mg disintegrating tablet Take 1 Tab by mouth every eight (8) hours as needed for Nausea or Vomiting. 20 Tab 0    polyethylene glycol (MIRALAX) 17 gram/dose powder Take 17 g by mouth as needed.  ACETAMINOPHEN (TYLENOL PO) Take 650 mg by mouth as needed. Indications: two tabs       Current Facility-Administered Medications on File Prior to Visit   Medication Dose Route Frequency Provider Last Rate Last Dose    [COMPLETED] sodium chloride 0.9 % bolus infusion 500 mL  500 mL IntraVENous ONCE Rogelio Michael MD   Stopped at 20    [DISCONTINUED] iopamidoL (ISOVUE-370) 76 % injection 100 mL  100 mL IntraVENous RAD ONCE Rogelio Michael MD        [DISCONTINUED] sodium chloride (NS) flush 10 mL  10 mL IntraVENous RAD ONCE Rogelio Michael MD           Review of Systems  Pertinent items are noted in HPI. Objective:     Gen: well appearing female  Resp: normal respiratory effort, no audible wheezing. CV: patient does not feel palpitations or heart irregularity  Neuro: Alert and oriented, able to answer questions without difficulty  Psych: depressed affect. Assessment/Plan:       ICD-10-CM ICD-9-CM    1. Loss of appetite  R63.0 783.0 mirtazapine (REMERON) 7.5 mg tablet   2. Depression, unspecified depression type  F32.9 311 mirtazapine (REMERON) 7.5 mg tablet       This was a telemedicine visit with video.         Merton Kanner MD [place]    Follow-up and Dispositions    · Return in about 2 weeks (around 7/24/2020) for virtual follow up on depression. .         8/29/2020  ADDENDUM:  Pediatric walker ordered for patient on August 7, 2020, due to her increased risk of falling and generalized weakness. Height 4' 10\" Weight 85#.

## 2020-07-10 NOTE — ED PROVIDER NOTES
EMERGENCY DEPARTMENT HISTORY AND PHYSICAL EXAM      Date: 7/9/2020  Patient Name: Rylee Gee    History of Presenting Illness     Chief Complaint   Patient presents with    Fatigue     Patient arrives to ER via EMS coming from home. Pt states she had a syncopal episode on tuesday this week, was walking to bathroom, became dizzy/lightheaded and fell. Pt states she did hit her head,  found pt on bathroom floor. Pt states she has had a decreased appetite for 2-3 months, lost a bunch of weight unintentionally. Pt states she was recently in the ER recently for bilateral pink eye & on abx for the infection. Pt denies cp, sob, cough, but does state she has been running fevers(100.8)    Syncope    Fall       History Provided By: Patient    HPI: Rylee Gee, 78 y.o. female with PMHx significant for chronic kidney disease, GERD, hypertension, hyperlipidemia, presents to the ED with cc of generalized weakness, increased falls, near syncopal episode this week. Patient reports over the last several weeks patient has had a significant decline in appetite and has lost up to 10 pounds during this time. She denies any chest pain, abdominal pain, cough but does report felt running low-grade temperatures of about 100 °F.  She was seen here several weeks ago for similar complaints and improved after IV fluids. She is concerned that she is not eating at that there could be something concerning causing this. Her  is concerned because she continues to fall. Otherwise she has no other associated symptoms. No other exacerbating or militating factors. There are no other complaints, changes, or physical findings at this time. PCP: David Burton MD    No current facility-administered medications on file prior to encounter.       Current Outpatient Medications on File Prior to Encounter   Medication Sig Dispense Refill    erythromycin (ILOTYCIN) ophthalmic ointment Administer 0.1 g to left eye every six (6) hours for 5 days. 2 Tube 0    loratadine (Claritin) 10 mg tablet Take 10 mg by mouth.  lidocaine (Lidoderm) 5 % Apply patch to the affected area for 12 hours a day and remove for 12 hours a day. 5 Each 0    predniSONE (STERAPRED DS) 10 mg dose pack Take as prescribed 21 Tab 0    gabapentin (NEURONTIN) 100 mg capsule Take 3 Caps by mouth two (2) times a day. Max Daily Amount: 600 mg. 60 Cap 2    HYDROcodone-acetaminophen (NORCO) 5-325 mg per tablet Take 1 Tab by mouth every eight (8) hours as needed for Pain for up to 30 days. Max Daily Amount: 3 Tabs. 12 Tab 0    ondansetron (ZOFRAN ODT) 4 mg disintegrating tablet Take 1 Tab by mouth every eight (8) hours as needed for Nausea or Vomiting. 20 Tab 0    famotidine (PEPCID) 20 mg tablet Take 1 Tab by mouth two (2) times a day. Indications: gastroesophageal reflux disease 180 Tab 3    metoprolol succinate (TOPROL-XL) 50 mg XL tablet Take 1 Tab by mouth daily. 90 Tab 3    diclofenac (VOLTAREN) 1 % gel Apply 2 g to affected area four (4) times daily. (Patient taking differently: Apply 2 g to affected area as needed.) 100 g 3    polyethylene glycol (MIRALAX) 17 gram/dose powder Take 17 g by mouth as needed.  timolol (TIMOPTIC) 0.5 % ophthalmic solution Administer 1 Drop to left eye daily.  ACETAMINOPHEN (TYLENOL PO) Take 650 mg by mouth as needed. Indications: two tabs      latanoprost (XALATAN) 0.005 % ophthalmic solution Administer 1 Drop to both eyes nightly.  Aspirin, Buffered 81 mg tab Take 1 Tab by mouth daily.          Past History     Past Medical History:  Past Medical History:   Diagnosis Date    AIN (acute interstitial nephritis)     Arthritis     CKD (chronic kidney disease) stage 3, GFR 30-59 ml/min ([facility])     GERD (gastroesophageal reflux disease)     Glaucoma     High cholesterol     Hypertension     Ill-defined condition     chronic cystitis       Past Surgical History:  Past Surgical History:   Procedure Laterality Date    COLONOSCOPY N/A 2020    COLONOSCOPY/EGD performed by Lizbeth Coe MD at [facility] ENDOSCOPY    HX APPENDECTOMY      with hysterectomy    HX  SECTION      HX COLONOSCOPY      HX DILATION AND CURETTAGE      HX HYSTERECTOMY      HX RENAL BIOPSY Left     in office    HX TONSILLECTOMY      HX UROLOGICAL  2016    cauterization of bladder ulcers       Family History:  Family History   Problem Relation Age of Onset    Cancer Father     Cancer Brother     Breast Cancer Cousin     Arthritis-osteo Mother        Social History:  Social History     Tobacco Use    Smoking status: Former Smoker     Packs/day: 0.50     Years: 4.00     Pack years: 2.00     Last attempt to quit: 1997     Years since quittin.9    Smokeless tobacco: Former User   Substance Use Topics    Alcohol use: Yes     Comment: seldom    Drug use: Yes     Types: Prescription, OTC     Comment: never recreational substance       Allergies: Allergies   Allergen Reactions    Aleve [Naproxen Sodium] Other (comments)     Renal issues    Cefuroxime Other (comments)     Kidney failure    Ibuprofen Unknown (comments)    Lisinopril Other (comments)     Renal dysfunction    Omeprazole Other (comments)     Cause Kidney failure         Review of Systems   Review of Systems   Constitutional: Negative for chills, diaphoresis, fatigue and fever. HENT: Negative for ear pain and sore throat. Eyes: Negative for pain and redness. Respiratory: Negative for cough and shortness of breath. Cardiovascular: Negative for chest pain and leg swelling. Gastrointestinal: Negative for abdominal pain, diarrhea, nausea and vomiting. Endocrine: Negative for cold intolerance and heat intolerance. Genitourinary: Negative for flank pain and hematuria. Musculoskeletal: Negative for back pain and neck stiffness. Skin: Negative for rash and wound. Neurological: Positive for weakness and light-headedness.  Negative for dizziness, syncope and headaches. All other systems reviewed and are negative. Physical Exam   Physical Exam  Vitals signs and nursing note reviewed. Constitutional:       Appearance: She is well-developed. HENT:      Head: Normocephalic and atraumatic. Mouth/Throat:      Pharynx: No oropharyngeal exudate. Eyes:      Conjunctiva/sclera: Conjunctivae normal.      Pupils: Pupils are equal, round, and reactive to light. Neck:      Musculoskeletal: Normal range of motion. Cardiovascular:      Rate and Rhythm: Normal rate and regular rhythm. Heart sounds: No murmur. Pulmonary:      Effort: Pulmonary effort is normal. No respiratory distress. Breath sounds: Normal breath sounds. No wheezing. Abdominal:      General: Bowel sounds are normal. There is no distension. Palpations: Abdomen is soft. Tenderness: There is no abdominal tenderness. Musculoskeletal: Normal range of motion. General: No deformity. Skin:     General: Skin is warm and dry. Findings: No rash. Neurological:      Mental Status: She is alert and oriented to person, place, and time. GCS: GCS eye subscore is 4. GCS verbal subscore is 5. GCS motor subscore is 6. Cranial Nerves: Cranial nerves are intact. Sensory: Sensation is intact. Motor: Motor function is intact. Coordination: Coordination normal.      Gait: Gait is intact.  Gait and tandem walk normal.   Psychiatric:         Behavior: Behavior normal.         Diagnostic Study Results     Labs -     Recent Results (from the past 24 hour(s))   EKG, 12 LEAD, INITIAL    Collection Time: 07/09/20  3:35 PM   Result Value Ref Range    Ventricular Rate 92 BPM    Atrial Rate 92 BPM    P-R Interval 170 ms    QRS Duration 68 ms    Q-T Interval 320 ms    QTC Calculation (Bezet) 395 ms    Calculated P Axis 25 degrees    Calculated R Axis -12 degrees    Calculated T Axis 45 degrees    Diagnosis       Normal sinus rhythm  Cannot rule out Anterior infarct , age undetermined  When compared with ECG of 07-JUL-2020 07:55,  No significant change was found     CBC WITH AUTOMATED DIFF    Collection Time: 07/09/20  3:42 PM   Result Value Ref Range    WBC 8.3 3.6 - 11.0 K/uL    RBC 3.58 (L) 3.80 - 5.20 M/uL    HGB 10.7 (L) 11.5 - 16.0 g/dL    HCT 34.0 (L) 35.0 - 47.0 %    MCV 95.0 80.0 - 99.0 FL    MCH 29.9 26.0 - 34.0 PG    MCHC 31.5 30.0 - 36.5 g/dL    RDW 15.8 (H) 11.5 - 14.5 %    PLATELET 659 794 - 128 K/uL    MPV 11.2 8.9 - 12.9 FL    NRBC 0.0 0  WBC    ABSOLUTE NRBC 0.00 0.00 - 0.01 K/uL    NEUTROPHILS 74 32 - 75 %    LYMPHOCYTES 11 (L) 12 - 49 %    MONOCYTES 10 5 - 13 %    EOSINOPHILS 4 0 - 7 %    BASOPHILS 0 0 - 1 %    IMMATURE GRANULOCYTES 1 (H) 0.0 - 0.5 %    ABS. NEUTROPHILS 6.3 1.8 - 8.0 K/UL    ABS. LYMPHOCYTES 0.9 0.8 - 3.5 K/UL    ABS. MONOCYTES 0.8 0.0 - 1.0 K/UL    ABS. EOSINOPHILS 0.3 0.0 - 0.4 K/UL    ABS. BASOPHILS 0.0 0.0 - 0.1 K/UL    ABS. IMM. GRANS. 0.1 (H) 0.00 - 0.04 K/UL    DF AUTOMATED     METABOLIC PANEL, COMPREHENSIVE    Collection Time: 07/09/20  3:42 PM   Result Value Ref Range    Sodium 134 (L) 136 - 145 mmol/L    Potassium 3.9 3.5 - 5.1 mmol/L    Chloride 104 97 - 108 mmol/L    CO2 23 21 - 32 mmol/L    Anion gap 7 5 - 15 mmol/L    Glucose 100 65 - 100 mg/dL    BUN 14 6 - 20 MG/DL    Creatinine 0.90 0.55 - 1.02 MG/DL    BUN/Creatinine ratio 16 12 - 20      GFR est AA >60 >60 ml/min/1.73m2    GFR est non-AA 60 (L) >60 ml/min/1.73m2    Calcium 8.3 (L) 8.5 - 10.1 MG/DL    Bilirubin, total 0.5 0.2 - 1.0 MG/DL    ALT (SGPT) 17 12 - 78 U/L    AST (SGOT) 15 15 - 37 U/L    Alk.  phosphatase 69 45 - 117 U/L    Protein, total 6.5 6.4 - 8.2 g/dL    Albumin 2.3 (L) 3.5 - 5.0 g/dL    Globulin 4.2 (H) 2.0 - 4.0 g/dL    A-G Ratio 0.5 (L) 1.1 - 2.2     TROPONIN I    Collection Time: 07/09/20  3:42 PM   Result Value Ref Range    Troponin-I, Qt. <0.05 <0.05 ng/mL   CK W/ REFLX CKMB    Collection Time: 07/09/20  3:42 PM   Result Value Ref Range    CK 17 (L) 26 - 192 U/L   MAGNESIUM    Collection Time: 07/09/20  3:42 PM   Result Value Ref Range    Magnesium 1.9 1.6 - 2.4 mg/dL   PHOSPHORUS    Collection Time: 07/09/20  3:42 PM   Result Value Ref Range    Phosphorus 2.5 (L) 2.6 - 4.7 MG/DL   TSH 3RD GENERATION    Collection Time: 07/09/20  3:42 PM   Result Value Ref Range    TSH 1.14 0.36 - 3.74 uIU/mL   URINALYSIS W/ REFLEX CULTURE    Collection Time: 07/09/20  5:17 PM    Specimen: Urine   Result Value Ref Range    Color YELLOW/STRAW      Appearance CLEAR CLEAR      Specific gravity 1.014 1.003 - 1.030      pH (UA) 7.5 5.0 - 8.0      Protein TRACE (A) NEG mg/dL    Glucose Negative NEG mg/dL    Ketone Negative NEG mg/dL    Bilirubin Negative NEG      Blood TRACE (A) NEG      Urobilinogen 1.0 0.2 - 1.0 EU/dL    Nitrites Negative NEG      Leukocyte Esterase Negative NEG      WBC 0-4 0 - 4 /hpf    RBC 5-10 0 - 5 /hpf    Epithelial cells FEW FEW /lpf    Bacteria Negative NEG /hpf    UA:UC IF INDICATED CULTURE NOT INDICATED BY UA RESULT CNI      Hyaline cast 0-2 0 - 5 /lpf   OCCULT BLOOD, STOOL    Collection Time: 07/09/20  5:17 PM   Result Value Ref Range    Occult blood, stool Negative NEG         Radiologic Studies -   CT HEAD WO CONT   Final Result   IMPRESSION:    No acute intracranial process identified            CT ABD PELV WO CONT   Final Result   IMPRESSION:   Nonobstructing left renal calculi   Cholelithiasis   Multilevel degenerative disc disease. Grade 1 anterolisthesis of L3 on a   degenerative basis. Sigmoid diverticulosis      XR CHEST PA LAT   Final Result   IMPRESSION: No acute cardiopulmonary process seen. CT Results  (Last 48 hours)               07/09/20 1801  CT HEAD WO CONT Final result    Impression:  IMPRESSION:    No acute intracranial process identified               Narrative:  EXAM: CT HEAD WO CONT       INDICATION: fall, head injury, abdominal pain, loose stools       COMPARISON: 5/25/2020.        CONTRAST: None.       TECHNIQUE: Unenhanced CT of the head was performed using 5 mm images. Brain and   bone windows were generated. Coronal and sagittal reformats. CT dose reduction   was achieved through use of a standardized protocol tailored for this   examination and automatic exposure control for dose modulation. FINDINGS:   The ventricles and sulci are stable in size, shape and configuration. . There is   no significant white matter disease. There is no intracranial hemorrhage,   extra-axial collection, or mass effect. The basilar cisterns are open. The bone windows demonstrate no abnormalities. The visualized portions of the   paranasal sinuses and mastoid air cells are clear. 07/09/20 1801  CT ABD PELV WO CONT Final result    Impression:  IMPRESSION:   Nonobstructing left renal calculi   Cholelithiasis   Multilevel degenerative disc disease. Grade 1 anterolisthesis of L3 on a   degenerative basis. Sigmoid diverticulosis       Narrative:  EXAM: CT ABD PELV WO CONT       INDICATION: fall, head injury, abdominal pain, loose stools       COMPARISON: 6/12/2020       CONTRAST:  None. TECHNIQUE:    Thin axial images were obtained through the abdomen and pelvis. Coronal and   sagittal reformats were generated. Oral contrast was not administered. CT dose   reduction was achieved through use of a standardized protocol tailored for this   examination and automatic exposure control for dose modulation. The absence of intravenous contrast material reduces the sensitivity for   evaluation of the vasculature and solid organs. FINDINGS:    LOWER THORAX: No significant abnormality in the incidentally imaged lower chest.   LIVER: No mass. BILIARY TREE: Cholelithiasis CBD is not dilated. SPLEEN: within normal limits. PANCREAS: No focal abnormality. ADRENALS: Unremarkable. KIDNEYS/URETERS: Left renal nonobstructing 9.5 x 5.1 mm calculus (image 36 x 3   mm calculus (image 26). STOMACH: Unremarkable. SMALL BOWEL: No dilatation or wall thickening. COLON: No dilatation or wall thickening. Sigmoid diverticulosis   APPENDIX: Not visualized   PERITONEUM: No ascites or pneumoperitoneum. RETROPERITONEUM: No lymphadenopathy or aortic aneurysm. REPRODUCTIVE ORGANS: Prior hysterectomy   URINARY BLADDER: No mass or calculus. BONES: No destructive bone lesion. ABDOMINAL WALL: No mass or hernia. ADDITIONAL COMMENTS: Disc desiccation at L3-4, L4-5, and L5-S1. 3.3 mm   anterolisthesis of L3 relative to L4. CXR Results  (Last 48 hours)               07/09/20 1614  XR CHEST PA LAT Final result    Impression:  IMPRESSION: No acute cardiopulmonary process seen. Narrative:  EXAM: XR CHEST PA LAT       INDICATION: syncope, fall, fatigue       COMPARISON: 11/1/2019. FINDINGS: PA and lateral radiographs of the chest demonstrate clear lungs. The   cardiac and mediastinal contours and pulmonary vascularity are normal. Moderate   spondylitic changes are present. Medical Decision Making   I am the first provider for this patient. I reviewed the vital signs, available nursing notes, past medical history, past surgical history, family history and social history. Vital Signs-Reviewed the patient's vital signs.   Patient Vitals for the past 24 hrs:   Temp Pulse Resp BP SpO2   07/09/20 2000  93 17 148/70 99 %   07/09/20 1930  92 25 151/76 99 %   07/09/20 1919 100 °F (37.8 °C)       07/09/20 1730  92 23 143/62 99 %   07/09/20 1718  (!) 103 20 136/74 99 %   07/09/20 1717  92 24 148/68 99 %   07/09/20 1715  94 20 150/69 100 %   07/09/20 1600  91 20 147/70 100 %   07/09/20 1530 100.2 °F (37.9 °C) 94 25 147/72 99 %       Pulse Oximetry Analysis -99 % on room air    Cardiac Monitor:   Rate: 94 bpm  Rhythm: Normal Sinus Rhythm        Records Reviewed: Nursing Notes and Old Medical Records    Differential Diagnosis:    Patient presenting with generalized fatigue/weakness. Stable vitals and nontoxic appearing. DDx: infection, anemia, electrolyte anomoly (hypo or hyperkalemia, hypomagnesemia), hypothyroid, dehydration, depression, CA, ACS. Will obtain EKG, UA, labwork for any urgent/emergent pathology. Will reassess and monitor while in ED. Provider Notes (Medical Decision Making):   Patient is an 22-year-old female presenting for evaluation of progressive weakness, anorexia, and intermittent lightheadedness. She may have some mild signs of dehydration on exam but feels better after IV fluids. She is ambulating around the ER without difficulty. She has no focal gross motor or sensory deficits. She does have a temperature of 100 Lois Link but no clear signs of source of infection. A COVID-19 test was sent. At this time I am unclear of the cause of her subacute decline. I did perform a CT scan of the head and abdomen pelvis which were unremarkable. She is mildly anemic but has heme-negative stools. She is not orthostatic. I feel this is very important that her primary care doctor follows her very closely for these increased falls. Case management is called her the following day. She may need home health, home PT, or other services to help patient improve her ADLs. ED Course:     Initial assessment performed. The patients presenting problems have been discussed, and they are in agreement with the care plan formulated and outlined with them. I have encouraged them to ask questions as they arise throughout their visit. Critical Care Time:     None    Disposition:  4:08 PM  Yuliana Guajardo's  results have been reviewed with her. She has been counseled regarding her diagnosis. She verbally conveys understanding and agreement of the signs, symptoms, diagnosis, treatment and prognosis and additionally agrees to follow up as recommended with Dr. Jose Rogers MD in 24 - 48 hours.   She also agrees with the care-plan and conveys that all of her questions have been answered. I have also put together some discharge instructions for her that include: 1) educational information regarding their diagnosis, 2) how to care for their diagnosis at home, as well a 3) list of reasons why they would want to return to the ED prior to their follow-up appointment, should their condition change. PLAN:  1. Current Discharge Medication List        2. Follow-up Information     Follow up With Specialties Details Why Hussain Mendoza MD Internal Medicine In 2 days For a follow-up evaluation. Consult and close follow-up in the next few days to arrange for home health and further follow-up. Federico [address]      Sandie Muniz, DO Neurology In 1 week For a follow-up evaluation. This is neurology to consider further evaluation of your symptoms. He will be able to help with evaluation of your falls as well. [address] [place]  [phone]          Return to ED if worse     Diagnosis     Clinical Impression:   1. Weakness    2.  Anorexia

## 2020-07-10 NOTE — PROGRESS NOTES
Patient contacted regarding recent discharge and COVID-19 risk. Discussed COVID-19 related testing which was pending at this time. Test results were pending. Patient informed of results, if available? no    Care Transition Nurse/ Ambulatory Care Manager contacted the patient by telephone to perform post discharge assessment. Verified name and  with patient as identifiers. Patient has following risk factors of: chronic kidney disease. CTN/ACM reviewed discharge instructions, medical action plan and red flags related to discharge diagnosis. Reviewed and educated them on any new and changed medications related to discharge diagnosis. Advised obtaining a 90-day supply of all daily and as-needed medications. Education provided regarding infection prevention, and signs and symptoms of COVID-19 and when to seek medical attention with patient who verbalized understanding. Discussed exposure protocols and quarantine from [address] you at higher risk for severe illness  and given an opportunity for questions and concerns. The patient agrees to contact the COVID-19 hotline [phone] or PCP office for questions related to their healthcare. CTN/ACM provided contact information for future reference. Patient states she has a follow up today with her PCP and has a call out to her neurologist    From [facility]: Are you at higher risk for severe illness?  Wash your hands often.  Avoid close contact (6 feet, which is about two arm lengths) with people who are sick.  Put distance between yourself and other people if COVID-19 is spreading in your community.  Clean and disinfect frequently touched surfaces.  Avoid all cruise travel and non-essential air travel.  Call your healthcare professional if you have concerns about COVID-19 and your underlying condition or if you are sick.     For more information on steps you can take to protect yourself, see CDC's How to Protect Yourself Patient/family/caregiver given information for [employer] and agrees to enroll no  Patient's preferred e-mail:  n/a  Patient's preferred phone number: n/a  Based on Loop alert triggers, patient will be contacted by nurse care manager for worsening symptoms. Plan for follow-up call in 7-14 days based on severity of symptoms and risk factors.

## 2020-07-10 NOTE — ED NOTES
Pt ambulated with slow, steady gait from room 22 to front Pyxis. Denied feeling dizzy or lightheaded in the process.

## 2020-07-11 VITALS
TEMPERATURE: 98.5 F | RESPIRATION RATE: 18 BRPM | SYSTOLIC BLOOD PRESSURE: 120 MMHG | OXYGEN SATURATION: 93 % | DIASTOLIC BLOOD PRESSURE: 80 MMHG | HEART RATE: 106 BPM

## 2020-07-11 PROCEDURE — 3331090001 HH PPS REVENUE CREDIT

## 2020-07-11 PROCEDURE — 3331090002 HH PPS REVENUE DEBIT

## 2020-07-11 NOTE — PROGRESS NOTES
RE Negative COVID: Attempted to contact the patient but had to leave HIPPA complaint VM for return call to the ED.

## 2020-07-11 NOTE — PROGRESS NOTES
The patient was called for notification of a NEGATIVE test result for COVID-19. The following information was given to the patient:    The COVID-19 test, also known as novel coronavirus, result was negative  You probably were not infected at the time your sample was collected. However, that does not mean you will not get sick. The test result only means that you did not have COVID-19 at the time of testing. If you have no symptoms, continue to use preventive measures to protect yourself and others. If you have been sick, there are many other potential infectious causes. Contact your Primary Care Provider or Care Team for specific guidance, particularly if your symptoms worsen.

## 2020-07-12 PROCEDURE — 3331090001 HH PPS REVENUE CREDIT

## 2020-07-12 PROCEDURE — 3331090002 HH PPS REVENUE DEBIT

## 2020-07-13 ENCOUNTER — HOME CARE VISIT (OUTPATIENT)
Dept: SCHEDULING | Facility: HOME HEALTH | Age: 80
End: 2020-07-13
Payer: MEDICARE

## 2020-07-13 ENCOUNTER — TELEPHONE (OUTPATIENT)
Dept: INTERNAL MEDICINE CLINIC | Age: 80
End: 2020-07-13

## 2020-07-13 ENCOUNTER — HOME CARE VISIT (OUTPATIENT)
Dept: HOME HEALTH SERVICES | Facility: HOME HEALTH | Age: 80
End: 2020-07-13
Payer: MEDICARE

## 2020-07-13 VITALS
TEMPERATURE: 97.9 F | HEART RATE: 91 BPM | OXYGEN SATURATION: 96 % | DIASTOLIC BLOOD PRESSURE: 71 MMHG | SYSTOLIC BLOOD PRESSURE: 107 MMHG

## 2020-07-13 VITALS
HEART RATE: 96 BPM | TEMPERATURE: 100.3 F | SYSTOLIC BLOOD PRESSURE: 132 MMHG | RESPIRATION RATE: 20 BRPM | DIASTOLIC BLOOD PRESSURE: 72 MMHG | OXYGEN SATURATION: 98 %

## 2020-07-13 DIAGNOSIS — R26.81 UNSTEADY GAIT: Primary | ICD-10-CM

## 2020-07-13 DIAGNOSIS — R55 SYNCOPE, UNSPECIFIED SYNCOPE TYPE: ICD-10-CM

## 2020-07-13 PROCEDURE — 3331090002 HH PPS REVENUE DEBIT

## 2020-07-13 PROCEDURE — 3331090001 HH PPS REVENUE CREDIT

## 2020-07-13 PROCEDURE — G0151 HHCP-SERV OF PT,EA 15 MIN: HCPCS

## 2020-07-13 PROCEDURE — G0152 HHCP-SERV OF OT,EA 15 MIN: HCPCS

## 2020-07-13 NOTE — TELEPHONE ENCOUNTER
Colton//[facility]-OT, states she's calling to advise OT Eval was done today & patient advised/reports that she fell 2 times yesterday. Patient advised she fell one time off of the toilet & then 2nd time up against the wall & hit her head. Please call to discuss Plan of Care.  Thank you

## 2020-07-14 ENCOUNTER — HOME CARE VISIT (OUTPATIENT)
Dept: HOME HEALTH SERVICES | Facility: HOME HEALTH | Age: 80
End: 2020-07-14
Payer: MEDICARE

## 2020-07-14 ENCOUNTER — HOME CARE VISIT (OUTPATIENT)
Dept: SCHEDULING | Facility: HOME HEALTH | Age: 80
End: 2020-07-14
Payer: MEDICARE

## 2020-07-14 PROCEDURE — G0299 HHS/HOSPICE OF RN EA 15 MIN: HCPCS

## 2020-07-14 PROCEDURE — 3331090002 HH PPS REVENUE DEBIT

## 2020-07-14 PROCEDURE — 3331090001 HH PPS REVENUE CREDIT

## 2020-07-14 NOTE — TELEPHONE ENCOUNTER
Advise home care patient referred to neurology for syncope and falls.       Please contact patient and family to schedule with Dr. Anthony Wen.  neurology

## 2020-07-15 ENCOUNTER — HOME CARE VISIT (OUTPATIENT)
Dept: HOME HEALTH SERVICES | Facility: HOME HEALTH | Age: 80
End: 2020-07-15
Payer: MEDICARE

## 2020-07-15 ENCOUNTER — HOME CARE VISIT (OUTPATIENT)
Dept: SCHEDULING | Facility: HOME HEALTH | Age: 80
End: 2020-07-15
Payer: MEDICARE

## 2020-07-15 ENCOUNTER — TELEPHONE (OUTPATIENT)
Dept: INTERNAL MEDICINE CLINIC | Age: 80
End: 2020-07-15

## 2020-07-15 ENCOUNTER — HOSPITAL ENCOUNTER (EMERGENCY)
Age: 80
Discharge: HOME | End: 2020-07-15
Attending: EMERGENCY MEDICINE
Payer: MEDICARE

## 2020-07-15 VITALS
RESPIRATION RATE: 21 BRPM | WEIGHT: 85 LBS | TEMPERATURE: 98.9 F | DIASTOLIC BLOOD PRESSURE: 67 MMHG | HEART RATE: 93 BPM | SYSTOLIC BLOOD PRESSURE: 140 MMHG | HEIGHT: 58 IN | BODY MASS INDEX: 17.84 KG/M2 | OXYGEN SATURATION: 100 %

## 2020-07-15 VITALS
HEART RATE: 73 BPM | DIASTOLIC BLOOD PRESSURE: 60 MMHG | OXYGEN SATURATION: 97 % | SYSTOLIC BLOOD PRESSURE: 110 MMHG | TEMPERATURE: 98.8 F

## 2020-07-15 DIAGNOSIS — F33.1 MODERATE EPISODE OF RECURRENT MAJOR DEPRESSIVE DISORDER (HCC): Primary | ICD-10-CM

## 2020-07-15 LAB
ALBUMIN SERPL-MCNC: 2.3 G/DL (ref 3.5–5)
ALBUMIN/GLOB SERPL: 0.5 {RATIO} (ref 1.1–2.2)
ALP SERPL-CCNC: 92 U/L (ref 45–117)
ALT SERPL-CCNC: 80 U/L (ref 12–78)
ANION GAP SERPL CALC-SCNC: 7 MMOL/L (ref 5–15)
APPEARANCE UR: CLEAR
AST SERPL-CCNC: 74 U/L (ref 15–37)
BACTERIA URNS QL MICRO: NEGATIVE /HPF
BASOPHILS # BLD: 0.1 K/UL (ref 0–0.1)
BASOPHILS NFR BLD: 1 % (ref 0–1)
BILIRUB SERPL-MCNC: 0.3 MG/DL (ref 0.2–1)
BILIRUB UR QL: NEGATIVE
BUN SERPL-MCNC: 21 MG/DL (ref 6–20)
BUN/CREAT SERPL: 25 (ref 12–20)
CALCIUM SERPL-MCNC: 9.4 MG/DL (ref 8.5–10.1)
CHLORIDE SERPL-SCNC: 102 MMOL/L (ref 97–108)
CO2 SERPL-SCNC: 26 MMOL/L (ref 21–32)
COLOR UR: ABNORMAL
CREAT SERPL-MCNC: 0.85 MG/DL (ref 0.55–1.02)
DIFFERENTIAL METHOD BLD: ABNORMAL
EOSINOPHIL # BLD: 0.5 K/UL (ref 0–0.4)
EOSINOPHIL NFR BLD: 7 % (ref 0–7)
EPITH CASTS URNS QL MICRO: ABNORMAL /LPF
ERYTHROCYTE [DISTWIDTH] IN BLOOD BY AUTOMATED COUNT: 15.6 % (ref 11.5–14.5)
ETHANOL SERPL-MCNC: <10 MG/DL
GLOBULIN SER CALC-MCNC: 4.7 G/DL (ref 2–4)
GLUCOSE SERPL-MCNC: 110 MG/DL (ref 65–100)
GLUCOSE UR STRIP.AUTO-MCNC: NEGATIVE MG/DL
HCT VFR BLD AUTO: 32.8 % (ref 35–47)
HGB BLD-MCNC: 10.3 G/DL (ref 11.5–16)
HGB UR QL STRIP: NEGATIVE
IMM GRANULOCYTES # BLD AUTO: 0.3 K/UL (ref 0–0.04)
IMM GRANULOCYTES NFR BLD AUTO: 4 % (ref 0–0.5)
KETONES UR QL STRIP.AUTO: NEGATIVE MG/DL
LEUKOCYTE ESTERASE UR QL STRIP.AUTO: ABNORMAL
LYMPHOCYTES # BLD: 1.3 K/UL (ref 0.8–3.5)
LYMPHOCYTES NFR BLD: 19 % (ref 12–49)
MCH RBC QN AUTO: 29.1 PG (ref 26–34)
MCHC RBC AUTO-ENTMCNC: 31.4 G/DL (ref 30–36.5)
MCV RBC AUTO: 92.7 FL (ref 80–99)
MONOCYTES # BLD: 0.8 K/UL (ref 0–1)
MONOCYTES NFR BLD: 11 % (ref 5–13)
NEUTS SEG # BLD: 3.9 K/UL (ref 1.8–8)
NEUTS SEG NFR BLD: 58 % (ref 32–75)
NITRITE UR QL STRIP.AUTO: NEGATIVE
NRBC # BLD: 0 K/UL (ref 0–0.01)
NRBC BLD-RTO: 0 PER 100 WBC
PH UR STRIP: 7 [PH] (ref 5–8)
PLATELET # BLD AUTO: 385 K/UL (ref 150–400)
PLATELET COMMENTS,PCOM: ABNORMAL
PMV BLD AUTO: 11.2 FL (ref 8.9–12.9)
POTASSIUM SERPL-SCNC: 4.5 MMOL/L (ref 3.5–5.1)
PROT SERPL-MCNC: 7 G/DL (ref 6.4–8.2)
PROT UR STRIP-MCNC: ABNORMAL MG/DL
RBC # BLD AUTO: 3.54 M/UL (ref 3.8–5.2)
RBC #/AREA URNS HPF: ABNORMAL /HPF (ref 0–5)
RBC MORPH BLD: ABNORMAL
SODIUM SERPL-SCNC: 135 MMOL/L (ref 136–145)
SP GR UR REFRACTOMETRY: 1.01 (ref 1–1.03)
UROBILINOGEN UR QL STRIP.AUTO: 0.2 EU/DL (ref 0.2–1)
WBC # BLD AUTO: 6.9 K/UL (ref 3.6–11)
WBC URNS QL MICRO: ABNORMAL /HPF (ref 0–4)

## 2020-07-15 PROCEDURE — 3331090002 HH PPS REVENUE DEBIT

## 2020-07-15 PROCEDURE — 80053 COMPREHEN METABOLIC PANEL: CPT

## 2020-07-15 PROCEDURE — 99284 EMERGENCY DEPT VISIT MOD MDM: CPT

## 2020-07-15 PROCEDURE — 80307 DRUG TEST PRSMV CHEM ANLYZR: CPT

## 2020-07-15 PROCEDURE — 85025 COMPLETE CBC W/AUTO DIFF WBC: CPT

## 2020-07-15 PROCEDURE — 3331090001 HH PPS REVENUE CREDIT

## 2020-07-15 PROCEDURE — 74011250636 HC RX REV CODE- 250/636: Performed by: EMERGENCY MEDICINE

## 2020-07-15 PROCEDURE — 74011250637 HC RX REV CODE- 250/637: Performed by: EMERGENCY MEDICINE

## 2020-07-15 PROCEDURE — 36415 COLL VENOUS BLD VENIPUNCTURE: CPT

## 2020-07-15 PROCEDURE — 81001 URINALYSIS AUTO W/SCOPE: CPT

## 2020-07-15 RX ORDER — DIAZEPAM 2 MG/1
2 TABLET ORAL
Qty: 15 TAB | Refills: 0 | Status: SHIPPED | OUTPATIENT
Start: 2020-07-15 | End: 2021-03-16

## 2020-07-15 RX ORDER — DIAZEPAM 2 MG/1
2 TABLET ORAL
Status: COMPLETED | OUTPATIENT
Start: 2020-07-15 | End: 2020-07-15

## 2020-07-15 RX ADMIN — DIAZEPAM 2 MG: 2 TABLET ORAL at 16:19

## 2020-07-15 RX ADMIN — SODIUM CHLORIDE 1000 ML: 900 INJECTION, SOLUTION INTRAVENOUS at 18:23

## 2020-07-15 NOTE — ED NOTES
Shun Stubbs with [employer] on screen for interview, spouse at bedside.  Patient states depressed for 2 months- no hx, \"just want to die, no plan

## 2020-07-15 NOTE — TELEPHONE ENCOUNTER
Abeba//[facility] states she needs a call back right away to discuss patient cancelling/refusing visits today as patient states \"She has accepted that she is dying\". Mandeep Kirby reports patient is not suicidal. Bjdesiree Kirby reports patient is \"Profoundly Dizzy\" & is concerned with Patient falling & Head injury/trauma but patient is Refusing to go to the ER & patient needs to go. Please call to advise Right Away for Plan of Care.  Thank you

## 2020-07-15 NOTE — [facility] NOTE
No Beds at [facility] and due to fall risk, pt declined at [address]. This SW called [facility], [address] [place] [address], [facility], and [employer]. Couple live at [facility]. SW spoke with spouse who believes something needs to be done about her nutrition or she will die. Spouse, Lynn Hinton stated 'she needs a feeding tube or hydration'. Spouse states they do have 1 son who lives in [place] who has constraints and cannot assist.   Spouse also stated he had thought about getting a college student and offering room and board in exchange for assistance. Nurse came to the room and spouse stated he needed to attend to his wife. Christine Reina.  Marquise Ayers, DEV/LMSW  Supervisee in Social Work

## 2020-07-15 NOTE — ED NOTES
[facility] called and stated there are not any open beds. [facility] given RN station number to be transferred directly into pt's room to speak with pt and pt's .

## 2020-07-15 NOTE — DISCHARGE INSTRUCTIONS
Your examination today is most consistent with depression which is not adequately treated. Continue taking the antidepressant prescribed by your doctor, and you can use Valium 2 mg tablets as needed when symptoms of anxiety or depression become more pronounced. If you have thoughts of hurting herself or worsening symptoms, you can return to the emergency department for further evaluation.

## 2020-07-15 NOTE — ED PROVIDER NOTES
EMERGENCY DEPARTMENT HISTORY AND PHYSICAL EXAM      Date: 7/15/2020  Patient Name: Jyothi Martin  Patient Age and Sex: 78 y.o. female    History of Presenting Illness     Chief Complaint   Patient presents with    Lethargy     arrives via [facility]; increased weakness and unsteady gait x 2 MONTHS; pt experienced a fall at home 2 months ago and since has had decreased PO intake; feels emotional, denies fall today; A&Ox4    Decreased Appetite    Other     Pt states \"I'm here to try and start myself to get built up, I have a long ways to go\"       History Provided By: Patient    Ability to gather history was limited by:     HPI: Jyothi Martin, 78 y.o. female brought to the emergency department for fatigue and poor appetite. States that she has had increasing weakness, unsteadiness, generalized fatigue, depression, minimal appetite, going on for months. No significant pain or injuries. \"I just need to get myself together\"    Multiple recent nonspecific ED visits. States she was started on an antidepressant by her primary care doctor about 1.5 weeks ago. Location:    Quality:      Severity:    Duration:   Timing:      Context:    Modifying factors:   Associated symptoms:       The patient's medical, surgical, family, and social history on file were reviewed by me today.       Past Medical History:   Diagnosis Date    AIN (acute interstitial nephritis)     Arthritis     CKD (chronic kidney disease) stage 3, GFR 30-59 ml/min ([facility])     GERD (gastroesophageal reflux disease)     Glaucoma     High cholesterol     Hypertension     Ill-defined condition     chronic cystitis     Past Surgical History:   Procedure Laterality Date    COLONOSCOPY N/A 2020    COLONOSCOPY/EGD performed by Markie Garcia MD at [facility] ENDOSCOPY    HX APPENDECTOMY      with hysterectomy    HX  SECTION      HX COLONOSCOPY      HX DILATION AND CURETTAGE      HX HYSTERECTOMY      HX RENAL BIOPSY Left 2016    in office    HX TONSILLECTOMY      HX UROLOGICAL  2016    cauterization of bladder ulcers       PCP: Judith Calixto MD    Past History     Past Medical History:  Past Medical History:   Diagnosis Date    AIN (acute interstitial nephritis)     Arthritis     CKD (chronic kidney disease) stage 3, GFR 30-59 ml/min ([facility])     GERD (gastroesophageal reflux disease)     Glaucoma     High cholesterol     Hypertension     Ill-defined condition     chronic cystitis       Past Surgical History:  Past Surgical History:   Procedure Laterality Date    COLONOSCOPY N/A 2020    COLONOSCOPY/EGD performed by Dahiana Gonzalez MD at [facility] ENDOSCOPY    HX APPENDECTOMY      with hysterectomy    HX  SECTION      HX COLONOSCOPY      HX DILATION AND CURETTAGE      HX HYSTERECTOMY      HX RENAL BIOPSY Left     in office    HX TONSILLECTOMY      HX UROLOGICAL  2016    cauterization of bladder ulcers       Family History:  Family History   Problem Relation Age of Onset    Cancer Father     Cancer Brother     Breast Cancer Cousin     Arthritis-osteo Mother        Social History:  Social History     Tobacco Use    Smoking status: Former Smoker     Packs/day: 0.50     Years: 4.00     Pack years: 2.00     Last attempt to quit: 1997     Years since quittin.9    Smokeless tobacco: Former User   Substance Use Topics    Alcohol use: Yes     Comment: seldom    Drug use: Yes     Types: Prescription, OTC     Comment: never recreational substance       Allergies: Allergies   Allergen Reactions    Aleve [Naproxen Sodium] Other (comments)     Renal issues    Cefuroxime Other (comments)     Kidney failure    Ibuprofen Unknown (comments)    Lisinopril Other (comments)     Renal dysfunction    Omeprazole Other (comments)     Cause Kidney failure       Current Medications:  No current facility-administered medications on file prior to encounter.       Current Outpatient Medications on File Prior to Encounter   Medication Sig Dispense Refill    mirtazapine (REMERON) 7.5 mg tablet Take 1 Tab by mouth nightly. 30 Tab 1    loratadine (Claritin) 10 mg tablet Take 10 mg by mouth.  lidocaine (Lidoderm) 5 % Apply patch to the affected area for 12 hours a day and remove for 12 hours a day. 5 Each 0    predniSONE (STERAPRED DS) 10 mg dose pack Take as prescribed 21 Tab 0    gabapentin (NEURONTIN) 100 mg capsule Take 3 Caps by mouth two (2) times a day. Max Daily Amount: 600 mg. 60 Cap 2    ondansetron (ZOFRAN ODT) 4 mg disintegrating tablet Take 1 Tab by mouth every eight (8) hours as needed for Nausea or Vomiting. 20 Tab 0    famotidine (PEPCID) 20 mg tablet Take 1 Tab by mouth two (2) times a day. Indications: gastroesophageal reflux disease 180 Tab 3    metoprolol succinate (TOPROL-XL) 50 mg XL tablet Take 1 Tab by mouth daily. 90 Tab 3    diclofenac (VOLTAREN) 1 % gel Apply 2 g to affected area four (4) times daily. (Patient taking differently: Apply 2 g to affected area as needed.) 100 g 3    polyethylene glycol (MIRALAX) 17 gram/dose powder Take 17 g by mouth as needed for Constipation.  timolol (TIMOPTIC) 0.5 % ophthalmic solution Administer 1 Drop to left eye daily.  ACETAMINOPHEN (TYLENOL PO) Take 650 mg by mouth as needed for Pain. Indications: two tabs      latanoprost (XALATAN) 0.005 % ophthalmic solution Administer 1 Drop to both eyes nightly.  Aspirin, Buffered 81 mg tab Take 1 Tab by mouth daily. Review of Systems   Review of Systems   Constitutional: Positive for appetite change, fatigue and unexpected weight change. Respiratory: Negative for shortness of breath. Cardiovascular: Negative for chest pain. Gastrointestinal: Negative for abdominal pain. Psychiatric/Behavioral: Positive for dysphoric mood. Negative for confusion. The patient is nervous/anxious. All other systems reviewed and are negative.       Physical Exam   Vital Signs  Patient Vitals for the past 8 hrs:   Temp Pulse Resp BP SpO2   07/15/20 1520 98.9 °F (37.2 °C) 97 16 143/66 99 %          Physical Exam  Vitals signs and nursing note reviewed. Constitutional:       General: She is not in acute distress. Appearance: Normal appearance. She is well-developed. She is cachectic. She is not ill-appearing. HENT:      Head: Normocephalic and atraumatic. Mouth/Throat:      Mouth: Mucous membranes are moist.   Eyes:      General:         Right eye: No discharge. Left eye: No discharge. Conjunctiva/sclera: Conjunctivae normal.   Neck:      Musculoskeletal: Normal range of motion and neck supple. Cardiovascular:      Rate and Rhythm: Normal rate and regular rhythm. Heart sounds: Normal heart sounds. No murmur. Pulmonary:      Effort: Pulmonary effort is normal. No respiratory distress. Breath sounds: Normal breath sounds. No wheezing. Abdominal:      General: There is no distension. Palpations: Abdomen is soft. Tenderness: There is no abdominal tenderness. Musculoskeletal: Normal range of motion. General: No deformity. Skin:     General: Skin is warm and dry. Findings: No rash. Neurological:      General: No focal deficit present. Mental Status: She is alert and oriented to person, place, and time. Psychiatric:         Mood and Affect: Mood is anxious and depressed. Speech: Speech normal.         Behavior: Behavior normal.         Cognition and Memory: Cognition normal.      Comments: Seems very anxious and depressed. Tearful.            Diagnostic Study Results   Labs  Recent Results (from the past 24 hour(s))   CBC WITH AUTOMATED DIFF    Collection Time: 07/15/20  3:40 PM   Result Value Ref Range    WBC 6.9 3.6 - 11.0 K/uL    RBC 3.54 (L) 3.80 - 5.20 M/uL    HGB 10.3 (L) 11.5 - 16.0 g/dL    HCT 32.8 (L) 35.0 - 47.0 %    MCV 92.7 80.0 - 99.0 FL    MCH 29.1 26.0 - 34.0 PG    MCHC 31.4 30.0 - 36.5 g/dL    RDW 15.6 (H) 11.5 - 14.5 %    PLATELET 490 177 - 889 K/uL    MPV 11.2 8.9 - 12.9 FL    NRBC 0.0 0  WBC    ABSOLUTE NRBC 0.00 0.00 - 0.01 K/uL    NEUTROPHILS 58 32 - 75 %    LYMPHOCYTES 19 12 - 49 %    MONOCYTES 11 5 - 13 %    EOSINOPHILS 7 0 - 7 %    BASOPHILS 1 0 - 1 %    IMMATURE GRANULOCYTES 4 (H) 0.0 - 0.5 %    ABS. NEUTROPHILS 3.9 1.8 - 8.0 K/UL    ABS. LYMPHOCYTES 1.3 0.8 - 3.5 K/UL    ABS. MONOCYTES 0.8 0.0 - 1.0 K/UL    ABS. EOSINOPHILS 0.5 (H) 0.0 - 0.4 K/UL    ABS. BASOPHILS 0.1 0.0 - 0.1 K/UL    ABS. IMM. GRANS. 0.3 (H) 0.00 - 0.04 K/UL    DF SMEAR SCANNED      PLATELET COMMENTS CLUMPED PLATELETS      RBC COMMENTS ANISOCYTOSIS  1+       METABOLIC PANEL, COMPREHENSIVE    Collection Time: 07/15/20  3:40 PM   Result Value Ref Range    Sodium 135 (L) 136 - 145 mmol/L    Potassium 4.5 3.5 - 5.1 mmol/L    Chloride 102 97 - 108 mmol/L    CO2 26 21 - 32 mmol/L    Anion gap 7 5 - 15 mmol/L    Glucose 110 (H) 65 - 100 mg/dL    BUN 21 (H) 6 - 20 MG/DL    Creatinine 0.85 0.55 - 1.02 MG/DL    BUN/Creatinine ratio 25 (H) 12 - 20      GFR est AA >60 >60 ml/min/1.73m2    GFR est non-AA >60 >60 ml/min/1.73m2    Calcium 9.4 8.5 - 10.1 MG/DL    Bilirubin, total 0.3 0.2 - 1.0 MG/DL    ALT (SGPT) 80 (H) 12 - 78 U/L    AST (SGOT) 74 (H) 15 - 37 U/L    Alk. phosphatase 92 45 - 117 U/L    Protein, total 7.0 6.4 - 8.2 g/dL    Albumin 2.3 (L) 3.5 - 5.0 g/dL    Globulin 4.7 (H) 2.0 - 4.0 g/dL    A-G Ratio 0.5 (L) 1.1 - 2.2         Radiologic Studies  No orders to display     CT Results  (Last 48 hours)    None        CXR Results  (Last 48 hours)    None          Procedures   Procedures    Medical Decision Making     I reviewed the patient's most recent Emergency Dept notes and diagnostic tests  in formulating my MDM on today's visit.     Provider Notes (Medical Decision Making):   78-year-old female presenting with generalized fatigue, anxiety, depression, poor appetite going on for months, recently started on antidepressant a week and a half ago.    On exam she has no apparent injuries, reassuring vital signs. Reassuring cardiopulmonary exam and neuro exam.    She is quite tearful, seems quite depressed to me. Her H&P is all consistent with depression which is not adequately treated. We will check basic screening labs and administer low-dose Valium 2 mg orally and clinically reevaluate. I suspect that she can be safely discharged home but needs to continue her newly initiated antidepressant. Jimmy Skinner MD  3:51 PM    Labs reassuring. Improved with Valium. Her H&P is most consistent with inadequately treated depression, but she just started antidepressant about 1.5 weeks ago. There is no occasion for hospitalization at this time. No SI. We will have her use Valium 2 mg as needed, continue antidepressant, and close follow-up with PMD.    Jimmy Skinner MD  4:53 PM      Social History     Tobacco Use    Smoking status: Former Smoker     Packs/day: 0.50     Years: 4.00     Pack years: 2.00     Last attempt to quit: 1997     Years since quittin.9    Smokeless tobacco: Former User   Substance Use Topics    Alcohol use: Yes     Comment: seldom    Drug use: Yes     Types: Prescription, OTC     Comment: never recreational substance     Patient Vitals for the past 4 hrs:   Temp Pulse Resp BP SpO2   07/15/20 1520 98.9 °F (37.2 °C) 97 16 143/66 99 %            Consults:      Medications Administered during ED course:  Medications   diazePAM (VALIUM) tablet 2 mg (2 mg Oral Given 7/15/20 1619)          Current Discharge Medication List      START taking these medications    Details   diazePAM (Valium) 2 mg tablet Take 1 Tab by mouth every eight (8) hours as needed for Anxiety. Max Daily Amount: 6 mg. Qty: 15 Tab, Refills: 0    Associated Diagnoses: Moderate episode of recurrent major depressive disorder (HCC)                Diagnosis and Disposition     Disposition:  Discharged    Clinical Impression:   1.  Moderate episode of recurrent major depressive disorder [facility])        Attestation:  I personally performed the services described in this documentation on this date 7/15/2020 for patient Shun Velazco. Cristobal Ravi MD        I was the first provider for this patient on this visit. To the best of my ability I reviewed relevant prior medical records, electrocardiograms, laboratories, and radiologic studies. The patient's presenting problems were discussed, and the patient was in agreement with the care plan formulated and outlined with them. Cristobal Ravi MD    Please note that this dictation was completed with Dragon voice recognition software. Quite often unanticipated grammatical, syntax, homophones, and other interpretive errors are inadvertently transcribed by the computer software. Please disregard these errors and excuse any errors that have escaped final proofreading.

## 2020-07-15 NOTE — ED NOTES
Bedside and Verbal shift change report given to Cris Stewart (oncoming nurse) by Antonella Correa (offgoing nurse). Report included the following information SBAR, ED Summary, Intake/Output, MAR and Recent Results.

## 2020-07-15 NOTE — ED NOTES
Bedside and Verbal shift change report given to Carolina Rowland RN (oncoming nurse) by Jen Daugherty RN (offgoing nurse). Report included the following information SBAR, ED Summary, MAR and Recent Results.

## 2020-07-15 NOTE — TELEPHONE ENCOUNTER
Spoke with home health nurse, Danny Melara, today. Patient falling and dizzy, hit head. Sent to ED for further evaluation.

## 2020-07-15 NOTE — TELEPHONE ENCOUNTER
Spoke with Abe Rahman OT. Param Martinez informed per Dr. Andrea Silva. See message below. Gucci Lewis MD 19 hours ago (12:51 PM)          Advise home care patient referred to neurology for syncope and falls.       Please contact patient and family to schedule with Dr. Pamella Dandy.  neurology         Documentatio     LVM for patient on home and cell numbers listed in the chart to return call to the office. Regarding referral information.

## 2020-07-15 NOTE — BSMART NOTE
Comprehensive Assessment Form Part 1      Section I - Disposition    Axis I - MDD  Axis II - Deferred  Axis III - See Medical Chart  Axis IV - Psychosocial Stressors  Eloy V - 40      The Medical Doctor to Psychiatrist conference was not completed. The Medical Doctor is in agreement with Psychiatrist disposition because of increased suicidal ideations/depression. The plan is admit patient on U  The on-call Psychiatrist consulted was Dr. Elena Bach  The admitting Psychiatrist will be Dr. Elena Bach  The admitting Diagnosis is MDD  The Payor source is Medicare       Section II - Integrated Summary  Summary:  78 y.o. female with no mental health history or psych admissions presented to the ED with increase depression and suicidal ideations no plan. Patient has fallen 5 x within the last several months and has had multiple CAT scans and lab work ups and no significant findings. Patient has attempted to make appt with Neurologist and unable to get in contact  to schedule appt. Patient stated depression and suicidal  began when the falls took place. Patient has lost 25 pds in the last 3 months. Patient has no appetite and poor sleep. Patient reports increase suicidal ideations stating \" I want to die, and I don't care if I live or die\" Patient began using home health week ago. Patient presented as teary, sad, flat affect. Patient was oriented x 2. Patient  was in assessment and  he feels this is medical and patient feels the opposite. Patient agreed to voluntary admission. Consulted with Dr. Murphy Talamantes and he states patient has been worked up with multiple CAT scans and lab work and medical admit is not necessary. BSMART will search for U bed. The patienthas demonstrated mental capacity to provide informed consent. The information is given by the patient and spouse/SO. The Chief Complaint is increase depression and suicidal ideations.   The Precipitant Factors are previous falls  Previous Hospitalizations: Denies  The patient has not previously been in restraints. Current Psychiatrist and/or  is N/A. Lethality Assessment:    The potential for suicide noted by the following: ideation . The potential for homicide is not noted. The patient has not been a perpetrator of sexual or physical abuse. There are not pending charges. The patient is felt to be at risk for self harm or harm to others. The attending nurse was advised to remove potentially harmful or dangerous items from the patient's room . Section III - Psychosocial  The patient's overall mood and attitude is depressed, flat affect. Feelings of helplessness and hopelessness are observed by patients report. Generalized anxiety is observed by patient report. Panic is observed by patients  report. Phobias are not observed. Obsessive compulsive tendencies are not observed. Section IV - Mental Status Exam  The patient's appearance shows no evidence of impairment. The patient's behavior shows poor eye contact. The patient is oriented to time, place, person and situation. The patient's speech is slowed and is soft. The patient's mood is depressed, is anxious, is withdrawn and is sad. The range of affect is flat. The patient's thought content demonstrates no evidence of impairment. The thought process shows no evidence of impairment. The patient's perception shows no evidence of impairment. The patient's memory shows no evidence of impairment. The patient's appetite is decreased and shows signs of weight loss. The patient's sleep has evidence of insomnia. The patient shows little insight. The patient's judgement is psychologically impaired. Section V - Substance Abuse  The patient is not using substances. Section VI - Living Arrangements  The patient is . The spouses approximate age is 78 and appears to be in ok health. The patient lives with a spouse.  The patient has N/A The patient does plan to return home upon discharge. The patient does not have legal issues pending. The patient's source of income comes from social security. Spiritism and cultural practices have not been voiced at this time. The patient's greatest support comes from  and this person will be involved with the treatment. The patient has not been in an event described as horrible or outside the realm of ordinary life experience either currently or in the past.  The patient has not been a victim of sexual/physical abuse. Section VII - Other Areas of Clinical Concern  The highest grade achieved is 12 with the overall quality of school experience being described. The patient is currently retired and speaks [place] as   a primary language. The patient has no communication impairments affecting communication. The patient's preference for learning can be described as: can read and write adequately. The patient's hearing is normal.  The patient's vision is normal.      Mil Ruiz MA      Shift change and this Clinician passed all information to next [facility].

## 2020-07-16 ENCOUNTER — HOME CARE VISIT (OUTPATIENT)
Dept: SCHEDULING | Facility: HOME HEALTH | Age: 80
End: 2020-07-16
Payer: MEDICARE

## 2020-07-16 PROCEDURE — 3331090001 HH PPS REVENUE CREDIT

## 2020-07-16 PROCEDURE — 3331090002 HH PPS REVENUE DEBIT

## 2020-07-16 NOTE — ED NOTES
I have reviewed discharge instructions with the patient and spouse. The patient and spouse verbalized understanding. Prescription for Valium given at discharge.

## 2020-07-17 PROCEDURE — 3331090002 HH PPS REVENUE DEBIT

## 2020-07-17 PROCEDURE — 3331090001 HH PPS REVENUE CREDIT

## 2020-07-18 PROCEDURE — 3331090002 HH PPS REVENUE DEBIT

## 2020-07-18 PROCEDURE — 3331090001 HH PPS REVENUE CREDIT

## 2020-07-19 PROCEDURE — 3331090002 HH PPS REVENUE DEBIT

## 2020-07-19 PROCEDURE — 3331090001 HH PPS REVENUE CREDIT

## 2020-07-20 ENCOUNTER — HOME CARE VISIT (OUTPATIENT)
Dept: HOME HEALTH SERVICES | Facility: HOME HEALTH | Age: 80
End: 2020-07-20
Payer: MEDICARE

## 2020-07-20 ENCOUNTER — HOME CARE VISIT (OUTPATIENT)
Dept: SCHEDULING | Facility: HOME HEALTH | Age: 80
End: 2020-07-20
Payer: MEDICARE

## 2020-07-20 VITALS
SYSTOLIC BLOOD PRESSURE: 118 MMHG | TEMPERATURE: 98.2 F | HEART RATE: 69 BPM | OXYGEN SATURATION: 91 % | DIASTOLIC BLOOD PRESSURE: 60 MMHG

## 2020-07-20 PROCEDURE — G0152 HHCP-SERV OF OT,EA 15 MIN: HCPCS

## 2020-07-20 PROCEDURE — 3331090001 HH PPS REVENUE CREDIT

## 2020-07-20 PROCEDURE — 3331090002 HH PPS REVENUE DEBIT

## 2020-07-21 ENCOUNTER — HOME CARE VISIT (OUTPATIENT)
Dept: HOME HEALTH SERVICES | Facility: HOME HEALTH | Age: 80
End: 2020-07-21
Payer: MEDICARE

## 2020-07-21 ENCOUNTER — HOME CARE VISIT (OUTPATIENT)
Dept: SCHEDULING | Facility: HOME HEALTH | Age: 80
End: 2020-07-21
Payer: MEDICARE

## 2020-07-21 PROCEDURE — G0157 HHC PT ASSISTANT EA 15: HCPCS

## 2020-07-21 PROCEDURE — G0300 HHS/HOSPICE OF LPN EA 15 MIN: HCPCS

## 2020-07-21 PROCEDURE — 3331090001 HH PPS REVENUE CREDIT

## 2020-07-21 PROCEDURE — 3331090002 HH PPS REVENUE DEBIT

## 2020-07-22 VITALS
SYSTOLIC BLOOD PRESSURE: 142 MMHG | DIASTOLIC BLOOD PRESSURE: 60 MMHG | TEMPERATURE: 97.4 F | OXYGEN SATURATION: 92 % | HEART RATE: 55 BPM | RESPIRATION RATE: 18 BRPM

## 2020-07-22 PROCEDURE — 3331090001 HH PPS REVENUE CREDIT

## 2020-07-22 PROCEDURE — 3331090002 HH PPS REVENUE DEBIT

## 2020-07-23 ENCOUNTER — HOME CARE VISIT (OUTPATIENT)
Dept: SCHEDULING | Facility: HOME HEALTH | Age: 80
End: 2020-07-23
Payer: MEDICARE

## 2020-07-23 ENCOUNTER — HOME CARE VISIT (OUTPATIENT)
Dept: HOME HEALTH SERVICES | Facility: HOME HEALTH | Age: 80
End: 2020-07-23
Payer: MEDICARE

## 2020-07-23 PROCEDURE — G0157 HHC PT ASSISTANT EA 15: HCPCS

## 2020-07-23 PROCEDURE — 3331090002 HH PPS REVENUE DEBIT

## 2020-07-23 PROCEDURE — 3331090001 HH PPS REVENUE CREDIT

## 2020-07-24 ENCOUNTER — HOME CARE VISIT (OUTPATIENT)
Dept: SCHEDULING | Facility: HOME HEALTH | Age: 80
End: 2020-07-24
Payer: MEDICARE

## 2020-07-24 ENCOUNTER — HOME CARE VISIT (OUTPATIENT)
Dept: HOME HEALTH SERVICES | Facility: HOME HEALTH | Age: 80
End: 2020-07-24
Payer: MEDICARE

## 2020-07-24 VITALS
OXYGEN SATURATION: 98 % | TEMPERATURE: 97.8 F | SYSTOLIC BLOOD PRESSURE: 128 MMHG | HEART RATE: 75 BPM | DIASTOLIC BLOOD PRESSURE: 63 MMHG

## 2020-07-24 PROCEDURE — 3331090001 HH PPS REVENUE CREDIT

## 2020-07-24 PROCEDURE — 3331090002 HH PPS REVENUE DEBIT

## 2020-07-24 PROCEDURE — G0152 HHCP-SERV OF OT,EA 15 MIN: HCPCS

## 2020-07-25 PROCEDURE — 3331090002 HH PPS REVENUE DEBIT

## 2020-07-25 PROCEDURE — 3331090001 HH PPS REVENUE CREDIT

## 2020-07-26 PROCEDURE — 3331090001 HH PPS REVENUE CREDIT

## 2020-07-26 PROCEDURE — 3331090002 HH PPS REVENUE DEBIT

## 2020-07-27 ENCOUNTER — HOME CARE VISIT (OUTPATIENT)
Dept: SCHEDULING | Facility: HOME HEALTH | Age: 80
End: 2020-07-27
Payer: MEDICARE

## 2020-07-27 ENCOUNTER — HOME CARE VISIT (OUTPATIENT)
Dept: HOME HEALTH SERVICES | Facility: HOME HEALTH | Age: 80
End: 2020-07-27
Payer: MEDICARE

## 2020-07-27 VITALS
SYSTOLIC BLOOD PRESSURE: 118 MMHG | DIASTOLIC BLOOD PRESSURE: 68 MMHG | OXYGEN SATURATION: 98 % | TEMPERATURE: 98.2 F | HEART RATE: 76 BPM

## 2020-07-27 PROCEDURE — 3331090001 HH PPS REVENUE CREDIT

## 2020-07-27 PROCEDURE — 3331090002 HH PPS REVENUE DEBIT

## 2020-07-27 PROCEDURE — G0152 HHCP-SERV OF OT,EA 15 MIN: HCPCS

## 2020-07-28 ENCOUNTER — HOME CARE VISIT (OUTPATIENT)
Dept: HOME HEALTH SERVICES | Facility: HOME HEALTH | Age: 80
End: 2020-07-28
Payer: MEDICARE

## 2020-07-28 ENCOUNTER — HOME CARE VISIT (OUTPATIENT)
Dept: SCHEDULING | Facility: HOME HEALTH | Age: 80
End: 2020-07-28
Payer: MEDICARE

## 2020-07-28 PROCEDURE — G0299 HHS/HOSPICE OF RN EA 15 MIN: HCPCS

## 2020-07-28 PROCEDURE — 3331090001 HH PPS REVENUE CREDIT

## 2020-07-28 PROCEDURE — 3331090002 HH PPS REVENUE DEBIT

## 2020-07-29 ENCOUNTER — HOME CARE VISIT (OUTPATIENT)
Dept: SCHEDULING | Facility: HOME HEALTH | Age: 80
End: 2020-07-29
Payer: MEDICARE

## 2020-07-29 ENCOUNTER — HOME CARE VISIT (OUTPATIENT)
Dept: HOME HEALTH SERVICES | Facility: HOME HEALTH | Age: 80
End: 2020-07-29
Payer: MEDICARE

## 2020-07-29 VITALS
DIASTOLIC BLOOD PRESSURE: 62 MMHG | TEMPERATURE: 98.1 F | OXYGEN SATURATION: 96 % | HEART RATE: 84 BPM | SYSTOLIC BLOOD PRESSURE: 124 MMHG

## 2020-07-29 PROCEDURE — G0152 HHCP-SERV OF OT,EA 15 MIN: HCPCS

## 2020-07-29 PROCEDURE — 3331090001 HH PPS REVENUE CREDIT

## 2020-07-29 PROCEDURE — G0157 HHC PT ASSISTANT EA 15: HCPCS

## 2020-07-29 PROCEDURE — 3331090002 HH PPS REVENUE DEBIT

## 2020-07-30 VITALS
RESPIRATION RATE: 20 BRPM | HEART RATE: 88 BPM | SYSTOLIC BLOOD PRESSURE: 132 MMHG | TEMPERATURE: 98.2 F | DIASTOLIC BLOOD PRESSURE: 68 MMHG | OXYGEN SATURATION: 96 %

## 2020-07-30 PROCEDURE — 3331090002 HH PPS REVENUE DEBIT

## 2020-07-30 PROCEDURE — 3331090001 HH PPS REVENUE CREDIT

## 2020-07-31 ENCOUNTER — HOME CARE VISIT (OUTPATIENT)
Dept: SCHEDULING | Facility: HOME HEALTH | Age: 80
End: 2020-07-31
Payer: MEDICARE

## 2020-07-31 ENCOUNTER — HOME CARE VISIT (OUTPATIENT)
Dept: HOME HEALTH SERVICES | Facility: HOME HEALTH | Age: 80
End: 2020-07-31
Payer: MEDICARE

## 2020-07-31 VITALS
DIASTOLIC BLOOD PRESSURE: 65 MMHG | TEMPERATURE: 97.3 F | DIASTOLIC BLOOD PRESSURE: 65 MMHG | TEMPERATURE: 98.2 F | OXYGEN SATURATION: 98 % | HEART RATE: 89 BPM | SYSTOLIC BLOOD PRESSURE: 125 MMHG | OXYGEN SATURATION: 99 % | SYSTOLIC BLOOD PRESSURE: 139 MMHG | HEART RATE: 74 BPM

## 2020-07-31 PROCEDURE — G0151 HHCP-SERV OF PT,EA 15 MIN: HCPCS

## 2020-07-31 PROCEDURE — 3331090001 HH PPS REVENUE CREDIT

## 2020-07-31 PROCEDURE — 3331090002 HH PPS REVENUE DEBIT

## 2020-08-01 PROCEDURE — 3331090001 HH PPS REVENUE CREDIT

## 2020-08-01 PROCEDURE — 3331090002 HH PPS REVENUE DEBIT

## 2020-08-02 PROCEDURE — 3331090001 HH PPS REVENUE CREDIT

## 2020-08-02 PROCEDURE — 3331090002 HH PPS REVENUE DEBIT

## 2020-08-03 ENCOUNTER — HOME CARE VISIT (OUTPATIENT)
Dept: HOME HEALTH SERVICES | Facility: HOME HEALTH | Age: 80
End: 2020-08-03
Payer: MEDICARE

## 2020-08-03 PROCEDURE — 3331090001 HH PPS REVENUE CREDIT

## 2020-08-03 PROCEDURE — 3331090002 HH PPS REVENUE DEBIT

## 2020-08-04 ENCOUNTER — HOME CARE VISIT (OUTPATIENT)
Dept: HOME HEALTH SERVICES | Facility: HOME HEALTH | Age: 80
End: 2020-08-04
Payer: MEDICARE

## 2020-08-04 ENCOUNTER — HOME CARE VISIT (OUTPATIENT)
Dept: SCHEDULING | Facility: HOME HEALTH | Age: 80
End: 2020-08-04
Payer: MEDICARE

## 2020-08-04 PROCEDURE — G0157 HHC PT ASSISTANT EA 15: HCPCS

## 2020-08-04 PROCEDURE — 3331090001 HH PPS REVENUE CREDIT

## 2020-08-04 PROCEDURE — 3331090002 HH PPS REVENUE DEBIT

## 2020-08-05 VITALS
DIASTOLIC BLOOD PRESSURE: 80 MMHG | TEMPERATURE: 97.9 F | OXYGEN SATURATION: 98 % | SYSTOLIC BLOOD PRESSURE: 145 MMHG | HEART RATE: 84 BPM

## 2020-08-05 PROCEDURE — 3331090002 HH PPS REVENUE DEBIT

## 2020-08-05 PROCEDURE — 3331090001 HH PPS REVENUE CREDIT

## 2020-08-06 ENCOUNTER — HOME CARE VISIT (OUTPATIENT)
Dept: SCHEDULING | Facility: HOME HEALTH | Age: 80
End: 2020-08-06
Payer: MEDICARE

## 2020-08-06 ENCOUNTER — HOME CARE VISIT (OUTPATIENT)
Dept: HOME HEALTH SERVICES | Facility: HOME HEALTH | Age: 80
End: 2020-08-06
Payer: MEDICARE

## 2020-08-06 VITALS
TEMPERATURE: 98 F | DIASTOLIC BLOOD PRESSURE: 71 MMHG | HEART RATE: 91 BPM | SYSTOLIC BLOOD PRESSURE: 130 MMHG | OXYGEN SATURATION: 98 %

## 2020-08-06 VITALS
DIASTOLIC BLOOD PRESSURE: 71 MMHG | HEART RATE: 91 BPM | OXYGEN SATURATION: 98 % | SYSTOLIC BLOOD PRESSURE: 130 MMHG | RESPIRATION RATE: 18 BRPM | TEMPERATURE: 98 F

## 2020-08-06 PROCEDURE — 3331090001 HH PPS REVENUE CREDIT

## 2020-08-06 PROCEDURE — G0151 HHCP-SERV OF PT,EA 15 MIN: HCPCS

## 2020-08-06 PROCEDURE — 3331090002 HH PPS REVENUE DEBIT

## 2020-08-06 PROCEDURE — G0152 HHCP-SERV OF OT,EA 15 MIN: HCPCS

## 2020-08-07 ENCOUNTER — TELEPHONE (OUTPATIENT)
Dept: INTERNAL MEDICINE CLINIC | Age: 80
End: 2020-08-07

## 2020-08-07 DIAGNOSIS — R26.81 UNSTEADY GAIT: Primary | ICD-10-CM

## 2020-08-07 NOTE — TELEPHONE ENCOUNTER
Home health needs orders for a pediatric walker sent to [facility]    Pt was discharged from [facility] yesterday    [facility] [address] [facility] [phone]

## 2020-08-07 NOTE — TELEPHONE ENCOUNTER
Fax order for walker to [facility].    Amb supply order faxed to [facility] [phone]. Fax confirmation received.

## 2020-08-25 ENCOUNTER — TELEPHONE (OUTPATIENT)
Dept: INTERNAL MEDICINE CLINIC | Age: 80
End: 2020-08-25

## 2020-08-25 DIAGNOSIS — R26.81 UNSTEADY GAIT: Primary | ICD-10-CM

## 2020-08-25 NOTE — TELEPHONE ENCOUNTER
Patient states she needs to advise that per [facility] they need additional information/Documentation for her Lawanda Oiler & order sent on 8/7/20. Patient state they're needing her Height, Weight , & Chart notes in order for Walker order to be completed. Please call when done or if any questions.  Thank you      [facility] Fax# is [phone]

## 2020-08-26 NOTE — TELEPHONE ENCOUNTER
Requested information faxed to [facility] and confirmation received

## 2020-09-03 ENCOUNTER — TELEPHONE (OUTPATIENT)
Dept: INTERNAL MEDICINE CLINIC | Age: 80
End: 2020-09-03

## 2020-09-03 DIAGNOSIS — R26.81 UNSTEADY GAIT: Primary | ICD-10-CM

## 2020-09-03 DIAGNOSIS — M51.36 DDD (DEGENERATIVE DISC DISEASE), LUMBAR: ICD-10-CM

## 2020-09-03 NOTE — TELEPHONE ENCOUNTER
----- Message from [address] sent at 9/3/2020 11:01 AM EDT -----  Regarding: FW: Prescription Question  Contact: [phone]    ----- Message -----  From: Meggan Corona  Sent: 9/3/2020  10:49 AM EDT  To: [facility] Nurse Pool  Subject: Prescription Question                            Dr. Burton Shook:  Anatoliy Howard recently sent a prescription for a walker to  [facility] for me. It was for  standard walker which does not have the front wheels. I am unable to use this as to pick it up each step is quite a strain. I took it back to [facility] and asked for one with front wheels. They told me that they would need a revised prescription from you stating needed with front wheels. I would appreciate it if you could fax a revised prescription to them and they will file with Medicare and ship to me. Thanks for your assistance in this matter.     Stacy Gutierrez

## 2021-03-16 NOTE — PERIOP NOTES
[facility]  Ambulatory Surgery Unit  Pre-operative Instructions    Surgery/Procedure Date  Monday, March 22, 2021            Tentative Arrival Time 0815      1. On the day of your surgery/procedure, please report to the Ambulatory Surgery Unit Registration Desk and sign in at your designated time. The Ambulatory Surgery Unit is located in [facility] on the [facility] side of the [facility] across from the [address] [place] [address]. Please have all of your health insurance cards and a photo ID. 2. You must have someone with you to drive you home, as you should not drive a car for 24 hours following anesthesia. Please make arrangements for a responsible adult friend or family member to stay with you for at least the first 24 hours after your surgery. 3. Do not have anything to eat or drink (including water, gum, mints, coffee, juice) after 11:59 PM, Sunday. This may not apply to medications prescribed by your physician. (Please note below the special instructions with medications to take the morning of surgery, if applicable.)    4. We recommend you do not drink any alcoholic beverages for 24 hours before and after your surgery. 5. Contact your surgeons office for instructions on the following medications: non-steroidal anti-inflammatory drugs (i.e. Advil, Aleve), vitamins, and supplements. (Some surgeons will want you to stop these medications prior to surgery and others may allow you to take them)   **If you are currently taking Plavix, Coumadin, Aspirin and/or other blood-thinning agents, contact your surgeon for instructions. ** Your surgeon will partner with the physician prescribing these medications to determine if it is safe to stop or if you need to continue taking. Please do not stop taking these medications without instructions from your surgeon.     6. In an effort to help prevent surgical site infection, we ask that you shower with an anti-bacterial soap (i.e. Dial/Safeguard, or the soap provided to you at your preadmission testing appointment) for 3 days prior to and on the morning of surgery, using a fresh towel after each shower. (Please begin this process with fresh bed linens.) Do not apply any lotions, powders, or deodorants after the shower on the day of your procedure. If applicable, please do not shave the operative site for 48 hours prior to surgery. 7. Wear comfortable clothes. Wear glasses instead of contacts. Do not bring any jewelry or money (other than copays or fees as instructed). Do not wear make-up, particularly mascara, the morning of your surgery. Do not wear nail polish, particularly if you are having foot /hand surgery. Wear your hair loose or down, no ponytails, buns, ekta pins or clips. All body piercings must be removed. 8. You should understand that if you do not follow these instructions your surgery may be cancelled. If your physical condition changes (i.e. fever, cold or flu) please contact your surgeon as soon as possible. 9. It is important that you be on time. If a situation occurs where you may be late, or if you have any questions or problems, please call [phone].    10. Your surgery time may be subject to change. You will receive a phone call the day prior to surgery to confirm your arrival time. 11. Pediatric patients: please bring a change of clothes, diapers, bottle/sippy cup, pacifier, etc.      Special Instructions: Take all medications and inhalers, as prescribed, on the morning of surgery with a sip of water. I understand a pre-operative phone call will be made to verify my surgery time. In the event that I am not available, I give permission for a message to be left on my answering service and/or with another person?       yes    Preop instructions reviewed  Pt verbalized understanding.      ___________________      ___________________      ________________  (Signature of Patient)          (Witness) (Date and Time)

## 2021-03-18 ENCOUNTER — HOSPITAL ENCOUNTER (OUTPATIENT)
Dept: PREADMISSION TESTING | Age: 81
Discharge: HOME | End: 2021-03-18
Payer: MEDICARE

## 2021-03-18 LAB — SARS-COV-2, COV2: NORMAL

## 2021-03-18 PROCEDURE — U0003 INFECTIOUS AGENT DETECTION BY NUCLEIC ACID (DNA OR RNA); SEVERE ACUTE RESPIRATORY SYNDROME CORONAVIRUS 2 (SARS-COV-2) (CORONAVIRUS DISEASE [COVID-19]), AMPLIFIED PROBE TECHNIQUE, MAKING USE OF HIGH THROUGHPUT TECHNOLOGIES AS DESCRIBED BY CMS-2020-01-R: HCPCS

## 2021-03-19 ENCOUNTER — ANESTHESIA EVENT (OUTPATIENT)
Dept: SURGERY | Age: 81
End: 2021-03-19
Payer: MEDICARE

## 2021-03-19 LAB — SARS-COV-2, COV2NT: NOT DETECTED

## 2021-03-22 ENCOUNTER — ANESTHESIA (OUTPATIENT)
Dept: SURGERY | Age: 81
End: 2021-03-22
Payer: MEDICARE

## 2021-03-22 ENCOUNTER — HOSPITAL ENCOUNTER (OUTPATIENT)
Age: 81
Setting detail: OUTPATIENT SURGERY
Discharge: HOME | End: 2021-03-22
Attending: OPHTHALMOLOGY | Admitting: OPHTHALMOLOGY
Payer: MEDICARE

## 2021-03-22 VITALS
BODY MASS INDEX: 20.36 KG/M2 | DIASTOLIC BLOOD PRESSURE: 53 MMHG | TEMPERATURE: 97.8 F | OXYGEN SATURATION: 100 % | HEIGHT: 58 IN | SYSTOLIC BLOOD PRESSURE: 162 MMHG | RESPIRATION RATE: 18 BRPM | HEART RATE: 74 BPM | WEIGHT: 97 LBS

## 2021-03-22 PROCEDURE — 74011250636 HC RX REV CODE- 250/636: Performed by: OPHTHALMOLOGY

## 2021-03-22 PROCEDURE — 76210000040 HC AMBSU PH I REC FIRST 0.5 HR: Performed by: OPHTHALMOLOGY

## 2021-03-22 PROCEDURE — 74011000250 HC RX REV CODE- 250

## 2021-03-22 PROCEDURE — 2709999900 HC NON-CHARGEABLE SUPPLY: Performed by: OPHTHALMOLOGY

## 2021-03-22 PROCEDURE — 76030000000 HC AMB SURG OR TIME 0.5 TO 1: Performed by: OPHTHALMOLOGY

## 2021-03-22 PROCEDURE — 77030018846 HC SOL IRR STRL H20 ICUM -A: Performed by: OPHTHALMOLOGY

## 2021-03-22 PROCEDURE — 74011250637 HC RX REV CODE- 250/637: Performed by: OPHTHALMOLOGY

## 2021-03-22 PROCEDURE — 76210000046 HC AMBSU PH II REC FIRST 0.5 HR: Performed by: OPHTHALMOLOGY

## 2021-03-22 PROCEDURE — V2632 POST CHMBR INTRAOCULAR LENS: HCPCS | Performed by: OPHTHALMOLOGY

## 2021-03-22 PROCEDURE — 74011000250 HC RX REV CODE- 250: Performed by: OPHTHALMOLOGY

## 2021-03-22 PROCEDURE — 77030021352 HC CBL LD SYS DISP COVD -B: Performed by: OPHTHALMOLOGY

## 2021-03-22 PROCEDURE — 74011250636 HC RX REV CODE- 250/636: Performed by: NURSE ANESTHETIST, CERTIFIED REGISTERED

## 2021-03-22 PROCEDURE — C1783 OCULAR IMP, AQUEOUS DRAIN DE: HCPCS | Performed by: OPHTHALMOLOGY

## 2021-03-22 PROCEDURE — 76060000061 HC AMB SURG ANES 0.5 TO 1 HR: Performed by: OPHTHALMOLOGY

## 2021-03-22 DEVICE — ACRYSOF(R) IQ ASPHERIC NATURAL IOL, SINGLE-PIECE ACRYLIC FOLDABLE PCL, UV WITH BLUE LIGHTFILTER, 13.0MM LENGTH, 6.0MM ANTERIORASYMMETRIC BICONVEX OPTIC, PLANAR HAPTICS.
Type: IMPLANTABLE DEVICE | Site: EYE | Status: FUNCTIONAL
Brand: ACRYSOF®

## 2021-03-22 DEVICE — TRABECULAR MICRO-BYPASS STENT SYSTEM - LEFT
Type: IMPLANTABLE DEVICE | Site: EYE | Status: FUNCTIONAL
Brand: ISTENT

## 2021-03-22 RX ORDER — SODIUM CHLORIDE 0.9 % (FLUSH) 0.9 %
5-40 SYRINGE (ML) INJECTION EVERY 8 HOURS
Status: DISCONTINUED | OUTPATIENT
Start: 2021-03-22 | End: 2021-03-22 | Stop reason: HOSPADM

## 2021-03-22 RX ORDER — TROPICAMIDE 10 MG/ML
SOLUTION/ DROPS OPHTHALMIC
Status: COMPLETED
Start: 2021-03-22 | End: 2021-03-22

## 2021-03-22 RX ORDER — MIDAZOLAM HYDROCHLORIDE 1 MG/ML
INJECTION, SOLUTION INTRAMUSCULAR; INTRAVENOUS AS NEEDED
Status: DISCONTINUED | OUTPATIENT
Start: 2021-03-22 | End: 2021-03-22 | Stop reason: HOSPADM

## 2021-03-22 RX ORDER — FENTANYL CITRATE 50 UG/ML
25 INJECTION, SOLUTION INTRAMUSCULAR; INTRAVENOUS
Status: DISCONTINUED | OUTPATIENT
Start: 2021-03-22 | End: 2021-03-22 | Stop reason: HOSPADM

## 2021-03-22 RX ORDER — SODIUM CHLORIDE 9 MG/ML
25 INJECTION, SOLUTION INTRAVENOUS CONTINUOUS
Status: DISCONTINUED | OUTPATIENT
Start: 2021-03-22 | End: 2021-03-22 | Stop reason: HOSPADM

## 2021-03-22 RX ORDER — ONDANSETRON 2 MG/ML
4 INJECTION INTRAMUSCULAR; INTRAVENOUS AS NEEDED
Status: DISCONTINUED | OUTPATIENT
Start: 2021-03-22 | End: 2021-03-22 | Stop reason: HOSPADM

## 2021-03-22 RX ORDER — TETRACAINE HYDROCHLORIDE 5 MG/ML
SOLUTION OPHTHALMIC AS NEEDED
Status: DISCONTINUED | OUTPATIENT
Start: 2021-03-22 | End: 2021-03-22 | Stop reason: HOSPADM

## 2021-03-22 RX ORDER — SODIUM CHLORIDE 0.9 % (FLUSH) 0.9 %
5-40 SYRINGE (ML) INJECTION AS NEEDED
Status: DISCONTINUED | OUTPATIENT
Start: 2021-03-22 | End: 2021-03-22 | Stop reason: HOSPADM

## 2021-03-22 RX ORDER — CIPROFLOXACIN HYDROCHLORIDE 3.5 MG/ML
1 SOLUTION/ DROPS TOPICAL
Status: COMPLETED | OUTPATIENT
Start: 2021-03-22 | End: 2021-03-22

## 2021-03-22 RX ORDER — TROPICAMIDE 10 MG/ML
1 SOLUTION/ DROPS OPHTHALMIC
Status: COMPLETED | OUTPATIENT
Start: 2021-03-22 | End: 2021-03-22

## 2021-03-22 RX ORDER — TIMOLOL MALEATE 5 MG/ML
SOLUTION/ DROPS OPHTHALMIC AS NEEDED
Status: DISCONTINUED | OUTPATIENT
Start: 2021-03-22 | End: 2021-03-22 | Stop reason: HOSPADM

## 2021-03-22 RX ORDER — NEOMYCIN SULFATE, POLYMYXIN B SULFATE, AND DEXAMETHASONE 3.5; 10000; 1 MG/G; [USP'U]/G; MG/G
OINTMENT OPHTHALMIC AS NEEDED
Status: DISCONTINUED | OUTPATIENT
Start: 2021-03-22 | End: 2021-03-22 | Stop reason: HOSPADM

## 2021-03-22 RX ADMIN — SODIUM CHLORIDE: 900 INJECTION, SOLUTION INTRAVENOUS at 07:54

## 2021-03-22 RX ADMIN — MIDAZOLAM HYDROCHLORIDE 0.5 MG: 1 INJECTION, SOLUTION INTRAMUSCULAR; INTRAVENOUS at 09:17

## 2021-03-22 RX ADMIN — SODIUM CHLORIDE 25 ML/HR: 900 INJECTION, SOLUTION INTRAVENOUS at 07:53

## 2021-03-22 RX ADMIN — TROPICAMIDE 1 DROP: 10 SOLUTION/ DROPS OPHTHALMIC at 08:01

## 2021-03-22 RX ADMIN — TROPICAMIDE 1 DROP: 10 SOLUTION/ DROPS OPHTHALMIC at 07:52

## 2021-03-22 RX ADMIN — TROPICAMIDE 1 DROP: 10 SOLUTION/ DROPS OPHTHALMIC at 07:55

## 2021-03-22 RX ADMIN — CIPROFLOXACIN 1 DROP: 3 SOLUTION OPHTHALMIC at 07:52

## 2021-03-22 RX ADMIN — MIDAZOLAM HYDROCHLORIDE 0.5 MG: 1 INJECTION, SOLUTION INTRAMUSCULAR; INTRAVENOUS at 09:13

## 2021-03-22 RX ADMIN — CIPROFLOXACIN 1 DROP: 3 SOLUTION OPHTHALMIC at 08:01

## 2021-03-22 RX ADMIN — Medication 3 AMPULE: at 08:02

## 2021-03-22 RX ADMIN — CIPROFLOXACIN 1 DROP: 3 SOLUTION OPHTHALMIC at 07:56

## 2021-03-22 NOTE — PERIOP NOTES
HOB elevated, patient sipping water. Denies discomfort. 1005 D/C instructions reviewed with sister niki Nog over phone and pt at bedside.  to bedside to speak with pt. Instructed to continue with glaucoma drops for now in addition to post op drops. 1023 Discharged to home via/wc,accompanied to car per RN. Skin warm and dry, awake and alert. Respirations even, unlabored. Pt and family members questions and concerns addressed prior to discharge. All belongings with pt.

## 2021-03-22 NOTE — DISCHARGE INSTRUCTIONS
Srinivasan Burgess MD  [address] [place] [address] Drive   TERENCE Gonzales, [address]  Phone: [phone]  If you are unable to keep appointment, kindly give 24 hours notice please. REMOVE PATCH  START DROPS WHEN YOU GET HOME  PUT PATCH BACK ON AT BEDTIME    1. DO NOT RUB the eye that was operated on. 2. Do not strain excessively. It is all right to bend as long as you do not strain. 3. It is safe to take a shower, wash your face, and wash your hair. Just keep the eye closed. 4. Do not swim for 1 week after surgery. 5. If you have any problems or questions, do not hesitate to call .  6. Follow instructions on eye drops from office. 7. You may take Tylenol or Advil for discomfort. If it pressure not relieved by Tylenol or Advil, please call Dr. Roberts Signs office. TO PREVENT AN INFECTION      1. [address] YOUR HANDS     To prevent infection, good handwashing is the most important thing you or your caregiver can do.  Wash your hands with soap and water or use the hand  we gave you before you touch any wounds. 2.  USE CLEAN SHEETS     Use freshly cleaned sheets on your bed after surgery.  To keep the surgery site clean, do not allow pets to sleep with you while your wound is still healing. 3. STOP SMOKING    Stop smoking, or at least cut back on smoking     Smoking slows your healing. 4.  CONTROL YOUR BLOOD SUGAR     High blood sugars slow wound healing.  If you are diabetic, control your blood sugar levels before and after your surgery. If you were given prescriptions, please review the written information on the prescribed medications. DO NOT DRIVE WHILE TAKING NARCOTIC PAIN MEDICATIONS.     DISCHARGE SUMMARY from Nurse    The following personal items collected during your admission are returned to you:   Dental Appliance: Dental Appliances: None  Vision: Visual Aid: Glasses(readers)  Hearing Aid:    Jewelry:    Clothing:    Other Valuables: Valuables sent to safe:      PATIENT INSTRUCTIONS:    After general anesthesia or intravenous sedation, for 24 hours or while taking prescription Narcotics:  · Someone should be with you for the next 24 hours. · For your own safety, a responsible adult must drive you home. · Limit your activities  · Recommended activity: Rest today, Do not climb stairs or shower unattended for the next 24 hours. · Do not drive and operate hazardous machinery  · Do not make important personal or business decisions  · Do  not drink alcoholic beverages  · If you have not urinated within 8 hours after discharge, please contact your surgeon on call. Report the following to your surgeon:  · Excessive pain, swelling, redness or odor of or around the surgical area  · Temperature over 100.5  · Nausea and vomiting lasting longer than 4 hours or if unable to take medications    · You will receive a Post Operative Call from one of the Recovery Room Nurses on the day after your surgery to check on you. It is very important for us to know how you are recovering after your surgery. · You may receive an e-mail or letter in the mail from [facility] regarding your experience with us in the Ambulatory Surgery Unit. Your feedback is valuable to us and we appreciate your participation in the survey. · We wish youre a speedy recovery ? What to do at Home:      *  Please give a list of your current medications to your Primary Care Provider. *  Please update this list whenever your medications are discontinued, doses are      changed, or new medications (including over-the-counter products) are added. *  Please carry medication information at all times in case of emergency situations. These are general instructions for a healthy lifestyle:    No smoking/ No tobacco products/ Avoid exposure to second hand smoke    Surgeon General's Warning:  Quitting smoking now greatly reduces serious risk to your health.     Obesity, smoking, and sedentary lifestyle greatly increases your risk for illness    A healthy diet, regular physical exercise & weight monitoring are important for maintaining a healthy lifestyle    You may be retaining fluid if you have a history of heart failure or if you experience any of the following symptoms:  Weight gain of 3 pounds or more overnight or 5 pounds in a week, increased swelling in our hands or feet or shortness of breath while lying flat in bed. Please call your doctor as soon as you notice any of these symptoms; do not wait until your next office visit. Recognize signs and symptoms of STROKE:    B - Balance  E - Eyes    F-face looks uneven    A-arms unable to move or move even    S-speech slurred or non-existent    T-time-call 911 as soon as signs and symptoms begin-DO NOT go       Back to bed or wait to see if you get better-TIME IS BRAIN. If you have not received your influenza and/or pneumococcal vaccine, please follow up with your primary care physician. The discharge information has been reviewed with the patient and caregiver. The patient and caregiver verbalized understanding.

## 2021-03-22 NOTE — ANESTHESIA POSTPROCEDURE EVALUATION
Procedure(s):  LEFT EYE CATARACT EXTRACTION WITH INTRA OCULAR LENS WITH I-STENT.     MAC    Anesthesia Post Evaluation      Multimodal analgesia: multimodal analgesia not used between 6 hours prior to anesthesia start to PACU discharge  Patient location during evaluation: PACU  Patient participation: complete - patient participated  Level of consciousness: awake and alert  Pain score: 0  Airway patency: patent  Anesthetic complications: no  Cardiovascular status: acceptable  Respiratory status: acceptable  Hydration status: acceptable  Post anesthesia nausea and vomiting:  none  Final Post Anesthesia Temperature Assessment:  Normothermia (36.0-37.5 degrees C)      INITIAL Post-op Vital signs:   Vitals Value Taken Time   /72 03/22/21 0954   Temp 36.6 °C (97.8 °F) 03/22/21 0954   Pulse 70 03/22/21 0954   Resp 13 03/22/21 0954   SpO2 100 % 03/22/21 0954

## 2021-03-22 NOTE — OP NOTES
Date of Procedure: 3/22/2021  Preoperative Diagnosis: Nuclear Sclerotic cataract left eye H25.12, Uncontrolled open angle glaucoma left eye (Z18.0658)  Postoperative Diagnosis: Nuclear Sclerotic cataract left eye H25.12, Uncontrolled open angle glaucoma left eye (Y94.9459)  Procedure: Extracapsular cataract extraction with lens implant left eye with Istent placement  Surgeon:  MARIA INES Stubbs MD  Assistants: None  Anesthesia: MAC with local  Estimated Blood Loss: None  Findings: Cataract left eye, uncontrolled open angle glaucoma left eye  Complications: None  Specimens: None  Prosthetic Devices: Intraocular lens implant, Istent left eye     The patient's left eye was dilated with mydriacyl 1% and ciprofloxacin 0.3% for 3 doses preoperatively. The patient was taken to the operating room and was given sedation. Tetracaine was given topically to the left eye, and the eye was prepped and draped in the usual manner for sterile eye surgery, including Betadine solution being dropped onto the conjunctiva at the beginning of the prep. The eyelashes were isolated with a plastic drape. A lid speculum was placed.     A #15 blade was used to make a paracentesis at the 5:00 location. The eye was flushed with a lidocaine / epinephrine mixture (\"Shugarcaine\"). The eye was filled with Viscoat (Duovisc), and a crescent blade was used to make a 2.5 mm incision at the limbus temporally. This was dissected 2 mm into clear cornea, and the eye was entered with a 2.4 mm keratome. A 0.12 forceps was used for fixation during the procedure. A capsulorhexis flap was started with a cystotome, and this was completed 360 degrees with Utrata forceps. The capsular piece was removed. [place] dissection was performed with the \"Shugarcaine\" mixture on a cannula.     The lens nucleus was removed using phacoemulsification with a total phaco time of 1:44 minutes at 9.6%.     The lens was cracked and manipulated with a Sinsky hook.   Residual cortex was removed using irrigation / aspiration. The capsule remained intact.     The capsule was refilled with Provisc, and an Tremaine Intraocular lens model [id number] power 20.0 was placed in a lens folding cartridge with Provisc.     The lens was unfolded into the capsular bag. The lens centered well. The Istent was then placed. Additional viscoat was used to fill the anterior chamber and some was placed on the cornea. The patient's head was turned nasally 45 degrees and the microscope was angled down at 45 degrees also. A gonio lens was placed and the structures of the chamber angle were clearly visible with increased magnification. The Istent was removed from its packaging and inspected and was found to be in good condition. The Istent was introduced through the main incision and the trabecular meshwork was engaged by the tip of the Istent at 3:00. It was gently inserted into the meshwork under direct visualiztion. The Istent was released and was found to be in good position. It was strummed and was found to be secure. There was a slight reflux of blood into the anterior chamber. The insertion device was removed. Residual Provisc and Viscoat were removed using I / A. No suture was required to close the incision securely. The eye was flushed with BSS through the paracentesis. Betadine solution was placed on the conjunctival surface at the end of the case. The eye was left soft and formed at the end of the case. The incision site was water tight. The speculum was removed, and a drop of timolol 0.5% and neopolydex ointment was placed on the eye followed by a shield. The patient tolerated the procedure well and is to follow-up in one day.

## 2021-03-22 NOTE — ANESTHESIA PREPROCEDURE EVALUATION
Anesthetic History   No history of anesthetic complications            Review of Systems / Medical History  Patient summary reviewed, nursing notes reviewed and pertinent labs reviewed    Pulmonary  Within defined limits                 Neuro/Psych   Within defined limits           Cardiovascular    Hypertension          Hyperlipidemia    Exercise tolerance: >4 METS     GI/Hepatic/Renal     GERD: well controlled    Renal disease (STAGE 3 / improved): CRI       Endo/Other        Arthritis     Other Findings   Comments: Glaucoma  Cataract left eye         Physical Exam    Airway  Mallampati: II  TM Distance: 4 - 6 cm  Neck ROM: normal range of motion        Cardiovascular    Rhythm: regular  Rate: normal         Dental    Dentition: Bridges     Pulmonary  Breath sounds clear to auscultation               Abdominal  GI exam deferred       Other Findings            Anesthetic Plan    ASA: 2  Anesthesia type: MAC          Induction: Intravenous  Anesthetic plan and risks discussed with: Patient

## 2021-03-22 NOTE — PERIOP NOTES
Chana Jansen  1940  [id number]    Situation:  Verbal report given from: YVROSE Lambert RN and Vivek BLACKMAN  Procedure: Procedure(s):  LEFT EYE CATARACT EXTRACTION WITH INTRA OCULAR LENS WITH I-STENT    Background:    Preoperative diagnosis: H25.12  H40.1112    Postoperative diagnosis: H25.12  A48.9594    :  Dr. Zaid Campos    Assistant(s): Circ-1: [facility]  Scrub Tech-1: Erin RANDOLPH, RT    Specimens: * No specimens in log *    Assessment:  Intra-procedure medications         Anesthesia gave intra-procedure sedation and medications, see anesthesia flow sheet     Intravenous fluids: NS @ KVO     Vital signs stable       Recommendation:    Permission to share finding with sister niki Stout Perico : yes

## 2021-03-22 NOTE — BRIEF OP NOTE
Brief Postoperative Note    Patient: Praveena Marlow  YOB: 1940  MRN: [id number]    Date of Procedure: 3/22/2021     Pre-Op Diagnosis: Nuclear Sclerotic cataract left eye H25.12, Uncontrolled open angle glaucoma left eye (H40.1132)    Post-Op Diagnosis: Nuclear Sclerotic cataract left eye H25.12, Uncontrolled open angle glaucoma left eye (H40.1132)    Procedure(s):  LEFT EYE CATARACT EXTRACTION WITH INTRA OCULAR LENS WITH I-STENT    Surgeon(s):  Nelsy Matute MD    Surgical Assistant: None    Anesthesia: MAC     Estimated Blood Loss (mL): none    Complications: none    Specimens: * No specimens in log *     Implants:   Implant Name Type Inv.  Item Serial No.  Lot No. LRB No. Used Action   IOL ASPHERIC 6.0 HADLEY +20.0 - [id number]  IOL ASPHERIC 6.0 HADLEY +20.0 [id number] [employer]WD  Left 1 Implanted   SHUNT EYE TRABECLAR LAXMI INVAS -- ISTENT - [id number]  SHUNT EYE TRABECLAR LAXMI INVAS -- ISTENT [id number] [facility] [id number] Left 1 Implanted       Drains: * No LDAs found *    Findings: Cataract left eye, uncontrolled glaucoma left eye    Electronically Signed by Annamarie Arce MD on 3/22/2021 at 10:01 AM

## 2021-07-11 NOTE — PROGRESS NOTES
Ashvin Alvarez is a 78 y.o. female who presents for follow-up after urgent care visit after fall. The patient has a left gluteal hematoma. Using walker at times. She reports she reached for phone and braced herself on nightstand and slipped. She is using voltaren gel and taking tylenol with relief. Was given hydrocodone by hospital, taking it at bedtime infrequently. Had bilateral cataract surgery. BP controlled. On metoprolol.           Past Medical History:   Diagnosis Date    AIN (acute interstitial nephritis)     Arthritis     CKD (chronic kidney disease) stage 3, GFR 30-59 ml/min ([facility])     Cystitis     chronic, no problems since 2017, Edward Coughlin    GERD (gastroesophageal reflux disease)     Glaucoma     High cholesterol     Hypertension        Family History   Problem Relation Age of Onset    Cancer Father     Cancer Brother     Breast Cancer Cousin     Arthritis-osteo Mother        Social History     Socioeconomic History    Marital status:      Spouse name: Not on file    Number of children: Not on file    Years of education: Not on file    Highest education level: Not on file   Occupational History    Not on file   Tobacco Use    Smoking status: Former Smoker     Packs/day: 0.50     Years: 4.00     Pack years: 2.00     Quit date: 1997     Years since quittin.9    Smokeless tobacco: Former User   Substance and Sexual Activity    Alcohol use: Yes     Comment: maybe a glass of wine twice a year    Drug use: Yes     Types: Prescription, OTC     Comment: never recreational substance    Sexual activity: Not Currently     Partners: Male     Birth control/protection: None   Other Topics Concern    Not on file   Social History Narrative    ** Merged History Encounter **          Social Determinants of Health     Financial Resource Strain:     Difficulty of Paying Living Expenses:    Food Insecurity:     Worried About Running Out of Food in the Last Year:     Ran Out of Food in the Last Year:    Transportation Needs:     Lack of Transportation (Medical):  Lack of Transportation (Non-Medical):    Physical Activity:     Days of Exercise per Week:     Minutes of Exercise per Session:    Stress:     Feeling of Stress :    Social Connections:     Frequency of Communication with Friends and Family:     Frequency of Social Gatherings with Friends and Family:     Attends [employer] Services:     Active Member of Clubs or Organizations:     Attends Club or Organization Meetings:     Marital Status:    Intimate Partner Violence:     Fear of Current or Ex-Partner:     Emotionally Abused:     Physically Abused:     Sexually Abused:        Current Outpatient Medications on File Prior to Visit   Medication Sig Dispense Refill    HYDROcodone-acetaminophen ([address] Dayne) 5-325 mg per tablet Take 1 Tablet by mouth every eight (8) hours as needed for Pain. Hs prn      loratadine (Claritin) 10 mg tablet Take 10 mg by mouth two (2) times a day.  famotidine (PEPCID) 20 mg tablet Take 1 Tab by mouth two (2) times a day. Indications: gastroesophageal reflux disease 180 Tab 3    metoprolol succinate (TOPROL-XL) 50 mg XL tablet Take 1 Tab by mouth daily. 90 Tab 3    diclofenac (VOLTAREN) 1 % gel Apply 2 g to affected area four (4) times daily. (Patient taking differently: Apply 2 g to affected area as needed.) 100 g 3    polyethylene glycol (MIRALAX) 17 gram/dose powder Take 17 g by mouth as needed for Constipation.  ACETAMINOPHEN (TYLENOL PO) Take 650 mg by mouth as needed for Pain. Indications: two tabs      latanoprost (XALATAN) 0.005 % ophthalmic solution Administer 1 Drop to both eyes nightly.  ondansetron (ZOFRAN ODT) 4 mg disintegrating tablet Take 1 Tab by mouth every eight (8) hours as needed for Nausea or Vomiting. (Patient not taking: Reported on 7/12/2021) 20 Tab 0    timolol (TIMOPTIC) 0.5 % ophthalmic solution Administer 1 Drop to left eye daily. (Patient not taking: Reported on 7/12/2021)       No current facility-administered medications on file prior to visit. Review of Systems  Pertinent items are noted in HPI. Objective:     Visit Vitals  BP (!) 156/61 (BP 1 Location: Right arm, BP Patient Position: Sitting, BP Cuff Size: Adult)   Pulse 65   Temp 98.2 °F (36.8 °C) (Oral)   Resp 16   Ht 4' 10\" (1.473 m)   Wt 99 lb 9.6 oz (45.2 kg)   LMP  (LMP Unknown)   SpO2 100%   BMI 20.82 kg/m²     Gen: well appearing female  HEENT:   PERRL,normal conjunctiva. External ear and canals normal, no erythema, no exudates, MMM  Neck:   No masses or LAD  Resp:  No wheezing, no rhonchi, no rales. CV:  RRR, normal S1S2  GI: soft, nontender, without masses. Extrem:  +2 pulses, no edema, warm distally, hematoma left buttocks      Assessment/Plan:       ICD-10-CM ICD-9-CM    1. Contusion of buttock, subsequent encounter  S30. 0XXD V58.89    2. Hematoma  T14. 8XXA 924.9        Follow-up and Dispositions    · Return if symptoms worsen or fail to improve.          Hai Herbert MD

## 2021-07-12 ENCOUNTER — OFFICE VISIT (OUTPATIENT)
Dept: INTERNAL MEDICINE CLINIC | Age: 81
End: 2021-07-12
Payer: MEDICARE

## 2021-07-12 VITALS
RESPIRATION RATE: 16 BRPM | SYSTOLIC BLOOD PRESSURE: 156 MMHG | DIASTOLIC BLOOD PRESSURE: 61 MMHG | HEART RATE: 65 BPM | HEIGHT: 58 IN | TEMPERATURE: 98.2 F | OXYGEN SATURATION: 100 % | BODY MASS INDEX: 20.91 KG/M2 | WEIGHT: 99.6 LBS

## 2021-07-12 DIAGNOSIS — T14.8XXA HEMATOMA: ICD-10-CM

## 2021-07-12 DIAGNOSIS — S30.0XXD CONTUSION OF BUTTOCK, SUBSEQUENT ENCOUNTER: Primary | ICD-10-CM

## 2021-07-12 PROCEDURE — G8510 SCR DEP NEG, NO PLAN REQD: HCPCS | Performed by: FAMILY MEDICINE

## 2021-07-12 PROCEDURE — 1101F PT FALLS ASSESS-DOCD LE1/YR: CPT | Performed by: FAMILY MEDICINE

## 2021-07-12 PROCEDURE — G8399 PT W/DXA RESULTS DOCUMENT: HCPCS | Performed by: FAMILY MEDICINE

## 2021-07-12 PROCEDURE — G8420 CALC BMI NORM PARAMETERS: HCPCS | Performed by: FAMILY MEDICINE

## 2021-07-12 PROCEDURE — G8427 DOCREV CUR MEDS BY ELIG CLIN: HCPCS | Performed by: FAMILY MEDICINE

## 2021-07-12 PROCEDURE — G8754 DIAS BP LESS 90: HCPCS | Performed by: FAMILY MEDICINE

## 2021-07-12 PROCEDURE — 1090F PRES/ABSN URINE INCON ASSESS: CPT | Performed by: FAMILY MEDICINE

## 2021-07-12 PROCEDURE — G8536 NO DOC ELDER MAL SCRN: HCPCS | Performed by: FAMILY MEDICINE

## 2021-07-12 PROCEDURE — 99213 OFFICE O/P EST LOW 20 MIN: CPT | Performed by: FAMILY MEDICINE

## 2021-07-12 PROCEDURE — G8753 SYS BP > OR = 140: HCPCS | Performed by: FAMILY MEDICINE

## 2021-07-12 RX ORDER — HYDROCODONE BITARTRATE AND ACETAMINOPHEN 5; 325 MG/1; MG/1
1 TABLET ORAL
COMMUNITY
End: 2022-04-14

## 2021-11-29 ENCOUNTER — TELEPHONE (OUTPATIENT)
Dept: INTERNAL MEDICINE CLINIC | Age: 81
End: 2021-11-29

## 2021-11-29 NOTE — TELEPHONE ENCOUNTER
----- Message from Joce Collette sent at 11/29/2021 11:32 AM EST -----  Subject: Message to Provider    QUESTIONS  Information for Provider? Pt is wanting to change PCP on her insurance   card to [facility]. She is asking how to do that. She is advising her   insurance is telling her that she is not a doctor. Please call and advise.     ---------------------------------------------------------------------------  --------------  CALL BACK INFO  What is the best way for the office to contact you? OK to leave message on   voicemail  Preferred Call Back Phone Number? [phone]  ---------------------------------------------------------------------------  --------------  SCRIPT ANSWERS  Relationship to Patient?  Self

## 2022-03-19 PROBLEM — M47.816 SPONDYLOSIS OF LUMBAR SPINE: Status: ACTIVE | Noted: 2019-04-10

## 2022-03-19 PROBLEM — M15.4 EROSIVE OSTEOARTHRITIS OF BOTH HANDS: Status: ACTIVE | Noted: 2018-01-05

## 2022-03-19 PROBLEM — M11.20 PSEUDOGOUT: Status: ACTIVE | Noted: 2018-10-10

## 2022-03-20 PROBLEM — E83.52 HYPERCALCEMIA: Status: ACTIVE | Noted: 2017-01-17

## 2022-04-10 NOTE — PROGRESS NOTES
Estrella Pedraza is a 78 y.o. female who presents for follow up. BP elevated today, better at home. [id number]. On metoprolol XL 50mg daily. Notes bruising easily, not on aspirin or NSAIDS. [facility], bilateral leg pain, bilateral shoulder pain. Prior PT. Taking tylenol 650mg in am and afternoon. Off gabapentin due to side effects. Had hand OA.  Has seen Dr. Finley Angelucci, treated for pseudogout.  prior injections.       Sees urologist, Dr. Bruna Lanier. Last seen a few years ago. 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 No urinary symptoms.   Is drinking a lot of water.      Has had frequent diarrhea, saw Dr. Nola Leyden. Brother had colon cancer. On probiotics. Some artificial sweeteners.        Up to date with eye doctor, for glaucoma, On drops regularly. S/p right cataract surgery. has a left cataract.      Prior ARF, from NSAIDS. Creatinine 1.01. Saw Dr Coby Shultz, now as needed.            Past Medical History:   Diagnosis Date    AIN (acute interstitial nephritis)     Arthritis     CKD (chronic kidney disease) stage 3, GFR 30-59 ml/min ([facility])     Cystitis     chronic, no problems since , Bruna Lanier    GERD (gastroesophageal reflux disease)     Glaucoma     High cholesterol     Hypertension        Family History   Problem Relation Age of Onset    Cancer Father     Cancer Brother     Breast Cancer Cousin     OSTEOARTHRITIS Mother     Cancer Maternal Uncle     Cancer Maternal Uncle        Social History     Socioeconomic History    Marital status:      Spouse name: Not on file    Number of children: Not on file    Years of education: Not on file    Highest education level: Not on file   Occupational History    Not on file   Tobacco Use    Smoking status: Former Smoker     Packs/day: 0.50     Years: 4.00     Pack years: 2.00     Quit date: 1997     Years since quittin.7    Smokeless tobacco: Former User   Substance and Sexual Activity    Alcohol use: Yes     Comment: maybe a glass of wine twice a year    Drug use: Yes     Types: Prescription, OTC     Comment: never recreational substance    Sexual activity: Not Currently     Partners: Male     Birth control/protection: None   Other Topics Concern    Not on file   Social History Narrative    ** Merged History Encounter **          Social Determinants of Health     Financial Resource Strain:     Difficulty of Paying Living Expenses: Not on file   Food Insecurity:     Worried About Running Out of Food in the Last Year: Not on file    Zach of Food in the Last Year: Not on file   Transportation Needs:     Lack of Transportation (Medical): Not on file    Lack of Transportation (Non-Medical): Not on file   Physical Activity:     Days of Exercise per Week: Not on file    Minutes of Exercise per Session: Not on file   Stress:     Feeling of Stress : Not on file   Social Connections:     Frequency of Communication with Friends and Family: Not on file    Frequency of Social Gatherings with Friends and Family: Not on file    Attends Taoist Services: Not on file    Active Member of [address] [place] [address] or Organizations: Not on file    Attends Club or Organization Meetings: Not on file    Marital Status: Not on file   Intimate Partner Violence:     Fear of Current or Ex-Partner: Not on file    Emotionally Abused: Not on file    Physically Abused: Not on file    Sexually Abused: Not on file   Housing Stability:     Unable to Pay for Housing in the Last Year: Not on file    Number of [place] in the Last Year: Not on file    Unstable Housing in the Last Year: Not on file       Current Outpatient Medications on File Prior to Visit   Medication Sig Dispense Refill    B.animalis,bifid,infantis,long (Probiotic 4X) 10-15 mg TbEC Take 2 Capsules by mouth. 2 gummies daily      loratadine (Claritin) 10 mg tablet Take 10 mg by mouth two (2) times a day.       famotidine (PEPCID) 20 mg tablet Take 1 Tab by mouth two (2) times a day. Indications: gastroesophageal reflux disease 180 Tab 3    metoprolol succinate (TOPROL-XL) 50 mg XL tablet Take 1 Tab by mouth daily. 90 Tab 3    diclofenac (VOLTAREN) 1 % gel Apply 2 g to affected area four (4) times daily. (Patient taking differently: Apply 2 g to affected area as needed.) 100 g 3    polyethylene glycol (MIRALAX) 17 gram/dose powder Take 17 g by mouth as needed for Constipation.  ACETAMINOPHEN (TYLENOL PO) Take 650 mg by mouth as needed for Pain. Indications: two tabs      HYDROcodone-acetaminophen (NORCO) 5-325 mg per tablet Take 1 Tablet by mouth every eight (8) hours as needed for Pain. Hs prn (Patient not taking: Reported on 4/14/2022)      ondansetron (ZOFRAN ODT) 4 mg disintegrating tablet Take 1 Tab by mouth every eight (8) hours as needed for Nausea or Vomiting. (Patient not taking: Reported on 7/12/2021) 20 Tab 0    timolol (TIMOPTIC) 0.5 % ophthalmic solution Administer 1 Drop to left eye daily. (Patient not taking: Reported on 7/12/2021)      latanoprost (XALATAN) 0.005 % ophthalmic solution Administer 1 Drop to both eyes nightly. (Patient not taking: Reported on 4/14/2022)       No current facility-administered medications on file prior to visit. Review of Systems  Pertinent items are noted in HPI. Objective:     Visit Vitals  BP (!) 170/78 (BP 1 Location: Left arm, BP Patient Position: Sitting, BP Cuff Size: Adult)   Pulse 70   Temp (!) 96.7 °F (35.9 °C) (Temporal)   Resp 17   Ht 4' 10\" (1.473 m)   Wt 99 lb (44.9 kg)   LMP  (LMP Unknown)   SpO2 99%   BMI 20.69 kg/m²     Gen: well appearing female  HEENT:   PERRL,normal conjunctiva. External ear and canals normal, TMs no opacification or erythema,  OP no erythema, no exudates, MMM  Neck:  Supple. Thyroid normal size, nontender, without nodules. No masses or LAD  Resp:  No wheezing, no rhonchi, no rales. CV:  RRR, normal S1S2, no murmur. GI: soft, nontender, without masses.   No hepatosplenomegaly. Extrem:  +2 pulses, no edema, warm distally      Assessment/Plan:       ICD-10-CM ICD-9-CM    1. Essential hypertension  J67 554.3 METABOLIC PANEL, COMPREHENSIVE      CBC W/O DIFF   2. Moderate episode of recurrent major depressive disorder (HCC)  F33.1 296.32    3. Gastroesophageal reflux disease without esophagitis  K21.9 530.81    4. Pure hypercholesterolemia  Y77.92 349.9 METABOLIC PANEL, COMPREHENSIVE      LIPID PANEL   5. Family history of colon cancer  Z80.0 V16.0    6. Osteopenia, unspecified location  M85.80 733.90    7. Medicare annual wellness visit, subsequent  Z00.00 V70.0        keep food diary. Increase fiber. Avoid dairy, artificial sweeteners, fats    Monitor BP at home. Nicko Lanier MD    This is the Subsequent Medicare Annual Wellness Exam, performed 12 months or more after the Initial AWV or the last Subsequent AWV    I have reviewed the patient's medical history in detail and updated the computerized patient record. Assessment/Plan   Education and counseling provided:  Are appropriate based on today's review and evaluation    1. Essential hypertension  -     METABOLIC PANEL, COMPREHENSIVE; Future  -     CBC W/O DIFF; Future  2. Moderate episode of recurrent major depressive disorder ([facility] [address]  3. Gastroesophageal reflux disease without esophagitis  4. Pure hypercholesterolemia  -     METABOLIC PANEL, COMPREHENSIVE; Future  -     LIPID PANEL; Future  5. Family history of colon cancer  6. Osteopenia, unspecified location  7.  Medicare annual wellness visit, subsequent       Depression Risk Factor Screening     3 most recent PHQ Screens 4/14/2022   PHQ Not Done -   Little interest or pleasure in doing things Not at all   Feeling down, depressed, irritable, or hopeless Not at all   Total Score PHQ 2 0       Alcohol & Drug Abuse Risk Screen    Do you average more than 1 drink per night or more than 7 drinks a week:  No    On any one occasion in the past three months have you have had more than 3 drinks containing alcohol:  No          Functional Ability and Level of Safety    Hearing: Hearing is good. Activities of Daily Living: The home contains: no safety equipment. Patient does total self care      Ambulation: with no difficulty     Fall Risk:  Fall Risk Assessment, last 12 mths 4/14/2022   Able to walk? Yes   Fall in past 12 months? 1   Do you feel unsteady? 1   Are you worried about falling 1   Is the gait abnormal? 0   Number of falls in past 12 months 1   Fall with injury?  1      Abuse Screen:  Patient is not abused       Cognitive Screening    Has your family/caregiver stated any concerns about your memory: no     Cognitive Screening: Normal - Verbal Fluency Test    Health Maintenance Due     Health Maintenance Due   Topic Date Due    Shingrix Vaccine Age 49> (1 of 2) Never done    Medicare Yearly Exam  10/11/2020       Patient Care Team   Patient Care Team:  Luis Angel Calix MD as PCP - General (Internal Medicine)  Luis Angel Calix MD as PCP - [facility] Empaneled Provider  Rip Baez MD as Consulting Provider (Cardiology)  Florencia Ortiz MD as Consulting Provider (Urology)  Jerrica Mendoza MD as Consulting Provider (Ophthalmology)  Chun Peck MD as Consulting Provider (Nephrology)    History     Patient Active Problem List   Diagnosis Code    Hyperlipidemia E78.5    Glaucoma H40.9    Gastroesophageal reflux disease without esophagitis K21.9    Osteopenia M85.80    Family history of colon cancer Z80.0    Anemia D64.9    Essential hypertension I10    ARF (acute renal failure) ([facility] [address] N17.9    Chronic interstitial cystitis N30.10    Diarrhea R19.7    Acute cystitis without hematuria N30.00    Interstitial nephritis N12    Hypercalcemia E83.52    Erosive osteoarthritis of both hands M15.4    Pseudogout M11.20    Spondylosis of lumbar spine M47.816     Past Medical History:   Diagnosis Date    AIN (acute interstitial nephritis)     Arthritis     CKD (chronic kidney disease) stage 3, GFR 30-59 ml/min ([facility]     Cystitis     chronic, no problems since 2017, Bruna Lanier    GERD (gastroesophageal reflux disease)     Glaucoma     High cholesterol     Hypertension       Past Surgical History:   Procedure Laterality Date    COLONOSCOPY N/A 2020    COLONOSCOPY[facility]EGD [facility] END[facility]COPY    HX APPENDECTOMY      with hysterectomy    HX  SECTION      HX COLONOSCOPY      HX DILATION AND CURETTAGE      HX ENDOSCOPY      HX HYSTERECTOMY      HX RENAL BIOPSY Left     in office    HX TONSILLECTOMY      HX UROLOGICAL  2016    cauterization of bladder ulcers     Current Outpatient Medications   Medication Sig Dispense Refill    B.animalis,bifid,infantis,long (Probiotic 4X) 10-15 mg TbEC Take 2 Capsules by mouth. 2 gummies daily      loratadine (Claritin) 10 mg tablet Take 10 mg by mouth two (2) times a day.  famotidine (PEPCID) 20 mg tablet Take 1 Tab by mouth two (2) times a day. Indications: gastroesophageal reflux disease 180 Tab 3    metoprolol succinate (TOPROL-XL) 50 mg XL tablet Take 1 Tab by mouth daily. 90 Tab 3    diclofenac (VOLTAREN) 1 % gel Apply 2 g to affected area four (4) times daily. (Patient taking differently: Apply 2 g to affected area as needed.) 100 g 3    polyethylene glycol (MIRALAX) 17 gram/dose powder Take 17 g by mouth as needed for Constipation.  ACETAMINOPHEN (TYLENOL PO) Take 650 mg by mouth as needed for Pain. Indications: two tabs      HYDROcodone-acetaminophen (NORCO) 5-325 mg per tablet Take 1 Tablet by mouth every eight (8) hours as needed for Pain. Hs prn (Patient not taking: Reported on 2022)      ondansetron (ZOFRAN ODT) 4 mg disintegrating tablet Take 1 Tab by mouth every eight (8) hours as needed for Nausea or Vomiting.  (Patient not taking: Reported on 2021) 20 Tab 0    timolol (TIMOPTIC) 0.5 % ophthalmic solution Administer 1 Drop to left eye daily. (Patient not taking: Reported on 2021)      latanoprost (XALATAN) 0.005 % ophthalmic solution Administer 1 Drop to both eyes nightly.  (Patient not taking: Reported on 2022)       Allergies   Allergen Reactions    Gabapentin Other (comments)     confusion    Aleve [Naproxen Sodium] Other (comments)     Renal issues    Cefuroxime Other (comments)     Kidney failure    Ibuprofen Unknown (comments)     Renal dysfunction    Lisinopril Other (comments)     Renal dysfunction    Omeprazole Other (comments)     Cause Kidney failure       Family History   Problem Relation Age of Onset    Cancer Father     Cancer Brother     Breast Cancer Cousin     OSTEOARTHRITIS Mother     Cancer Maternal Uncle     Cancer Maternal Uncle      Social History     Tobacco Use    Smoking status: Former Smoker     Packs/day: 0.50     Years: 4.00     Pack years: 2.00     Quit date: 1997     Years since quittin.7    Smokeless tobacco: Former User   Substance Use Topics    Alcohol use: Yes     Comment: maybe a glass of wine twice a year         Olga Ferreira MD

## 2022-04-10 NOTE — PATIENT INSTRUCTIONS
Medicare Wellness Visit, Female     The best way to live healthy is to have a lifestyle where you eat a well-balanced diet, exercise regularly, limit alcohol use, and quit all forms of tobacco/nicotine, if applicable. Regular preventive services are another way to keep healthy. Preventive services (vaccines, screening tests, monitoring & exams) can help personalize your care plan, which helps you manage your own care. Screening tests can find health problems at the earliest stages, when they are easiest to treat. [facility] follows the current, evidence-based guidelines published by the [facility] ([facility]) when recommending preventive services for our patients. Because we follow these guidelines, sometimes recommendations change over time as research supports it. (For example, mammograms used to be recommended annually. Even though Medicare will still pay for an annual mammogram, the newer guidelines recommend a mammogram every two years for women of average risk). Of course, you and your doctor may decide to screen more often for some diseases, based on your risk and your co-morbidities (chronic disease you are already diagnosed with). Preventive services for you include:  - Medicare offers their members a free annual wellness visit, which is time for you and your primary care provider to discuss and plan for your preventive service needs. Take advantage of this benefit every year!  -All adults over the age of 72 should receive the recommended pneumonia vaccines. Current USPSTF guidelines recommend a series of two vaccines for the best pneumonia protection.   -All adults should have a flu vaccine yearly and a tetanus vaccine every 10 years.   -All adults age 48 and older should receive the shingles vaccines (series of two vaccines).       -All adults age 38-68 who are overweight should have a diabetes screening test once every three years.   -All adults born between [birth year] and 1967 should be screened once for Hepatitis C.  -Other screening tests and preventive services for persons with diabetes include: an eye exam to screen for diabetic retinopathy, a kidney function test, a foot exam, and stricter control over your cholesterol.   -Cardiovascular screening for adults with routine risk involves an electrocardiogram (ECG) at intervals determined by your doctor.   -Colorectal cancer screenings should be done for adults age 54-65 with no increased risk factors for colorectal cancer. There are a number of acceptable methods of screening for this type of cancer. Each test has its own benefits and drawbacks. Discuss with your doctor what is most appropriate for you during your annual wellness visit. The different tests include: colonoscopy (considered the best screening method), a fecal occult blood test, a fecal DNA test, and sigmoidoscopy.    -A bone mass density test is recommended when a woman turns 65 to screen for osteoporosis. This test is only recommended one time, as a screening. Some providers will use this same test as a disease monitoring tool if you already have osteoporosis. -Breast cancer screenings are recommended every other year for women of normal risk, age 54-69.  -Cervical cancer screenings for women over age 72 are only recommended with certain risk factors.

## 2022-04-14 ENCOUNTER — OFFICE VISIT (OUTPATIENT)
Dept: INTERNAL MEDICINE CLINIC | Age: 82
End: 2022-04-14
Payer: MEDICARE

## 2022-04-14 VITALS
HEIGHT: 58 IN | DIASTOLIC BLOOD PRESSURE: 78 MMHG | SYSTOLIC BLOOD PRESSURE: 170 MMHG | TEMPERATURE: 96.7 F | WEIGHT: 99 LBS | BODY MASS INDEX: 20.78 KG/M2 | RESPIRATION RATE: 17 BRPM | OXYGEN SATURATION: 99 % | HEART RATE: 70 BPM

## 2022-04-14 DIAGNOSIS — Z00.00 MEDICARE ANNUAL WELLNESS VISIT, SUBSEQUENT: ICD-10-CM

## 2022-04-14 DIAGNOSIS — K21.9 GASTROESOPHAGEAL REFLUX DISEASE WITHOUT ESOPHAGITIS: ICD-10-CM

## 2022-04-14 DIAGNOSIS — Z80.0 FAMILY HISTORY OF COLON CANCER: ICD-10-CM

## 2022-04-14 DIAGNOSIS — M85.80 OSTEOPENIA, UNSPECIFIED LOCATION: ICD-10-CM

## 2022-04-14 DIAGNOSIS — F33.1 MODERATE EPISODE OF RECURRENT MAJOR DEPRESSIVE DISORDER (HCC): ICD-10-CM

## 2022-04-14 DIAGNOSIS — E78.00 PURE HYPERCHOLESTEROLEMIA: ICD-10-CM

## 2022-04-14 DIAGNOSIS — I10 ESSENTIAL HYPERTENSION: Primary | ICD-10-CM

## 2022-04-14 PROCEDURE — G8754 DIAS BP LESS 90: HCPCS | Performed by: FAMILY MEDICINE

## 2022-04-14 PROCEDURE — G0439 PPPS, SUBSEQ VISIT: HCPCS | Performed by: FAMILY MEDICINE

## 2022-04-14 PROCEDURE — G8536 NO DOC ELDER MAL SCRN: HCPCS | Performed by: FAMILY MEDICINE

## 2022-04-14 PROCEDURE — 99214 OFFICE O/P EST MOD 30 MIN: CPT | Performed by: FAMILY MEDICINE

## 2022-04-14 PROCEDURE — G8420 CALC BMI NORM PARAMETERS: HCPCS | Performed by: FAMILY MEDICINE

## 2022-04-14 PROCEDURE — G8427 DOCREV CUR MEDS BY ELIG CLIN: HCPCS | Performed by: FAMILY MEDICINE

## 2022-04-14 PROCEDURE — 1090F PRES/ABSN URINE INCON ASSESS: CPT | Performed by: FAMILY MEDICINE

## 2022-04-14 PROCEDURE — 1101F PT FALLS ASSESS-DOCD LE1/YR: CPT | Performed by: FAMILY MEDICINE

## 2022-04-14 PROCEDURE — G8753 SYS BP > OR = 140: HCPCS | Performed by: FAMILY MEDICINE

## 2022-04-14 PROCEDURE — G8399 PT W/DXA RESULTS DOCUMENT: HCPCS | Performed by: FAMILY MEDICINE

## 2022-04-14 PROCEDURE — G8510 SCR DEP NEG, NO PLAN REQD: HCPCS | Performed by: FAMILY MEDICINE

## 2022-04-14 RX ORDER — CHOLECALCIFEROL (VITAMIN D3) 50 MCG
2 CAPSULE ORAL
COMMUNITY

## 2022-04-14 RX ORDER — METOPROLOL SUCCINATE 50 MG/1
50 TABLET, EXTENDED RELEASE ORAL DAILY
Qty: 90 TABLET | Refills: 3 | Status: SHIPPED | OUTPATIENT
Start: 2022-04-14

## 2022-04-14 NOTE — PROGRESS NOTES
Em.1. Have you been to the ER, urgent care clinic since your last visit? Hospitalized since your last visit? Yes When: 6/2021   Urgent care [facility] , ct scan done   reaching to table, fell onto table , lt hip hematoma    2. Have you seen or consulted any other health care providers outside of the [address] [place] [address] since your last visit? Include any pap smears or colon screening.  Yes When: the past year      Dr Yfn Nava lpn

## 2022-04-15 LAB
ALBUMIN SERPL-MCNC: 4.3 G/DL (ref 3.5–5)
ALBUMIN/GLOB SERPL: 1.2 {RATIO} (ref 1.1–2.2)
ALP SERPL-CCNC: 107 U/L (ref 45–117)
ALT SERPL-CCNC: 16 U/L (ref 12–78)
ANION GAP SERPL CALC-SCNC: 6 MMOL/L (ref 5–15)
AST SERPL-CCNC: 14 U/L (ref 15–37)
BILIRUB SERPL-MCNC: 0.4 MG/DL (ref 0.2–1)
BUN SERPL-MCNC: 14 MG/DL (ref 6–20)
BUN/CREAT SERPL: 12 (ref 12–20)
CALCIUM SERPL-MCNC: 9.8 MG/DL (ref 8.5–10.1)
CHLORIDE SERPL-SCNC: 111 MMOL/L (ref 97–108)
CHOLEST SERPL-MCNC: 222 MG/DL
CO2 SERPL-SCNC: 24 MMOL/L (ref 21–32)
CREAT SERPL-MCNC: 1.13 MG/DL (ref 0.55–1.02)
ERYTHROCYTE [DISTWIDTH] IN BLOOD BY AUTOMATED COUNT: 14.7 % (ref 11.5–14.5)
GLOBULIN SER CALC-MCNC: 3.6 G/DL (ref 2–4)
GLUCOSE SERPL-MCNC: 97 MG/DL (ref 65–100)
HCT VFR BLD AUTO: 44.3 % (ref 35–47)
HDLC SERPL-MCNC: 44 MG/DL
HDLC SERPL: 5 {RATIO} (ref 0–5)
HGB BLD-MCNC: 13.2 G/DL (ref 11.5–16)
LDLC SERPL CALC-MCNC: 146.4 MG/DL (ref 0–100)
MCH RBC QN AUTO: 31.1 PG (ref 26–34)
MCHC RBC AUTO-ENTMCNC: 29.8 G/DL (ref 30–36.5)
MCV RBC AUTO: 104.2 FL (ref 80–99)
NRBC # BLD: 0 K/UL (ref 0–0.01)
NRBC BLD-RTO: 0 PER 100 WBC
PLATELET # BLD AUTO: 310 K/UL (ref 150–400)
PMV BLD AUTO: 11.3 FL (ref 8.9–12.9)
POTASSIUM SERPL-SCNC: 4.6 MMOL/L (ref 3.5–5.1)
PROT SERPL-MCNC: 7.9 G/DL (ref 6.4–8.2)
RBC # BLD AUTO: 4.25 M/UL (ref 3.8–5.2)
SODIUM SERPL-SCNC: 141 MMOL/L (ref 136–145)
TRIGL SERPL-MCNC: 158 MG/DL (ref ?–150)
VLDLC SERPL CALC-MCNC: 31.6 MG/DL
WBC # BLD AUTO: 9.6 K/UL (ref 3.6–11)

## 2022-04-17 ENCOUNTER — PATIENT MESSAGE (OUTPATIENT)
Dept: INTERNAL MEDICINE CLINIC | Age: 82
End: 2022-04-17

## 2022-10-16 NOTE — PROGRESS NOTES
Vinny Horton is a 78 y.o. female who presents for follow up. Reports allergies, taking claritin. Has headaches. BP elevated today, better at home. 140's at home. On metoprolol XL 50mg daily. [facility], [facility] leg pain, bilateral shoulder pain. Prior PT. Taking tylenol 650mg in am and afternoon. Off gabapentin due to side effects. Had hand OA. Has seen Dr. José Miguel Patel, treated for pseudogout. prior injections. Sees urologist, Dr. Christiano Sevilla. Last seen a few years ago. 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 No urinary symptoms. Is drinking a lot of water. Has had frequent diarrhea, saw Dr. Laurie Garcia. Brother had colon cancer. On probiotics. Some artificial sweeteners. Up to date with eye doctor, for glaucoma, On drops regularly. S/p right cataract surgery. has a left cataract. Prior ARF, from NSAIDS. Creatinine 1.13. Saw Dr Mason Felipe, now as needed.            Past Medical History:   Diagnosis Date    AIN (acute interstitial nephritis)     Arthritis     CKD (chronic kidney disease) stage 3, GFR 30-59 ml/min ([facility])     Cystitis     chronic, no problems since 2017, Katia    GERD (gastroesophageal reflux disease)     Glaucoma     High cholesterol     Hypertension        Family History   Problem Relation Age of Onset    Cancer Father     Cancer Brother     Breast Cancer Cousin     OSTEOARTHRITIS Mother     Cancer Maternal Uncle     Cancer Maternal Uncle        Social History     Socioeconomic History    Marital status:      Spouse name: Not on file    Number of children: Not on file    Years of education: Not on file    Highest education level: Not on file   Occupational History    Not on file   Tobacco Use    Smoking status: Former     Packs/day: 0.50     Years: 4.00     Pack years: 2.00     Types: Cigarettes     Quit date: 1997     Years since quittin.2    Smokeless tobacco: Former   Substance and Sexual Activity    Alcohol use: Yes     Comment: maybe a glass of wine twice a year    Drug use: Yes     Types: Prescription, OTC     Comment: never recreational substance    Sexual activity: Not Currently     Partners: Male     Birth control/protection: None   Other Topics Concern    Not on file   Social History Narrative    ** Merged History Encounter **          Social Determinants of Health     Financial Resource Strain: Not on file   Food Insecurity: Not on file   Transportation Needs: Not on file   Physical Activity: Not on file   Stress: Not on file   Social Connections: Not on file   Intimate Partner Violence: Not on file   Housing Stability: Not on file       Current Outpatient Medications on File Prior to Visit   Medication Sig Dispense Refill    B.animalis,bifid,infantis,long (Probiotic 4X) 10-15 mg TbEC Take 2 Capsules by mouth. 2 gummies daily      metoprolol succinate (TOPROL-XL) 50 mg XL tablet Take 1 Tablet by mouth daily. 90 Tablet 3    loratadine (CLARITIN) 10 mg tablet Take 10 mg by mouth two (2) times a day. famotidine (PEPCID) 20 mg tablet Take 1 Tab by mouth two (2) times a day. Indications: gastroesophageal reflux disease 180 Tab 3    diclofenac (VOLTAREN) 1 % gel Apply 2 g to affected area four (4) times daily. (Patient taking differently: Apply 2 g to affected area as needed.) 100 g 3    ACETAMINOPHEN (TYLENOL PO) Take 650 mg by mouth as needed for Pain. Indications: two tabs      polyethylene glycol (MIRALAX) 17 gram/dose powder Take 17 g by mouth as needed for Constipation. No current facility-administered medications on file prior to visit. Review of Systems  Pertinent items are noted in HPI. Objective:     Visit Vitals  BP (!) 192/82   Pulse 68   Temp (!) 96.4 °F (35.8 °C) (Temporal)   Resp 16   Ht 4' 10\" (1.473 m)   Wt 100 lb (45.4 kg)   LMP  (LMP Unknown)   SpO2 98%   BMI 20.90 kg/m²     Gen: well appearing female  HEENT:   PERRL,normal conjunctiva.  External ear and canals normal, TMs no opacification or erythema,  OP no erythema, no exudates, MMM  Neck:  Supple. Thyroid normal size, nontender, without nodules. No masses or LAD  Resp:  No wheezing, no rhonchi, no rales. CV:  RRR, normal S1S2, no murmur. GI: soft, nontender, without masses. No hepatosplenomegaly. Extrem:  +2 pulses, no edema, warm distally      Assessment/Plan:       ICD-10-CM ICD-9-CM    1. Essential hypertension  I10 401.9       2. Pure hypercholesterolemia  E78.00 272.0       3. Chronic interstitial cystitis  N30.10 595.1       4. Osteopenia, unspecified location  M85.80 733.90       5. Elevated serum creatinine  V84.73 334.43 METABOLIC PANEL, BASIC      METABOLIC PANEL, BASIC          Follow-up and Dispositions    Return in about 6 months (around 4/17/2023) for follow up and fasting labs.   Nazia Orr MD

## 2022-10-17 ENCOUNTER — OFFICE VISIT (OUTPATIENT)
Dept: INTERNAL MEDICINE CLINIC | Age: 82
End: 2022-10-17
Payer: MEDICARE

## 2022-10-17 VITALS
HEIGHT: 58 IN | WEIGHT: 100 LBS | OXYGEN SATURATION: 98 % | BODY MASS INDEX: 20.99 KG/M2 | SYSTOLIC BLOOD PRESSURE: 192 MMHG | HEART RATE: 68 BPM | RESPIRATION RATE: 16 BRPM | TEMPERATURE: 96.4 F | DIASTOLIC BLOOD PRESSURE: 82 MMHG

## 2022-10-17 DIAGNOSIS — R79.89 ELEVATED SERUM CREATININE: ICD-10-CM

## 2022-10-17 DIAGNOSIS — Z23 NEEDS FLU SHOT: ICD-10-CM

## 2022-10-17 DIAGNOSIS — E78.00 PURE HYPERCHOLESTEROLEMIA: ICD-10-CM

## 2022-10-17 DIAGNOSIS — I10 ESSENTIAL HYPERTENSION: Primary | ICD-10-CM

## 2022-10-17 DIAGNOSIS — N30.10 CHRONIC INTERSTITIAL CYSTITIS: ICD-10-CM

## 2022-10-17 DIAGNOSIS — M85.80 OSTEOPENIA, UNSPECIFIED LOCATION: ICD-10-CM

## 2022-10-17 DIAGNOSIS — Z23 ENCOUNTER FOR IMMUNIZATION: ICD-10-CM

## 2022-10-17 PROCEDURE — G8510 SCR DEP NEG, NO PLAN REQD: HCPCS | Performed by: FAMILY MEDICINE

## 2022-10-17 PROCEDURE — 90694 VACC AIIV4 NO PRSRV 0.5ML IM: CPT | Performed by: FAMILY MEDICINE

## 2022-10-17 PROCEDURE — 1101F PT FALLS ASSESS-DOCD LE1/YR: CPT | Performed by: FAMILY MEDICINE

## 2022-10-17 PROCEDURE — G8754 DIAS BP LESS 90: HCPCS | Performed by: FAMILY MEDICINE

## 2022-10-17 PROCEDURE — G8536 NO DOC ELDER MAL SCRN: HCPCS | Performed by: FAMILY MEDICINE

## 2022-10-17 PROCEDURE — G8753 SYS BP > OR = 140: HCPCS | Performed by: FAMILY MEDICINE

## 2022-10-17 PROCEDURE — G8427 DOCREV CUR MEDS BY ELIG CLIN: HCPCS | Performed by: FAMILY MEDICINE

## 2022-10-17 PROCEDURE — 1090F PRES/ABSN URINE INCON ASSESS: CPT | Performed by: FAMILY MEDICINE

## 2022-10-17 PROCEDURE — G8420 CALC BMI NORM PARAMETERS: HCPCS | Performed by: FAMILY MEDICINE

## 2022-10-17 PROCEDURE — G8399 PT W/DXA RESULTS DOCUMENT: HCPCS | Performed by: FAMILY MEDICINE

## 2022-10-17 PROCEDURE — 99214 OFFICE O/P EST MOD 30 MIN: CPT | Performed by: FAMILY MEDICINE

## 2022-10-17 PROCEDURE — G0008 ADMIN INFLUENZA VIRUS VAC: HCPCS | Performed by: FAMILY MEDICINE

## 2022-10-17 NOTE — PROGRESS NOTES
1. \"Have you been to the ER, urgent care clinic since your last visit? Hospitalized since your last visit? \" No    2. \"Have you seen or consulted any other health care providers outside of the [address] [place] [address] since your last visit? \"No     3. For patients aged 39-70: Has the patient had a colonoscopy / FIT/ Cologuard? No      If the patient is female:    4. For patients aged 41-77: Has the patient had a mammogram within the past 2 years? NA - based on age or sex      11. For patients aged 21-65: Has the patient had a pap smear?  NA - based on age or sex

## 2022-10-18 LAB
ANION GAP SERPL CALC-SCNC: 9 MMOL/L (ref 5–15)
BUN SERPL-MCNC: 15 MG/DL (ref 6–20)
BUN/CREAT SERPL: 14 (ref 12–20)
CALCIUM SERPL-MCNC: 9.3 MG/DL (ref 8.5–10.1)
CHLORIDE SERPL-SCNC: 109 MMOL/L (ref 97–108)
CO2 SERPL-SCNC: 24 MMOL/L (ref 21–32)
CREAT SERPL-MCNC: 1.1 MG/DL (ref 0.55–1.02)
GLUCOSE SERPL-MCNC: 100 MG/DL (ref 65–100)
POTASSIUM SERPL-SCNC: 4.6 MMOL/L (ref 3.5–5.1)
SODIUM SERPL-SCNC: 142 MMOL/L (ref 136–145)

## 2022-10-30 ENCOUNTER — PATIENT MESSAGE (OUTPATIENT)
Dept: INTERNAL MEDICINE CLINIC | Age: 82
End: 2022-10-30

## 2022-12-14 NOTE — ED NOTES
How Severe Are Your Spot(S)?: moderate Pt ambulatory to ED restroom at this time in no sign of distressed assisted by this RN What Type Of Note Output Would You Prefer (Optional)?: Standard Output What Is The Reason For Today's Visit?: Full Body Skin Examination What Is The Reason For Today's Visit? (Being Monitored For X): the development of new lesions

## 2023-02-18 DIAGNOSIS — I10 ESSENTIAL HYPERTENSION: ICD-10-CM

## 2023-02-19 RX ORDER — METOPROLOL SUCCINATE 50 MG/1
TABLET, EXTENDED RELEASE ORAL
Qty: 90 TABLET | Refills: 3 | Status: SHIPPED | OUTPATIENT
Start: 2023-02-19

## 2023-03-07 ENCOUNTER — PATIENT MESSAGE (OUTPATIENT)
Dept: INTERNAL MEDICINE CLINIC | Age: 83
End: 2023-03-07

## 2023-03-08 RX ORDER — AMLODIPINE BESYLATE 2.5 MG/1
2.5 TABLET ORAL
Qty: 30 TABLET | Refills: 2 | Status: SHIPPED | OUTPATIENT
Start: 2023-03-08

## 2023-03-08 NOTE — TELEPHONE ENCOUNTER
----- Message from Dayna Fontaine LPN sent at 6/9/1618  9:07 AM EST -----  Regarding: FW: Blood Pressure    ----- Message -----  From: Celestine Corbin  Sent: 3/7/2023   7:15 PM EST  To: [facility] Nurse Pool  Subject: Blood Pressure                                   Dr. Emanuel Man:  I am having a difficult time with this. I thought I sent a message regarding my blood pressure getting pretty high. In the 170's and 180's. One time in the 190's. The bottom figure was in the 90's. In case you did not get my other message: It was so high last night that I took half a tablet and that seemed to have helped. This morning, it was 140/69. This is now is before going to bed and it was 181/91. I have an appointment with you April 17th. What should I do in the meantime. It frightens me when it gets that high. Thanks so much.   Ugo Peace

## 2023-04-16 PROBLEM — F33.1 MODERATE EPISODE OF RECURRENT MAJOR DEPRESSIVE DISORDER (HCC): Status: ACTIVE | Noted: 2023-04-16

## 2023-04-17 ENCOUNTER — OFFICE VISIT (OUTPATIENT)
Dept: INTERNAL MEDICINE CLINIC | Age: 83
End: 2023-04-17

## 2023-04-17 VITALS
SYSTOLIC BLOOD PRESSURE: 144 MMHG | WEIGHT: 103 LBS | BODY MASS INDEX: 21.53 KG/M2 | TEMPERATURE: 96.6 F | DIASTOLIC BLOOD PRESSURE: 71 MMHG | HEART RATE: 66 BPM | OXYGEN SATURATION: 97 %

## 2023-04-17 DIAGNOSIS — E55.9 VITAMIN D DEFICIENCY: ICD-10-CM

## 2023-04-17 DIAGNOSIS — I10 ESSENTIAL HYPERTENSION: Primary | ICD-10-CM

## 2023-04-17 DIAGNOSIS — K21.9 GASTROESOPHAGEAL REFLUX DISEASE WITHOUT ESOPHAGITIS: ICD-10-CM

## 2023-04-17 DIAGNOSIS — E78.00 PURE HYPERCHOLESTEROLEMIA: ICD-10-CM

## 2023-04-17 DIAGNOSIS — Z00.00 MEDICARE ANNUAL WELLNESS VISIT, SUBSEQUENT: ICD-10-CM

## 2023-04-17 RX ORDER — AMLODIPINE BESYLATE 2.5 MG/1
2.5 TABLET ORAL
Qty: 90 TABLET | Refills: 3 | Status: SHIPPED | OUTPATIENT
Start: 2023-04-17

## 2023-04-17 NOTE — PROGRESS NOTES
1. \"Have you been to the ER, urgent care clinic since your last visit? Hospitalized since your last visit? \" No    2. \"Have you seen or consulted any other health care providers outside of the [address] [place] [address] since your last visit? \" No     3. For patients aged 39-70: Has the patient had a colonoscopy / FIT/ Cologuard? No 4/28/2023      If the patient is female:    4. For patients aged 41-77: Has the patient had a mammogram within the past 2 years? No      5. For patients aged 21-65: Has the patient had a pap smear?  No

## 2023-04-18 LAB
25(OH)D3 SERPL-MCNC: 13 NG/ML (ref 30–100)
ALBUMIN SERPL-MCNC: 3.9 G/DL (ref 3.5–5)
ALBUMIN/GLOB SERPL: 1.1 (ref 1.1–2.2)
ALP SERPL-CCNC: 78 U/L (ref 45–117)
ALT SERPL-CCNC: 14 U/L (ref 12–78)
ANION GAP SERPL CALC-SCNC: 5 MMOL/L (ref 5–15)
AST SERPL-CCNC: 12 U/L (ref 15–37)
BASOPHILS # BLD: 0.1 K/UL (ref 0–0.1)
BASOPHILS NFR BLD: 1 % (ref 0–1)
BILIRUB SERPL-MCNC: 0.4 MG/DL (ref 0.2–1)
BUN SERPL-MCNC: 22 MG/DL (ref 6–20)
BUN/CREAT SERPL: 21 (ref 12–20)
CALCIUM SERPL-MCNC: 9.1 MG/DL (ref 8.5–10.1)
CHLORIDE SERPL-SCNC: 111 MMOL/L (ref 97–108)
CHOLEST SERPL-MCNC: 195 MG/DL
CO2 SERPL-SCNC: 24 MMOL/L (ref 21–32)
CREAT SERPL-MCNC: 1.05 MG/DL (ref 0.55–1.02)
DIFFERENTIAL METHOD BLD: ABNORMAL
EOSINOPHIL # BLD: 0.3 K/UL (ref 0–0.4)
EOSINOPHIL NFR BLD: 4 % (ref 0–7)
ERYTHROCYTE [DISTWIDTH] IN BLOOD BY AUTOMATED COUNT: 14.5 % (ref 11.5–14.5)
GLOBULIN SER CALC-MCNC: 3.4 G/DL (ref 2–4)
GLUCOSE SERPL-MCNC: 98 MG/DL (ref 65–100)
HCT VFR BLD AUTO: 44.2 % (ref 35–47)
HDLC SERPL-MCNC: 44 MG/DL
HDLC SERPL: 4.4 (ref 0–5)
HGB BLD-MCNC: 13.3 G/DL (ref 11.5–16)
IMM GRANULOCYTES # BLD AUTO: 0.1 K/UL (ref 0–0.04)
IMM GRANULOCYTES NFR BLD AUTO: 1 % (ref 0–0.5)
LDLC SERPL CALC-MCNC: 117.6 MG/DL (ref 0–100)
LYMPHOCYTES # BLD: 1.9 K/UL (ref 0.8–3.5)
LYMPHOCYTES NFR BLD: 24 % (ref 12–49)
MCH RBC QN AUTO: 30 PG (ref 26–34)
MCHC RBC AUTO-ENTMCNC: 30.1 G/DL (ref 30–36.5)
MCV RBC AUTO: 99.5 FL (ref 80–99)
MONOCYTES # BLD: 0.6 K/UL (ref 0–1)
MONOCYTES NFR BLD: 8 % (ref 5–13)
NEUTS SEG # BLD: 4.8 K/UL (ref 1.8–8)
NEUTS SEG NFR BLD: 62 % (ref 32–75)
NRBC # BLD: 0 K/UL (ref 0–0.01)
NRBC BLD-RTO: 0 PER 100 WBC
PLATELET # BLD AUTO: 250 K/UL (ref 150–400)
PMV BLD AUTO: 10.7 FL (ref 8.9–12.9)
POTASSIUM SERPL-SCNC: 4.6 MMOL/L (ref 3.5–5.1)
PROT SERPL-MCNC: 7.3 G/DL (ref 6.4–8.2)
RBC # BLD AUTO: 4.44 M/UL (ref 3.8–5.2)
SODIUM SERPL-SCNC: 140 MMOL/L (ref 136–145)
TRIGL SERPL-MCNC: 167 MG/DL (ref ?–150)
TSH SERPL DL<=0.05 MIU/L-ACNC: 2.61 UIU/ML (ref 0.36–3.74)
VLDLC SERPL CALC-MCNC: 33.4 MG/DL
WBC # BLD AUTO: 7.7 K/UL (ref 3.6–11)

## 2023-04-19 ENCOUNTER — PATIENT MESSAGE (OUTPATIENT)
Dept: INTERNAL MEDICINE CLINIC | Age: 83
End: 2023-04-19

## 2023-04-20 RX ORDER — ERGOCALCIFEROL 1.25 MG/1
CAPSULE ORAL
Qty: 8 CAPSULE | Refills: 1 | Status: SHIPPED | OUTPATIENT
Start: 2023-04-20

## 2023-04-27 RX ORDER — ACETAMINOPHEN 500 MG
500 TABLET ORAL 2 TIMES DAILY
COMMUNITY

## 2023-04-27 RX ORDER — LATANOPROST 50 UG/ML
1 SOLUTION/ DROPS OPHTHALMIC
COMMUNITY

## 2023-04-28 ENCOUNTER — ANESTHESIA (OUTPATIENT)
Dept: ENDOSCOPY | Age: 83
End: 2023-04-28
Payer: MEDICARE

## 2023-04-28 ENCOUNTER — ANESTHESIA EVENT (OUTPATIENT)
Dept: ENDOSCOPY | Age: 83
End: 2023-04-28
Payer: MEDICARE

## 2023-04-28 ENCOUNTER — HOSPITAL ENCOUNTER (OUTPATIENT)
Age: 83
Setting detail: OUTPATIENT SURGERY
Discharge: HOME | End: 2023-04-28
Attending: SPECIALIST | Admitting: SPECIALIST
Payer: MEDICARE

## 2023-04-28 VITALS
OXYGEN SATURATION: 98 % | SYSTOLIC BLOOD PRESSURE: 143 MMHG | TEMPERATURE: 97.4 F | BODY MASS INDEX: 20.52 KG/M2 | WEIGHT: 101.8 LBS | HEIGHT: 59 IN | RESPIRATION RATE: 21 BRPM | DIASTOLIC BLOOD PRESSURE: 112 MMHG | HEART RATE: 82 BPM

## 2023-04-28 PROCEDURE — 2709999900 HC NON-CHARGEABLE SUPPLY: Performed by: SPECIALIST

## 2023-04-28 PROCEDURE — 76040000007: Performed by: SPECIALIST

## 2023-04-28 PROCEDURE — 77030019988 HC FCPS ENDOSC DISP BSC -B: Performed by: SPECIALIST

## 2023-04-28 PROCEDURE — 74011250636 HC RX REV CODE- 250/636: Performed by: NURSE ANESTHETIST, CERTIFIED REGISTERED

## 2023-04-28 PROCEDURE — 77030018712 HC DEV BLLN INFL BSC -B: Performed by: SPECIALIST

## 2023-04-28 PROCEDURE — 74011000250 HC RX REV CODE- 250: Performed by: NURSE ANESTHETIST, CERTIFIED REGISTERED

## 2023-04-28 PROCEDURE — 88305 TISSUE EXAM BY PATHOLOGIST: CPT

## 2023-04-28 PROCEDURE — 76060000032 HC ANESTHESIA 0.5 TO 1 HR: Performed by: SPECIALIST

## 2023-04-28 PROCEDURE — 77030020268 HC MISC GENERAL SUPPLY: Performed by: SPECIALIST

## 2023-04-28 PROCEDURE — 77030013992 HC SNR POLYP ENDOSC BSC -B: Performed by: SPECIALIST

## 2023-04-28 PROCEDURE — 74011250636 HC RX REV CODE- 250/636: Performed by: SPECIALIST

## 2023-04-28 RX ORDER — LIDOCAINE HYDROCHLORIDE 20 MG/ML
INJECTION, SOLUTION EPIDURAL; INFILTRATION; INTRACAUDAL; PERINEURAL AS NEEDED
Status: DISCONTINUED | OUTPATIENT
Start: 2023-04-28 | End: 2023-04-28 | Stop reason: HOSPADM

## 2023-04-28 RX ORDER — PROPOFOL 10 MG/ML
INJECTION, EMULSION INTRAVENOUS AS NEEDED
Status: DISCONTINUED | OUTPATIENT
Start: 2023-04-28 | End: 2023-04-28 | Stop reason: HOSPADM

## 2023-04-28 RX ORDER — SODIUM CHLORIDE 9 MG/ML
INJECTION, SOLUTION INTRAVENOUS
Status: DISCONTINUED | OUTPATIENT
Start: 2023-04-28 | End: 2023-04-28 | Stop reason: HOSPADM

## 2023-04-28 RX ORDER — SODIUM CHLORIDE 9 MG/ML
50 INJECTION, SOLUTION INTRAVENOUS CONTINUOUS
Status: DISCONTINUED | OUTPATIENT
Start: 2023-04-28 | End: 2023-04-28 | Stop reason: HOSPADM

## 2023-04-28 RX ORDER — SODIUM CHLORIDE 0.9 % (FLUSH) 0.9 %
5-40 SYRINGE (ML) INJECTION AS NEEDED
Status: DISCONTINUED | OUTPATIENT
Start: 2023-04-28 | End: 2023-04-28 | Stop reason: HOSPADM

## 2023-04-28 RX ORDER — SODIUM CHLORIDE 0.9 % (FLUSH) 0.9 %
5-40 SYRINGE (ML) INJECTION EVERY 8 HOURS
Status: DISCONTINUED | OUTPATIENT
Start: 2023-04-28 | End: 2023-04-28 | Stop reason: HOSPADM

## 2023-04-28 RX ORDER — DEXTROMETHORPHAN/PSEUDOEPHED 2.5-7.5/.8
1.2 DROPS ORAL
Status: DISCONTINUED | OUTPATIENT
Start: 2023-04-28 | End: 2023-04-28 | Stop reason: HOSPADM

## 2023-04-28 RX ADMIN — PROPOFOL 50 MG: 10 INJECTION, EMULSION INTRAVENOUS at 10:42

## 2023-04-28 RX ADMIN — PROPOFOL 50 MG: 10 INJECTION, EMULSION INTRAVENOUS at 11:02

## 2023-04-28 RX ADMIN — PROPOFOL 50 MG: 10 INJECTION, EMULSION INTRAVENOUS at 10:48

## 2023-04-28 RX ADMIN — PROPOFOL 50 MG: 10 INJECTION, EMULSION INTRAVENOUS at 10:56

## 2023-04-28 RX ADMIN — LIDOCAINE HYDROCHLORIDE 80 MG: 20 INJECTION, SOLUTION EPIDURAL; INFILTRATION; INTRACAUDAL; PERINEURAL at 10:42

## 2023-04-28 RX ADMIN — SODIUM CHLORIDE: 9 INJECTION, SOLUTION INTRAVENOUS at 10:32

## 2023-04-28 RX ADMIN — SODIUM CHLORIDE 50 ML/HR: 9 INJECTION, SOLUTION INTRAVENOUS at 10:07

## 2023-04-28 NOTE — H&P
Gastroenterology Outpatient History and Physical    Patient: Wong Joshi    Physician: Jazmin Kothari MD    Vital Signs: Blood pressure (!) 181/62, pulse 94, temperature 97.8 °F (36.6 °C), resp. rate 24, height 4' 11\" (1.499 m), weight 46.2 kg (101 lb 12.8 oz), SpO2 100 %, not currently breastfeeding. Allergies:    Allergies   Allergen Reactions    Gabapentin Other (comments)     confusion    Aleve [Naproxen Sodium] Other (comments)     Renal issues    Cefuroxime Other (comments)     Kidney failure    Ibuprofen Unknown (comments)     Renal dysfunction    Lisinopril Other (comments)     Renal dysfunction    Omeprazole Other (comments)     Cause Kidney failure    Tramadol Other (comments)     Confusion       Chief Complaint: Dysphagia; H/O Colon Polyps    History of Present Illness: above    Justification for Procedure: above    History:  Past Medical History:   Diagnosis Date    AIN (acute interstitial nephritis)     Arthritis     CKD (chronic kidney disease) stage 3, GFR 30-59 ml/min ([facility] [address]     Cystitis     chronic, no problems since , Eduardo     GERD (gastroesophageal reflux disease)     Glaucoma     High cholesterol     Hypertension       Past Surgical History:   Procedure Laterality Date    COLONOSCOPY N/A 2020    COLONOSCOPY/EGD performed by Ambar Villegas MD at [facility] ENDOSCOPY    HX APPENDECTOMY      with hysterectomy    HX  SECTION      HX COLONOSCOPY      HX DILATION AND CURETTAGE      HX ENDOSCOPY      HX HYSTERECTOMY      HX RENAL BIOPSY Left     in office    HX TONSILLECTOMY      HX UROLOGICAL  2016    cauterization of bladder ulcers      Social History     Socioeconomic History    Marital status:    Tobacco Use    Smoking status: Former     Packs/day: 0.25     Years: 4.00     Pack years: 1.00     Types: Cigarettes     Quit date: 1997     Years since quittin.7    Smokeless tobacco: Former   Vaping Use    Vaping Use: Never used   Substance and Sexual Activity    Alcohol use: Yes     Comment: maybe a glass of wine twice a year    Drug use: Yes     Types: Prescription, OTC     Comment: never recreational substance    Sexual activity: Not Currently     Partners: Male     Birth control/protection: None   Social History Narrative    ** Merged History Encounter **          Social Determinants of Health     Financial Resource Strain: Low Risk     Difficulty of Paying Living Expenses: Not hard at all   Food Insecurity: No Food Insecurity    Worried About Running Out of Food in the Last Year: Never true    Ran Out of Food in the Last Year: Never true      Family History   Problem Relation Age of Onset    Cancer Father     Cancer Brother     Breast Cancer Cousin     OSTEOARTHRITIS Mother     Cancer Maternal Uncle     Cancer Maternal Uncle        Medications:   Prior to Admission medications    Medication Sig Start Date End Date Taking? Authorizing Provider   latanoprost (XALATAN) 0.005 % ophthalmic solution Administer 1 Drop to both eyes nightly. Yes Provider, Historical   acetaminophen (TYLENOL) 500 mg tablet Take 1 Tablet by mouth two (2) times a day. Yes Provider, Historical   psyllium husk (METAMUCIL PO) Take  by mouth daily. Yes Provider, Historical   ergocalciferol (ERGOCALCIFEROL) 1,250 mcg (50,000 unit) capsule Take one capsule by mouth twice weekly 4/20/23  Yes Yvette Segovia MD   amLODIPine (NORVASC) 2.5 mg tablet Take 1 Tablet by mouth nightly. 4/17/23  Yes Yvette Segovia MD   metoprolol succinate (TOPROL-XL) 50 mg XL tablet TAKE 1 TABLET EVERY DAY 2/19/23  Yes Dilcia Dang MD   B.animalis,bifid,infantis,long (Probiotic 4X) 10-15 mg TbEC Take 2 Capsules by mouth. 2 gummies daily   Yes Provider, Historical   loratadine (CLARITIN) 10 mg tablet Take 1 Tablet by mouth two (2) times a day. Yes Provider, Historical   famotidine (PEPCID) 20 mg tablet Take 1 Tab by mouth two (2) times a day.  Indications: gastroesophageal reflux disease  Patient taking differently: Take 2 Tablets by mouth two (2) times a day. Indications: gastroesophageal reflux disease 10/11/19  Yes Yvette Segovia MD   diclofenac (VOLTAREN) 1 % gel Apply 2 g to affected area four (4) times daily. Patient taking differently: Apply 2 g to affected area as needed. 10/11/19  Yes Yvette Segovia MD       Physical Exam:   General: alert, no distress   HEENT: Head: Normocephalic, no lesions, without obvious abnormality. Heart: regular rate and rhythm, S1, S2 normal, no murmur, click, rub or gallop   Lungs: chest clear, no wheezing, rales, normal symmetric air entry   Abdominal: soft, NT/ND + BS   Neurological: Grossly normal   Extremities: extremities normal, atraumatic, no cyanosis or edema     Findings/Diagnosis: GERD with dysphagia;  Personal h/o colon polyp    Plan of Care/Planned Procedure: EGD and Colonoscopy

## 2023-04-28 NOTE — PERIOP NOTES
Endoscopy Case End Note:    1110:  Procedure scope was pre-cleaned, per protocol, at bedside by Tyson DANIELS      1110:  Report received from anesthesia Janie Naranjo CRNA. See anesthesia flowsheet for intra-procedure vital signs and events.

## 2023-04-28 NOTE — PROCEDURES
Esophagogastroduodenoscopy Procedure Note      Ronit Gagnon  1940  [id number]    Indication:  GERD with dysphagia      Endoscopist: Dlieep Sher MD    Referring Provider:  Chandu Hopkins MD    Sedation:  MAC anesthesia Propofol    Procedure Details:  After infomed consent was obtained for the procedure, with all risks and benefits of procedure explained the patient was taken to the endoscopy suite and placed in the left lateral decubitus position. Following sequential administration of sedation as per above, the endoscope was inserted into the mouth and advanced under direct vision to second portion of the duodenum. A careful inspection was made as the gastroscope was withdrawn, including a retroflexed view of the proximal stomach; findings and interventions are described below. Findings:     Esophagus:   - Normal mucosa in esophagus. Normal Z line normal at 38 cm.  + Mild resistance in area of the UES without stricture s/p bx of the mid esophagus then dilation with 54 FR Savary over wire. Stomach:   + There was erythema in the antrum s/p Bx    Duodenum:   - The bulb and post bulbar mucosa is normal in appearance to the second portion. The duodenal folds appeared normal.  Cold forceps biopsies to r/o celiac. Therapies:  see above    Specimen: Specimens were collected as described and send to the laboratory. Complications:   None were encountered during the procedure. EBL: < 10 ml.           Recommendations:   -F/U path   -continue current medications for acid reflux      Dileep Sher MD  4/28/2023  11:14 AM

## 2023-04-28 NOTE — PROCEDURES
Colonoscopy Procedure Note    Indications:   Personal history of colon polyps (screening only)    Referring Physician: Yvette Segovia MD  Anesthesia/Sedation: MAC anesthesia Propofol  Endoscopist:  Dr. Edwardo Holguin    Procedure in Detail:  Informed consent was obtained for the procedure, including sedation. Risks of perforation, hemorrhage, adverse drug reaction, and aspiration were discussed. The patient was placed in the left lateral decubitus position. Based on the pre-procedure assessment, including review of the patient's medical history, medications, allergies, and review of systems, she had been deemed to be an appropriate candidate for moderate sedation; she was therefore sedated with the medications listed above. The patient was monitored continuously with ECG tracing, pulse oximetry, blood pressure monitoring, and direct observations. A rectal examination was performed. The [id number] was inserted into the rectum and advanced under direct vision to the cecum, which was identified by the ileocecal valve and appendiceal orifice. The quality of the colonic preparation was adequate. A careful inspection was made as the colonoscope was withdrawn, including a retroflexed view of the rectum; findings and interventions are described below. Appropriate photodocumentation was obtained. Findings:    Scope advanced to the cecum. Preparation was adequate. There was a sessile polyp 5 mm in the descending colon s/p cold snare removal   Scattered sigmoid diverticulosis. Internal hemorrhoids. Therapies:  see above    Specimen: Specimens were collected as described above and sent to pathology. Complications: None were encountered during the procedure. EBL: < 10 ml .     Recommendations:   -F/U Path    Signed By: Ana Davis MD                        April 28, 2023

## 2023-04-28 NOTE — ANESTHESIA POSTPROCEDURE EVALUATION
Procedure(s):  COLONOSCOPY  ESOPHAGOGASTRODUODENOSCOPY (EGD)  ESOPHAGOGASTRODUODENAL (EGD) BIOPSY  ESOPHAGEAL DILATION  ENDOSCOPIC POLYPECTOMY. MAC    Anesthesia Post Evaluation      Multimodal analgesia: multimodal analgesia used between 6 hours prior to anesthesia start to PACU discharge  Patient location during evaluation: PACU  Level of consciousness: sleepy but conscious  Pain management: adequate  Airway patency: patent  Anesthetic complications: no  Cardiovascular status: acceptable  Respiratory status: acceptable  Hydration status: acceptable  Comments: +Post-Anesthesia Evaluation and Assessment    Patient: Cee Walter MRN: [id number]  SSN: [id number]   YOB: 1940  Age: 78 y.o. Sex: female      Cardiovascular Function/Vital Signs    BP (!) 143/112   Pulse 82   Temp 36.3 °C (97.4 °F)   Resp 21   Ht 4' 11\" (1.499 m)   Wt 46.2 kg (101 lb 12.8 oz)   SpO2 98%   Breastfeeding No   BMI 20.56 kg/m²     Patient is status post Procedure(s):  COLONOSCOPY  ESOPHAGOGASTRODUODENOSCOPY (EGD)  ESOPHAGOGASTRODUODENAL (EGD) BIOPSY  ESOPHAGEAL DILATION  ENDOSCOPIC POLYPECTOMY. Nausea/Vomiting: Controlled. Postoperative hydration reviewed and adequate. Pain:  Pain Scale 1: Numeric (0 - 10) (04/28/23 1130)  Pain Intensity 1: 0 (04/28/23 1130)   Managed. Neurological Status: At baseline. Mental Status and Level of Consciousness: Arousable. Pulmonary Status:   O2 Device: None (Room air) (04/28/23 1130)   Adequate oxygenation and airway patent. Complications related to anesthesia: None    Post-anesthesia assessment completed. No concerns.     Signed By: Chet Marroquin MD    4/28/2023  Post anesthesia nausea and vomiting:  controlled  Final Post Anesthesia Temperature Assessment:  Normothermia (36.0-37.5 degrees C)      INITIAL Post-op Vital signs:   Vitals Value Taken Time   /72 04/28/23 1133   Temp 36.3 °C (97.4 °F) 04/28/23 1118   Pulse 76 04/28/23 1134   Resp 12 04/28/23 1134   SpO2 98 % 04/28/23 1130   Vitals shown include unvalidated device data.

## 2023-04-28 NOTE — DISCHARGE INSTRUCTIONS
José Luis Flores  [id number]  1940    COLON / EGD DISCHARGE INSTRUCTIONS  Discomfort:  Sore throat- throat lozenges or warm salt water gargle  Redness at IV site- apply warm compress to area; if redness or soreness persist- contact your physician  There may be a slight amount of blood passed from the rectum  Gaseous discomfort- walking, belching will help relieve any discomfort  You may not operate a vehicle for 12 hours  You may not engage in an occupation involving machinery or appliances for rest of today  You may not drink alcoholic beverages for at least 12 hours  Avoid making any critical decisions for at least 24 hour  DIET:   Regular diet. - however -  remember your colon is empty and a heavy meal will produce gas. Avoid these foods:  vegetables, fried / greasy foods, carbonated drinks for today     ACTIVITY:  You may  resume your normal daily activities it is recommended that you spend the remainder of the day resting -  avoid any strenuous activity. CALL M.D. ANY SIGN OF:   Increasing pain, nausea, vomiting  Abdominal distension (swelling)  New increased bleeding (oral or rectal)  Fever (chills)  Pain in chest area  Bloody discharge from nose or mouth  Shortness of breath  Tylenol as needed for pain.       Follow-up Instructions:   Call Dr. Rinku Krishna for results of procedure / biopsy in 7 days at telephone #  [phone]  Additional instructions: Continue current medications for acid reflux        Patient Education on Sedation / Analgesia Administered for Procedure      For 24 hours after general anesthesia or intravenous analgesia / sedation:  Have someone responsible help you with your care  Limit your activities  Do not drive and operate hazardous machinery  Do not make important personal, legal or business decisions  Do not drink alcoholic beverages  If you have not urinated within 8 hours after discharge, please contact your physician  Resume your medications unless otherwise instructed    For 24 hours after general anesthesia or intravenous analgesia / sedation  you may experience:  Drowsiness, dizziness, sleepiness, or confusion  Difficulty remembering or delayed reaction times  Difficulty with your balance, especially while walking, move slowly and carefully, do not make sudden position changes  Difficulty focusing or blurred vision    You may not be aware of slight changes in your behavior and/or your reaction time because of the medication used during and after your procedure. Report the following to your physician:  Excessive pain, swelling, redness or odor of or around the surgical area  Temperature over 100.5  Nausea and vomiting lasting longer than 4 hours or if unable to take medications  Any signs of decreased circulation or nerve impairment to extremity: change in color, persistent numbness, tingling, coldness or increase pain  Any questions or concerns    IF YOU REPORT TO AN EMERGENCY ROOM, DOCTOR'S OFFICE OR HOSPITAL WITHIN 24 HOURS AFTER YOUR PROCEDURE, BRING THIS SHEET AND YOUR AFTER VISIT SUMMARY WITH YOU AND GIVE IT TO THE PHYSICIAN OR NURSE ATTENDING YOU. Omaraguilar Mark  [id number]  1940        DISCHARGE SUMMARY from Nurse    The following personal items collected during your admission are returned to you:   Dental Appliance: Dental Appliances: None  Vision: Visual Aid: None  Hearing Aid:    Jewelry:    Clothing:    Other Valuables:    Valuables sent to safe:      PATIENT INSTRUCTIONS:    Take Home Medications:  {Medication reconciliation information is now added to the patient's AVS automatically when it is printed. There is no need to use this SmartLink in discharge instructions.   Highlight this text and delete it to clear this message}

## 2023-04-28 NOTE — PROGRESS NOTES
Dwayne Jefferson  1940  [id number]    Situation:  Verbal report received from: Patrice Carrington RN  Procedure: Procedure(s):  COLONOSCOPY  ESOPHAGOGASTRODUODENOSCOPY (EGD)  ESOPHAGOGASTRODUODENAL (EGD) BIOPSY  ESOPHAGEAL DILATION  ENDOSCOPIC POLYPECTOMY    Background:    Preoperative diagnosis: Family history of colonic polyps [Z83.71]  Family history of malignant neoplasm of gastrointestinal tract [Z80.0]  Personal history of colonic polyps [Z86.010]  Postoperative diagnosis: EGD: dysphagia, gastritis   Colon: Colon polyp, hemorrhoids     :  Dr. Kristal Regalado  Assistant(s): Endoscopy Technician-1: Theodora Pate  Endoscopy RN-1: Usha Banks RN    Specimens:   ID Type Source Tests Collected by Time Destination   1 : Duodenum biopsy Preservative Duodenum  Flower Goyal MD 4/28/2023 1050 Pathology   2 : Gastric biopsy Preservative Gastric  Flower Goyal MD 4/28/2023 1051 Pathology   3 : Mid esophageal biopsy Preservative Esophagus, Mid  Flower Goyal MD 4/28/2023 1051 Pathology   4 : Descending colon polyp Preservative Colon, Descending  Flower Goyal MD 4/28/2023 1105 Pathology     H. Pylori  no    Assessment:  Intra-procedure medications   Anesthesia gave intra-procedure sedation and medications, see anesthesia flow sheet yes    Intravenous fluids: NS@ KVO     Vital signs stable yes    Abdominal assessment: round and soft yes    Recommendation:  Discharge patient per MD order yes.   Return to floor n/a  Family or Friend THALIA Schmitz  Permission to share finding with family or friend yes

## 2023-08-16 NOTE — ED NOTES
Assisted pt while she ambulated in the hallway. Pt's HR was 120 and O2 sats were 95%. The pt stated she became dizzy when turning the corner, but denies chest pain, SOB, and N/V. Arnol Null MD notified. [email or web address]  Gentle Skin Care  Below is a list of products our providers recommend for gentle skin care.  Daily bathing is recommended. Make sure you are applying a good moisturizer after bathing every time.  Use Moisturizing creams at least twice daily to the whole body. Your provider may recommend a lighter or heavier moisturizer based on your child s severity and that time of year it is.  Lighter, more pleasing to the feel moisturizers include products such as; Cetaphil, Cerave, and Vanicream light.   Thicker agents include; Aquaphor ointment, Vaseline, Eucerin and Vanicream.  Creams are more moisturizing than lotions  Products should be fragrance free- soaps, creams, detergents.  Mild Bar Soaps include;   Fragrance Free Dove, Basis and Purpose  Mild Liquid Cleansers;  Vanicream, Cetaphil, Aquanil, Cerave and Aquaphor  Laundry Products include;  All Free and Clear, Dreft, and Cheer Free  Care Plan:  Keep bathing and showering short, less than 15 mins  Always use lukewarm warm when possible. AVOID very HOT or COLD water  DO NOT use bubble bath  Limit the use of soaps. Focus on  dirty  areas of the body; face, armpits, groin and feet  Do NOT vigorously scrub when you cleanse your skin  After bathing, PAT your skin lightly with a towel. DO NOT rub or scrub when drying  ALWAYS apply a moisturizer immediately after bathing. This helps to  lock in  the moisture. * IF YOU WERE PRESCRIBED A TOPICAL MEDICATION, APPLY YOUR MEDICATION FIRST THEN COVER WITH YOUR DAILY MOISTURIZER  Reapply moisturizing agents at least twice daily to your whole body  Do not use products such as powders, perfumes, or colognes   Avoid saunas and steam baths. This temperature is too HOT  Use unscented hypo-allergenic laundry products. AVOID fabric softeners  and dryer sheets  Avoid tight or  scratchy  clothing such as wool  Always wash new clothing before wearing them for the first time  Sometimes a humidifier or vaporizer, used at night  can help the dry skin. Remember to keep it clean to void mold growth.      Patient Education    [employer]afety[employer] HANDOUT- PARENT  12 MONTH VISIT  Here are some suggestions from [employer] experts that may be of value to your family.     HOW YOUR FAMILY IS DOING  If you are worried about your living or food situation, reach out for help. Community agencies and programs such as WIC and SNAP can provide information and assistance.  Don t smoke or use e-cigarettes. Keep your home and car smoke-free. Tobacco-free spaces keep children healthy.  Don t use alcohol or drugs.  Make sure everyone who cares for your child offers healthy foods, avoids sweets, provides time for active play, and uses the same rules for discipline that you do.  Make sure the places your child stays are safe.  Think about joining a toddler playgroup or taking a parenting class.  Take time for yourself and your partner.  Keep in contact with family and friends.    ESTABLISHING ROUTINES   Praise your child when he does what you ask him to do.  Use short and simple rules for your child.  Try not to hit, spank, or yell at your child.  Use short time-outs when your child isn t following directions.  Distract your child with something he likes when he starts to get upset.  Play with and read to your child often.  Your child should have at least one nap a day.  Make the hour before bedtime loving and calm, with reading, singing, and a favorite toy.  Avoid letting your child watch TV or play on a tablet or smartphone.  Consider making a family media plan. It helps you make rules for media use and balance screen time with other activities, including exercise.    FEEDING YOUR CHILD   Offer healthy foods for meals and snacks. Give 3 meals and 2 to 3 snacks spaced evenly over the day.  Avoid small, hard foods that can cause choking-- popcorn, hot dogs, grapes, nuts, and hard, raw vegetables.  Have your child eat with the rest of the family during  mealtime.  Encourage your child to feed herself.  Use a small plate and cup for eating and drinking.  Be patient with your child as she learns to eat without help.  Let your child decide what and how much to eat. End her meal when she stops eating.  Make sure caregivers follow the same ideas and routines for meals that you do.    FINDING A DENTIST   Take your child for a first dental visit as soon as her first tooth erupts or by 12 months of age.  Brush your child s teeth twice a day with a soft toothbrush. Use a small smear of fluoride toothpaste (no more than a grain of rice).  If you are still using a bottle, offer only water.    SAFETY   Make sure your child s car safety seat is rear facing until he reaches the highest weight or height allowed by the car safety seat s . In most cases, this will be well past the second birthday.  Never put your child in the front seat of a vehicle that has a passenger airbag. The back seat is safest.  Place crow at the top and bottom of stairs. Install operable window guards on windows at the second story and higher. Operable means that, in an emergency, an adult can open the window.  Keep furniture away from windows.  Make sure TVs, furniture, and other heavy items are secure so your child can t pull them over.  Keep your child within arm s reach when he is near or in water.  Empty buckets, pools, and tubs when you are finished using them.  Never leave young brothers or sisters in charge of your child.  When you go out, put a hat on your child, have him wear sun protection clothing, and apply sunscreen with SPF of 15 or higher on his exposed skin. Limit time outside when the sun is strongest (11:00 am-3:00 pm).  Keep your child away when your pet is eating. Be close by when he plays with your pet.  Keep poisons, medicines, and cleaning supplies in locked cabinets and out of your child s sight and reach.  Keep cords, latex balloons, plastic bags, and small objects,  such as marbles and batteries, away from your child. Cover all electrical outlets.  Put the Poison Help number into all phones, including cell phones. Call if you are worried your child has swallowed something harmful. Do not make your child vomit.    WHAT TO EXPECT AT YOUR BABY S 15 MONTH VISIT  We will talk about  Supporting your child s speech and independence and making time for yourself  Developing good bedtime routines  Handling tantrums and discipline  Caring for your child s teeth  Keeping your child safe at home and in the car        Helpful Resources:  Smoking Quit Line: 640.885.7420  Family Media Use Plan: www.enercast.org/MediaUsePlan  Poison Help Line: 131.831.4795  Information About Car Safety Seats: www.safercar.gov/parents  Toll-free Auto Safety Hotline: 749.288.1915  Consistent with Bright Futures: Guidelines for Health Supervision of Infants, Children, and Adolescents, 4th Edition  For more information, go to https://brightfutures.aap.org.

## 2023-09-14 ENCOUNTER — TELEPHONE (OUTPATIENT)
Age: 83
End: 2023-09-14

## 2023-09-14 ENCOUNTER — NURSE ONLY (OUTPATIENT)
Age: 83
End: 2023-09-14
Payer: MEDICARE

## 2023-09-14 VITALS
RESPIRATION RATE: 18 BRPM | BODY MASS INDEX: 20.08 KG/M2 | DIASTOLIC BLOOD PRESSURE: 70 MMHG | HEIGHT: 59 IN | WEIGHT: 99.6 LBS | SYSTOLIC BLOOD PRESSURE: 168 MMHG | TEMPERATURE: 99.6 F | OXYGEN SATURATION: 98 % | HEART RATE: 66 BPM

## 2023-09-14 DIAGNOSIS — N39.0 URINARY TRACT INFECTION WITH HEMATURIA, SITE UNSPECIFIED: Primary | ICD-10-CM

## 2023-09-14 DIAGNOSIS — R31.9 URINARY TRACT INFECTION WITH HEMATURIA, SITE UNSPECIFIED: Primary | ICD-10-CM

## 2023-09-14 LAB
BILIRUBIN, URINE, POC: NEGATIVE
BLOOD URINE, POC: ABNORMAL
GLUCOSE URINE, POC: NEGATIVE
KETONES, URINE, POC: NEGATIVE
LEUKOCYTE ESTERASE, URINE, POC: ABNORMAL
NITRITE, URINE, POC: POSITIVE
PH, URINE, POC: 6 (ref 4.6–8)
PROTEIN,URINE, POC: 30
SPECIFIC GRAVITY, URINE, POC: 1.01 (ref 1–1.03)
URINALYSIS CLARITY, POC: ABNORMAL
URINALYSIS COLOR, POC: YELLOW
UROBILINOGEN, POC: ABNORMAL

## 2023-09-14 PROCEDURE — 81001 URINALYSIS AUTO W/SCOPE: CPT | Performed by: FAMILY MEDICINE

## 2023-09-14 PROCEDURE — PBSHW AMB POC URINALYSIS DIP STICK AUTO W/ MICRO: Performed by: FAMILY MEDICINE

## 2023-09-14 NOTE — TELEPHONE ENCOUNTER
UTI has come back and hasn't been off antibiotics very long. There are no appts. For pt until 9-20-23    Symptoms; burning when urinating, little scratchy when urine comes out. Frequent at least about 3-4 times a night. Was running a fever, but doesn't think so now. Pt is asking what you want her to do with no appts? Please call pt to advise.      Phyllis Alex #[phone]

## 2023-09-15 ENCOUNTER — TELEPHONE (OUTPATIENT)
Age: 83
End: 2023-09-15

## 2023-09-15 RX ORDER — SULFAMETHOXAZOLE AND TRIMETHOPRIM 800; 160 MG/1; MG/1
1 TABLET ORAL 2 TIMES DAILY
Qty: 14 TABLET | Refills: 0 | Status: SHIPPED | OUTPATIENT
Start: 2023-09-15 | End: 2023-09-22

## 2023-09-15 NOTE — TELEPHONE ENCOUNTER
Pt states that there was not an antibiotic called in for her yesterday and she would like to know why? Please call pt. Thanks.

## 2023-09-17 ENCOUNTER — TELEPHONE (OUTPATIENT)
Age: 83
End: 2023-09-17

## 2023-09-17 LAB
BACTERIA SPEC CULT: ABNORMAL
CC UR VC: ABNORMAL
SERVICE CMNT-IMP: ABNORMAL

## 2023-09-17 RX ORDER — NITROFURANTOIN 25; 75 MG/1; MG/1
100 CAPSULE ORAL 2 TIMES DAILY
Qty: 14 CAPSULE | Refills: 0 | Status: SHIPPED | OUTPATIENT
Start: 2023-09-17

## 2023-09-17 NOTE — TELEPHONE ENCOUNTER
Please notify patient that urine culture shows that she is resistant to the antibiotic given (Bactrim DS). Antibiotic changed to Macrobid 100 mg twice daily for the next 7 days, sent to [facility].

## 2023-09-21 ENCOUNTER — TELEPHONE (OUTPATIENT)
Age: 83
End: 2023-09-21

## 2023-09-21 DIAGNOSIS — N39.0 URINARY TRACT INFECTION WITH HEMATURIA, SITE UNSPECIFIED: Primary | ICD-10-CM

## 2023-09-21 DIAGNOSIS — R31.9 URINARY TRACT INFECTION WITH HEMATURIA, SITE UNSPECIFIED: Primary | ICD-10-CM

## 2023-09-21 RX ORDER — GRANULES FOR ORAL 3 G/1
3 POWDER ORAL ONCE
Qty: 1 EACH | Refills: 0 | Status: SHIPPED | OUTPATIENT
Start: 2023-09-21 | End: 2023-09-21

## 2023-09-21 NOTE — TELEPHONE ENCOUNTER
Patient states that she thinks that the [facility] is not agreeing with her. She has had a headache since starting the med, very tired and not eating.   Very lethargic

## 2023-09-21 NOTE — TELEPHONE ENCOUNTER
Pt believes the new medication is not agreeing with her. She has had a headache since starting the med, very tired and not eating. Very lethargic. She doesn't know how else to describe. The med is nitrofurantion monohyd     Please call to advise as soon as possible. Pt is waiting to take medication until you call.

## 2023-09-23 ENCOUNTER — APPOINTMENT (OUTPATIENT)
Facility: HOSPITAL | Age: 83
End: 2023-09-23
Payer: MEDICARE

## 2023-09-23 ENCOUNTER — HOSPITAL ENCOUNTER (EMERGENCY)
Facility: HOSPITAL | Age: 83
Discharge: HOME | End: 2023-09-23
Attending: EMERGENCY MEDICINE
Payer: MEDICARE

## 2023-09-23 VITALS
HEART RATE: 60 BPM | SYSTOLIC BLOOD PRESSURE: 153 MMHG | DIASTOLIC BLOOD PRESSURE: 65 MMHG | RESPIRATION RATE: 16 BRPM | TEMPERATURE: 98 F | WEIGHT: 96.34 LBS | HEIGHT: 59 IN | OXYGEN SATURATION: 98 % | BODY MASS INDEX: 19.42 KG/M2

## 2023-09-23 DIAGNOSIS — N30.00 ACUTE CYSTITIS WITHOUT HEMATURIA: Primary | ICD-10-CM

## 2023-09-23 DIAGNOSIS — R19.7 DIARRHEA, UNSPECIFIED TYPE: ICD-10-CM

## 2023-09-23 DIAGNOSIS — R10.9 RIGHT FLANK PAIN: ICD-10-CM

## 2023-09-23 LAB
ALBUMIN SERPL-MCNC: 3.8 G/DL (ref 3.5–5)
ALBUMIN/GLOB SERPL: 1 (ref 1.1–2.2)
ALP SERPL-CCNC: 78 U/L (ref 45–117)
ALT SERPL-CCNC: 18 U/L (ref 12–78)
ANION GAP SERPL CALC-SCNC: 7 MMOL/L (ref 5–15)
APPEARANCE UR: CLEAR
AST SERPL-CCNC: 12 U/L (ref 15–37)
BACTERIA URNS QL MICRO: NEGATIVE /HPF
BASOPHILS # BLD: 0.1 K/UL (ref 0–0.1)
BASOPHILS NFR BLD: 1 % (ref 0–1)
BILIRUB SERPL-MCNC: 0.3 MG/DL (ref 0.2–1)
BILIRUB UR QL: NEGATIVE
BUN SERPL-MCNC: 28 MG/DL (ref 6–20)
BUN/CREAT SERPL: 21 (ref 12–20)
CALCIUM SERPL-MCNC: 9.3 MG/DL (ref 8.5–10.1)
CHLORIDE SERPL-SCNC: 109 MMOL/L (ref 97–108)
CO2 SERPL-SCNC: 21 MMOL/L (ref 21–32)
COLOR UR: ABNORMAL
CREAT SERPL-MCNC: 1.32 MG/DL (ref 0.55–1.02)
DIFFERENTIAL METHOD BLD: ABNORMAL
EOSINOPHIL # BLD: 0.7 K/UL (ref 0–0.4)
EOSINOPHIL NFR BLD: 8 % (ref 0–7)
EPITH CASTS URNS QL MICRO: ABNORMAL /LPF
ERYTHROCYTE [DISTWIDTH] IN BLOOD BY AUTOMATED COUNT: 16 % (ref 11.5–14.5)
GLOBULIN SER CALC-MCNC: 4 G/DL (ref 2–4)
GLUCOSE SERPL-MCNC: 101 MG/DL (ref 65–100)
GLUCOSE UR STRIP.AUTO-MCNC: NEGATIVE MG/DL
HCT VFR BLD AUTO: 38.8 % (ref 35–47)
HGB BLD-MCNC: 12.8 G/DL (ref 11.5–16)
HGB UR QL STRIP: ABNORMAL
HYALINE CASTS URNS QL MICRO: ABNORMAL /LPF (ref 0–2)
IMM GRANULOCYTES # BLD AUTO: 0 K/UL (ref 0–0.04)
IMM GRANULOCYTES NFR BLD AUTO: 0 % (ref 0–0.5)
KETONES UR QL STRIP.AUTO: NEGATIVE MG/DL
LEUKOCYTE ESTERASE UR QL STRIP.AUTO: ABNORMAL
LIPASE SERPL-CCNC: 127 U/L (ref 73–393)
LYMPHOCYTES # BLD: 2.3 K/UL (ref 0.8–3.5)
LYMPHOCYTES NFR BLD: 25 % (ref 12–49)
MCH RBC QN AUTO: 31.7 PG (ref 26–34)
MCHC RBC AUTO-ENTMCNC: 33 G/DL (ref 30–36.5)
MCV RBC AUTO: 96 FL (ref 80–99)
MONOCYTES # BLD: 0.7 K/UL (ref 0–1)
MONOCYTES NFR BLD: 8 % (ref 5–13)
NEUTS SEG # BLD: 5.2 K/UL (ref 1.8–8)
NEUTS SEG NFR BLD: 58 % (ref 32–75)
NITRITE UR QL STRIP.AUTO: NEGATIVE
NRBC # BLD: 0 K/UL (ref 0–0.01)
NRBC BLD-RTO: 0 PER 100 WBC
PH UR STRIP: 6 (ref 5–8)
PLATELET # BLD AUTO: 258 K/UL (ref 150–400)
PMV BLD AUTO: 10 FL (ref 8.9–12.9)
POTASSIUM SERPL-SCNC: 4.4 MMOL/L (ref 3.5–5.1)
PROT SERPL-MCNC: 7.8 G/DL (ref 6.4–8.2)
PROT UR STRIP-MCNC: NEGATIVE MG/DL
RBC # BLD AUTO: 4.04 M/UL (ref 3.8–5.2)
RBC #/AREA URNS HPF: ABNORMAL /HPF (ref 0–5)
SODIUM SERPL-SCNC: 137 MMOL/L (ref 136–145)
SP GR UR REFRACTOMETRY: 1.01
URINE CULTURE IF INDICATED: ABNORMAL
UROBILINOGEN UR QL STRIP.AUTO: 0.2 EU/DL (ref 0.2–1)
WBC # BLD AUTO: 9 K/UL (ref 3.6–11)
WBC URNS QL MICRO: >100 /HPF (ref 0–4)

## 2023-09-23 PROCEDURE — 87186 SC STD MICRODIL/AGAR DIL: CPT

## 2023-09-23 PROCEDURE — 99285 EMERGENCY DEPT VISIT HI MDM: CPT

## 2023-09-23 PROCEDURE — 81001 URINALYSIS AUTO W/SCOPE: CPT

## 2023-09-23 PROCEDURE — 2580000003 HC RX 258: Performed by: EMERGENCY MEDICINE

## 2023-09-23 PROCEDURE — 6360000004 HC RX CONTRAST MEDICATION: Performed by: RADIOLOGY

## 2023-09-23 PROCEDURE — 74177 CT ABD & PELVIS W/CONTRAST: CPT

## 2023-09-23 PROCEDURE — 36415 COLL VENOUS BLD VENIPUNCTURE: CPT

## 2023-09-23 PROCEDURE — 6360000002 HC RX W HCPCS: Performed by: EMERGENCY MEDICINE

## 2023-09-23 PROCEDURE — 87086 URINE CULTURE/COLONY COUNT: CPT

## 2023-09-23 PROCEDURE — 96365 THER/PROPH/DIAG IV INF INIT: CPT

## 2023-09-23 PROCEDURE — 85025 COMPLETE CBC W/AUTO DIFF WBC: CPT

## 2023-09-23 PROCEDURE — 87077 CULTURE AEROBIC IDENTIFY: CPT

## 2023-09-23 PROCEDURE — 80053 COMPREHEN METABOLIC PANEL: CPT

## 2023-09-23 PROCEDURE — 83690 ASSAY OF LIPASE: CPT

## 2023-09-23 RX ORDER — 0.9 % SODIUM CHLORIDE 0.9 %
500 INTRAVENOUS SOLUTION INTRAVENOUS ONCE
Status: COMPLETED | OUTPATIENT
Start: 2023-09-23 | End: 2023-09-23

## 2023-09-23 RX ADMIN — PIPERACILLIN AND TAZOBACTAM 3375 MG: 3; .375 INJECTION, POWDER, LYOPHILIZED, FOR SOLUTION INTRAVENOUS at 17:26

## 2023-09-23 RX ADMIN — IOPAMIDOL 100 ML: 755 INJECTION, SOLUTION INTRAVENOUS at 16:53

## 2023-09-23 RX ADMIN — SODIUM CHLORIDE 500 ML: 9 INJECTION, SOLUTION INTRAVENOUS at 15:59

## 2023-09-23 ASSESSMENT — PAIN DESCRIPTION - LOCATION: LOCATION: BACK

## 2023-09-23 ASSESSMENT — LIFESTYLE VARIABLES
HOW OFTEN DO YOU HAVE A DRINK CONTAINING ALCOHOL: NEVER
HOW MANY STANDARD DRINKS CONTAINING ALCOHOL DO YOU HAVE ON A TYPICAL DAY: PATIENT DOES NOT DRINK

## 2023-09-23 ASSESSMENT — PAIN SCALES - GENERAL
PAINLEVEL_OUTOF10: 3
PAINLEVEL_OUTOF10: 3

## 2023-09-23 NOTE — ED PROVIDER NOTES
[facility] EMERGENCY DEPT  EMERGENCY DEPARTMENT ENCOUNTER       Pt Name: Brooks Mims  MRN: [id number]  [address] [facility] 1940  Date of evaluation: 9/23/2023  Provider: Alvin Sweet MD   PCP: Teena Gottron, MD  Note Started: 4:46 PM EDT 9/23/23     CHIEF COMPLAINT       Chief Complaint   Patient presents with    Urinary Tract Infection     Pt is still having problems of uti for a month. She has been prescribed antibiotics. Pt is concerned as she has back pain, and years ago uti caused kidney problem. Denies vomiting. Her doctor(Dr. Newton) wants our doctor to call her on call         HISTORY OF PRESENT ILLNESS: 1 or more elements      History From: Patient and medical records, History limited by: none     Brooks Mims is a 78 y.o. female patient with history of chronic kidney disease, hypertension and high cholesterol, here for UTI. Patient has been having symptoms for over a month. She says she is on her third antibiotic. She currently is on Macrobid. She feels like she has been having side effects from the medication, such as headaches and body aches. She has had intermittent back pain in the right flank area which is a 6 out of 10 in severity when present. She has also had lower abdominal pains at times which is mild to moderate. She denies either of those pains currently. She denies fever currently but states a few days ago she had a temp of 100.2. Her PCP advised her to come in to get fosfomycin. She has asked me to call her PCP. She denies lightheadedness, dizziness, chest pain, cough, shortness of breath. She did have diarrhea 3 times a day for the last 4 days. Please See MDM for Additional Details of the HPI/PMH  Nursing Notes were all reviewed and agreed with or any disagreements were addressed in the HPI. REVIEW OF SYSTEMS        Positives and Pertinent negatives as per HPI.     PAST HISTORY     Past Medical History:  Past Medical History:   Diagnosis Date    AIN (acute interstitial CLARITIN     metoprolol succinate 50 MG extended release tablet  Commonly known as: TOPROL XL     nitrofurantoin (macrocrystal-monohydrate) 100 MG capsule  Commonly known as: MACROBID  Take 1 capsule by mouth 2 times daily     polyethylene glycol 17 GM/SCOOP powder  Commonly known as: GLYCOLAX                DISCONTINUED MEDICATIONS:  Current Discharge Medication List          I am the Primary Clinician of Record. Scarlet Elias MD (electronically signed)    (Please note that parts of this dictation were completed with voice recognition software. Quite often unanticipated grammatical, syntax, homophones, and other interpretive errors are inadvertently transcribed by the computer software. Please disregards these errors.  Please excuse any errors that have escaped final proofreading.)        Scarlet Elias MD  09/23/23 2040

## 2023-09-24 LAB
BACTERIA SPEC CULT: ABNORMAL
CC UR VC: ABNORMAL
SERVICE CMNT-IMP: ABNORMAL

## 2023-09-25 ENCOUNTER — TELEPHONE (OUTPATIENT)
Age: 83
End: 2023-09-25

## 2023-09-25 NOTE — TELEPHONE ENCOUNTER
Pt states she spoke to Dr. Beka Gatica this wknd and was sent to ED for IV antibiotics. Pt states that there is now an antibiotic to  at the pharmacy. Is she to take this as well? She thought the IV was in place of this? Please call this morning.

## 2023-09-25 NOTE — TELEPHONE ENCOUNTER
Patient called 09/23   She was unable to obtain fosfomycin  She feels fatigued and has low back pain  No urinary burning  She is concerned with possible pyelo  She stopped macrobid did not tolerate    09/14 urine culture data reviewed and there is no other PO alternative     I advised her to go to ER   I personally spoke to Dr Yfn Pisano in ER   Imaging done CT and no evidence of pyelo  She was given  zosyn in the ER

## 2023-09-25 NOTE — TELEPHONE ENCOUNTER
Patient states that she has diarrhea she has had it since Friday now. She wants to know if she should take something for it.

## 2023-09-26 ENCOUNTER — TELEPHONE (OUTPATIENT)
Age: 83
End: 2023-09-26

## 2023-09-26 LAB
BACTERIA SPEC CULT: ABNORMAL
BACTERIA SPEC CULT: ABNORMAL
CC UR VC: ABNORMAL
SERVICE CMNT-IMP: ABNORMAL

## 2023-09-26 NOTE — TELEPHONE ENCOUNTER
Patient states she needs a call back to discuss Persistent Diarrhea/Dripping when she goes to the Bathroom that also patient reports feels like a Blockage for some reason with a Slight Bloody color now. Please call to discuss & advise Plan of care as Patient reports she has No Access to do VV with Silvestre today, 9/26/23.  Thank you

## 2023-09-26 NOTE — TELEPHONE ENCOUNTER
Left generic voicemail to schedule a NV for urinalysis next week 10/3-10/6    Message per   Patient is already scheduled for 6-month follow-up appointment on 10/16. Please keep that appointment for now but also please contact patient for her to come in next week to repeat a urinalysis. It can be a nurse visit. She has been treated for UTI and I want to make sure that it has resolved.

## 2023-09-26 NOTE — TELEPHONE ENCOUNTER
Called and spoke to patient . Patient states that she has not been able to stop going the rest room. Patient now states she feels like she has a blockage and has some bloody stool. Informed patient that she will need to go to UC to be checked out. She states she will go to the [facility] ER in [place].

## 2023-10-03 ENCOUNTER — NURSE ONLY (OUTPATIENT)
Age: 83
End: 2023-10-03
Payer: MEDICARE

## 2023-10-03 ENCOUNTER — TELEPHONE (OUTPATIENT)
Age: 83
End: 2023-10-03

## 2023-10-03 DIAGNOSIS — N39.0 URINARY TRACT INFECTION WITH HEMATURIA, SITE UNSPECIFIED: Primary | ICD-10-CM

## 2023-10-03 DIAGNOSIS — R31.9 URINARY TRACT INFECTION WITH HEMATURIA, SITE UNSPECIFIED: Primary | ICD-10-CM

## 2023-10-03 LAB
BILIRUBIN, URINE, POC: NEGATIVE
BLOOD URINE, POC: ABNORMAL
GLUCOSE URINE, POC: NEGATIVE
KETONES, URINE, POC: NEGATIVE
LEUKOCYTE ESTERASE, URINE, POC: ABNORMAL
NITRITE, URINE, POC: NEGATIVE
PH, URINE, POC: 6 (ref 4.6–8)
PROTEIN,URINE, POC: ABNORMAL
SPECIFIC GRAVITY, URINE, POC: 1.01 (ref 1–1.03)
URINALYSIS CLARITY, POC: ABNORMAL
URINALYSIS COLOR, POC: YELLOW
UROBILINOGEN, POC: ABNORMAL

## 2023-10-03 PROCEDURE — 81001 URINALYSIS AUTO W/SCOPE: CPT | Performed by: FAMILY MEDICINE

## 2023-10-03 PROCEDURE — PBSHW PBB SHADOW CHARGE: Performed by: FAMILY MEDICINE

## 2023-10-03 PROCEDURE — PBSHW AMB POC URINALYSIS DIP STICK AUTO W/ MICRO: Performed by: FAMILY MEDICINE

## 2023-10-03 PROCEDURE — 99211 OFF/OP EST MAY X REQ PHY/QHP: CPT | Performed by: FAMILY MEDICINE

## 2023-10-03 NOTE — TELEPHONE ENCOUNTER
Please notify patient urinalysis shows bacteria, protein, and white blood cells. We are sending a urine culture. Advised the patient to schedule an appointment at [facility] first available provider for further evaluation of recurrent UTIs. [address] [facility]  [phone]    We will await the urine culture prior to any further antibiotics.

## 2023-10-05 ENCOUNTER — TELEPHONE (OUTPATIENT)
Age: 83
End: 2023-10-05

## 2023-10-06 ENCOUNTER — HOSPITAL ENCOUNTER (INPATIENT)
Facility: HOSPITAL | Age: 83
LOS: 2 days | Discharge: HOME | DRG: 690 | End: 2023-10-08
Attending: STUDENT IN AN ORGANIZED HEALTH CARE EDUCATION/TRAINING PROGRAM | Admitting: INTERNAL MEDICINE
Payer: MEDICARE

## 2023-10-06 ENCOUNTER — TELEPHONE (OUTPATIENT)
Age: 83
End: 2023-10-06

## 2023-10-06 DIAGNOSIS — B96.29 UTI DUE TO EXTENDED-SPECTRUM BETA LACTAMASE (ESBL) PRODUCING ESCHERICHIA COLI: Primary | ICD-10-CM

## 2023-10-06 DIAGNOSIS — N39.0 UTI DUE TO EXTENDED-SPECTRUM BETA LACTAMASE (ESBL) PRODUCING ESCHERICHIA COLI: Primary | ICD-10-CM

## 2023-10-06 DIAGNOSIS — Z16.12 UTI DUE TO EXTENDED-SPECTRUM BETA LACTAMASE (ESBL) PRODUCING ESCHERICHIA COLI: Primary | ICD-10-CM

## 2023-10-06 LAB
ALBUMIN SERPL-MCNC: 3.7 G/DL (ref 3.5–5)
ALBUMIN/GLOB SERPL: 0.9 (ref 1.1–2.2)
ALP SERPL-CCNC: 70 U/L (ref 45–117)
ALT SERPL-CCNC: 16 U/L (ref 12–78)
ANION GAP SERPL CALC-SCNC: 5 MMOL/L (ref 5–15)
APPEARANCE UR: CLEAR
AST SERPL-CCNC: 10 U/L (ref 15–37)
BACTERIA SPEC CULT: NORMAL
BACTERIA SPEC CULT: NORMAL
BACTERIA URNS QL MICRO: NEGATIVE /HPF
BASOPHILS # BLD: 0.1 K/UL (ref 0–0.1)
BASOPHILS NFR BLD: 1 % (ref 0–1)
BILIRUB SERPL-MCNC: 0.4 MG/DL (ref 0.2–1)
BILIRUB UR QL: NEGATIVE
BUN SERPL-MCNC: 25 MG/DL (ref 6–20)
BUN/CREAT SERPL: 22 (ref 12–20)
CALCIUM SERPL-MCNC: 9 MG/DL (ref 8.5–10.1)
CC UR VC: NORMAL
CHLORIDE SERPL-SCNC: 110 MMOL/L (ref 97–108)
CO2 SERPL-SCNC: 25 MMOL/L (ref 21–32)
COLOR UR: ABNORMAL
CREAT SERPL-MCNC: 1.16 MG/DL (ref 0.55–1.02)
DIFFERENTIAL METHOD BLD: ABNORMAL
EOSINOPHIL # BLD: 0.4 K/UL (ref 0–0.4)
EOSINOPHIL NFR BLD: 4 % (ref 0–7)
EPITH CASTS URNS QL MICRO: ABNORMAL /LPF
ERYTHROCYTE [DISTWIDTH] IN BLOOD BY AUTOMATED COUNT: 16.2 % (ref 11.5–14.5)
GLOBULIN SER CALC-MCNC: 4.3 G/DL (ref 2–4)
GLUCOSE SERPL-MCNC: 112 MG/DL (ref 65–100)
GLUCOSE UR STRIP.AUTO-MCNC: NEGATIVE MG/DL
HCT VFR BLD AUTO: 37.5 % (ref 35–47)
HGB BLD-MCNC: 12 G/DL (ref 11.5–16)
HGB UR QL STRIP: NEGATIVE
HYALINE CASTS URNS QL MICRO: ABNORMAL /LPF (ref 0–2)
IMM GRANULOCYTES # BLD AUTO: 0 K/UL (ref 0–0.04)
IMM GRANULOCYTES NFR BLD AUTO: 0 % (ref 0–0.5)
KETONES UR QL STRIP.AUTO: NEGATIVE MG/DL
LEUKOCYTE ESTERASE UR QL STRIP.AUTO: ABNORMAL
LYMPHOCYTES # BLD: 2 K/UL (ref 0.8–3.5)
LYMPHOCYTES NFR BLD: 21 % (ref 12–49)
MCH RBC QN AUTO: 31.7 PG (ref 26–34)
MCHC RBC AUTO-ENTMCNC: 32 G/DL (ref 30–36.5)
MCV RBC AUTO: 98.9 FL (ref 80–99)
MONOCYTES # BLD: 0.9 K/UL (ref 0–1)
MONOCYTES NFR BLD: 10 % (ref 5–13)
NEUTS SEG # BLD: 5.8 K/UL (ref 1.8–8)
NEUTS SEG NFR BLD: 64 % (ref 32–75)
NITRITE UR QL STRIP.AUTO: NEGATIVE
NRBC # BLD: 0 K/UL (ref 0–0.01)
NRBC BLD-RTO: 0 PER 100 WBC
PH UR STRIP: 6.5 (ref 5–8)
PLATELET # BLD AUTO: 283 K/UL (ref 150–400)
PMV BLD AUTO: 10.6 FL (ref 8.9–12.9)
POTASSIUM SERPL-SCNC: 3.6 MMOL/L (ref 3.5–5.1)
PROT SERPL-MCNC: 8 G/DL (ref 6.4–8.2)
PROT UR STRIP-MCNC: NEGATIVE MG/DL
RBC # BLD AUTO: 3.79 M/UL (ref 3.8–5.2)
RBC #/AREA URNS HPF: ABNORMAL /HPF (ref 0–5)
SERVICE CMNT-IMP: NORMAL
SODIUM SERPL-SCNC: 140 MMOL/L (ref 136–145)
SP GR UR REFRACTOMETRY: 1.01
URINE CULTURE IF INDICATED: ABNORMAL
UROBILINOGEN UR QL STRIP.AUTO: 0.2 EU/DL (ref 0.2–1)
WBC # BLD AUTO: 9.2 K/UL (ref 3.6–11)
WBC URNS QL MICRO: ABNORMAL /HPF (ref 0–4)

## 2023-10-06 PROCEDURE — 87086 URINE CULTURE/COLONY COUNT: CPT

## 2023-10-06 PROCEDURE — 85025 COMPLETE CBC W/AUTO DIFF WBC: CPT

## 2023-10-06 PROCEDURE — 1100000000 HC RM PRIVATE

## 2023-10-06 PROCEDURE — 87077 CULTURE AEROBIC IDENTIFY: CPT

## 2023-10-06 PROCEDURE — 80053 COMPREHEN METABOLIC PANEL: CPT

## 2023-10-06 PROCEDURE — 87186 SC STD MICRODIL/AGAR DIL: CPT

## 2023-10-06 PROCEDURE — 36415 COLL VENOUS BLD VENIPUNCTURE: CPT

## 2023-10-06 PROCEDURE — 81001 URINALYSIS AUTO W/SCOPE: CPT

## 2023-10-06 PROCEDURE — 99285 EMERGENCY DEPT VISIT HI MDM: CPT

## 2023-10-06 ASSESSMENT — PAIN SCALES - GENERAL: PAINLEVEL_OUTOF10: 3

## 2023-10-06 NOTE — TELEPHONE ENCOUNTER
Called patient . She does not want the Macrobid. She states she is scared of that medication. She states she tried to call [facility] urology and they did not have any appts until next year. She wants to know should she go to the hospital or wait to see the doctor. ?

## 2023-10-06 NOTE — TELEPHONE ENCOUNTER
Notify patient she still has the same urinary tract infection that she had before. The last antibiotic did not work. She has a highly resistant bacteria. The only antibiotic I can give her is nitrofurantoin (Macrobid) that she just recently tried. Otherwise she will need to go to the hospital for IV antibiotics. When is she scheduled to go to the urologist?  Maybe the urologist will have another option.

## 2023-10-06 NOTE — TELEPHONE ENCOUNTER
Called patient. Discussed urine culture results. Highly resistant E. Coli  Has burning with urination,  abdominal pain and fatigue. Unable to have macrobid, allergic. Took fosfomycin. Urine culture remains positive. Spoke with ED [facility] ED. Referring patient for admission for IV antibiotic treatment of UTI.

## 2023-10-07 PROCEDURE — 6370000000 HC RX 637 (ALT 250 FOR IP): Performed by: INTERNAL MEDICINE

## 2023-10-07 PROCEDURE — 2580000003 HC RX 258: Performed by: INTERNAL MEDICINE

## 2023-10-07 PROCEDURE — 6360000002 HC RX W HCPCS: Performed by: INTERNAL MEDICINE

## 2023-10-07 PROCEDURE — 1100000000 HC RM PRIVATE

## 2023-10-07 RX ORDER — FAMOTIDINE 20 MG/1
20 TABLET, FILM COATED ORAL DAILY
Status: DISCONTINUED | OUTPATIENT
Start: 2023-10-07 | End: 2023-10-08 | Stop reason: HOSPADM

## 2023-10-07 RX ORDER — SODIUM CHLORIDE 9 MG/ML
INJECTION, SOLUTION INTRAVENOUS CONTINUOUS
Status: DISCONTINUED | OUTPATIENT
Start: 2023-10-07 | End: 2023-10-08

## 2023-10-07 RX ORDER — HEPARIN SODIUM 5000 [USP'U]/ML
5000 INJECTION, SOLUTION INTRAVENOUS; SUBCUTANEOUS 2 TIMES DAILY
Status: DISCONTINUED | OUTPATIENT
Start: 2023-10-07 | End: 2023-10-08 | Stop reason: HOSPADM

## 2023-10-07 RX ORDER — HYDRALAZINE HYDROCHLORIDE 20 MG/ML
20 INJECTION INTRAMUSCULAR; INTRAVENOUS EVERY 6 HOURS PRN
Status: DISCONTINUED | OUTPATIENT
Start: 2023-10-07 | End: 2023-10-08 | Stop reason: HOSPADM

## 2023-10-07 RX ORDER — ONDANSETRON 4 MG/1
4 TABLET, ORALLY DISINTEGRATING ORAL EVERY 8 HOURS PRN
Status: DISCONTINUED | OUTPATIENT
Start: 2023-10-07 | End: 2023-10-08 | Stop reason: HOSPADM

## 2023-10-07 RX ORDER — POLYETHYLENE GLYCOL 3350 17 G/17G
17 POWDER, FOR SOLUTION ORAL DAILY
Status: DISCONTINUED | OUTPATIENT
Start: 2023-10-07 | End: 2023-10-08 | Stop reason: HOSPADM

## 2023-10-07 RX ORDER — AMLODIPINE BESYLATE 5 MG/1
2.5 TABLET ORAL NIGHTLY
Status: DISCONTINUED | OUTPATIENT
Start: 2023-10-07 | End: 2023-10-07

## 2023-10-07 RX ORDER — SODIUM CHLORIDE 0.9 % (FLUSH) 0.9 %
5-40 SYRINGE (ML) INJECTION PRN
Status: DISCONTINUED | OUTPATIENT
Start: 2023-10-07 | End: 2023-10-08 | Stop reason: HOSPADM

## 2023-10-07 RX ORDER — SODIUM CHLORIDE 9 MG/ML
INJECTION, SOLUTION INTRAVENOUS PRN
Status: DISCONTINUED | OUTPATIENT
Start: 2023-10-07 | End: 2023-10-08 | Stop reason: HOSPADM

## 2023-10-07 RX ORDER — AMLODIPINE BESYLATE 5 MG/1
5 TABLET ORAL NIGHTLY
Status: DISCONTINUED | OUTPATIENT
Start: 2023-10-07 | End: 2023-10-08 | Stop reason: HOSPADM

## 2023-10-07 RX ORDER — SODIUM CHLORIDE 0.9 % (FLUSH) 0.9 %
5-40 SYRINGE (ML) INJECTION EVERY 12 HOURS SCHEDULED
Status: DISCONTINUED | OUTPATIENT
Start: 2023-10-07 | End: 2023-10-08 | Stop reason: HOSPADM

## 2023-10-07 RX ORDER — BISACODYL 10 MG
10 SUPPOSITORY, RECTAL RECTAL DAILY PRN
Status: DISCONTINUED | OUTPATIENT
Start: 2023-10-07 | End: 2023-10-08 | Stop reason: HOSPADM

## 2023-10-07 RX ORDER — ACETAMINOPHEN 650 MG/1
650 SUPPOSITORY RECTAL EVERY 6 HOURS PRN
Status: DISCONTINUED | OUTPATIENT
Start: 2023-10-07 | End: 2023-10-08 | Stop reason: HOSPADM

## 2023-10-07 RX ORDER — METOPROLOL SUCCINATE 25 MG/1
50 TABLET, EXTENDED RELEASE ORAL DAILY
Status: DISCONTINUED | OUTPATIENT
Start: 2023-10-07 | End: 2023-10-08 | Stop reason: HOSPADM

## 2023-10-07 RX ORDER — POTASSIUM CHLORIDE 20 MEQ/1
40 TABLET, EXTENDED RELEASE ORAL ONCE
Status: COMPLETED | OUTPATIENT
Start: 2023-10-07 | End: 2023-10-07

## 2023-10-07 RX ORDER — ACETAMINOPHEN 325 MG/1
650 TABLET ORAL EVERY 6 HOURS PRN
Status: DISCONTINUED | OUTPATIENT
Start: 2023-10-07 | End: 2023-10-08 | Stop reason: HOSPADM

## 2023-10-07 RX ORDER — ONDANSETRON 2 MG/ML
4 INJECTION INTRAMUSCULAR; INTRAVENOUS EVERY 6 HOURS PRN
Status: DISCONTINUED | OUTPATIENT
Start: 2023-10-07 | End: 2023-10-08 | Stop reason: HOSPADM

## 2023-10-07 RX ADMIN — TRANDOLAPRIL AND VERAPAMIL HYDROCHLORIDE 2.5 MG: 1; 240 TABLET, FILM COATED, EXTENDED RELEASE ORAL at 01:25

## 2023-10-07 RX ADMIN — MEROPENEM 1000 MG: 1 INJECTION, POWDER, FOR SOLUTION INTRAVENOUS at 11:41

## 2023-10-07 RX ADMIN — SODIUM CHLORIDE: 9 INJECTION, SOLUTION INTRAVENOUS at 01:23

## 2023-10-07 RX ADMIN — HEPARIN SODIUM 5000 UNITS: 5000 INJECTION INTRAVENOUS; SUBCUTANEOUS at 21:15

## 2023-10-07 RX ADMIN — METOPROLOL SUCCINATE 50 MG: 25 TABLET, EXTENDED RELEASE ORAL at 08:10

## 2023-10-07 RX ADMIN — SODIUM CHLORIDE, PRESERVATIVE FREE 10 ML: 5 INJECTION INTRAVENOUS at 21:15

## 2023-10-07 RX ADMIN — SODIUM CHLORIDE, PRESERVATIVE FREE 10 ML: 5 INJECTION INTRAVENOUS at 08:10

## 2023-10-07 RX ADMIN — HEPARIN SODIUM 5000 UNITS: 5000 INJECTION INTRAVENOUS; SUBCUTANEOUS at 08:10

## 2023-10-07 RX ADMIN — POTASSIUM CHLORIDE 40 MEQ: 1500 TABLET, EXTENDED RELEASE ORAL at 10:43

## 2023-10-07 RX ADMIN — FAMOTIDINE 20 MG: 20 TABLET ORAL at 08:10

## 2023-10-07 RX ADMIN — MEROPENEM 1000 MG: 1 INJECTION, POWDER, FOR SOLUTION INTRAVENOUS at 01:23

## 2023-10-07 RX ADMIN — AMLODIPINE BESYLATE 5 MG: 5 TABLET ORAL at 21:14

## 2023-10-07 NOTE — ED PROVIDER NOTES
homophones, and other interpretive errors are inadvertently transcribed by the computer software. Please disregards these errors.  Please excuse any errors that have escaped final proofreading.)         Marlon Anderson MD  10/09/23 4487

## 2023-10-07 NOTE — CARE COORDINATION
Care Management Initial Assessment       RUR: 12%-\"Low Risk\"  Readmission? No  1st IM letter given? Yes - 10/7/23  1st  letter given: No     Transition of Care Plan:    RUR: 12%-\"Low Risk\"  Prior Level of Functioning: Independent in her ADLs and IADLs  Disposition: Home with follow-up appts, the patient is currently on IV antibiotics and may need home IV antibiotics arranged   If SNF or IPR: Date FOC offered: N/A  Follow up appointments: PCP & Specialists as indicated   DME needed: The patient has a front-wheeled walker but she does not use it   Transportation at discharge: The patient's sister-in-law will transport her home as her  does not drive   IM/IMM Medicare/ letter given: To be given prior to d/c   Is patient a [place] and connected with [facility]? N/A   If yes, was [place] transfer form completed and [facility] notified? Caregiver Contact: Josi Nuñez, , Phone: [phone]  Discharge Caregiver contacted prior to discharge? CM will contact her  if she requests this   Care Conference needed? No   Barriers to discharge: Pending medical stability, final IV abx plan     The patient uses [facility] order pharmacy or the [facility] for her medications. She has never had [facility] services in the past and she has never been to a SNF/IPR facility in the past.        10/07/23 1219   Service Assessment   Patient Orientation Alert and Oriented   Cognition Alert   History Provided By Patient   Primary Caregiver Self   Support Systems Spouse/Significant Other;Children;Family Members  (The patient lives with her . She has 1 son that lives in [place], [place].  She has a 9 y/o grandson and a 15 y/o granddaughter)   PCP Verified by CM Yes   Last Visit to PCP Within last 6 months   Prior Functional Level Independent in ADLs/IADLs   Current Functional Level Independent in ADLs/IADLs   Can patient return to prior living arrangement Yes   Family able to assist with home care needs:

## 2023-10-07 NOTE — H&P
diverticulitis. 3. Cholelithiasis. No biliary ductal dilatation. 4. Nonobstructing left renal stone. _______________________________________________________________________    TOTAL TIME:  76 Minutes    Critical Care Provided     Minutes non procedure based    Signed: Lacey Polanco MD    Procedures: see electronic medical records for all procedures/Xrays and details which were not copied into this note but were reviewed prior to creation of Plan.

## 2023-10-07 NOTE — PROGRESS NOTES
End of Shift Note    Bedside shift change report given to HARRIETT Manning  (oncoming nurse) by Francesco Edouard RN (offgoing nurse). Report included the following information SBAR, Kardex, MAR, and Recent Results    Shift worked:  6023-9185     Shift summary and any significant changes:    Pt arrived and oriented to unit. Pt had no complaints other than some burning during urinating. Pt tolerated care well. Safety rounding and toileting needs were completed during shift.             Francesco Edouard RN
test results and cultures  YES  Reviewed most current radiology test results   YES  Review and summation of old records today    NO  Reviewed patient's current orders and MAR    YES  PMH/SH reviewed - no change compared to H&P  ________________________________________________________________________  Care Plan discussed with:    Comments   Patient x    Family      RN x    Care Manager     Consultant                        Multidiciplinary team rounds were held today with , nursing, pharmacist and clinical coordinator. Patient's plan of care was discussed; medications were reviewed and discharge planning was addressed. ________________________________________________________________________  Total NON critical care TIME:  55  Minutes    Total CRITICAL CARE TIME Spent:   Minutes non procedure based      Comments   >50% of visit spent in counseling and coordination of care     ________________________________________________________________________  Celine Cam MD     Procedures: see electronic medical records for all procedures/Xrays and details which were not copied into this note but were reviewed prior to creation of Plan. LABS:  I reviewed today's most current labs and imaging studies.   Pertinent labs include:  Recent Labs     10/06/23  2006   WBC 9.2   HGB 12.0   HCT 37.5        Recent Labs     10/06/23  2006      K 3.6   *   CO2 25   GLUCOSE 112*   BUN 25*   CREATININE 1.16*   CALCIUM 9.0   LABALBU 3.7   BILITOT 0.4   AST 10*   ALT 16       Signed: Celine Cam MD

## 2023-10-08 VITALS
TEMPERATURE: 97.9 F | HEART RATE: 71 BPM | DIASTOLIC BLOOD PRESSURE: 59 MMHG | SYSTOLIC BLOOD PRESSURE: 122 MMHG | OXYGEN SATURATION: 97 % | WEIGHT: 98.55 LBS | BODY MASS INDEX: 19.9 KG/M2 | RESPIRATION RATE: 18 BRPM

## 2023-10-08 LAB
ALBUMIN SERPL-MCNC: 3.3 G/DL (ref 3.5–5)
ALBUMIN/GLOB SERPL: 0.8 (ref 1.1–2.2)
ALP SERPL-CCNC: 64 U/L (ref 45–117)
ALT SERPL-CCNC: 13 U/L (ref 12–78)
ANION GAP SERPL CALC-SCNC: 4 MMOL/L (ref 5–15)
AST SERPL-CCNC: 7 U/L (ref 15–37)
BASOPHILS # BLD: 0.1 K/UL (ref 0–0.1)
BASOPHILS NFR BLD: 1 % (ref 0–1)
BILIRUB SERPL-MCNC: 1 MG/DL (ref 0.2–1)
BUN SERPL-MCNC: 17 MG/DL (ref 6–20)
BUN/CREAT SERPL: 17 (ref 12–20)
CALCIUM SERPL-MCNC: 8.7 MG/DL (ref 8.5–10.1)
CHLORIDE SERPL-SCNC: 113 MMOL/L (ref 97–108)
CO2 SERPL-SCNC: 22 MMOL/L (ref 21–32)
CREAT SERPL-MCNC: 1.01 MG/DL (ref 0.55–1.02)
DIFFERENTIAL METHOD BLD: ABNORMAL
EOSINOPHIL # BLD: 0.3 K/UL (ref 0–0.4)
EOSINOPHIL NFR BLD: 4 % (ref 0–7)
ERYTHROCYTE [DISTWIDTH] IN BLOOD BY AUTOMATED COUNT: 16.2 % (ref 11.5–14.5)
GLOBULIN SER CALC-MCNC: 4.3 G/DL (ref 2–4)
GLUCOSE SERPL-MCNC: 112 MG/DL (ref 65–100)
HCT VFR BLD AUTO: 37.9 % (ref 35–47)
HGB BLD-MCNC: 12 G/DL (ref 11.5–16)
IMM GRANULOCYTES # BLD AUTO: 0.1 K/UL (ref 0–0.04)
IMM GRANULOCYTES NFR BLD AUTO: 1 % (ref 0–0.5)
LACTATE SERPL-SCNC: 1 MMOL/L (ref 0.4–2)
LYMPHOCYTES # BLD: 2.6 K/UL (ref 0.8–3.5)
LYMPHOCYTES NFR BLD: 30 % (ref 12–49)
MAGNESIUM SERPL-MCNC: 2.2 MG/DL (ref 1.6–2.4)
MCH RBC QN AUTO: 31.3 PG (ref 26–34)
MCHC RBC AUTO-ENTMCNC: 31.7 G/DL (ref 30–36.5)
MCV RBC AUTO: 98.7 FL (ref 80–99)
MONOCYTES # BLD: 1 K/UL (ref 0–1)
MONOCYTES NFR BLD: 11 % (ref 5–13)
NEUTS SEG # BLD: 4.7 K/UL (ref 1.8–8)
NEUTS SEG NFR BLD: 53 % (ref 32–75)
NRBC # BLD: 0 K/UL (ref 0–0.01)
NRBC BLD-RTO: 0 PER 100 WBC
PLATELET # BLD AUTO: 268 K/UL (ref 150–400)
PMV BLD AUTO: 10.1 FL (ref 8.9–12.9)
POTASSIUM SERPL-SCNC: 4.5 MMOL/L (ref 3.5–5.1)
PROT SERPL-MCNC: 7.6 G/DL (ref 6.4–8.2)
RBC # BLD AUTO: 3.84 M/UL (ref 3.8–5.2)
SODIUM SERPL-SCNC: 139 MMOL/L (ref 136–145)
WBC # BLD AUTO: 8.8 K/UL (ref 3.6–11)

## 2023-10-08 PROCEDURE — 36415 COLL VENOUS BLD VENIPUNCTURE: CPT

## 2023-10-08 PROCEDURE — 85025 COMPLETE CBC W/AUTO DIFF WBC: CPT

## 2023-10-08 PROCEDURE — 2580000003 HC RX 258: Performed by: INTERNAL MEDICINE

## 2023-10-08 PROCEDURE — 6370000000 HC RX 637 (ALT 250 FOR IP): Performed by: INTERNAL MEDICINE

## 2023-10-08 PROCEDURE — 6360000002 HC RX W HCPCS: Performed by: INTERNAL MEDICINE

## 2023-10-08 PROCEDURE — 83605 ASSAY OF LACTIC ACID: CPT

## 2023-10-08 PROCEDURE — 80053 COMPREHEN METABOLIC PANEL: CPT

## 2023-10-08 PROCEDURE — 83735 ASSAY OF MAGNESIUM: CPT

## 2023-10-08 RX ORDER — GRANULES FOR ORAL 3 G/1
3 POWDER ORAL ONCE
Status: COMPLETED | OUTPATIENT
Start: 2023-10-08 | End: 2023-10-08

## 2023-10-08 RX ADMIN — METOPROLOL SUCCINATE 50 MG: 25 TABLET, EXTENDED RELEASE ORAL at 08:26

## 2023-10-08 RX ADMIN — HEPARIN SODIUM 5000 UNITS: 5000 INJECTION INTRAVENOUS; SUBCUTANEOUS at 08:26

## 2023-10-08 RX ADMIN — GRANULES FOR ORAL SOLUTION 1 PACKET: 3 POWDER ORAL at 10:17

## 2023-10-08 RX ADMIN — MEROPENEM 1000 MG: 1 INJECTION, POWDER, FOR SOLUTION INTRAVENOUS at 00:23

## 2023-10-08 RX ADMIN — SODIUM CHLORIDE, PRESERVATIVE FREE 10 ML: 5 INJECTION INTRAVENOUS at 08:26

## 2023-10-08 RX ADMIN — FAMOTIDINE 20 MG: 20 TABLET ORAL at 08:26

## 2023-10-08 NOTE — CARE COORDINATION
Transition of Care Plan:     RUR: 12%-\"Low Risk\"  Prior Level of Functioning: Independent in her ADLs and IADLs  Disposition: Home with follow-up appts   If SNF or IPR: Date FOC offered: N/A  Follow up appointments: PCP & Specialists as indicated   DME needed: The patient has a front-wheeled walker but she does not use it   Transportation at discharge: The patient's sister-in-law will transport her home as her  does not drive   IM/IMM Medicare/ letter given: 2nd IM letter given & signed on 10/8/23  Is patient a  and connected with [place]? N/A              If yes, was [employer] transfer form completed and [facility] notified? Caregiver Contact: Jaqui Brunson, , Phone: [phone]  Discharge Caregiver contacted prior to discharge? CM will contact her  if she requests this   Care Conference needed? No   Barriers to discharge: N/A    CM was alerted that the patient is being discharged today, 10/8/23. CM confirmed that she has no questions/concerns for d/c. Her sister-in-law will be at the hospital after [facility] to transport her home. 2nd IM letter was given & signed. From CM perspective, the patient can be discharged.      [address] [facility]

## 2023-10-08 NOTE — DISCHARGE SUMMARY
Discharge Summary    Name: Jordana Rios  [id number]  YOB: 1940 (Age: 78 y.o.)   Date of Admission: 10/6/2023  Date of Discharge: 10/8/2023  Attending Physician: Kayy Whitfield MD    Discharge Diagnosis:   ESBL E. coli UTI  failed outpatient treatment with nitrofurantoin (did not tolerate)  HTN  CKD 3  GERD        Consultations:  None      Brief Admission History/Reason for Admission Per Lacey Wild MD: Flor Mora is a 78 y.o.  female   Recurrent urinary tract infections since 2017  Recent UTI failed outpatient treatment with nitrofurantoin(did not tolerate)  Recently had a course of fosfomycin  Still with persistent dysuria, low-grade fevers to 100.4 at home  Some nausea but no vomiting  Urine culture as outpatient 10/3/2023 with > log 5 CFU/ml ESBL E. Coli   Resistant to Bactrim, quinolones  Sensitive to nitrofurantoin, aminoglycosides, meropenem  Intermittent headaches  No chest pain, shortness of breath, cough, abdominal pain, flank pain, diarrhea  PCP called patient to come to ED after urine culture resulted  ED  WBC 9.2  No SIRS criteria  Repeat UA with 20-50 WBCs, 0-5 RBCs, negative bacteria  Urine culture in lab  IV meropenem pending culture  We were asked to admit for work up and evaluation of the above problems.            Brief Hospital Course by Main Problems:   ESBL E. coli UTI  failed outpatient treatment with nitrofurantoin (did not tolerate)  Recently had a course of fosfomycin  Urine culture 10/3/2023 with > log 5 CFU/ml ESBL E. Coli   Resistant to Bactrim, quinolones  Sensitive to nitrofurantoin, aminoglycosides, meropenem  No good oral options  Nitrofurantoin not recommended with stage IV chronic kidney disease  Still with persistent dysuria, low-grade fevers to 100.4 at home  Some nausea but no vomiting  PCP called patient to come to ED after urine culture resulted  Admit  Repeat UA with 20-50 WBCs, 0-5 RBCs, negative

## 2023-10-09 LAB
BACTERIA SPEC CULT: NORMAL
BACTERIA SPEC CULT: NORMAL
CC UR VC: NORMAL
SERVICE CMNT-IMP: NORMAL

## 2023-10-12 ENCOUNTER — TELEPHONE (OUTPATIENT)
Age: 83
End: 2023-10-12

## 2023-10-12 NOTE — TELEPHONE ENCOUNTER
Pt states that she was in the hospital taking IV antibiotics. She now has a UTI with urinary burning. Pt took home test and it came back positive.     Should pt wait until her appt on Monday, 10-16-23    [facility] #[phone]

## 2023-10-13 SDOH — ECONOMIC STABILITY: FOOD INSECURITY: WITHIN THE PAST 12 MONTHS, THE FOOD YOU BOUGHT JUST DIDN'T LAST AND YOU DIDN'T HAVE MONEY TO GET MORE.: NEVER TRUE

## 2023-10-13 SDOH — ECONOMIC STABILITY: INCOME INSECURITY: HOW HARD IS IT FOR YOU TO PAY FOR THE VERY BASICS LIKE FOOD, HOUSING, MEDICAL CARE, AND HEATING?: NOT HARD AT ALL

## 2023-10-13 SDOH — ECONOMIC STABILITY: FOOD INSECURITY: WITHIN THE PAST 12 MONTHS, YOU WORRIED THAT YOUR FOOD WOULD RUN OUT BEFORE YOU GOT MONEY TO BUY MORE.: NEVER TRUE

## 2023-10-13 SDOH — ECONOMIC STABILITY: TRANSPORTATION INSECURITY
IN THE PAST 12 MONTHS, HAS LACK OF TRANSPORTATION KEPT YOU FROM MEETINGS, WORK, OR FROM GETTING THINGS NEEDED FOR DAILY LIVING?: NO

## 2023-10-13 SDOH — ECONOMIC STABILITY: HOUSING INSECURITY
IN THE LAST 12 MONTHS, WAS THERE A TIME WHEN YOU DID NOT HAVE A STEADY PLACE TO SLEEP OR SLEPT IN A SHELTER (INCLUDING NOW)?: NO

## 2023-10-13 NOTE — TELEPHONE ENCOUNTER
Called, Spoke with Pt. Received two pt identifiers  Pt informed per Dr. Simon Lemus if SX Persist to go to ED  Pt states would like to wait until after appt on Monday  Advised pt if worsens over weekend the to head to ED  Pt informed per Dr. Simon Lemus to call to make a uro appt  Pt verbalizes understanding of the instructions and has no further questions at this time.

## 2023-10-15 NOTE — PROGRESS NOTES
Juan Carlos Laws is a 78 y.o. female who presents for follow-up. Patient recently hospitalized for treatment of ESBL E. coli. Intolerant of oral antibiotics. Required IV antibiotics. Given fosfomycin, second time. Has been referred to urology, not scheduled. Previously seen by Amber Mooney for bladder ulcers. Reports diarrhea, since antibiotics. Taking imodium and probiotic. Followed by Dr Stephanie Myers. Treated for HTN. BP elevated today, better at home. On metoprolol XL 50mg daily and amlodipine 2.5mg once a day, BP better. [facility], [facility] leg pain, bilateral shoulder pain. Prior PT. Taking tylenol 650mg in am and afternoon. Off gabapentin due to side effects. Up to date with eye doctor, for glaucoma, On drops regularly. S/p bilateral cataract surgery.           Past Medical History:   Diagnosis Date    AIN (acute interstitial nephritis)     Arthritis     CKD (chronic kidney disease) stage 3, GFR 30-59 ml/min ([facility]) 2016    Cystitis     chronic, no problems since , Ayush    GERD (gastroesophageal reflux disease)     Glaucoma     High cholesterol     Hypertension        Family History   Problem Relation Age of Onset    Osteoarthritis Mother     Cancer Maternal Uncle     Cancer Father     Cancer Brother     Breast Cancer Cousin     Cancer Maternal Uncle         Social History     Socioeconomic History    Marital status:      Spouse name: Not on file    Number of children: Not on file    Years of education: Not on file    Highest education level: Not on file   Occupational History    Not on file   Tobacco Use    Smoking status: Former     Packs/day: .25     Types: Cigarettes     Quit date: 1997     Years since quittin.2    Smokeless tobacco: Former   Substance and Sexual Activity    Alcohol use: Yes    Drug use: Yes     Types: OTC, Prescription    Sexual activity: Not on file   Other Topics Concern    Not on file   Social History

## 2023-10-16 ENCOUNTER — OFFICE VISIT (OUTPATIENT)
Age: 83
End: 2023-10-16
Payer: MEDICARE

## 2023-10-16 VITALS
HEART RATE: 64 BPM | RESPIRATION RATE: 14 BRPM | OXYGEN SATURATION: 98 % | WEIGHT: 97.6 LBS | BODY MASS INDEX: 19.68 KG/M2 | HEIGHT: 59 IN | SYSTOLIC BLOOD PRESSURE: 154 MMHG | TEMPERATURE: 97.3 F | DIASTOLIC BLOOD PRESSURE: 90 MMHG

## 2023-10-16 DIAGNOSIS — N39.0 RECURRENT UTI: Primary | ICD-10-CM

## 2023-10-16 DIAGNOSIS — I10 ESSENTIAL HYPERTENSION: ICD-10-CM

## 2023-10-16 DIAGNOSIS — R19.7 DIARRHEA, UNSPECIFIED TYPE: ICD-10-CM

## 2023-10-16 DIAGNOSIS — F33.1 MAJOR DEPRESSIVE DISORDER, RECURRENT, MODERATE (HCC): ICD-10-CM

## 2023-10-16 DIAGNOSIS — Z23 NEEDS FLU SHOT: ICD-10-CM

## 2023-10-16 LAB
BILIRUBIN, URINE, POC: NEGATIVE
BLOOD URINE, POC: ABNORMAL
GLUCOSE URINE, POC: NEGATIVE
KETONES, URINE, POC: NEGATIVE
LEUKOCYTE ESTERASE, URINE, POC: ABNORMAL
NITRITE, URINE, POC: NEGATIVE
PH, URINE, POC: 6 (ref 4.6–8)
PROTEIN,URINE, POC: NEGATIVE
SPECIFIC GRAVITY, URINE, POC: 1.01 (ref 1–1.03)
URINALYSIS CLARITY, POC: ABNORMAL
URINALYSIS COLOR, POC: ABNORMAL
UROBILINOGEN, POC: ABNORMAL

## 2023-10-16 PROCEDURE — 3080F DIAST BP >= 90 MM HG: CPT | Performed by: FAMILY MEDICINE

## 2023-10-16 PROCEDURE — 99214 OFFICE O/P EST MOD 30 MIN: CPT | Performed by: FAMILY MEDICINE

## 2023-10-16 PROCEDURE — PBSHW INFLUENZA, FLUAD, (AGE 65 Y+), IM, PF, 0.5 ML: Performed by: FAMILY MEDICINE

## 2023-10-16 PROCEDURE — PBSHW AMB POC URINALYSIS DIP STICK AUTO W/ MICRO: Performed by: FAMILY MEDICINE

## 2023-10-16 PROCEDURE — 1090F PRES/ABSN URINE INCON ASSESS: CPT | Performed by: FAMILY MEDICINE

## 2023-10-16 PROCEDURE — G8420 CALC BMI NORM PARAMETERS: HCPCS | Performed by: FAMILY MEDICINE

## 2023-10-16 PROCEDURE — 1036F TOBACCO NON-USER: CPT | Performed by: FAMILY MEDICINE

## 2023-10-16 PROCEDURE — 1111F DSCHRG MED/CURRENT MED MERGE: CPT | Performed by: FAMILY MEDICINE

## 2023-10-16 PROCEDURE — 90694 VACC AIIV4 NO PRSRV 0.5ML IM: CPT | Performed by: FAMILY MEDICINE

## 2023-10-16 PROCEDURE — G8484 FLU IMMUNIZE NO ADMIN: HCPCS | Performed by: FAMILY MEDICINE

## 2023-10-16 PROCEDURE — 1123F ACP DISCUSS/DSCN MKR DOCD: CPT | Performed by: FAMILY MEDICINE

## 2023-10-16 PROCEDURE — 81001 URINALYSIS AUTO W/SCOPE: CPT | Performed by: FAMILY MEDICINE

## 2023-10-16 PROCEDURE — 3077F SYST BP >= 140 MM HG: CPT | Performed by: FAMILY MEDICINE

## 2023-10-16 PROCEDURE — G8399 PT W/DXA RESULTS DOCUMENT: HCPCS | Performed by: FAMILY MEDICINE

## 2023-10-16 PROCEDURE — G8427 DOCREV CUR MEDS BY ELIG CLIN: HCPCS | Performed by: FAMILY MEDICINE

## 2023-10-16 RX ORDER — LATANOPROST 50 UG/ML
SOLUTION/ DROPS OPHTHALMIC
COMMUNITY
Start: 2023-09-15

## 2023-10-16 RX ORDER — ERGOCALCIFEROL 1.25 MG/1
1 CAPSULE ORAL
COMMUNITY
Start: 2023-04-20

## 2023-10-16 ASSESSMENT — PATIENT HEALTH QUESTIONNAIRE - PHQ9
9. THOUGHTS THAT YOU WOULD BE BETTER OFF DEAD, OR OF HURTING YOURSELF: 0
5. POOR APPETITE OR OVEREATING: 0
4. FEELING TIRED OR HAVING LITTLE ENERGY: 0
10. IF YOU CHECKED OFF ANY PROBLEMS, HOW DIFFICULT HAVE THESE PROBLEMS MADE IT FOR YOU TO DO YOUR WORK, TAKE CARE OF THINGS AT HOME, OR GET ALONG WITH OTHER PEOPLE: 0
3. TROUBLE FALLING OR STAYING ASLEEP: 0
7. TROUBLE CONCENTRATING ON THINGS, SUCH AS READING THE NEWSPAPER OR WATCHING TELEVISION: 0
SUM OF ALL RESPONSES TO PHQ QUESTIONS 1-9: 0
2. FEELING DOWN, DEPRESSED OR HOPELESS: 0
1. LITTLE INTEREST OR PLEASURE IN DOING THINGS: 0
SUM OF ALL RESPONSES TO PHQ QUESTIONS 1-9: 0
SUM OF ALL RESPONSES TO PHQ QUESTIONS 1-9: 0
6. FEELING BAD ABOUT YOURSELF - OR THAT YOU ARE A FAILURE OR HAVE LET YOURSELF OR YOUR FAMILY DOWN: 0
8. MOVING OR SPEAKING SO SLOWLY THAT OTHER PEOPLE COULD HAVE NOTICED. OR THE OPPOSITE, BEING SO FIGETY OR RESTLESS THAT YOU HAVE BEEN MOVING AROUND A LOT MORE THAN USUAL: 0
SUM OF ALL RESPONSES TO PHQ QUESTIONS 1-9: 0
SUM OF ALL RESPONSES TO PHQ9 QUESTIONS 1 & 2: 0

## 2023-10-16 NOTE — PATIENT INSTRUCTIONS
AVOID UNCOOKED VEGGIES, FATS, DAIRY, ARTIFICIAL SWEETENERS. KEEP A FOOD DIARY. USE IMODIUM AS NEEDED. STAY ON PROBIOTIC.

## 2023-10-16 NOTE — PROGRESS NOTES
After obtaining consent, and per verbal order from Denny Pretty MD, patient received influenza vaccine given in  Right deltoid  Influenza Vaccine 0.5 mL IM now. Patient was observed for 10 minutes post injection. Patient tolerated injection well.

## 2023-10-19 ENCOUNTER — TELEPHONE (OUTPATIENT)
Age: 83
End: 2023-10-19

## 2023-10-19 NOTE — TELEPHONE ENCOUNTER
Lab called to report preliminary urine culture report showing ESBL, e-coli    Left message for on call provider - message sent to PCP and on call provider

## 2023-10-20 ENCOUNTER — TELEPHONE (OUTPATIENT)
Age: 83
End: 2023-10-20

## 2023-10-20 NOTE — TELEPHONE ENCOUNTER
Please contact patient directly with this information    I called [facility] and changed her appointment to Monday, 10/23 at 10:30 AM.  She needs to arrive at 10:15 AM.  Appointment is with Dr. Patricia Sullivan. Their address is [address]. This is a new location for [facility].       Please remind patient that if she develops a fever, nausea, vomiting, feels worse she needs to go to the emergency room this weekend

## 2023-10-29 ENCOUNTER — HOSPITAL ENCOUNTER (EMERGENCY)
Facility: HOSPITAL | Age: 83
Discharge: HOME | End: 2023-10-29
Attending: STUDENT IN AN ORGANIZED HEALTH CARE EDUCATION/TRAINING PROGRAM
Payer: MEDICARE

## 2023-10-29 ENCOUNTER — APPOINTMENT (OUTPATIENT)
Facility: HOSPITAL | Age: 83
End: 2023-10-29
Payer: MEDICARE

## 2023-10-29 VITALS
BODY MASS INDEX: 19.68 KG/M2 | RESPIRATION RATE: 23 BRPM | HEIGHT: 59 IN | WEIGHT: 97.6 LBS | DIASTOLIC BLOOD PRESSURE: 63 MMHG | HEART RATE: 92 BPM | SYSTOLIC BLOOD PRESSURE: 142 MMHG | TEMPERATURE: 97.9 F | OXYGEN SATURATION: 98 %

## 2023-10-29 DIAGNOSIS — U07.1 COVID-19: Primary | ICD-10-CM

## 2023-10-29 LAB
ALBUMIN SERPL-MCNC: 3.3 G/DL (ref 3.5–5)
ALBUMIN/GLOB SERPL: 0.8 (ref 1.1–2.2)
ALP SERPL-CCNC: 61 U/L (ref 45–117)
ALT SERPL-CCNC: 10 U/L (ref 12–78)
ANION GAP SERPL CALC-SCNC: 6 MMOL/L (ref 5–15)
APPEARANCE UR: CLEAR
AST SERPL-CCNC: 9 U/L (ref 15–37)
BACTERIA URNS QL MICRO: NEGATIVE /HPF
BASOPHILS # BLD: 0 K/UL (ref 0–0.1)
BASOPHILS NFR BLD: 0 % (ref 0–1)
BILIRUB SERPL-MCNC: 0.4 MG/DL (ref 0.2–1)
BILIRUB UR QL: NEGATIVE
BUN SERPL-MCNC: 13 MG/DL (ref 6–20)
BUN/CREAT SERPL: 13 (ref 12–20)
CALCIUM SERPL-MCNC: 8.7 MG/DL (ref 8.5–10.1)
CHLORIDE SERPL-SCNC: 109 MMOL/L (ref 97–108)
CO2 SERPL-SCNC: 23 MMOL/L (ref 21–32)
COLOR UR: ABNORMAL
CREAT SERPL-MCNC: 1.02 MG/DL (ref 0.55–1.02)
DIFFERENTIAL METHOD BLD: ABNORMAL
EOSINOPHIL # BLD: 0.1 K/UL (ref 0–0.4)
EOSINOPHIL NFR BLD: 1 % (ref 0–7)
EPITH CASTS URNS QL MICRO: ABNORMAL /LPF
ERYTHROCYTE [DISTWIDTH] IN BLOOD BY AUTOMATED COUNT: 15.8 % (ref 11.5–14.5)
GLOBULIN SER CALC-MCNC: 4.2 G/DL (ref 2–4)
GLUCOSE SERPL-MCNC: 141 MG/DL (ref 65–100)
GLUCOSE UR STRIP.AUTO-MCNC: NEGATIVE MG/DL
HCT VFR BLD AUTO: 34.6 % (ref 35–47)
HGB BLD-MCNC: 11.4 G/DL (ref 11.5–16)
HGB UR QL STRIP: ABNORMAL
HYALINE CASTS URNS QL MICRO: ABNORMAL /LPF (ref 0–2)
IMM GRANULOCYTES # BLD AUTO: 0.1 K/UL (ref 0–0.04)
IMM GRANULOCYTES NFR BLD AUTO: 1 % (ref 0–0.5)
KETONES UR QL STRIP.AUTO: 15 MG/DL
LEUKOCYTE ESTERASE UR QL STRIP.AUTO: NEGATIVE
LYMPHOCYTES # BLD: 0.7 K/UL (ref 0.8–3.5)
LYMPHOCYTES NFR BLD: 9 % (ref 12–49)
MAGNESIUM SERPL-MCNC: 2.2 MG/DL (ref 1.6–2.4)
MCH RBC QN AUTO: 32.5 PG (ref 26–34)
MCHC RBC AUTO-ENTMCNC: 32.9 G/DL (ref 30–36.5)
MCV RBC AUTO: 98.6 FL (ref 80–99)
MONOCYTES # BLD: 0.7 K/UL (ref 0–1)
MONOCYTES NFR BLD: 9 % (ref 5–13)
NEUTS SEG # BLD: 6.3 K/UL (ref 1.8–8)
NEUTS SEG NFR BLD: 80 % (ref 32–75)
NITRITE UR QL STRIP.AUTO: NEGATIVE
NRBC # BLD: 0 K/UL (ref 0–0.01)
NRBC BLD-RTO: 0 PER 100 WBC
PH UR STRIP: 6.5 (ref 5–8)
PLATELET # BLD AUTO: 191 K/UL (ref 150–400)
PMV BLD AUTO: 10.4 FL (ref 8.9–12.9)
POTASSIUM SERPL-SCNC: 3.7 MMOL/L (ref 3.5–5.1)
PROT SERPL-MCNC: 7.5 G/DL (ref 6.4–8.2)
PROT UR STRIP-MCNC: 30 MG/DL
RBC # BLD AUTO: 3.51 M/UL (ref 3.8–5.2)
RBC #/AREA URNS HPF: ABNORMAL /HPF (ref 0–5)
RBC MORPH BLD: ABNORMAL
SARS-COV-2 RDRP RESP QL NAA+PROBE: DETECTED
SODIUM SERPL-SCNC: 138 MMOL/L (ref 136–145)
SOURCE: ABNORMAL
SP GR UR REFRACTOMETRY: 1.01
TSH SERPL DL<=0.05 MIU/L-ACNC: 1.25 UIU/ML (ref 0.36–3.74)
URINE CULTURE IF INDICATED: ABNORMAL
UROBILINOGEN UR QL STRIP.AUTO: 0.2 EU/DL (ref 0.2–1)
WBC # BLD AUTO: 7.9 K/UL (ref 3.6–11)
WBC URNS QL MICRO: ABNORMAL /HPF (ref 0–4)

## 2023-10-29 PROCEDURE — 6360000002 HC RX W HCPCS: Performed by: STUDENT IN AN ORGANIZED HEALTH CARE EDUCATION/TRAINING PROGRAM

## 2023-10-29 PROCEDURE — 81001 URINALYSIS AUTO W/SCOPE: CPT

## 2023-10-29 PROCEDURE — 71045 X-RAY EXAM CHEST 1 VIEW: CPT

## 2023-10-29 PROCEDURE — 80053 COMPREHEN METABOLIC PANEL: CPT

## 2023-10-29 PROCEDURE — 83735 ASSAY OF MAGNESIUM: CPT

## 2023-10-29 PROCEDURE — 96374 THER/PROPH/DIAG INJ IV PUSH: CPT

## 2023-10-29 PROCEDURE — 99285 EMERGENCY DEPT VISIT HI MDM: CPT

## 2023-10-29 PROCEDURE — 85025 COMPLETE CBC W/AUTO DIFF WBC: CPT

## 2023-10-29 PROCEDURE — 84443 ASSAY THYROID STIM HORMONE: CPT

## 2023-10-29 PROCEDURE — 87635 SARS-COV-2 COVID-19 AMP PRB: CPT

## 2023-10-29 PROCEDURE — 2580000003 HC RX 258: Performed by: STUDENT IN AN ORGANIZED HEALTH CARE EDUCATION/TRAINING PROGRAM

## 2023-10-29 PROCEDURE — 96361 HYDRATE IV INFUSION ADD-ON: CPT

## 2023-10-29 PROCEDURE — 36415 COLL VENOUS BLD VENIPUNCTURE: CPT

## 2023-10-29 RX ORDER — ONDANSETRON 4 MG/1
4 TABLET, ORALLY DISINTEGRATING ORAL 3 TIMES DAILY PRN
Qty: 12 TABLET | Refills: 0 | Status: SHIPPED | OUTPATIENT
Start: 2023-10-29

## 2023-10-29 RX ORDER — ONDANSETRON 2 MG/ML
4 INJECTION INTRAMUSCULAR; INTRAVENOUS ONCE
Status: COMPLETED | OUTPATIENT
Start: 2023-10-29 | End: 2023-10-29

## 2023-10-29 RX ORDER — 0.9 % SODIUM CHLORIDE 0.9 %
500 INTRAVENOUS SOLUTION INTRAVENOUS ONCE
Status: COMPLETED | OUTPATIENT
Start: 2023-10-29 | End: 2023-10-29

## 2023-10-29 RX ADMIN — ONDANSETRON 4 MG: 2 INJECTION INTRAMUSCULAR; INTRAVENOUS at 16:05

## 2023-10-29 RX ADMIN — SODIUM CHLORIDE 500 ML: 9 INJECTION, SOLUTION INTRAVENOUS at 16:05

## 2023-10-29 ASSESSMENT — PAIN SCALES - GENERAL: PAINLEVEL_OUTOF10: 2

## 2023-10-29 ASSESSMENT — PAIN - FUNCTIONAL ASSESSMENT: PAIN_FUNCTIONAL_ASSESSMENT: 0-10

## 2023-10-29 NOTE — ED NOTES
Patient discharged by Dr. Ye Mary. Patient provided with discharge instructions Rx and instructions on follow up care. Patient out of ED via wheelchair accompanied by biju Valdivia, [address]  10/29/23 7102

## 2023-10-29 NOTE — ED PROVIDER NOTES
hypertension, ESBL UTIs presenting with dizziness and nausea. She threw up a few times today. She has a normal nonfocal neurological exam.  NIH is 0. Notes her dizziness is worse when she stands up. Possibly orthostatic hypotension so we will check this. We will give her 500 cc normal saline bolus as well. We will check lab work to evaluate for kidney status or partial electrolyte derangements given she has a history of CKD. On the monitor she appears to going in and out of bigeminy's that could have some and with this as well. We will recheck her urine she has a history of ESBL. Reviewed her recent discharge information for hospitalist when she was discharged after an oral dose of fosfomycin and IV meropenem while here. My interpretation her EKG is nonischemic. She does have 1 PVC so check a magnesium level. Amount and/or Complexity of Data Reviewed  Labs: ordered. Radiology: ordered. ECG/medicine tests: ordered. Risk  Prescription drug management. ED Course as of 10/29/23 1904   Aguilar Rubio Oct 29, 2023   1745 Reviewed patient's lab work which was grossly normal.  No signs of UTI. After fluids she remains nauseous. Reviewed her chest x-ray I see no signs of any fluid overload or infiltrate. We will get a CT of her abdomen pelvis to evaluate for any signs of acute abnormality causing her nausea. We will also add on a CT of the head given her dizziness and nausea. [JS]   0845 She is COVID-positive. This explains her symptoms. We will cancel CT head and belly. She has not is not better so we will feed her and make sure she can walk without desaturating. [JS]   2106 Patient walk on apartment with low saturation 80%. Past p.o. challenge. Will discharge with Zofran. [JS]      ED Course User Index  [JS] Garret Gu MD         FINAL IMPRESSION     1. COVID-19          DISPOSITION/PLAN   Aniachelita Combs's  results have been reviewed with her.   She has been counseled regarding her

## 2023-10-29 NOTE — ED NOTES
Pt able to tolerate PO fluids and a snack. She denies being nauseous. Pt was able to ambulate with RN to bathroom with continuous pulse ox. O2 maintained above 95% for the duration.      Uzma Garcia  10/29/23 9803

## 2023-10-30 LAB
EKG ATRIAL RATE: 84 BPM
EKG DIAGNOSIS: NORMAL
EKG P AXIS: 51 DEGREES
EKG P-R INTERVAL: 212 MS
EKG Q-T INTERVAL: 360 MS
EKG QRS DURATION: 70 MS
EKG QTC CALCULATION (BAZETT): 420 MS
EKG R AXIS: -10 DEGREES
EKG T AXIS: 42 DEGREES
EKG VENTRICULAR RATE: 82 BPM

## 2023-11-05 PROBLEM — N39.0 UTI (URINARY TRACT INFECTION): Status: RESOLVED | Noted: 2023-10-06 | Resolved: 2023-11-05

## 2023-12-01 NOTE — TELEPHONE ENCOUNTER
Call to pt, ID verified x2. Pt advised to increase Toprol dose from 25mg to 50mg daily. Pt expressed understanding and stated she takes this medication in the evening at dinner. Pt inquired if she should be taking Toprol in the AM or if PM is ok. Sent to provider for review. Case Management Assessment  Initial Evaluation    Date/Time of Evaluation: 12/1/2023 10:46 AM  Assessment Completed by: ZABRINA Gamez    If patient is discharged prior to next notation, then this note serves as note for discharge by case management. Patient Name: Una Pete                   YOB: 1964  Diagnosis: Acute respiratory failure with hypoxia ([address]) [J96.01]  Pneumonia due to infectious organism, unspecified laterality, unspecified part of lung [J18.9]  CAP (community acquired pneumonia) due to Chlamydia species [J16.0]                   Date / Time: 11/29/2023  5:20 PM    Patient Admission Status: Inpatient   Readmission Risk (Low < 19, Mod (19-27), High > 27): Readmission Risk Score: 8.5    Current PCP: Chad Monae MD  PCP verified by CM? Yes    Chart Reviewed: Yes      History Provided by: Patient, Other (see comment) (The SW completed the assessment with Cinthia Fox (Patient's point of contact))  Patient Orientation: Alert and Oriented, Other (see comment) (The SW completed the assessment with Cinthia Fox (Patient's point of contact))    Patient Cognition: Alert    Hospitalization in the last 30 days (Readmission):  No    If yes, Readmission Assessment in  Navigator will be completed. Advance Directives:      Code Status: Full Code   Patient's Primary Decision Maker is: Legal Next of Kin      Discharge Planning:    Patient lives with: Alone (The pt is active with [employer] which as agency that helps with the disabled an receves 24 hour assitance.) Type of Home: Apartment  Primary Care Giver: Self  Patient Support Systems include: Family Members, Other (Comment), Home Care Staff (The pt is active with [employer] which as agency that helps with the disabled)   Current Financial resources: Medicaid, Medicare  Current community resources:  Other (Comment)  Current services prior to admission: None (The pt is active with [employer] which as agency that also Chitra Sotelo who helps he patient through [employer] and [email or web address]. The patient lives home alone but Tinyderik Deepak stated the the patient always has someone with her 24 hours a day. Chitra Sotelo stated the patient works at the [employer] Monday through Friday and staff from [address] [place], [place] [address]rebecca Winston  the patient and stay with her in the evenings/nghts and weekends. Chitra Sotelo stated once the patient is ready for discharge we can give him a call at [phone] and he will coordinate transport for the patient to return home and someone will be staying with her when she returns home from the hospital.     PT/OT pending    The Plan for Transition of Care is related to the following treatment goals of Acute respiratory failure with hypoxia ([address]) [J96.01]  Pneumonia due to infectious organism, unspecified laterality, unspecified part of lung [J18.9]  CAP (community acquired pneumonia) due to Chlamydia species [G73.2]    IF APPLICABLE: The Patient and/or patient representative Jo Vicente and her family were provided with a choice of provider and agrees with the discharge plan. Freedom of choice list with basic dialogue that supports the patient's individualized plan of care/goals and shares the quality data associated with the providers was provided to:     Patient Representative Name:       The Patient and/or Patient Representative Agree with the Discharge Plan?       Berry Diaz, MSW  Case Management Department

## 2024-01-11 ENCOUNTER — TELEPHONE (OUTPATIENT)
Age: 84
End: 2024-01-11

## 2024-01-11 NOTE — TELEPHONE ENCOUNTER
----- Message from Tiff Eastan sent at 1/11/2024  9:48 AM EST -----  Subject: Referral Request    Reason for referral request? Bloodwork  Provider patient wants to be referred to(if known):     Provider Phone Number(if known):    Additional Information for Provider? Pt of Sol Rivers requesting   orders for bloodwork. Would like to ensure kidney and liver function is   good after taking meds for UTI. Requested call back when complete.   ---------------------------------------------------------------------------  --------------  CALL BACK INFO    [phone]; OK to leave message on voicemail  ---------------------------------------------------------------------------  --------------

## 2024-02-22 RX ORDER — METOPROLOL SUCCINATE 50 MG/1
50 TABLET, EXTENDED RELEASE ORAL DAILY
Qty: 90 TABLET | Refills: 3 | Status: SHIPPED | OUTPATIENT
Start: 2024-02-22

## 2024-02-26 ENCOUNTER — HOSPITAL ENCOUNTER (INPATIENT)
Facility: HOSPITAL | Age: 84
LOS: 1 days | DRG: 392 | End: 2024-02-27
Attending: STUDENT IN AN ORGANIZED HEALTH CARE EDUCATION/TRAINING PROGRAM | Admitting: STUDENT IN AN ORGANIZED HEALTH CARE EDUCATION/TRAINING PROGRAM
Payer: MEDICARE

## 2024-02-26 ENCOUNTER — APPOINTMENT (OUTPATIENT)
Facility: HOSPITAL | Age: 84
DRG: 392 | End: 2024-02-26
Payer: MEDICARE

## 2024-02-26 DIAGNOSIS — R07.89 OTHER CHEST PAIN: ICD-10-CM

## 2024-02-26 DIAGNOSIS — R10.30 LOWER ABDOMINAL PAIN: ICD-10-CM

## 2024-02-26 DIAGNOSIS — N39.0 URINARY TRACT INFECTION WITHOUT HEMATURIA, SITE UNSPECIFIED: Primary | ICD-10-CM

## 2024-02-26 DIAGNOSIS — N17.9 AKI (ACUTE KIDNEY INJURY) (HCC): ICD-10-CM

## 2024-02-26 LAB
ALBUMIN SERPL-MCNC: 3.1 G/DL (ref 3.5–5)
ALBUMIN/GLOB SERPL: 0.9 (ref 1.1–2.2)
ALP SERPL-CCNC: 80 U/L (ref 45–117)
ALT SERPL-CCNC: 30 U/L (ref 12–78)
ANION GAP SERPL CALC-SCNC: 12 MMOL/L (ref 5–15)
APPEARANCE UR: CLEAR
AST SERPL-CCNC: 51 U/L (ref 15–37)
BACTERIA URNS QL MICRO: NEGATIVE /HPF
BASOPHILS # BLD: 0.1 K/UL (ref 0–0.1)
BASOPHILS NFR BLD: 1 % (ref 0–1)
BILIRUB SERPL-MCNC: 1.1 MG/DL (ref 0.2–1)
BILIRUB UR QL: NEGATIVE
BUN SERPL-MCNC: 43 MG/DL (ref 6–20)
BUN/CREAT SERPL: 28 (ref 12–20)
CALCIUM SERPL-MCNC: 8.4 MG/DL (ref 8.5–10.1)
CHLORIDE SERPL-SCNC: 104 MMOL/L (ref 97–108)
CO2 SERPL-SCNC: 19 MMOL/L (ref 21–32)
COLOR UR: ABNORMAL
CREAT SERPL-MCNC: 1.55 MG/DL (ref 0.55–1.02)
DIFFERENTIAL METHOD BLD: ABNORMAL
EOSINOPHIL # BLD: 0.2 K/UL (ref 0–0.4)
EOSINOPHIL NFR BLD: 2 % (ref 0–7)
EPITH CASTS URNS QL MICRO: ABNORMAL /LPF
ERYTHROCYTE [DISTWIDTH] IN BLOOD BY AUTOMATED COUNT: 15.9 % (ref 11.5–14.5)
GLOBULIN SER CALC-MCNC: 3.6 G/DL (ref 2–4)
GLUCOSE SERPL-MCNC: 102 MG/DL (ref 65–100)
GLUCOSE UR STRIP.AUTO-MCNC: NEGATIVE MG/DL
HCT VFR BLD AUTO: 39.8 % (ref 35–47)
HGB BLD-MCNC: 12.7 G/DL (ref 11.5–16)
HGB UR QL STRIP: NEGATIVE
HYALINE CASTS URNS QL MICRO: ABNORMAL /LPF (ref 0–2)
IMM GRANULOCYTES # BLD AUTO: 0.1 K/UL (ref 0–0.04)
IMM GRANULOCYTES NFR BLD AUTO: 1 % (ref 0–0.5)
KETONES UR QL STRIP.AUTO: NEGATIVE MG/DL
LACTATE BLD-SCNC: 0.6 MMOL/L (ref 0.4–2)
LEUKOCYTE ESTERASE UR QL STRIP.AUTO: ABNORMAL
LIPASE SERPL-CCNC: 7 U/L (ref 13–75)
LYMPHOCYTES # BLD: 3.1 K/UL (ref 0.8–3.5)
LYMPHOCYTES NFR BLD: 31 % (ref 12–49)
MCH RBC QN AUTO: 32.8 PG (ref 26–34)
MCHC RBC AUTO-ENTMCNC: 31.9 G/DL (ref 30–36.5)
MCV RBC AUTO: 102.8 FL (ref 80–99)
MONOCYTES # BLD: 1 K/UL (ref 0–1)
MONOCYTES NFR BLD: 10 % (ref 5–13)
NEUTS SEG # BLD: 5.7 K/UL (ref 1.8–8)
NEUTS SEG NFR BLD: 55 % (ref 32–75)
NITRITE UR QL STRIP.AUTO: NEGATIVE
NRBC # BLD: 0 K/UL (ref 0–0.01)
NRBC BLD-RTO: 0 PER 100 WBC
PH UR STRIP: 6 (ref 5–8)
PLATELET # BLD AUTO: 242 K/UL (ref 150–400)
PMV BLD AUTO: 10.8 FL (ref 8.9–12.9)
POTASSIUM SERPL-SCNC: 5.6 MMOL/L (ref 3.5–5.1)
PROT SERPL-MCNC: 6.7 G/DL (ref 6.4–8.2)
PROT UR STRIP-MCNC: NEGATIVE MG/DL
RBC # BLD AUTO: 3.87 M/UL (ref 3.8–5.2)
RBC #/AREA URNS HPF: ABNORMAL /HPF (ref 0–5)
SODIUM SERPL-SCNC: 135 MMOL/L (ref 136–145)
SP GR UR REFRACTOMETRY: 1.01
URINE CULTURE IF INDICATED: ABNORMAL
UROBILINOGEN UR QL STRIP.AUTO: 0.2 EU/DL (ref 0.2–1)
WBC # BLD AUTO: 10.2 K/UL (ref 3.6–11)
WBC URNS QL MICRO: ABNORMAL /HPF (ref 0–4)

## 2024-02-26 PROCEDURE — 85025 COMPLETE CBC W/AUTO DIFF WBC: CPT

## 2024-02-26 PROCEDURE — 87086 URINE CULTURE/COLONY COUNT: CPT

## 2024-02-26 PROCEDURE — 2580000003 HC RX 258: Performed by: NURSE PRACTITIONER

## 2024-02-26 PROCEDURE — 36415 COLL VENOUS BLD VENIPUNCTURE: CPT

## 2024-02-26 PROCEDURE — 1100000000 HC RM PRIVATE

## 2024-02-26 PROCEDURE — 99285 EMERGENCY DEPT VISIT HI MDM: CPT

## 2024-02-26 PROCEDURE — 6360000002 HC RX W HCPCS: Performed by: NURSE PRACTITIONER

## 2024-02-26 PROCEDURE — 96374 THER/PROPH/DIAG INJ IV PUSH: CPT

## 2024-02-26 PROCEDURE — 6370000000 HC RX 637 (ALT 250 FOR IP): Performed by: NURSE PRACTITIONER

## 2024-02-26 PROCEDURE — 81001 URINALYSIS AUTO W/SCOPE: CPT

## 2024-02-26 PROCEDURE — 96375 TX/PRO/DX INJ NEW DRUG ADDON: CPT

## 2024-02-26 PROCEDURE — 83690 ASSAY OF LIPASE: CPT

## 2024-02-26 PROCEDURE — 0BH17EZ INSERTION OF ENDOTRACHEAL AIRWAY INTO TRACHEA, VIA NATURAL OR ARTIFICIAL OPENING: ICD-10-PCS | Performed by: INTERNAL MEDICINE

## 2024-02-26 PROCEDURE — 74177 CT ABD & PELVIS W/CONTRAST: CPT

## 2024-02-26 PROCEDURE — 6360000004 HC RX CONTRAST MEDICATION: Performed by: PHYSICIAN ASSISTANT

## 2024-02-26 PROCEDURE — 87040 BLOOD CULTURE FOR BACTERIA: CPT

## 2024-02-26 PROCEDURE — 83605 ASSAY OF LACTIC ACID: CPT

## 2024-02-26 PROCEDURE — 6360000002 HC RX W HCPCS: Performed by: PHYSICIAN ASSISTANT

## 2024-02-26 PROCEDURE — 80053 COMPREHEN METABOLIC PANEL: CPT

## 2024-02-26 PROCEDURE — 3E033XZ INTRODUCTION OF VASOPRESSOR INTO PERIPHERAL VEIN, PERCUTANEOUS APPROACH: ICD-10-PCS | Performed by: INTERNAL MEDICINE

## 2024-02-26 PROCEDURE — 2580000003 HC RX 258: Performed by: PHYSICIAN ASSISTANT

## 2024-02-26 RX ORDER — POLYETHYLENE GLYCOL 3350 17 G/17G
17 POWDER, FOR SOLUTION ORAL DAILY PRN
Status: DISCONTINUED | OUTPATIENT
Start: 2024-02-26 | End: 2024-02-27 | Stop reason: HOSPADM

## 2024-02-26 RX ORDER — ENOXAPARIN SODIUM 100 MG/ML
30 INJECTION SUBCUTANEOUS DAILY
Status: DISCONTINUED | OUTPATIENT
Start: 2024-02-26 | End: 2024-02-27 | Stop reason: ALTCHOICE

## 2024-02-26 RX ORDER — SODIUM CHLORIDE 9 MG/ML
INJECTION, SOLUTION INTRAVENOUS PRN
Status: DISCONTINUED | OUTPATIENT
Start: 2024-02-26 | End: 2024-02-27 | Stop reason: HOSPADM

## 2024-02-26 RX ORDER — SODIUM CHLORIDE 9 MG/ML
INJECTION, SOLUTION INTRAVENOUS CONTINUOUS
Status: DISCONTINUED | OUTPATIENT
Start: 2024-02-26 | End: 2024-02-27 | Stop reason: HOSPADM

## 2024-02-26 RX ORDER — MORPHINE SULFATE 2 MG/ML
2 INJECTION, SOLUTION INTRAMUSCULAR; INTRAVENOUS EVERY 4 HOURS PRN
Status: DISCONTINUED | OUTPATIENT
Start: 2024-02-26 | End: 2024-02-27 | Stop reason: HOSPADM

## 2024-02-26 RX ORDER — 0.9 % SODIUM CHLORIDE 0.9 %
1000 INTRAVENOUS SOLUTION INTRAVENOUS ONCE
Status: COMPLETED | OUTPATIENT
Start: 2024-02-26 | End: 2024-02-26

## 2024-02-26 RX ORDER — MORPHINE SULFATE 4 MG/ML
4 INJECTION, SOLUTION INTRAMUSCULAR; INTRAVENOUS
Status: COMPLETED | OUTPATIENT
Start: 2024-02-26 | End: 2024-02-26

## 2024-02-26 RX ORDER — SODIUM CHLORIDE 0.9 % (FLUSH) 0.9 %
5-40 SYRINGE (ML) INJECTION EVERY 12 HOURS SCHEDULED
Status: DISCONTINUED | OUTPATIENT
Start: 2024-02-26 | End: 2024-02-27 | Stop reason: HOSPADM

## 2024-02-26 RX ORDER — OXYBUTYNIN CHLORIDE 5 MG/1
5 TABLET ORAL 4 TIMES DAILY PRN
Status: DISCONTINUED | OUTPATIENT
Start: 2024-02-26 | End: 2024-02-27 | Stop reason: HOSPADM

## 2024-02-26 RX ORDER — ACETAMINOPHEN 325 MG/1
650 TABLET ORAL EVERY 6 HOURS PRN
Status: DISCONTINUED | OUTPATIENT
Start: 2024-02-26 | End: 2024-02-27 | Stop reason: HOSPADM

## 2024-02-26 RX ORDER — ONDANSETRON 2 MG/ML
4 INJECTION INTRAMUSCULAR; INTRAVENOUS ONCE
Status: COMPLETED | OUTPATIENT
Start: 2024-02-26 | End: 2024-02-26

## 2024-02-26 RX ORDER — ONDANSETRON 4 MG/1
4 TABLET, ORALLY DISINTEGRATING ORAL EVERY 8 HOURS PRN
Status: DISCONTINUED | OUTPATIENT
Start: 2024-02-26 | End: 2024-02-27 | Stop reason: HOSPADM

## 2024-02-26 RX ORDER — SODIUM CHLORIDE 0.9 % (FLUSH) 0.9 %
5-40 SYRINGE (ML) INJECTION PRN
Status: DISCONTINUED | OUTPATIENT
Start: 2024-02-26 | End: 2024-02-27 | Stop reason: HOSPADM

## 2024-02-26 RX ORDER — ACETAMINOPHEN 650 MG/1
650 SUPPOSITORY RECTAL EVERY 6 HOURS PRN
Status: DISCONTINUED | OUTPATIENT
Start: 2024-02-26 | End: 2024-02-27 | Stop reason: HOSPADM

## 2024-02-26 RX ORDER — HYDROXYZINE HYDROCHLORIDE 25 MG/1
25 TABLET, FILM COATED ORAL 3 TIMES DAILY PRN
Status: DISCONTINUED | OUTPATIENT
Start: 2024-02-26 | End: 2024-02-27 | Stop reason: HOSPADM

## 2024-02-26 RX ORDER — ONDANSETRON 2 MG/ML
4 INJECTION INTRAMUSCULAR; INTRAVENOUS EVERY 6 HOURS PRN
Status: DISCONTINUED | OUTPATIENT
Start: 2024-02-26 | End: 2024-02-27 | Stop reason: HOSPADM

## 2024-02-26 RX ADMIN — OXYBUTYNIN CHLORIDE 5 MG: 5 TABLET ORAL at 12:22

## 2024-02-26 RX ADMIN — ENOXAPARIN SODIUM 30 MG: 100 INJECTION SUBCUTANEOUS at 11:39

## 2024-02-26 RX ADMIN — HYDROXYZINE HYDROCHLORIDE 25 MG: 25 TABLET, FILM COATED ORAL at 12:22

## 2024-02-26 RX ADMIN — IOPAMIDOL 100 ML: 755 INJECTION, SOLUTION INTRAVENOUS at 08:22

## 2024-02-26 RX ADMIN — ONDANSETRON 4 MG: 2 INJECTION INTRAMUSCULAR; INTRAVENOUS at 18:10

## 2024-02-26 RX ADMIN — SODIUM CHLORIDE 1000 ML: 9 INJECTION, SOLUTION INTRAVENOUS at 07:24

## 2024-02-26 RX ADMIN — HYDROXYZINE HYDROCHLORIDE 25 MG: 25 TABLET, FILM COATED ORAL at 21:39

## 2024-02-26 RX ADMIN — ACETAMINOPHEN 650 MG: 325 TABLET ORAL at 11:39

## 2024-02-26 RX ADMIN — SODIUM CHLORIDE, PRESERVATIVE FREE 10 ML: 5 INJECTION INTRAVENOUS at 21:39

## 2024-02-26 RX ADMIN — MORPHINE SULFATE 4 MG: 4 INJECTION, SOLUTION INTRAMUSCULAR; INTRAVENOUS at 07:24

## 2024-02-26 RX ADMIN — SODIUM CHLORIDE: 9 INJECTION, SOLUTION INTRAVENOUS at 13:59

## 2024-02-26 RX ADMIN — SODIUM CHLORIDE 1000 ML: 9 INJECTION, SOLUTION INTRAVENOUS at 10:04

## 2024-02-26 RX ADMIN — ONDANSETRON 4 MG: 2 INJECTION INTRAMUSCULAR; INTRAVENOUS at 07:24

## 2024-02-26 RX ADMIN — ONDANSETRON 4 MG: 2 INJECTION INTRAMUSCULAR; INTRAVENOUS at 12:27

## 2024-02-26 ASSESSMENT — PAIN DESCRIPTION - ONSET: ONSET: PROGRESSIVE

## 2024-02-26 ASSESSMENT — PAIN - FUNCTIONAL ASSESSMENT: PAIN_FUNCTIONAL_ASSESSMENT: 0-10

## 2024-02-26 ASSESSMENT — ENCOUNTER SYMPTOMS
SHORTNESS OF BREATH: 0
VOMITING: 0
BLOOD IN STOOL: 0
ABDOMINAL PAIN: 1
NAUSEA: 1

## 2024-02-26 ASSESSMENT — PAIN SCALES - GENERAL
PAINLEVEL_OUTOF10: 5
PAINLEVEL_OUTOF10: 5
PAINLEVEL_OUTOF10: 0

## 2024-02-26 ASSESSMENT — PAIN DESCRIPTION - ORIENTATION: ORIENTATION: RIGHT

## 2024-02-26 ASSESSMENT — PAIN DESCRIPTION - DESCRIPTORS: DESCRIPTORS: ACHING

## 2024-02-26 ASSESSMENT — PAIN DESCRIPTION - LOCATION: LOCATION: ABDOMEN;FLANK

## 2024-02-26 ASSESSMENT — PAIN DESCRIPTION - PAIN TYPE: TYPE: ACUTE PAIN;CHRONIC PAIN

## 2024-02-26 NOTE — H&P
liver lesion. Gallbladder and Bile Ducts:  Note is made of a 1.1 x 1.1 cm density within the gallbladder, similar to prior. 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 No bilary ductal dilatation. Pancreas: The pancreas is normal in appearance. A nonspecific subcentimeter focus of hyperattenuation in the pancreatic body (series 8/108), unchanged from the prior examination. No pancreatic ductal dilatation. Spleen:  Spleen is within normal size without focal abnormality. Adrenals: No adrenal mass. Kidneys, Collecting systems: Bilateral renal cortical thinning. No hydronephrosis or perinephric stranding. Subcentimeter renal hypodensities are too small to characterize. Mild bilateral renal cortical thinning noted. A 1.3 cm right renal cystic focus is noted, unchanged A 0.9 cm nonobstructing left renal calculus is again noted.. No suspicious renal mass.   Urinary Bladder: Circumferential urinary bladder wall thickening with mild mucosal enhancement noted. Correlate with urinalysis to exclude infection. Reproductive Organs: Status post hysterectomy. Vaginal cuff shows no acute abnormalities Peritoneum, Mesentery, Retroperitoneum: No free or loculated fluid in the abdomen or pelvis. No pneumoperitoneum.  No retroperitoneal hematoma or soft tissue mass. Nodes: No lymphadenopathy by size criteria.   Bowel: Colonic diverticulosis is noted. Circumferential left colonic wall thickening with mucosal hyperenhancement is noted, suspicious for colitis. No evidence of mechanical intestinal obstruction. Nonvisualized appendix. Mild enterocele with descent of small bowel loops into the deep pelvis along the rectum on the right, similar to prior multiple ingested pills noted in the stomach and duodenum. Vasculature: Portal, splenic, and superior mesenteric veins are patent.  Atherosclerotic calcifications are noted in the abdominal aorta and its branches.  No

## 2024-02-26 NOTE — CONSULTS
84 y/o female with hx of recurrent UTI, interstitial cystitis and known bladder wall thickening. Known to [facility].  Presented to ED  with c/o abdominal and R flank pain.  Urology consulted for known bladder wall thickening.    Nad, patient reports abdominal pain, R flank pain and feeling she can't completely empty her bladder. Denies dysuria or hematuria.    VSS, afebrile  No leukocytosis, creatinine- 1.55  UA not c/w UTI  CT abd/pel: slight enhancement and wall thickening of L urinary bladder wall, similar to CT imaging from 2023. Known non-obstructing L renal stone. No hydronephrosis.    ROS  - feelings of incomplete bladder emptying    A/P    Gen: NAD  Cardio: No distress  Res: even and unlabored  GI: abdominal pain  : urgency, frequency, feelings of incomplete bladder emptying  MSK: COOPER  Skin: no gross abnormalities, clean, dry, intact  Neuro: A&O x 3      No indications for intervention or consultation  - OP FU in place  - PVR to ensure complete bladder emptying, straight cath protocol for values greater than 300    Urology will sign-off

## 2024-02-26 NOTE — ED PROVIDER NOTES
[facility] EMERGENCY DEPT  EMERGENCY DEPARTMENT ENCOUNTER       Pt Name: Aspen Combs  MRN: [id number]  Birthdate 1940  Date of evaluation: 2/26/2024  Provider: KRISTA Meneses   PCP: Sol Rivers MD  Note Started:  7:02 AM EST 2/26/24     CHIEF COMPLAINT       Chief Complaint   Patient presents with    Flank Pain    Abdominal Pain     Patient BIB EMS for Right flank pain and abdominal pain        HISTORY OF PRESENT ILLNESS: 1 or more elements      History From: Patient and EMS  HPI Limitations: None     Aspen Combs is a 83 y.o. female with PMH of HTN, HLD, GERD, kidney stones, recurrent URI, interstitial cystitis, who presents BIBA with CC of nausea and abdominal pain.  She reports a sensation of lower abdominal discomfort and feeling like she has had a UTI over the last 6 months.  She has done several courses of antibiotics without feeling like her symptoms go away.  Her pain has become worse over the last 3 days.  She describes it as dull and constant felt in her entire lower abdomen.  Yesterday she had a brief moment of \"piercing\" right mid abdominal pain that is no longer present.  She reports decreased urinary output and decreased bowel movements due to poor p.o. intake over the last 3 days.  She complains of nausea without vomiting and has not been eating or drinking much as result.  Denies fever, emesis, dysuria, hematuria, flank pain.  She is established with [facility].  She reports having a CT scan about a month ago that showed a large nonobstructing kidney stone. PSH of C/S, hysterectomy, appendectomy.      Nursing Notes were all reviewed and agreed with or any disagreements were addressed in the HPI.     REVIEW OF SYSTEMS      Review of Systems   Constitutional:  Positive for appetite change.   Respiratory:  Negative for shortness of breath.    Cardiovascular:  Negative for chest pain.   Gastrointestinal:  Positive for abdominal pain and nausea. Negative for blood in stool and

## 2024-02-26 NOTE — ED NOTES
1140: Bed alarm turned on. Pt made aware of no getting up and family understands as well due to pt being so unsteady and high fall risk and needing to urinate a few drops every few minutes. Perfect Serve sent to RYAN Haji to see if pt can have something for bladder spasms and/or anxiety.

## 2024-02-27 ENCOUNTER — ANESTHESIA (OUTPATIENT)
Facility: HOSPITAL | Age: 84
DRG: 392 | End: 2024-02-27
Payer: MEDICARE

## 2024-02-27 ENCOUNTER — APPOINTMENT (OUTPATIENT)
Facility: HOSPITAL | Age: 84
DRG: 392 | End: 2024-02-27
Payer: MEDICARE

## 2024-02-27 ENCOUNTER — ANESTHESIA EVENT (OUTPATIENT)
Facility: HOSPITAL | Age: 84
DRG: 392 | End: 2024-02-27
Payer: MEDICARE

## 2024-02-27 VITALS
HEIGHT: 58 IN | DIASTOLIC BLOOD PRESSURE: 56 MMHG | WEIGHT: 108.91 LBS | RESPIRATION RATE: 18 BRPM | SYSTOLIC BLOOD PRESSURE: 72 MMHG | OXYGEN SATURATION: 88 % | BODY MASS INDEX: 22.86 KG/M2 | TEMPERATURE: 97.5 F

## 2024-02-27 PROBLEM — I46.9 CARDIAC ARREST (HCC): Status: ACTIVE | Noted: 2024-02-27

## 2024-02-27 PROBLEM — Z51.5 END OF LIFE CARE: Status: ACTIVE | Noted: 2024-02-27

## 2024-02-27 LAB
ANION GAP SERPL CALC-SCNC: 8 MMOL/L (ref 5–15)
BACTERIA SPEC CULT: NORMAL
BASOPHILS # BLD: 0.1 K/UL (ref 0–0.1)
BASOPHILS NFR BLD: 1 % (ref 0–1)
BUN SERPL-MCNC: 29 MG/DL (ref 6–20)
BUN/CREAT SERPL: 22 (ref 12–20)
CALCIUM SERPL-MCNC: 7.5 MG/DL (ref 8.5–10.1)
CHLORIDE SERPL-SCNC: 115 MMOL/L (ref 97–108)
CO2 SERPL-SCNC: 15 MMOL/L (ref 21–32)
CREAT SERPL-MCNC: 1.29 MG/DL (ref 0.55–1.02)
CRP SERPL-MCNC: 1.39 MG/DL (ref 0–0.3)
DIFFERENTIAL METHOD BLD: ABNORMAL
EOSINOPHIL # BLD: 0 K/UL (ref 0–0.4)
EOSINOPHIL NFR BLD: 0 % (ref 0–7)
ERYTHROCYTE [DISTWIDTH] IN BLOOD BY AUTOMATED COUNT: 15.8 % (ref 11.5–14.5)
GLUCOSE BLD STRIP.AUTO-MCNC: 136 MG/DL (ref 65–117)
GLUCOSE SERPL-MCNC: 99 MG/DL (ref 65–100)
HCT VFR BLD AUTO: 37 % (ref 35–47)
HGB BLD-MCNC: 12 G/DL (ref 11.5–16)
IMM GRANULOCYTES # BLD AUTO: 0 K/UL (ref 0–0.04)
IMM GRANULOCYTES NFR BLD AUTO: 0 % (ref 0–0.5)
LACTATE SERPL-SCNC: 3.7 MMOL/L (ref 0.4–2)
LYMPHOCYTES # BLD: 1.9 K/UL (ref 0.8–3.5)
LYMPHOCYTES NFR BLD: 22 % (ref 12–49)
MCH RBC QN AUTO: 33.6 PG (ref 26–34)
MCHC RBC AUTO-ENTMCNC: 32.4 G/DL (ref 30–36.5)
MCV RBC AUTO: 103.6 FL (ref 80–99)
MONOCYTES # BLD: 0.7 K/UL (ref 0–1)
MONOCYTES NFR BLD: 8 % (ref 5–13)
NEUTS SEG # BLD: 6 K/UL (ref 1.8–8)
NEUTS SEG NFR BLD: 69 % (ref 32–75)
NRBC # BLD: 0 K/UL (ref 0–0.01)
NRBC BLD-RTO: 0 PER 100 WBC
NT PRO BNP: ABNORMAL PG/ML
PLATELET # BLD AUTO: 214 K/UL (ref 150–400)
PMV BLD AUTO: 11.1 FL (ref 8.9–12.9)
POTASSIUM SERPL-SCNC: 4.3 MMOL/L (ref 3.5–5.1)
PROCALCITONIN SERPL-MCNC: 0.21 NG/ML
RBC # BLD AUTO: 3.57 M/UL (ref 3.8–5.2)
SERVICE CMNT-IMP: ABNORMAL
SERVICE CMNT-IMP: NORMAL
SODIUM SERPL-SCNC: 138 MMOL/L (ref 136–145)
TROPONIN I SERPL HS-MCNC: ABNORMAL NG/L (ref 0–51)
WBC # BLD AUTO: 8.8 K/UL (ref 3.6–11)

## 2024-02-27 PROCEDURE — 31500 INSERT EMERGENCY AIRWAY: CPT

## 2024-02-27 PROCEDURE — 84484 ASSAY OF TROPONIN QUANT: CPT

## 2024-02-27 PROCEDURE — 6360000002 HC RX W HCPCS: Performed by: NURSE PRACTITIONER

## 2024-02-27 PROCEDURE — 71045 X-RAY EXAM CHEST 1 VIEW: CPT

## 2024-02-27 PROCEDURE — 99221 1ST HOSP IP/OBS SF/LOW 40: CPT | Performed by: NURSE PRACTITIONER

## 2024-02-27 PROCEDURE — 2580000003 HC RX 258: Performed by: NURSE PRACTITIONER

## 2024-02-27 PROCEDURE — 85025 COMPLETE CBC W/AUTO DIFF WBC: CPT

## 2024-02-27 PROCEDURE — 83880 ASSAY OF NATRIURETIC PEPTIDE: CPT

## 2024-02-27 PROCEDURE — 83605 ASSAY OF LACTIC ACID: CPT

## 2024-02-27 PROCEDURE — 92950 HEART/LUNG RESUSCITATION CPR: CPT

## 2024-02-27 PROCEDURE — 2580000003 HC RX 258: Performed by: INTERNAL MEDICINE

## 2024-02-27 PROCEDURE — 5A12012 PERFORMANCE OF CARDIAC OUTPUT, SINGLE, MANUAL: ICD-10-PCS | Performed by: INTERNAL MEDICINE

## 2024-02-27 PROCEDURE — 84145 PROCALCITONIN (PCT): CPT

## 2024-02-27 PROCEDURE — 82962 GLUCOSE BLOOD TEST: CPT

## 2024-02-27 PROCEDURE — 31500 INSERT EMERGENCY AIRWAY: CPT | Performed by: ANESTHESIOLOGY

## 2024-02-27 PROCEDURE — 36415 COLL VENOUS BLD VENIPUNCTURE: CPT

## 2024-02-27 PROCEDURE — 80048 BASIC METABOLIC PNL TOTAL CA: CPT

## 2024-02-27 PROCEDURE — 86140 C-REACTIVE PROTEIN: CPT

## 2024-02-27 PROCEDURE — P9041 ALBUMIN (HUMAN),5%, 50ML: HCPCS | Performed by: NURSE PRACTITIONER

## 2024-02-27 RX ORDER — FAMOTIDINE 20 MG/1
20 TABLET, FILM COATED ORAL DAILY
Status: DISCONTINUED | OUTPATIENT
Start: 2024-02-27 | End: 2024-02-27 | Stop reason: HOSPADM

## 2024-02-27 RX ORDER — LATANOPROST 50 UG/ML
1 SOLUTION/ DROPS OPHTHALMIC NIGHTLY
Status: DISCONTINUED | OUTPATIENT
Start: 2024-02-27 | End: 2024-02-27 | Stop reason: HOSPADM

## 2024-02-27 RX ORDER — 0.9 % SODIUM CHLORIDE 0.9 %
500 INTRAVENOUS SOLUTION INTRAVENOUS ONCE
Status: COMPLETED | OUTPATIENT
Start: 2024-02-27 | End: 2024-02-27

## 2024-02-27 RX ORDER — ALBUMIN, HUMAN INJ 5% 5 %
25 SOLUTION INTRAVENOUS ONCE
Status: COMPLETED | OUTPATIENT
Start: 2024-02-27 | End: 2024-02-27

## 2024-02-27 RX ORDER — FAMOTIDINE 20 MG/1
20 TABLET, FILM COATED ORAL 2 TIMES DAILY
Status: DISCONTINUED | OUTPATIENT
Start: 2024-02-27 | End: 2024-02-27

## 2024-02-27 RX ORDER — NOREPINEPHRINE BITARTRATE 0.06 MG/ML
1-100 INJECTION, SOLUTION INTRAVENOUS CONTINUOUS
Status: DISCONTINUED | OUTPATIENT
Start: 2024-02-27 | End: 2024-02-27 | Stop reason: HOSPADM

## 2024-02-27 RX ORDER — HEPARIN SODIUM 5000 [USP'U]/ML
5000 INJECTION, SOLUTION INTRAVENOUS; SUBCUTANEOUS 2 TIMES DAILY
Status: DISCONTINUED | OUTPATIENT
Start: 2024-02-27 | End: 2024-02-27 | Stop reason: HOSPADM

## 2024-02-27 RX ORDER — SODIUM CHLORIDE, SODIUM LACTATE, POTASSIUM CHLORIDE, AND CALCIUM CHLORIDE .6; .31; .03; .02 G/100ML; G/100ML; G/100ML; G/100ML
1000 INJECTION, SOLUTION INTRAVENOUS ONCE
Status: DISCONTINUED | OUTPATIENT
Start: 2024-02-27 | End: 2024-02-27 | Stop reason: HOSPADM

## 2024-02-27 RX ADMIN — MORPHINE SULFATE 2 MG: 2 INJECTION, SOLUTION INTRAMUSCULAR; INTRAVENOUS at 04:04

## 2024-02-27 RX ADMIN — ONDANSETRON 4 MG: 2 INJECTION INTRAMUSCULAR; INTRAVENOUS at 04:05

## 2024-02-27 RX ADMIN — SODIUM CHLORIDE 500 ML: 9 INJECTION, SOLUTION INTRAVENOUS at 09:06

## 2024-02-27 RX ADMIN — ALBUMIN (HUMAN) 25 G: 12.5 INJECTION, SOLUTION INTRAVENOUS at 10:33

## 2024-02-27 RX ADMIN — SODIUM CHLORIDE 500 ML: 9 INJECTION, SOLUTION INTRAVENOUS at 10:09

## 2024-02-27 ASSESSMENT — PAIN SCALES - GENERAL: PAINLEVEL_OUTOF10: 9

## 2024-02-27 ASSESSMENT — PAIN DESCRIPTION - LOCATION: LOCATION: BACK

## 2024-02-27 NOTE — PROGRESS NOTES
Spiritual Care Assessment/Progress Note  [facility]    Name: Aspen Combs MRN: [id number]    Age: 83 y.o.     Sex: female   Language: English     Date: 2024            Total Time Calculated: 77 min              Spiritual Assessment begun in [facility] CRITICAL CARE  Service Provided For:: Family  Referral/Consult From:: Rounding  Encounter Overview/Reason : Crisis    Spiritual beliefs:      [x] Involved in a chapin tradition/spiritual practice: Patient     [] Supported by a chapin community:      [x] Claims no spiritual orientation:      [] Seeking spiritual identity:           [] Adheres to an individual form of spirituality:      [] Not able to assess:                Identified resources for coping and support system:   Support System: Spouse, Children, Family members       [x] Prayer                  [] Devotional reading               [] Music                  [] Guided Imagery     [] Pet visits                                        [] Other: (COMMENT)     Specific area/focus of visit   Encounter:    Crisis: Type: Code Blue, Emotional distress, Family Care  Spiritual/Emotional needs: Type: Difficult news received, Emotional Distress  Ritual, Rites and Sacraments:    Grief, Loss, and Adjustments: Type: Death  Ethics/Mediation:    Behavioral Health:    Palliative Care: Type: Palliative Care, Initial/Spiritual Assessment, Palliative Care, Family Care  Advance Care Planning:      Plan/Referrals: Other (Comment)    Narrative:   While engaged in CCU IDR, this patient arrived to 2520 and arrested upon arrival.  Despite efforts to resuscitate patient, she .  Patient's son and niece had visited her earlier this morning and were present moments after she coded.  She  shortly before her  and his two sisters arrived.  Accompanied family to bedside offering pastoral presence and emotional support. One sister shared privately that patient was a woman of chapin but her

## 2024-02-27 NOTE — PROGRESS NOTES
Hospitalist Progress Note    NAME:   Aspen Combs   : 1940   MRN: [id number]     Date/Time: 2024 11:00 AM  Patient PCP: Sol Rivers MD    Estimated discharge date: TBD  Barriers: clinical stability      Assessment / Plan:    Hypotension requiring vasopressor  - given 1/L IV bolus, Albumin  - Levophed drip   - ICU consultation for progressive hypotension despite above intervention    Cardiogenic shock  - RRT called for CP, was already coordinating transfer to ICU for hypotension management during the event  - During conversation with Dr. Alvarenga for the consult, Troponin still pending but pro BNP resulted and relayed result (17,083) and lactic 3.7 from yesterday's 0.6  - He verbally ordered stat echo and additional IV bolus  - Noted Troponin result as patient is being transferred to ICU, ordered cardiology consult  - Patient decompensated during transfer and was coded on arrival to ICU  -Troponin: 10,413, Pro BNP 17,083    Abdominal pain  Nausea  Recently completed antibiotic for recurrent UTI  CT abdomen pelvis:   1. There is slight wall thickening left side of the urinary bladder with slight  mucosal enhancement nonspecific but could be inflammatory/infectious and  follow-up studies would be helpful to assess for interval change.  2. Otherwise, no acute acute intra-abdominal process is identified.  3. Cholelithiasis.  4. Nonobstructing left renal calculus is unchanged  Lactic: 0.6, Procalcitonin: 0.21, CRP: 1.39  - symptom management  - gentle IV hydration  - per urology , imaging is similar to recent one in the office. Recommends OP follow up, cysto     Elevated creatinine (secondary to)  Poor oral intake  - gentle IV hydration  - avoid nephrotoxins  - trend renal function  - encourage oral intake as tolerated     Hypertension  - continue Norvasc     Hyperlipidemia  - not on meds  - diet controlled     Bladder spasm  UA: negative for UTI  - PRN Oxybutynin     Glaucoma  - continue

## 2024-02-27 NOTE — PROGRESS NOTES
RAPID RESPONSE TEAM    Responded to overhead Rapid Response-Chest Pain call in room 3216.    NP Mir at bedside. EKG and NS bolus in progress.     Upon arrival, patient alert; maintains own airway; respirations equal/unlabored/regular, lung sounds clear; pulses rapid/equal, skin pale, mucous membranes dry. Patient states CP resolved.    BP~80/60, HR~110,RR~18, SpO2 88%    Orders:  EKG-completed and reviewed immediately  Labs-samples sent  CXR-completed  NS bolus-infusing  Albumin-to be sent from pharmacy  Consult Intensivist  Levophed-if MAP <65. Not yet indicated.  Increased NC from 2L to 4L, SpO2 now >92%.    Transfer to PCU or CCU based on response to NS/Albumin.    1100 Follow up :  Patient out of room. Reportedly transferring to 5550.   Latest Reference Range & Units 02/27/24 09:33 02/27/24 09:40   Lactic Acid, Plasma 0.4 - 2.0 MMOL/L  3.7 (HH)   POC Glucose 65 - 117 mg/dL 136 (H)    NT Pro-BNP <450 PG/ML  17,083 (H)   Troponin, High Sensitivity 0 - 51 ng/L  10,413 ()   Performed by:  Misha Vásquez PCT    (): Data is critically high  (H): Data is abnormally high    CXR 2/27/2024 10:22  Mild interstitial edema     Please call back if needed.  Luke Foy RN  Ext. [phone]    1104: Code Blue called in room 2520. Upon arrival, resuscitation efforts already in progress.

## 2024-02-27 NOTE — PROGRESS NOTES
1103: On admission to ICU, Pt. Found to be PEA on the monitor after being placed on ICU bed. Pt. unresponsive. CPR initiated. Dr. Alvarenga already at the bedside.     1105: Epinephrine now given.     1106: Pulse check. PEA, resume CPR.     1107: Epinephrine now given.     1108: Pulse check, No pulse present. Pt. Asystole on the monitor.     1110: Epinephrine and Sodium Bicarb now given per Dr. Alvarenga.     1111: Pulse check, PEA on the monitor. Epinephrine now given per Dr. Alvarenga. Pt, intubated emergently at the bedside by Dr. Saul.     1112: Son updated by Clare NP with palliative care. Spouse in route. Epinephrine now pushed per Dr. Alvarenga.     1113: Pulse now present.    1115: Dr. Alvarenga inserting Left Chest tube emergently with ultrasound.     1119: Pt. Cory in the 30's. PEA. Code blue now restarted. CPR initiated.  Epi now given.     1121: Pulse check, Pt. Remains asystole on the monitor. CPR resumed.     1122: Epinephrine now given.     1123: Pulse check, Pt. Remains asystole on the monitor, No pulse present. CPR resumed.     1124: Epinephrine given. Second Chest tube now inserted emergently on the right side by Dr. Alvarenga.      1125: Pulse check, Pt. Remains asystole on the monitor, No pulse present. CPR resumed.     1127: Epinephrine and sodium bicarb now given.     1129: Pulse check, Pt. Remains asystole on the monitor, No pulse present. CPR resumed.     1130: Epinephrine now given.     1131: Pulse check, Pt. Remains asystole on the monitor, No pulse present. CPR resumed.     1133: Pulse check, Pt. Remains asystole on the monitor, No pulse present. CPR resumed. Epinephrine given.     1135: Pulse check, Pt. Remains asystole on the monitor, No pulse present. CPR resumed.     1136: Epinephrine given.    1137: Pulse check, Pt. Remains asystole on the monitor, No pulse present. Code now ended per Dr. Alvarenga.     1140: Pt. Now pronounced by Dr. Alvarenga.     1200: Family now at the bedside with the .

## 2024-02-27 NOTE — PROGRESS NOTES
P&T-Approved DVT Prophylaxis Dosing    Per P&T Committee-approved protocol enoxaparin 30 mg daily has been adjusted to heparin 5000 units BID based on weight and renal function as shown in the table below.         Laura Bose, [facility]

## 2024-02-27 NOTE — PROGRESS NOTES
Pharmacy Note - Renal Dosing and Extended Infusion Beta-Lactam Adjustment    Piperacillin/Tazobactam 3375mg Q6h for treatment of Sepsis of unknown etiology. Per [facility] Extended Infusion Beta-Lactam Policy, piperacillin/tazobactam will be changed to 4500mg loading dose followed by 3375mg Q8h extended infusion    Estimated Creatinine Clearance: Estimated Creatinine Clearance: 24 mL/min (A) (based on SCr of 1.29 mg/dL (H)).    BMI: Body mass index is 22.76 kg/m².    Please call with any questions.    Thank you,    EDVIN HARVEY, [facility]

## 2024-02-27 NOTE — ANESTHESIA PROCEDURE NOTES
Airway  Date/Time: 2/27/2024 11:19 AM  Urgency: emergent    Airway not difficult    General Information and Staff    Patient location during procedure: ICU  Anesthesiologist: Loc Darling MD  Performed by: Loc Darling MD  Authorized by: Loc Darling MD      Consent for Airway (if performed for an anesthetic, see related documentation for consents)  Consent: The procedure was performed in an emergent situation. Verbal consent not obtained. Written consent not obtained.  Risks and benefits: risks, benefits and alternatives were not discussed    no  Indications and Patient Condition  Indications for airway management: airway protection  Spontaneous Ventilation: absent  Sedation level: minimal  Preoxygenated: yes  Patient position: sniffing  MILS not maintained throughout  Mask difficulty assessment: vent by bag mask + OA or adjuvant +/- NMBA    Final Airway Details  Final airway type: endotracheal airway      Successful airway: ETT  Cuffed: yes   Successful intubation technique: video laryngoscopy  Facilitating devices/methods: intubating stylet and cricoid pressure  Blade: Sana  Blade size: #3  ETT size (mm): 7.5  Cormack-Lehane Classification: grade IIb - view of arytenoids or posterior of glottis only  Placement verified by: chest auscultation and capnometry   Inital cuff pressure (cm H2O): 5  Measured from: teeth  ETT to teeth (cm): 21  Number of attempts at approach: 1  Ventilation between attempts: bag mask  Number of other approaches attempted: 0    Additional Comments  Called for Code Blue  ACLS and CPR in progress  Patient noted to be in PEA  Patient unresponsive and intubated with glidescope mac 4 with 7.5OETT  High PIP and difficult to hand ventilate  Facial crepitus noted prior to intubation extending to Left anterior Chest  Intubation confirmed x3 with direct glidescope visualization.   Left pneumothorax highly suspected and communicated to Intensivist.  Needle decompression done at

## 2024-02-27 NOTE — PROGRESS NOTES
Bedside and Verbal shift change report given to Chip RN (oncoming nurse) by Erin RN (offgoing nurse). Report included the following information Nurse Handoff Report, Recent Results, Cardiac Rhythm NSR, Quality Measures, and Neuro Assessment.

## 2024-02-27 NOTE — PROGRESS NOTES
1050 Report received on patient from Norah RN (Med-surgical)    1058 Patient being transported to room 2520 by Medical surgical RN x2 on monitor and non-rebreather     1103 Pt. Found to be PEA on the monitor after being placed on ICU bed. Pt. unresponsive and cyanotic. CPR / Code Blue initiated. Dr. Alvarenga at the bedside.    See CPR note documented by CCU Charge RN    Code ended at 1137, TOD called by Colette CHAVEZ

## 2024-02-27 NOTE — CONSULTS
Palliative Medicine  Patient Name: Aspen Combs  YOB: 1940  MRN: [id number]  Age: 83 y.o.  Gender: female    Date of Initial Consult: 2/27/2024  Date of Service: 2/27/2024  Time: 11:31 AM  Provider: TRACY Guaman NP  Hospital Day: 2  Admit Date: 2/26/2024  Referring Provider: Sean Alvarenga MD       Reasons for Consultation:  Goals of Care    HISTORY OF PRESENT ILLNESS (HPI):   Aspen Combs is a 83 y.o. female with a past medical history of HTN, HLD, GERD, kidney stones, recurrent URI, interstitial cystitis, who was admitted on 2/26/2024 from home with a diagnosis of UTI without infection.    BIBA with c/o of abdominal pain and nausea; pt reports constant, dull, lower abdominal discomfort that feels like she has UTI over the past 6 months but worse over the past 3 days.  She is followed by [facility]. CT scan about a month ago that showed a large nonobstructing kidney stone. PSH of C/S, hysterectomy, appendectomy. Lactic acid 0.6.  Creatinine elevated due to poor oral intake, admitted for IV hydration.   2/26: ABD CT: 1. There is slight wall thickening left side of the urinary bladder with slight mucosal enhancement nonspecific but could be inflammatory/infectious and follow-up studies would be helpful to assess for interval change. 2. Otherwise, no acute acute intra-abdominal process is identified. 3. Cholelithiasis. 4. Nonobstructing left renal calculus is unchanged.    2/27: per discussion with staff, pt was stable until this am when she c/o chest pain; her BP steadily dropped, levo started, and she was transferred to ICU, upon arrival to ICU at 11:03am she was in cardiac arrest.  CPR initiated, during which left sided pneumothorax and right sided hemothorax noted.  Pt's time of death was 11:40am.      Psychosocial: per son, lived with , was independent with ADLs.  PALLIATIVE DIAGNOSES:    Abdominal pain   Poor appetite  Nausea   End of life support to family  ASSESSMENT

## 2024-02-27 NOTE — PROGRESS NOTES
Patient transferred from floor for vasopressor need per report.     I hadn't previously seen the patient or evaluated. I hadn't seen any labs or records.     Patient had cardiac arrest as soon as the patient came to ICU.    CPR started immediately. Multiple rounds of epinephrine given. Patient was in asystolic arrest.     Patient was intubated with the help of anesthesia.     Patient was noted to have subcutaneous emphysema, no breath sounds bilaterally despite intubation. Difficult to bag.     Needle decompression of the left side done. Bagging improved but still there was difficulty. RIGHT sided decompression done as well.     Left sided chest tube placed surgically - pleural fluid (darrick colored) noted. Air came out as well.     Right sided chest tube placed surgically - 700 ml of fresh blood came out.     Despite continued resuscitation effort patient passed away at 11:40 am.     Michelle Hester NP (medical team) was at bedside. I informed them. They agreed to do the discharge summary.     I spoke to the family and updated.     Sean Alvarenga MD, FCCP, FCCM, ATSF, FACP, DAABIP  Interventional Pulmonology/Critical Care Medicine  [facility] Critical Care  [facility]

## 2024-02-27 NOTE — PROGRESS NOTES
Order received. Pt with code blue called. Pt medically unstable and not appropriate for PT services at this time. Will complete order. Can be re consulted as appropriate.

## 2024-02-27 NOTE — DISCHARGE SUMMARY
shock  - RRT called for CP, was already coordinating transfer to ICU for hypotension management during the event  - During conversation with Dr. Alvarenga for the consult, Troponin still pending but pro BNP resulted and relayed result (17,083) and lactic 3.7 from yesterday's 0.6  - He verbally ordered stat echo and additional IV bolus  - Noted Troponin result as patient is being transferred to ICU, ordered cardiology consult  - Patient decompensated during transfer and was coded on arrival to ICU  -Troponin: 10,413, Pro BNP 17,083  - patient developed subcutaneous emphysema s/p initial resuscitation, needle decompression to L chest done with chest tube placement - air and blood drainage noted  - family was updated at regular interval during the event, son at bedside at one point during resuscitation  - despite multiple rounds  and extensive CPR done, patient  at 11:40       Abdominal pain  Nausea  Recently completed antibiotic for recurrent UTI  CT abdomen pelvis:   1. There is slight wall thickening left side of the urinary bladder with slight  mucosal enhancement nonspecific but could be inflammatory/infectious and  follow-up studies would be helpful to assess for interval change.  2. Otherwise, no acute acute intra-abdominal process is identified.  3. Cholelithiasis.  4. Nonobstructing left renal calculus is unchanged  Lactic: 0.6, Procalcitonin: 0.21, CRP: 1.39  - symptom management  - gentle IV hydration  - per urology , imaging is similar to recent one in the office. Recommends OP follow up, cysto     Elevated creatinine (secondary to)  Poor oral intake  - gentle IV hydration  - avoid nephrotoxins  - trend renal function  - encourage oral intake as tolerated     Hypertension  - continue Norvasc     Hyperlipidemia  - not on meds  - diet controlled     Bladder spasm  UA: negative for UTI  - PRN Oxybutynin     Glaucoma  - continue Xalatan     GERD  - continue Famotidine     Discharge Exam:  Patient seen and

## 2024-02-28 NOTE — PROGRESS NOTES
At bedside with Charge nurse and Director, patient receiving fluid bolus and albumin patient's blood pressure came up to 89/75 patient was awake and alert just complaining of shortness of breath despite oxygen saturation in the mid 90s and decision was made to transport patient at that time with 3 RNs and RT. Patient on transport monitor and a NRB mask during transport. Patient was talking during transport and started to become restless on arrival to room 2520. Patient became unresponsive while transferring patient from Med-Tele bed to CCU bed and writer felt for a pulse with no pulse palpable pulse and CPR initiated by writer.

## 2024-03-01 NOTE — PROGRESS NOTES
0817: Nurse collected morning vitals on patient. Patient BP was 82/61.  0839: Nurse rechecked patient BP, BP was 82/58  0847: Nurse and tech readjusted patient in bed and checked BP again, BP was 82/60. Nurse messaged MD via eleni. Orders were placed for IV fluid bolus.   Patient started complaining of chest pain and RRT was called. EKG, Lactic and Troponin orders were placed.   1032: Lab called with critical Lactic Acid of 3.7, nurse communicated lab results to MD, face to face.   1055: Lab called with critical Troponin of 10,413, nurse communicated lab results to MD, face to face.     Decision was made to transfer patient to CCU 2520. Nurse called report to accepting nurse as Charge Nurse, CCL, and Director transported patient to CCU.

## 2024-03-03 LAB
BACTERIA SPEC CULT: NORMAL
BACTERIA SPEC CULT: NORMAL
SERVICE CMNT-IMP: NORMAL
SERVICE CMNT-IMP: NORMAL

## 2024-06-14 LAB
EKG ATRIAL RATE: 112 BPM
EKG DIAGNOSIS: NORMAL
EKG P AXIS: 70 DEGREES
EKG P-R INTERVAL: 314 MS
EKG Q-T INTERVAL: 312 MS
EKG QRS DURATION: 78 MS
EKG QTC CALCULATION (BAZETT): 425 MS
EKG R AXIS: -74 DEGREES
EKG T AXIS: 135 DEGREES
EKG VENTRICULAR RATE: 112 BPM

## 2024-12-02 NOTE — TELEPHONE ENCOUNTER
Spoke with patient. Complaint of continued pain in neck. She also states lump in neck is still present. Applying ice, using Tylenol. Patient is requesting additional recommendations. Normal XR on 8/14/17. Complaint of leg cramping. Advised patient to eat a banana daily to increase potassium in diet. If continues patient should let us know. Imaging Studies

## (undated) DEVICE — 1200 GUARD II KIT W/5MM TUBE W/O VAC TUBE: Brand: GUARDIAN

## (undated) DEVICE — SYR 5ML 1/5 GRAD LL NSAF LF --

## (undated) DEVICE — SYR 10ML LUER LOK 1/5ML GRAD --

## (undated) DEVICE — FORCEPS BX L160CM DIA8MM GRSP DISECT CUP TIP NONLOCKING ROT

## (undated) DEVICE — STRAINER URIN CALC RNL MSH -- CONVERT TO ITEM 357634

## (undated) DEVICE — HIGH FLOW RATE EXTENSION SET, LUER LOCK ADAPTERS

## (undated) DEVICE — TRAP,MUCUS SPECIMEN, 80CC: Brand: MEDLINE

## (undated) DEVICE — SYR 3ML LL TIP 1/10ML GRAD --

## (undated) DEVICE — NEONATAL-ADULT SPO2 SENSOR: Brand: NELLCOR

## (undated) DEVICE — BASIN EMSIS 16OZ GRAPHITE PLAS KID SHP MOLD GRAD FOR ORAL

## (undated) DEVICE — ELECTRODE,RADIOTRANSLUCENT,FOAM,5PK: Brand: MEDLINE

## (undated) DEVICE — CONTAINER SPEC 20 ML LID NEUT BUFF FORMALIN 10 % POLYPR STS

## (undated) DEVICE — SOLUTION IV STRL H2O 500 ML AQUALITE POUR BTL

## (undated) DEVICE — SYR ASSEMB INFL BLLN 60ML --

## (undated) DEVICE — THE MONARCH® "D" CARTRIDGE IS A SINGLE-USE POLYPROPYLENE CARTRIDGE FOR POSTERIOR CHAMBER IOL DELIVERY: Brand: MONARCH® III

## (undated) DEVICE — Device

## (undated) DEVICE — TOWEL SURG W17XL27IN STD BLU COT NONFENESTRATED PREWASHED

## (undated) DEVICE — CATH IV AUTOGRD BC PNK 20GA 25 -- INSYTE

## (undated) DEVICE — BLOCK BITE ENDOSCP AD 21 MM W/ DIL BLU LF DISP

## (undated) DEVICE — NEEDLE HYPO 18GA L1.5IN PNK S STL HUB POLYPR SHLD REG BVL

## (undated) DEVICE — SURGICAL PROCEDURE PACK CATRCT CUST

## (undated) DEVICE — APPLICATOR,COTTON-TIP,WOOD,3,STRL: Brand: MEDLINE

## (undated) DEVICE — SOLUTION IV 250ML 0.9% SOD CHL CLR INJ FLX BG CONT PRT CLSR

## (undated) DEVICE — NON-REM POLYHESIVE PATIENT RETURN ELECTRODE: Brand: VALLEYLAB

## (undated) DEVICE — SET ADMIN 16ML TBNG L100IN 2 Y INJ SITE IV PIGGY BK DISP (ORDER IN MULIPLES OF 48)

## (undated) DEVICE — BAG SPEC BIOHZRD 10 X 10 IN --

## (undated) DEVICE — TOWEL 4 PLY TISS 19X30 SUE WHT

## (undated) DEVICE — SOLIDIFIER MEDC 1200ML -- CONVERT TO 356117

## (undated) DEVICE — Z DISCONTINUED PER MEDLINE LINE GAS SAMPLING O2/CO2 LNG AD 13 FT NSL W/ TBNG FILTERLINE

## (undated) DEVICE — HYPODERMIC SAFETY NEEDLE: Brand: MAGELLAN

## (undated) DEVICE — SURGICAL PROCEDURE PACK EYE CUST DR CHNDLR

## (undated) DEVICE — NDL FLTR TIP 5 MIC 18GX1.5IN --

## (undated) DEVICE — SNARE ENDOSCP M L240CM W27MM SHTH DIA2.4MM CHN 2.8MM OVL

## (undated) DEVICE — KENDALL DL ECG CABLE AND LEAD WIRE SYSTEM, 3-LEAD, SINGLE PATIENT USE: Brand: KENDALL

## (undated) DEVICE — STERILE POLYISOPRENE POWDER-FREE SURGICAL GLOVES: Brand: PROTEXIS

## (undated) DEVICE — 3M™ TEGADERM™ TRANSPARENT FILM DRESSING FRAME STYLE, 1624W, 2-3/8 IN X 2-3/4 IN (6 CM X 7 CM), 100/CT 4CT/CASE: Brand: 3M™ TEGADERM™

## (undated) DEVICE — CATHETER IV 22GA L1IN TEF FEP STR HUB INTROCAN SFTY

## (undated) DEVICE — TRAP SPEC COLL POLYP POLYSTYR --

## (undated) DEVICE — SET ADMIN 16ML TBNG L100IN 2 Y INJ SITE IV PIGGY BK DISP

## (undated) DEVICE — YANKAUER,TAPERED BULBOUS TIP,W/O VENT: Brand: MEDLINE

## (undated) DEVICE — ESOPHAGEAL BALLOON DILATATION CATHETER: Brand: CRE FIXED WIRE